# Patient Record
Sex: FEMALE | Race: WHITE | Employment: FULL TIME | URBAN - NONMETROPOLITAN AREA
[De-identification: names, ages, dates, MRNs, and addresses within clinical notes are randomized per-mention and may not be internally consistent; named-entity substitution may affect disease eponyms.]

---

## 2017-01-06 ENCOUNTER — TELEPHONE (OUTPATIENT)
Dept: PRIMARY CARE CLINIC | Age: 52
End: 2017-01-06

## 2017-02-01 ENCOUNTER — OFFICE VISIT (OUTPATIENT)
Dept: PRIMARY CARE CLINIC | Age: 52
End: 2017-02-01
Payer: COMMERCIAL

## 2017-02-01 VITALS
TEMPERATURE: 98.8 F | HEART RATE: 86 BPM | DIASTOLIC BLOOD PRESSURE: 60 MMHG | OXYGEN SATURATION: 98 % | HEIGHT: 63 IN | SYSTOLIC BLOOD PRESSURE: 110 MMHG

## 2017-02-01 DIAGNOSIS — N64.9 LESION OF BREAST: ICD-10-CM

## 2017-02-01 DIAGNOSIS — C88.4 MALTOMA (HCC): Primary | ICD-10-CM

## 2017-02-01 DIAGNOSIS — M25.50 POLYARTHRALGIA: ICD-10-CM

## 2017-02-01 DIAGNOSIS — E03.9 ACQUIRED HYPOTHYROIDISM: ICD-10-CM

## 2017-02-01 DIAGNOSIS — M72.2 PLANTAR FASCIITIS: ICD-10-CM

## 2017-02-01 PROBLEM — C88.40 MALTOMA: Status: ACTIVE | Noted: 2017-02-01

## 2017-02-01 LAB
C-REACTIVE PROTEIN: <0.3 MG/L (ref 0–5)
RHEUMATOID FACTOR: 10 IU/ML
SEDIMENTATION RATE, ERYTHROCYTE: 14 MM/HR (ref 0–25)

## 2017-02-01 PROCEDURE — 99214 OFFICE O/P EST MOD 30 MIN: CPT | Performed by: PEDIATRICS

## 2017-02-01 RX ORDER — LEVOTHYROXINE SODIUM 0.03 MG/1
25 TABLET ORAL DAILY
COMMUNITY
End: 2017-02-01 | Stop reason: SDUPTHER

## 2017-02-01 RX ORDER — LEVOTHYROXINE SODIUM 0.07 MG/1
75 TABLET ORAL DAILY
Qty: 30 TABLET | Refills: 0 | Status: SHIPPED | OUTPATIENT
Start: 2017-02-01 | End: 2017-03-02 | Stop reason: SDUPTHER

## 2017-02-01 ASSESSMENT — ENCOUNTER SYMPTOMS
ABDOMINAL PAIN: 0
DIARRHEA: 0
SINUS PRESSURE: 0
CONSTIPATION: 0
EYE PAIN: 0
BACK PAIN: 0
VOICE CHANGE: 0
NAUSEA: 0
VOMITING: 0
SHORTNESS OF BREATH: 0
EYE DISCHARGE: 0
SORE THROAT: 0
WHEEZING: 0
COUGH: 0

## 2017-02-02 ENCOUNTER — TELEPHONE (OUTPATIENT)
Dept: PRIMARY CARE CLINIC | Age: 52
End: 2017-02-02

## 2017-02-03 LAB — ANA IGG, ELISA: NORMAL

## 2017-02-06 ENCOUNTER — TELEPHONE (OUTPATIENT)
Dept: PRIMARY CARE CLINIC | Age: 52
End: 2017-02-06

## 2017-03-01 ENCOUNTER — OFFICE VISIT (OUTPATIENT)
Dept: ONCOLOGY | Facility: CLINIC | Age: 52
End: 2017-03-01

## 2017-03-01 ENCOUNTER — LAB (OUTPATIENT)
Dept: ONCOLOGY | Facility: CLINIC | Age: 52
End: 2017-03-01

## 2017-03-01 VITALS
TEMPERATURE: 98 F | OXYGEN SATURATION: 98 % | SYSTOLIC BLOOD PRESSURE: 112 MMHG | RESPIRATION RATE: 16 BRPM | HEART RATE: 76 BPM | DIASTOLIC BLOOD PRESSURE: 62 MMHG | WEIGHT: 171.1 LBS

## 2017-03-01 DIAGNOSIS — R53.1 WEAKNESS: ICD-10-CM

## 2017-03-01 DIAGNOSIS — C85.80 OTHER SPECIFIED TYPE OF NON-HODGKIN LYMPHOMA: Primary | ICD-10-CM

## 2017-03-01 DIAGNOSIS — E03.9 HYPOTHYROIDISM, UNSPECIFIED TYPE: ICD-10-CM

## 2017-03-01 DIAGNOSIS — C82.99 NODULAR LYMPHOMA, UNSPECIFIED SITE, EXTRANODAL AND SOLID ORGAN SITES: Primary | ICD-10-CM

## 2017-03-01 DIAGNOSIS — E55.9 VITAMIN D DEFICIENCY: ICD-10-CM

## 2017-03-01 DIAGNOSIS — R53.1 WEAKNESS: Primary | ICD-10-CM

## 2017-03-01 PROBLEM — C85.90 NON-HODGKIN LYMPHOMA (HCC): Status: ACTIVE | Noted: 2017-03-01

## 2017-03-01 LAB
ALBUMIN SERPL-MCNC: 4.1 G/DL (ref 3.5–5)
ALBUMIN/GLOB SERPL: 1.2 G/DL
ALP SERPL-CCNC: 70 U/L (ref 38–126)
ALT SERPL W P-5'-P-CCNC: 50 U/L (ref 9–52)
ANION GAP SERPL CALCULATED.3IONS-SCNC: 13 MMOL/L
AST SERPL-CCNC: 46 U/L (ref 5–40)
AUTO MIXED CELLS #: 0.3 10*3/UL (ref 0.1–1.5)
AUTO MIXED CELLS %: 6.8 % (ref 0.2–15.1)
BILIRUB SERPL-MCNC: 0.5 MG/DL (ref 0.2–1.3)
BUN BLD-MCNC: 9 MG/DL (ref 7–26)
BUN/CREAT SERPL: 12.9 (ref 7–25)
CALCIUM SPEC-SCNC: 9.2 MG/DL (ref 8.4–10.2)
CHLORIDE SERPL-SCNC: 103 MMOL/L (ref 98–107)
CO2 SERPL-SCNC: 29 MMOL/L (ref 22–30)
CREAT BLD-MCNC: 0.7 MG/DL (ref 0.7–1.4)
ERYTHROCYTE [DISTWIDTH] IN BLOOD BY AUTOMATED COUNT: 13.9 % (ref 11.5–14.5)
GFR SERPL CREATININE-BSD FRML MDRD: 88 ML/MIN/1.73
GLOBULIN UR ELPH-MCNC: 3.4 GM/DL
GLUCOSE BLD-MCNC: 87 MG/DL (ref 75–110)
HCT VFR BLD AUTO: 38.5 % (ref 37–47)
HGB BLD-MCNC: 12.6 G/DL (ref 12–16)
LYMPHOCYTES # BLD AUTO: 1.7 10*3/MM3 (ref 0.8–7)
LYMPHOCYTES NFR BLD AUTO: 44.8 % (ref 10–58.5)
MCH RBC QN AUTO: 30.4 PG (ref 27–31)
MCHC RBC AUTO-ENTMCNC: 32.7 G/DL (ref 33–37)
MCV RBC AUTO: 92.8 FL (ref 81–99)
NEUTROPHILS # BLD AUTO: 1.9 10*3/MM3 (ref 2–7.8)
NEUTROPHILS NFR BLD AUTO: 48.4 % (ref 37–92)
PLATELET # BLD AUTO: 225 10*3/MM3 (ref 130–400)
PMV BLD AUTO: 8.6 FL (ref 6–12)
POTASSIUM BLD-SCNC: 3.8 MMOL/L (ref 3.6–5)
PROT SERPL-MCNC: 7.5 G/DL (ref 6.3–8.2)
RBC # BLD AUTO: 4.15 10*6/MM3 (ref 4.2–5.4)
SODIUM BLD-SCNC: 145 MMOL/L (ref 137–145)
WBC NRBC COR # BLD: 3.9 10*3/MM3 (ref 4.8–10.8)

## 2017-03-01 PROCEDURE — 36415 COLL VENOUS BLD VENIPUNCTURE: CPT | Performed by: INTERNAL MEDICINE

## 2017-03-01 PROCEDURE — 80053 COMPREHEN METABOLIC PANEL: CPT | Performed by: INTERNAL MEDICINE

## 2017-03-01 PROCEDURE — 99214 OFFICE O/P EST MOD 30 MIN: CPT | Performed by: INTERNAL MEDICINE

## 2017-03-01 PROCEDURE — 83615 LACTATE (LD) (LDH) ENZYME: CPT | Performed by: INTERNAL MEDICINE

## 2017-03-01 PROCEDURE — 85025 COMPLETE CBC W/AUTO DIFF WBC: CPT | Performed by: INTERNAL MEDICINE

## 2017-03-01 RX ORDER — FLUTICASONE PROPIONATE 50 MCG
SPRAY, SUSPENSION (ML) NASAL
COMMUNITY
Start: 2016-10-26 | End: 2019-07-30

## 2017-03-01 RX ORDER — MULTIVIT WITH MINERALS/LUTEIN
TABLET ORAL
COMMUNITY
End: 2017-03-01

## 2017-03-01 RX ORDER — LEVOTHYROXINE SODIUM 0.07 MG/1
TABLET ORAL
COMMUNITY
Start: 2017-02-01 | End: 2019-08-05 | Stop reason: SDUPTHER

## 2017-03-01 RX ORDER — M-VIT,TX,IRON,MINS/CALC/FOLIC 27MG-0.4MG
TABLET ORAL
COMMUNITY
End: 2017-03-01

## 2017-03-01 NOTE — PROGRESS NOTES
[facility]  HEMATOLOGY & ONCOLOGY        Subjective     VISIT DIAGNOSIS: No diagnosis found.    REASON FOR VISIT:   No chief complaint on file.       HEMATOLOGY / ONCOLOGY HISTORY:   Oncology/Hematology History    anabella Alberto presented with signs and symptoms of intestinal obstruction to [facility] on 12/11/2013. Evaluation  revealed a partial small bowel obstruction and she was treated conservatively and improved. Prior to discharge, she underwent a small  bowel follow through which was normal. Patient returns to the hospital on 1/29/2014 with recurrence of symptoms. She was taken to the  operating room finding thickening of the bowel wall in a near circumferential fashion. The bowel was opened finding areas of ulceration and  thickening of the bowel originating from the border was a mesenteric. A small bowel resection was performed. 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  Patient is undergone a PET/CT scan which is unremarkable. Her CAT scans of her abdomen and pelvis have been unremarkable as well,  with the exception of the mentions small bowel obstruction.  Radiology Studies:  5/26/2015 CTAP: Uterine fibroid.  10/6/2015 RUQ US: Negative  10/6/2015 HIDA scan: Ejection fraction 14%.  10/7/2015 CTAP: Abnormal 3.5 Center length segment of small bowel left upper abdominal quadrant. Recommend EGD. ovarian cyst, right  increased size from previous exam. uterine leiomyomata  11/4/2015 SKULL MID THIGH PET/CT: Normal PET/CT scan.  11/6/2015 RUQ Intraoperative cholangiogram: Negative  INTERVAL HISTORY  Ms. 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 She is status post small bowel resection and is currently in remission. No additional treatment and monitoring with  CT scans, watch and wait it is our plan.         Non-Hodgkin lymphoma    3/1/2017 Initial Diagnosis    Non-Hodgkin lymphoma     [No treatment plan]  Cancer Staging Information:  No matching staging information was found for the patient.      INTERVAL HISTORY  Patient ID: Desirae Alberto is a 52 y.o. year old female         Review of Systems         Medications:    Current Outpatient Prescriptions   Medication Sig Dispense Refill   • Diclofenac Sodium (VOLTAREN PO) Take  by mouth.     • fluticasone (FLONASE) 50 MCG/ACT nasal spray 1 spray by Nasal route daily     • levothyroxine (SYNTHROID, LEVOTHROID) 75 MCG tablet Take 1 tablet by mouth Daily       No current facility-administered medications for this visit.        ALLERGIES:  No Known Allergies    Objective      @VITALS    Current Status 3/1/2017   ECOG score 0       General Appearance: Patient is awake, alert, oriented and in no acute distress. Patient is welldeveloped, wellnourished, and appears stated age.  HEENT: Normocephalic. Sclerae clear, conjunctiva pink, extraocular movements intact, pupils, round, reactive to light and  accommodation. Mouth and throat are clear with moist oral mucosa.  NECK: Supple, no jugular venous distention, thyroid not enlarged.  LYMPH: No cervical, supraclavicular, axillary, or inguinal lymphadenopathy.  CHEST: Equal bilateral expansion, AP  diameter normal, resonant percussion note  LUNGS: Good air movement, no rales, rhonchi, rubs or wheezes with auscultation  CARDIO: Regular sinus rhythm, no murmurs, gallops or rubs.  ABDOMEN: Nondistended, soft, No tenderness, no guarding, no rebound, No hepatosplenomegaly. No abdominal masses. Bowel sounds positive. No hernia  GENITALIA: Not examined.  BREASTS: Not examined.  MUSKEL: No joint swelling, decreased motion, or inflammation  EXTREMS: No edema, clubbing, cyanosis, No varicose veins.  NEURO: Grossly nonfocal, Gait is coordinated and smooth, Cognition is preserved.  SKIN: No rashes, no ecchymoses, no petechia.  PSYCH: Oriented to  time, place and person. Memory is preserved. Mood and affect appear normal      RECENT LABS:  Orders Only on 03/01/2017   Component Date Value Ref Range Status   • Glucose 03/01/2017 87  75 - 110 mg/dL Final   • BUN 03/01/2017 9  7 - 26 mg/dL Final   • Creatinine 03/01/2017 0.70  0.70 - 1.40 mg/dL Final   • Sodium 03/01/2017 145  137 - 145 mmol/L Final   • Potassium 03/01/2017 3.8  3.6 - 5.0 mmol/L Final   • Chloride 03/01/2017 103  98 - 107 mmol/L Final   • CO2 03/01/2017 29.0  22.0 - 30.0 mmol/L Final   • Calcium 03/01/2017 9.2  8.4 - 10.2 mg/dL Final   • Total Protein 03/01/2017 7.5  6.3 - 8.2 g/dL Final   • Albumin 03/01/2017 4.10  3.50 - 5.00 g/dL Final   • ALT (SGPT) 03/01/2017 50  9 - 52 U/L Final   • AST (SGOT) 03/01/2017 46* 5 - 40 U/L Final   • Alkaline Phosphatase 03/01/2017 70  38 - 126 U/L Final   • Total Bilirubin 03/01/2017 0.5  0.2 - 1.3 mg/dL Final   • eGFR Non African Amer 03/01/2017 88  >60 mL/min/1.73 Final   • Globulin 03/01/2017 3.4  gm/dL Final   • A/G Ratio 03/01/2017 1.2  g/dL Final   • BUN/Creatinine Ratio 03/01/2017 12.9  7.0 - 25.0 Final   • Anion Gap 03/01/2017 13.0  mmol/L Final   • WBC 03/01/2017 3.90* 4.80 - 10.80 10*3/mm3 Final   • RBC 03/01/2017 4.15* 4.20 - 5.40 10*6/mm3 Final   • Hemoglobin 03/01/2017 12.6  12.0 - 16.0 g/dL Final   • Hematocrit 03/01/2017 38.5  37.0 - 47.0 % Final   • MCV 03/01/2017 92.8  81.0 - 99.0 fL Final   • MCH 03/01/2017 30.4  27.0 - 31.0 pg Final   • MCHC 03/01/2017 32.7* 33.0 - 37.0 g/dL Final   • RDW 03/01/2017 13.9  11.5 - 14.5 % Final   • MPV 03/01/2017 8.6  6.0 - 12.0 fL Final   • Platelets 03/01/2017 225  130 - 400 10*3/mm3 Final   • Neutrophil % 03/01/2017 48.4  37.0 - 92.0 % Final   • Lymphocyte % 03/01/2017 44.8  10.0 - 58.5 % Final   • Auto Mixed Cells % 03/01/2017 6.8  0.2 - 15.1 % Final   • Neutrophils, Absolute 03/01/2017 1.90* 2.00 - 7.80 10*3/mm3 Final   • Lymphocytes, Absolute 03/01/2017 1.70  0.80 - 7.00 10*3/mm3 Final   • Auto Mixed Cells  # 03/01/2017 0.30  0.10 - 1.50 10*3/uL Final       RADIOLOGY:  No results found.         Assessment/Plan      MALTOMA small intestine status post resection in January 2014.  She has never had any chemotherapy.  CT scans done in 2015 shows some thickening 3.5 cm segment.  She does not have any nausea abdominal pain and a moving her bowels regularly without any blood in the stools or any black stools.  She denies any new lumps or bumps or any B symptoms.  I will repeat another CT scan of the abdomen to look at the 3.5 cm segment that showed some thickening in 2015.  I will also give her 3 stool occult blood cards to look for any blood in the stools.  I will also check beta-2 microglobulin and LDH is baseline.. Her white count is borderline low at 3.9 although the absolute neutrophil count is 1900 we will keep an eye on that.  Recently she was diagnosed with hypothyroidism and she wants me to recheck her TSH and free T4 that I will do.  She will return to clinic after the above imaging is obtained.  On my clinical examination I do not find any evidence of lymphadenopathy in the neck or under the armpit or in the groin.  Abdominal appears benign.                Efraín Coles MD    3/1/2017    3:54 PM

## 2017-03-02 DIAGNOSIS — E03.9 ACQUIRED HYPOTHYROIDISM: ICD-10-CM

## 2017-03-02 LAB — LDH SERPL-CCNC: 439 U/L (ref 313–618)

## 2017-03-02 RX ORDER — LEVOTHYROXINE SODIUM 0.07 MG/1
75 TABLET ORAL DAILY
Qty: 30 TABLET | Refills: 3 | Status: SHIPPED | OUTPATIENT
Start: 2017-03-02 | End: 2017-07-19 | Stop reason: SDUPTHER

## 2017-03-03 LAB
25(OH)D3+25(OH)D2 SERPL-MCNC: 33.9 NG/ML (ref 30–100)
B2 MICROGLOB SERPL-MCNC: 1.3 MG/L (ref 0.6–2.4)
T3FREE SERPL-MCNC: 3 PG/ML (ref 2–4.4)
T4 FREE SERPL-MCNC: 1.65 NG/DL (ref 0.78–2.19)
THYROPEROXIDASE AB SERPL-ACNC: <6 IU/ML (ref 0–34)
TSH SERPL DL<=0.005 MIU/L-ACNC: 0.88 MIU/ML (ref 0.47–4.68)

## 2017-03-10 DIAGNOSIS — E03.9 HYPOTHYROIDISM, UNSPECIFIED TYPE: Primary | ICD-10-CM

## 2017-03-10 LAB
T3 FREE: 3
T4 FREE: 1.65
TSH SERPL DL<=0.05 MIU/L-ACNC: 0.88 UIU/ML

## 2017-03-13 ENCOUNTER — TELEPHONE (OUTPATIENT)
Dept: PRIMARY CARE CLINIC | Age: 52
End: 2017-03-13

## 2017-03-14 DIAGNOSIS — C83.80: Primary | ICD-10-CM

## 2017-03-15 ENCOUNTER — LAB (OUTPATIENT)
Dept: ONCOLOGY | Facility: CLINIC | Age: 52
End: 2017-03-15

## 2017-03-15 ENCOUNTER — OFFICE VISIT (OUTPATIENT)
Dept: ONCOLOGY | Facility: CLINIC | Age: 52
End: 2017-03-15

## 2017-03-15 VITALS
TEMPERATURE: 97.4 F | HEIGHT: 63 IN | OXYGEN SATURATION: 98 % | RESPIRATION RATE: 16 BRPM | HEART RATE: 88 BPM | SYSTOLIC BLOOD PRESSURE: 130 MMHG | DIASTOLIC BLOOD PRESSURE: 82 MMHG | WEIGHT: 168.7 LBS | BODY MASS INDEX: 29.89 KG/M2

## 2017-03-15 DIAGNOSIS — C85.80 OTHER SPECIFIED TYPE OF NON-HODGKIN LYMPHOMA: Primary | ICD-10-CM

## 2017-03-15 DIAGNOSIS — C83.80: ICD-10-CM

## 2017-03-15 LAB
AUTO MIXED CELLS #: 0.2 10*3/UL (ref 0.1–1.5)
AUTO MIXED CELLS %: 4.6 % (ref 0.2–15.1)
ERYTHROCYTE [DISTWIDTH] IN BLOOD BY AUTOMATED COUNT: 13 % (ref 11.5–14.5)
HCT VFR BLD AUTO: 41.8 % (ref 37–47)
HGB BLD-MCNC: 13.2 G/DL (ref 12–16)
LYMPHOCYTES # BLD AUTO: 1.7 10*3/MM3 (ref 0.8–7)
LYMPHOCYTES NFR BLD AUTO: 40.7 % (ref 10–58.5)
MCH RBC QN AUTO: 29.7 PG (ref 27–31)
MCHC RBC AUTO-ENTMCNC: 31.6 G/DL (ref 33–37)
MCV RBC AUTO: 94.2 FL (ref 81–99)
NEUTROPHILS # BLD AUTO: 2.3 10*3/MM3 (ref 2–7.8)
NEUTROPHILS NFR BLD AUTO: 54.7 % (ref 37–92)
PLATELET # BLD AUTO: 232 10*3/MM3 (ref 130–400)
PMV BLD AUTO: 8.3 FL (ref 6–12)
RBC # BLD AUTO: 4.44 10*6/MM3 (ref 4.2–5.4)
WBC NRBC COR # BLD: 4.2 10*3/MM3 (ref 4.8–10.8)

## 2017-03-15 PROCEDURE — 36415 COLL VENOUS BLD VENIPUNCTURE: CPT | Performed by: INTERNAL MEDICINE

## 2017-03-15 PROCEDURE — 99214 OFFICE O/P EST MOD 30 MIN: CPT | Performed by: INTERNAL MEDICINE

## 2017-03-15 PROCEDURE — 85025 COMPLETE CBC W/AUTO DIFF WBC: CPT | Performed by: INTERNAL MEDICINE

## 2017-03-15 RX ORDER — LEVOTHYROXINE SODIUM 0.03 MG/1
TABLET ORAL
COMMUNITY
Start: 2017-01-19 | End: 2017-03-15

## 2017-03-15 NOTE — PROGRESS NOTES
[facility]  HEMATOLOGY & ONCOLOGY        Subjective     VISIT DIAGNOSIS: No diagnosis found.    REASON FOR VISIT:   No chief complaint on file.       HEMATOLOGY / ONCOLOGY HISTORY:   Oncology/Hematology History    anabella Alberto presented with signs and symptoms of intestinal obstruction to [facility] on 12/11/2013. Evaluation  revealed a partial small bowel obstruction and she was treated conservatively and improved. Prior to discharge, she underwent a small  bowel follow through which was normal. Patient returns to the hospital on 1/29/2014 with recurrence of symptoms. She was taken to the  operating room finding thickening of the bowel wall in a near circumferential fashion. The bowel was opened finding areas of ulceration and  thickening of the bowel originating from the border was a mesenteric. A small bowel resection was performed. 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  Patient is undergone a PET/CT scan which is unremarkable. Her CAT scans of her abdomen and pelvis have been unremarkable as well,  with the exception of the mentions small bowel obstruction.  Radiology Studies:  5/26/2015 CTAP: Uterine fibroid.  10/6/2015 RUQ US: Negative  10/6/2015 HIDA scan: Ejection fraction 14%.  10/7/2015 CTAP: Abnormal 3.5 Center length segment of small bowel left upper abdominal quadrant. Recommend EGD. ovarian cyst, right  increased size from previous exam. uterine leiomyomata  11/4/2015 SKULL MID THIGH PET/CT: Normal PET/CT scan.  11/6/2015 RUQ Intraoperative cholangiogram: Negative  INTERVAL HISTORY  Ms. 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 She is status post small bowel resection and is currently in remission. No additional treatment and monitoring with  CT scans, watch and wait it is our plan.         Non-Hodgkin lymphoma    3/1/2017 Initial Diagnosis    Non-Hodgkin lymphoma     [No treatment plan]  Cancer Staging Information:  No matching staging information was found for the patient.      INTERVAL HISTORY  Patient ID: Desirae Alberto is a 52 y.o. year old female         Review of Systems   Constitutional: Negative.    HENT: Negative.    Eyes: Negative.    Respiratory: Negative.    Cardiovascular: Negative.    Gastrointestinal: Negative.    Endocrine: Negative.    Genitourinary: Negative.    Musculoskeletal: Negative.    Skin: Negative.    Allergic/Immunologic: Negative.    Neurological: Negative.    Hematological: Negative.    Psychiatric/Behavioral: Negative.             Medications:    Current Outpatient Prescriptions   Medication Sig Dispense Refill   • Diclofenac Sodium (VOLTAREN PO) Take  by mouth.     • fluticasone (FLONASE) 50 MCG/ACT nasal spray 1 spray by Nasal route daily     • levothyroxine (SYNTHROID, LEVOTHROID) 25 MCG tablet      • levothyroxine (SYNTHROID, LEVOTHROID) 75 MCG tablet Take 1 tablet by mouth Daily       No current facility-administered medications for this visit.        ALLERGIES:  No Known Allergies    Objective      @VITALS    Current Status 3/1/2017   ECOG score 0       General Appearance: Patient is awake, alert, oriented and in no acute distress. Patient is welldeveloped, wellnourished, and appears stated age.  HEENT: Normocephalic. Sclerae clear, conjunctiva pink, extraocular movements intact, pupils, round, reactive to light and  accommodation. Mouth and throat are clear with moist oral mucosa.  NECK: Supple, no jugular venous distention, thyroid not enlarged.  LYMPH: No cervical, supraclavicular, axillary, or inguinal lymphadenopathy.  CHEST: Equal bilateral expansion, AP  diameter normal, resonant percussion note  LUNGS: Good air movement, no rales, rhonchi, rubs or wheezes with auscultation  CARDIO: Regular sinus rhythm, no murmurs, gallops or rubs.  ABDOMEN:  Nondistended, soft, No tenderness, no guarding, no rebound, No hepatosplenomegaly. No abdominal masses. Bowel sounds positive. No hernia  GENITALIA: Not examined.  BREASTS: Not examined.  MUSKEL: No joint swelling, decreased motion, or inflammation  EXTREMS: No edema, clubbing, cyanosis, No varicose veins.  NEURO: Grossly nonfocal, Gait is coordinated and smooth, Cognition is preserved.  SKIN: No rashes, no ecchymoses, no petechia.  PSYCH: Oriented to time, place and person. Memory is preserved. Mood and affect appear normal      RECENT LABS:  No visits with results within 7 Day(s) from this visit.  Latest known visit with results is:    Orders Only on 03/01/2017   Component Date Value Ref Range Status   • LDH 03/01/2017 439  313 - 618 U/L Final   • Free T4 03/01/2017 1.65  0.78 - 2.19 ng/dL Final   • TSH 03/01/2017 0.884  0.470 - 4.680 mIU/mL Final   • 25 Hydroxy, Vitamin D 03/01/2017 33.9  30.0 - 100.0 ng/mL Final   • Thyroid Peroxidase Antibody 03/01/2017 <6  0 - 34 IU/mL Final   • Beta-2 03/01/2017 1.3  0.6 - 2.4 mg/L Final   • T3, Free 03/01/2017 3.0  2.0 - 4.4 pg/mL Final       RADIOLOGY:  No results found.         Assessment/Plan      MALTOMA small intestine status post resection in January 2014.  She has never had any chemotherapy.  CT scans done in 2015 shows some thickening 3.5 cm segment.  She does not have any nausea abdominal pain and a moving her bowels regularly without any blood in the stools or any black stools.  She denies any new lumps or bumps or any B symptoms.  I will repeat another CT scan of the abdomen to look at the 3.5 cm segment that showed some thickening in 2015.  I will also give her 3 stool occult blood cards to look for any blood in the stools.  I will also check beta-2 microglobulin and LDH is baseline.. Her white count is borderline low at 3.9 although the absolute neutrophil count is 1900 we will keep an eye on that.  Recently she was diagnosed with hypothyroidism and she wants me  to recheck her TSH and free T4 that I will do.  She will return to clinic after the above imaging is obtained.  On my clinical examination I do not find any evidence of lymphadenopathy in the neck or under the armpit or in the groin.  Abdominal appears benign.        Beat imaging studies with a CT scan of the abdomen does not show any evidence of recurrent lymphoma.  LDH and beta-2 microglobulin normal.  Chest x-ray was also reportedly normal.  Clinical examination essentially benign.  Continue observation.    Efraín Coles MD    3/15/2017    1:19 PM

## 2017-04-10 DIAGNOSIS — Z12.11 ENCOUNTER FOR SCREENING COLONOSCOPY: Primary | ICD-10-CM

## 2017-07-19 ENCOUNTER — OFFICE VISIT (OUTPATIENT)
Dept: PRIMARY CARE CLINIC | Age: 52
End: 2017-07-19
Payer: COMMERCIAL

## 2017-07-19 VITALS
WEIGHT: 168.5 LBS | SYSTOLIC BLOOD PRESSURE: 120 MMHG | HEART RATE: 70 BPM | TEMPERATURE: 98.3 F | HEIGHT: 63 IN | BODY MASS INDEX: 29.86 KG/M2 | DIASTOLIC BLOOD PRESSURE: 76 MMHG | OXYGEN SATURATION: 96 %

## 2017-07-19 DIAGNOSIS — M13.0 POLYARTHRITIS: ICD-10-CM

## 2017-07-19 DIAGNOSIS — E03.9 ACQUIRED HYPOTHYROIDISM: ICD-10-CM

## 2017-07-19 DIAGNOSIS — R53.83 FATIGUE, UNSPECIFIED TYPE: Primary | ICD-10-CM

## 2017-07-19 PROCEDURE — 99214 OFFICE O/P EST MOD 30 MIN: CPT | Performed by: PEDIATRICS

## 2017-07-19 RX ORDER — LEVOTHYROXINE SODIUM 0.07 MG/1
75 TABLET ORAL DAILY
Qty: 30 TABLET | Refills: 11 | Status: SHIPPED | OUTPATIENT
Start: 2017-07-19 | End: 2017-08-25 | Stop reason: SDUPTHER

## 2017-07-19 ASSESSMENT — ENCOUNTER SYMPTOMS
BACK PAIN: 0
NAUSEA: 0
TROUBLE SWALLOWING: 0
CHOKING: 0
SHORTNESS OF BREATH: 0
VOICE CHANGE: 0
SINUS PRESSURE: 0
CONSTIPATION: 0
COUGH: 0
ABDOMINAL DISTENTION: 0
SORE THROAT: 0
CHEST TIGHTNESS: 0
ABDOMINAL PAIN: 0
DIARRHEA: 0
WHEEZING: 0
VOMITING: 0

## 2017-07-20 DIAGNOSIS — E03.9 ACQUIRED HYPOTHYROIDISM: ICD-10-CM

## 2017-07-20 DIAGNOSIS — R53.83 FATIGUE, UNSPECIFIED TYPE: ICD-10-CM

## 2017-07-20 DIAGNOSIS — M13.0 POLYARTHRITIS: ICD-10-CM

## 2017-07-20 LAB
C-REACTIVE PROTEIN: 0.3 MG/DL (ref 0–0.5)
CORTISOL - AM: 5.4 UG/DL (ref 6.2–19.4)
ESTRADIOL LEVEL: 160.5 PG/ML
GLUCOSE BLD-MCNC: 92 MG/DL (ref 74–109)
HBA1C MFR BLD: 5.9 %
INSULIN: 4.9 MU/L (ref 2.6–24.9)
PROGESTERONE LEVEL: <0.05 NG/ML
SEDIMENTATION RATE, ERYTHROCYTE: 9 MM/HR (ref 0–25)
T3 FREE: 3.2 PG/ML (ref 2–4.4)
T4 FREE: 1.1 NG/ML (ref 0.9–1.7)
TSH SERPL DL<=0.05 MIU/L-ACNC: 2.83 UIU/ML (ref 0.27–4.2)
VITAMIN B-12: 334 PG/ML (ref 211–946)
VITAMIN D 25-HYDROXY: 38.1 NG/ML

## 2017-07-22 LAB
ANA IGG, ELISA: NORMAL
DHEAS (DHEA SULFATE): 53 UG/DL (ref 26–200)

## 2017-07-23 LAB — T3 REVERSE: 15.2 NG/DL (ref 9–27)

## 2017-07-25 ENCOUNTER — TELEPHONE (OUTPATIENT)
Dept: PRIMARY CARE CLINIC | Age: 52
End: 2017-07-25

## 2017-07-25 LAB — ESTRONE: 78.2 PG/ML

## 2017-07-26 LAB — THYROGLOBULIN BY LC-MS/MS, SERUM/PLASMA: 11.1 NG/ML (ref 1.3–31.8)

## 2017-07-27 ENCOUNTER — TELEPHONE (OUTPATIENT)
Dept: PRIMARY CARE CLINIC | Age: 52
End: 2017-07-27

## 2017-08-25 DIAGNOSIS — E03.9 ACQUIRED HYPOTHYROIDISM: ICD-10-CM

## 2017-08-25 RX ORDER — LEVOTHYROXINE SODIUM 0.07 MG/1
75 TABLET ORAL DAILY
Qty: 90 TABLET | Refills: 3 | Status: SHIPPED | OUTPATIENT
Start: 2017-08-25

## 2017-08-28 DIAGNOSIS — C85.90 LEUCOSARCOMA (HCC): Primary | ICD-10-CM

## 2017-08-30 ENCOUNTER — OFFICE VISIT (OUTPATIENT)
Dept: ONCOLOGY | Facility: CLINIC | Age: 52
End: 2017-08-30

## 2017-08-30 ENCOUNTER — LAB (OUTPATIENT)
Dept: LAB | Facility: HOSPITAL | Age: 52
End: 2017-08-30

## 2017-08-30 VITALS
SYSTOLIC BLOOD PRESSURE: 130 MMHG | HEIGHT: 63 IN | DIASTOLIC BLOOD PRESSURE: 76 MMHG | WEIGHT: 167 LBS | RESPIRATION RATE: 16 BRPM | HEART RATE: 78 BPM | BODY MASS INDEX: 29.59 KG/M2 | OXYGEN SATURATION: 98 % | TEMPERATURE: 98.1 F

## 2017-08-30 DIAGNOSIS — Z00.00 HEALTH CARE MAINTENANCE: ICD-10-CM

## 2017-08-30 DIAGNOSIS — C85.80 OTHER SPECIFIED TYPE OF NON-HODGKIN LYMPHOMA: Primary | ICD-10-CM

## 2017-08-30 DIAGNOSIS — C88.4 EXTRANODAL MARGINAL ZONE B-CELL LYMPHOMA OF MUCOSA-ASSOCIATED LYMPHOID TISSUE (MALT) IN REMISSION (HCC): Primary | ICD-10-CM

## 2017-08-30 LAB
ALBUMIN SERPL-MCNC: 4.3 G/DL (ref 3.5–5)
ALBUMIN/GLOB SERPL: 1.4 G/DL (ref 1.1–2.5)
ALP SERPL-CCNC: 79 U/L (ref 24–120)
ALT SERPL W P-5'-P-CCNC: 36 U/L (ref 0–54)
ANION GAP SERPL CALCULATED.3IONS-SCNC: 8 MMOL/L (ref 4–13)
AST SERPL-CCNC: 31 U/L (ref 7–45)
AUTO MIXED CELLS #: 0.4 10*3/MM3 (ref 0.1–2.6)
AUTO MIXED CELLS %: 7.4 % (ref 0.1–24)
BILIRUB SERPL-MCNC: 0.6 MG/DL (ref 0.1–1)
BUN BLD-MCNC: 11 MG/DL (ref 5–21)
BUN/CREAT SERPL: 15.5
CALCIUM SPEC-SCNC: 9.4 MG/DL (ref 8.4–10.4)
CHLORIDE SERPL-SCNC: 101 MMOL/L (ref 98–110)
CO2 SERPL-SCNC: 28 MMOL/L (ref 24–31)
CREAT BLD-MCNC: 0.71 MG/DL (ref 0.5–1.4)
ERYTHROCYTE [DISTWIDTH] IN BLOOD BY AUTOMATED COUNT: 12.3 % (ref 12–15)
GFR SERPL CREATININE-BSD FRML MDRD: 87 ML/MIN/1.73
GLOBULIN UR ELPH-MCNC: 3.1 GM/DL
GLUCOSE BLD-MCNC: 92 MG/DL (ref 70–100)
HCT VFR BLD AUTO: 37.9 % (ref 37–47)
HGB BLD-MCNC: 13.4 G/DL (ref 12–16)
HOLD SPECIMEN: NORMAL
LYMPHOCYTES # BLD AUTO: 1.9 10*3/MM3 (ref 0.8–7)
LYMPHOCYTES NFR BLD AUTO: 35.9 % (ref 15–45)
MCH RBC QN AUTO: 30.2 PG (ref 28–32)
MCHC RBC AUTO-ENTMCNC: 35.4 G/DL (ref 33–36)
MCV RBC AUTO: 85.4 FL (ref 82–98)
NEUTROPHILS # BLD AUTO: 3 10*3/MM3 (ref 1.5–8.3)
NEUTROPHILS NFR BLD AUTO: 56.7 % (ref 39–78)
PLATELET # BLD AUTO: 198 10*3/MM3 (ref 130–400)
PMV BLD AUTO: 9 FL (ref 6–12)
POTASSIUM BLD-SCNC: 4 MMOL/L (ref 3.5–5.3)
PROT SERPL-MCNC: 7.4 G/DL (ref 6.3–8.7)
RBC # BLD AUTO: 4.44 10*6/MM3 (ref 4.2–5.4)
SODIUM BLD-SCNC: 137 MMOL/L (ref 135–145)
WBC NRBC COR # BLD: 5.3 10*3/MM3 (ref 4.8–10.8)

## 2017-08-30 PROCEDURE — 99214 OFFICE O/P EST MOD 30 MIN: CPT | Performed by: INTERNAL MEDICINE

## 2017-08-30 PROCEDURE — 36415 COLL VENOUS BLD VENIPUNCTURE: CPT

## 2017-08-30 PROCEDURE — 85025 COMPLETE CBC W/AUTO DIFF WBC: CPT | Performed by: INTERNAL MEDICINE

## 2017-08-30 PROCEDURE — 80053 COMPREHEN METABOLIC PANEL: CPT | Performed by: INTERNAL MEDICINE

## 2017-08-30 NOTE — PROGRESS NOTES
[facility]  HEMATOLOGY & ONCOLOGY        Subjective     VISIT DIAGNOSIS:   Encounter Diagnosis   Name Primary?   • Extranodal marginal zone B-cell lymphoma of mucosa-associated lymphoid tissue (MALT) in remission Yes       REASON FOR VISIT:     Chief Complaint   Patient presents with   • Extranodal marginal zone lymphoma of MALT     She is here for f/u visit        HEMATOLOGY / ONCOLOGY HISTORY:   Oncology/Hematology History    Dolly presented with signs and symptoms of intestinal obstruction to [facility] on 12/11/2013. Evaluation  revealed a partial small bowel obstruction and she was treated conservatively and improved. Prior to discharge, she underwent a small  bowel follow through which was normal. Patient returns to the hospital on 1/29/2014 with recurrence of symptoms. She was taken to the  operating room finding thickening of the bowel wall in a near circumferential fashion. The bowel was opened finding areas of ulceration and  thickening of the bowel originating from the border was a mesenteric. A small bowel resection was performed. 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  Patient is undergone a PET/CT scan which is unremarkable for distant metastases.   Radiology Studies:  5/26/2015 CTAP: Uterine fibroid.  10/6/2015 RUQ US: Negative  10/6/2015 HIDA scan: Ejection fraction 14%.  10/7/2015 CTAP: Abnormal 3.5 Center length segment of small bowel left upper abdominal quadrant. Recommend EGD. ovarian cyst, right  increased size from previous exam. uterine leiomyomata  11/4/2015 SKULL MID THIGH PET/CT: Normal PET/CT scan.  11/6/2015 RUQ Intraoperative cholangiogram: Negative  Currently on watch and wait.  Abdominal CT performed 03/13/2017 unremarkable for recurrence of disease.        Non-Hodgkin lymphoma    3/1/2017 Initial Diagnosis     Non-Hodgkin lymphoma          Cancer Staging Information:  No  matching staging information was found for the patient.      INTERVAL HISTORY  Patient ID: Desirae Alberto is a 52 y.o. year old female with history of external bertram marginal zone MALT of the intestine.  She was resected in 2013 and has been an JC.  She denies abdominal pain, nausea or vomiting, diarrhea or constipation.  For a brief period of time, she went on gluten-free diet and lost some weight, reintroduced carbohydrate and regained those weight.  Last CT 03/13/2017  unremarkable for recurrence of disease, but noted was uterine fibroid stable from last time.    History of fibrocystic breast mass last mammogram February 29 of 2016.  Showed left breast cyst 2 o'clock position.  She had excisional biopsy of a breast mass in 2016 that showed spindle cell mass with inflammation, no malignancy detected.  This was also reviewed by independent lab at [place] that agreed with local pathologist that this is a benign breast mass.  Patient reports sometimes feeling a lump upper quadrant of the left breast.  This is same area noted on breast mammogram.  No nipple discharge.  No night sweats.  No axillary, inguinal, or cervical lymphadenopathy.  No unintentional weight loss.      Past Medical History: No past medical history on file.  Past Surgical History: No past surgical history on file.  Social History:   Social History     Social History   • Marital status:      Spouse name: N/A   • Number of children: N/A   • Years of education: N/A     Occupational History   • Not on file.     Social History Main Topics   • Smoking status: Never Smoker   • Smokeless tobacco: Not on file   • Alcohol use No   • Drug use: No   • Sexual activity: Defer     Other Topics Concern   • Not on file     Social History Narrative     Family History: No family history on file.    Review of Systems   Constitutional: Negative.    HENT: Negative.    Eyes: Negative.    Respiratory: Negative.    Cardiovascular: Negative.    Gastrointestinal:  "Negative.    Endocrine: Negative.           Diagnosis of hypothyroidism.   Genitourinary: Negative.    Musculoskeletal: Negative.    Skin: Negative.    Neurological: Negative.    Hematological: Negative.    Psychiatric/Behavioral: Negative.         Performance Status:  Asymptomatic    Medications:    Current Outpatient Prescriptions   Medication Sig Dispense Refill   • Diclofenac Sodium (VOLTAREN PO) Take  by mouth.     • fluticasone (FLONASE) 50 MCG/ACT nasal spray 1 spray by Nasal route daily     • levothyroxine (SYNTHROID, LEVOTHROID) 75 MCG tablet Take 1 tablet by mouth Daily     • MULTIPLE VITAMIN PO Take  by mouth.       No current facility-administered medications for this visit.        ALLERGIES:  No Known Allergies    Objective      Vitals:    08/30/17 1456   BP: 130/76   Pulse: 78   Resp: 16   Temp: 98.1 °F (36.7 °C)   TempSrc: Tympanic   SpO2: 98%   Weight: 167 lb (75.8 kg)   Height: 63\" (160 cm)         Current Status 8/30/2017   ECOG score 0         Physical Exam  General Appearance: Patient is awake, alert, oriented and in no acute distress. Patient is welldeveloped, wellnourished, and appears stated age.  HEENT: Normocephalic. Sclerae clear, conjunctiva pink, extraocular movements intact, pupils, round, reactive to light and  accommodation. Mouth and throat are clear with moist oral mucosa.  NECK: Supple, no jugular venous distention, thyroid not enlarged.  LYMPH: No cervical, supraclavicular, axillary, or inguinal lymphadenopathy.  CHEST: Equal bilateral expansion, AP  diameter normal, resonant percussion note  LUNGS: Good air movement, no rales, rhonchi, rubs or wheezes with auscultation  CARDIO: Regular sinus rhythm, no murmurs, gallops or rubs.  ABDOMEN: Nondistended, soft, No tenderness, no guarding, no rebound, No hepatosplenomegaly. No abdominal masses. Bowel sounds positive. No hernia  GENITALIA: Not examined.  BREASTS: Not examined.  MUSKEL: No joint swelling, decreased motion, or " inflammation  EXTREMS: No edema, clubbing, cyanosis, No varicose veins.  No axillary, or cervical lymphadenopathy noted  NEURO: Grossly nonfocal, Gait is coordinated and smooth, Cognition is preserved.  SKIN: No rashes, no ecchymoses, no petechia.  PSYCH: Oriented to time, place and person. Memory is preserved. Mood and affect appear normal  RECENT LABS:  Orders Only on 08/28/2017   Component Date Value Ref Range Status   • WBC 08/30/2017 5.30  4.80 - 10.80 10*3/mm3 Final   • RBC 08/30/2017 4.44  4.20 - 5.40 10*6/mm3 Final   • Hemoglobin 08/30/2017 13.4  12.0 - 16.0 g/dL Final   • Hematocrit 08/30/2017 37.9  37.0 - 47.0 % Final   • MCV 08/30/2017 85.4  82.0 - 98.0 fL Final   • MCH 08/30/2017 30.2  28.0 - 32.0 pg Final   • MCHC 08/30/2017 35.4  33.0 - 36.0 g/dL Final   • RDW 08/30/2017 12.3  12.0 - 15.0 % Final   • MPV 08/30/2017 9.0  6.0 - 12.0 fL Final   • Platelets 08/30/2017 198  130 - 400 10*3/mm3 Final   • Neutrophil % 08/30/2017 56.7  39.0 - 78.0 % Final   • Lymphocyte % 08/30/2017 35.9  15.0 - 45.0 % Final   • Auto Mixed Cells % 08/30/2017 7.4  0.1 - 24.0 % Final   • Neutrophils, Absolute 08/30/2017 3.00  1.50 - 8.30 10*3/mm3 Final   • Lymphocytes, Absolute 08/30/2017 1.90  0.80 - 7.00 10*3/mm3 Final   • Auto Mixed Cells # 08/30/2017 0.40  0.10 - 2.60 10*3/mm3 Final       RADIOLOGY:  No results found.         Assessment/Plan 54-year-old female history of far extranodal marginal zone MALT of the intestine and is status post surgical resection [id number] currently JC.    Patient Active Problem List   Diagnosis   • Non-Hodgkin lymphoma          1.External bertram marginal zone MALT of the intestine: Four years since resection.  CT no evidence of recurrence continue CBC, CMP, LDH, beta-2 microglobulin, every 6 months.  No CT scan unless clinically indicated.    2.  Fibrocystic breast disease. Ordered mammogram she is due.    3.  Have maintenance: Advised healthy diet, exercise, at least 30 minutes of exercise that  will increase her heart rate 5 days a week.        Shweta Hwang MD    8/30/2017    3:04 PM

## 2018-09-02 ENCOUNTER — TELEPHONE (OUTPATIENT)
Dept: PRIMARY CARE CLINIC | Age: 53
End: 2018-09-02

## 2018-09-02 DIAGNOSIS — Z79.899 MEDICATION MANAGEMENT: ICD-10-CM

## 2018-09-02 DIAGNOSIS — Z00.00 ROUTINE GENERAL MEDICAL EXAMINATION AT A HEALTH CARE FACILITY: Primary | ICD-10-CM

## 2018-09-02 DIAGNOSIS — E03.9 ACQUIRED HYPOTHYROIDISM: ICD-10-CM

## 2018-09-04 NOTE — TELEPHONE ENCOUNTER
LM for pt to callback and schedule an appt. She also needs labs before her appt since last seen 7/19/17. (incase she found new MD will wait until hear back to refill med)    What labs would you like to do?  She had 10 different things done at last lab draw and it didn't include Lipid, CBC, CMP

## 2018-09-05 RX ORDER — LEVOTHYROXINE SODIUM 0.07 MG/1
75 TABLET ORAL DAILY
Qty: 30 TABLET | Refills: 0 | OUTPATIENT
Start: 2018-09-05

## 2018-09-05 NOTE — TELEPHONE ENCOUNTER
Pt is seeing Laddie Phoenix, APRN for her thyroid. Will deny this med. She will make an appt if needs us in the future. Received fax from pharmacy requesting refill on pts medication(s). Pt was last seen in office on Visit date not found  and has a follow up scheduled for Visit date not found. Will send request to  Pharmacy  for patient.      Requested Prescriptions     Refused Prescriptions Disp Refills    levothyroxine (SYNTHROID) 75 MCG tablet [Pharmacy Med Name: LEVOTHYROXINE 75 MCG TABLET] 30 tablet 0     Sig: Take 1 tablet by mouth Daily     Refused By: Analisa Teran     Reason for Refusal: Refill not appropriate

## 2018-09-06 DIAGNOSIS — E03.9 ACQUIRED HYPOTHYROIDISM: ICD-10-CM

## 2018-09-06 RX ORDER — LEVOTHYROXINE SODIUM 0.07 MG/1
TABLET ORAL
Qty: 30 TABLET | Refills: 2 | OUTPATIENT
Start: 2018-09-06

## 2019-07-30 ENCOUNTER — LAB (OUTPATIENT)
Dept: LAB | Facility: HOSPITAL | Age: 54
End: 2019-07-30

## 2019-07-30 ENCOUNTER — OFFICE VISIT (OUTPATIENT)
Dept: INTERNAL MEDICINE | Facility: CLINIC | Age: 54
End: 2019-07-30

## 2019-07-30 ENCOUNTER — HOSPITAL ENCOUNTER (OUTPATIENT)
Dept: MRI IMAGING | Facility: HOSPITAL | Age: 54
Discharge: HOME | End: 2019-07-30
Admitting: INTERNAL MEDICINE

## 2019-07-30 VITALS
HEIGHT: 63 IN | RESPIRATION RATE: 16 BRPM | HEART RATE: 84 BPM | OXYGEN SATURATION: 97 % | DIASTOLIC BLOOD PRESSURE: 90 MMHG | SYSTOLIC BLOOD PRESSURE: 130 MMHG | BODY MASS INDEX: 30.58 KG/M2 | WEIGHT: 172.6 LBS

## 2019-07-30 DIAGNOSIS — G45.9 TIA (TRANSIENT ISCHEMIC ATTACK): ICD-10-CM

## 2019-07-30 DIAGNOSIS — G45.9 TIA (TRANSIENT ISCHEMIC ATTACK): Primary | ICD-10-CM

## 2019-07-30 DIAGNOSIS — R20.2 PARESTHESIA: ICD-10-CM

## 2019-07-30 DIAGNOSIS — Z15.89 MTHFR MUTATION: ICD-10-CM

## 2019-07-30 DIAGNOSIS — C85.80 OTHER SPECIFIED TYPE OF NON-HODGKIN LYMPHOMA, UNSPECIFIED BODY REGION (HCC): ICD-10-CM

## 2019-07-30 DIAGNOSIS — E03.8 OTHER SPECIFIED HYPOTHYROIDISM: ICD-10-CM

## 2019-07-30 LAB
25(OH)D3 SERPL-MCNC: 53 NG/ML (ref 30–100)
ALBUMIN SERPL-MCNC: 4.8 G/DL (ref 3.5–5)
ALBUMIN/GLOB SERPL: 1.4 G/DL (ref 1.1–2.5)
ALP SERPL-CCNC: 83 U/L (ref 24–120)
ALT SERPL W P-5'-P-CCNC: 28 U/L (ref 0–54)
ANION GAP SERPL CALCULATED.3IONS-SCNC: 9 MMOL/L (ref 4–13)
ARTICHOKE IGE QN: 124 MG/DL (ref 0–99)
AST SERPL-CCNC: 29 U/L (ref 7–45)
BASOPHILS # BLD AUTO: 0.02 10*3/MM3 (ref 0–0.2)
BASOPHILS NFR BLD AUTO: 0.4 % (ref 0–2)
BILIRUB SERPL-MCNC: 0.6 MG/DL (ref 0.1–1)
BUN BLD-MCNC: 9 MG/DL (ref 5–21)
BUN/CREAT SERPL: 13.8 (ref 7–25)
CALCIUM SPEC-SCNC: 9.7 MG/DL (ref 8.4–10.4)
CHLORIDE SERPL-SCNC: 103 MMOL/L (ref 98–110)
CHOLEST SERPL-MCNC: 252 MG/DL (ref 130–200)
CO2 SERPL-SCNC: 28 MMOL/L (ref 24–31)
CREAT BLD-MCNC: 0.65 MG/DL (ref 0.5–1.4)
CREAT BLDA-MCNC: 0.7 MG/DL (ref 0.6–1.3)
DEPRECATED RDW RBC AUTO: 39.9 FL (ref 40–54)
EOSINOPHIL # BLD AUTO: 0.03 10*3/MM3 (ref 0–0.7)
EOSINOPHIL NFR BLD AUTO: 0.6 % (ref 0–4)
ERYTHROCYTE [DISTWIDTH] IN BLOOD BY AUTOMATED COUNT: 12.5 % (ref 12–15)
ERYTHROCYTE [SEDIMENTATION RATE] IN BLOOD: 5 MM/HR (ref 0–20)
GFR SERPL CREATININE-BSD FRML MDRD: 95 ML/MIN/1.73
GLOBULIN UR ELPH-MCNC: 3.4 GM/DL
GLUCOSE BLD-MCNC: 87 MG/DL (ref 70–100)
HCT VFR BLD AUTO: 40.5 % (ref 37–47)
HDLC SERPL-MCNC: 84 MG/DL
HGB BLD-MCNC: 13.6 G/DL (ref 12–16)
IMM GRANULOCYTES # BLD AUTO: 0.01 10*3/MM3 (ref 0–0.05)
IMM GRANULOCYTES NFR BLD AUTO: 0.2 % (ref 0–5)
LDH SERPL-CCNC: 430 U/L (ref 265–665)
LDLC/HDLC SERPL: 1.38 {RATIO}
LYMPHOCYTES # BLD AUTO: 1.8 10*3/MM3 (ref 0.72–4.86)
LYMPHOCYTES NFR BLD AUTO: 38 % (ref 15–45)
MAGNESIUM SERPL-MCNC: 2 MG/DL (ref 1.4–2.2)
MCH RBC QN AUTO: 29.4 PG (ref 28–32)
MCHC RBC AUTO-ENTMCNC: 33.6 G/DL (ref 33–36)
MCV RBC AUTO: 87.5 FL (ref 82–98)
MONOCYTES # BLD AUTO: 0.25 10*3/MM3 (ref 0.19–1.3)
MONOCYTES NFR BLD AUTO: 5.3 % (ref 4–12)
NEUTROPHILS # BLD AUTO: 2.63 10*3/MM3 (ref 1.87–8.4)
NEUTROPHILS NFR BLD AUTO: 55.5 % (ref 39–78)
NRBC BLD AUTO-RTO: 0 /100 WBC (ref 0–0.2)
PHOSPHATE SERPL-MCNC: 3.4 MG/DL (ref 2.5–4.5)
PLATELET # BLD AUTO: 250 10*3/MM3 (ref 130–400)
PMV BLD AUTO: 9.8 FL (ref 6–12)
POTASSIUM BLD-SCNC: 3.9 MMOL/L (ref 3.5–5.3)
PROT SERPL-MCNC: 8.2 G/DL (ref 6.3–8.7)
RBC # BLD AUTO: 4.63 10*6/MM3 (ref 4.2–5.4)
SODIUM BLD-SCNC: 140 MMOL/L (ref 135–145)
TRIGL SERPL-MCNC: 261 MG/DL (ref 0–149)
TSH SERPL DL<=0.05 MIU/L-ACNC: 0.94 MIU/ML (ref 0.47–4.68)
VIT B12 BLD-MCNC: 596 PG/ML (ref 239–931)
WBC NRBC COR # BLD: 4.74 10*3/MM3 (ref 4.8–10.8)

## 2019-07-30 PROCEDURE — 84443 ASSAY THYROID STIM HORMONE: CPT | Performed by: INTERNAL MEDICINE

## 2019-07-30 PROCEDURE — 80053 COMPREHEN METABOLIC PANEL: CPT | Performed by: INTERNAL MEDICINE

## 2019-07-30 PROCEDURE — A9577 INJ MULTIHANCE: HCPCS | Performed by: INTERNAL MEDICINE

## 2019-07-30 PROCEDURE — 93000 ELECTROCARDIOGRAM COMPLETE: CPT | Performed by: INTERNAL MEDICINE

## 2019-07-30 PROCEDURE — 82565 ASSAY OF CREATININE: CPT

## 2019-07-30 PROCEDURE — 82607 VITAMIN B-12: CPT | Performed by: INTERNAL MEDICINE

## 2019-07-30 PROCEDURE — 81291 MTHFR GENE: CPT | Performed by: INTERNAL MEDICINE

## 2019-07-30 PROCEDURE — 85025 COMPLETE CBC W/AUTO DIFF WBC: CPT | Performed by: INTERNAL MEDICINE

## 2019-07-30 PROCEDURE — 83615 LACTATE (LD) (LDH) ENZYME: CPT | Performed by: INTERNAL MEDICINE

## 2019-07-30 PROCEDURE — 36415 COLL VENOUS BLD VENIPUNCTURE: CPT

## 2019-07-30 PROCEDURE — 83735 ASSAY OF MAGNESIUM: CPT | Performed by: INTERNAL MEDICINE

## 2019-07-30 PROCEDURE — 99204 OFFICE O/P NEW MOD 45 MIN: CPT | Performed by: INTERNAL MEDICINE

## 2019-07-30 PROCEDURE — 82306 VITAMIN D 25 HYDROXY: CPT | Performed by: INTERNAL MEDICINE

## 2019-07-30 PROCEDURE — 80061 LIPID PANEL: CPT | Performed by: INTERNAL MEDICINE

## 2019-07-30 PROCEDURE — 84100 ASSAY OF PHOSPHORUS: CPT | Performed by: INTERNAL MEDICINE

## 2019-07-30 PROCEDURE — 0 GADOBENATE DIMEGLUMINE 529 MG/ML SOLUTION: Performed by: INTERNAL MEDICINE

## 2019-07-30 PROCEDURE — 70553 MRI BRAIN STEM W/O & W/DYE: CPT

## 2019-07-30 PROCEDURE — 82232 ASSAY OF BETA-2 PROTEIN: CPT | Performed by: INTERNAL MEDICINE

## 2019-07-30 PROCEDURE — 85651 RBC SED RATE NONAUTOMATED: CPT | Performed by: INTERNAL MEDICINE

## 2019-07-30 RX ORDER — ESTRADIOL 0.5 MG/1
TABLET ORAL
COMMUNITY
Start: 2019-07-01 | End: 2019-09-06 | Stop reason: ALTCHOICE

## 2019-07-30 RX ORDER — ASPIRIN 81 MG/1
81 TABLET ORAL DAILY
Qty: 30 TABLET | Refills: 5 | Status: SHIPPED | OUTPATIENT
Start: 2019-07-30 | End: 2019-10-15 | Stop reason: SDUPTHER

## 2019-07-30 RX ORDER — LEVOTHYROXINE SODIUM 0.07 MG/1
75 TABLET ORAL DAILY
COMMUNITY
Start: 2017-08-25 | End: 2019-07-30 | Stop reason: SDUPTHER

## 2019-07-30 RX ORDER — CONJUGATED ESTROGENS 0.3 MG/1
1 TABLET, FILM COATED ORAL DAILY
COMMUNITY
Start: 2019-07-09 | End: 2019-09-06 | Stop reason: ALTCHOICE

## 2019-07-30 RX ADMIN — GADOBENATE DIMEGLUMINE 16 ML: 529 INJECTION, SOLUTION INTRAVENOUS at 13:37

## 2019-07-30 NOTE — PROGRESS NOTES
"Chief Complaint   Patient presents with   • Establish Care     Pt c/o tingling in the right side of her face, along with numbnes in her neck         History:  Desirae Alberto is a 54 y.o. female who presents today for evaluation of the above problems.    She is here to establish new primary care physician.  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  She last saw hematology in 2017.  She is here for an acute visit as well.    A few weeks ago she was at work and she developed tingling in her right face.  Then it occurred in her arm and felt like Jell-O.  She could not pick it up and this episode of arm weakness lasted for about 1 minute.    Few weeks ago face tingling then arm felt like jello. Couldn't pick it up. Lasted about one minute.  However, the facial numbness and tingling lasted the rest of the day.  She thought it was related to her lower back issues and did not think much of it.  Last night, the facial numbness returned but this time it was also associated with right leg symptoms.  It is hard for her to describe the right leg symptoms but it feels \"odd\".  Also feels like there is \"not enough blood flow\" that symptom is now resolved but her face continues to be numb.  In the past she has had issues with right hand numbness which usually resolved with chiropractor.  Associated symptoms include decreased vision in the right eye since the symptoms started.  She is also had decreased memory for many months and has difficulty finding words including names.  She has also been having throbbing posterior headache which usually resolve with over-the-counter medications.  This is new for her.    She has not had previous work-up for this.    She was supposed to have every 6-month follow-up with oncology but was lost to follow-up.  They recommended checking general labs and LDH and beta-2 micro globin every 6 months but she has not had it done since " 2017.      HPI    ROS:  Review of Systems   Constitutional: Positive for activity change. Negative for appetite change, fatigue, fever and unexpected weight change.   HENT: Negative for nosebleeds, sore throat and trouble swallowing.    Eyes: Positive for visual disturbance. Negative for pain.   Respiratory: Negative for cough, chest tightness and shortness of breath.    Cardiovascular: Negative for chest pain, palpitations and leg swelling.   Gastrointestinal: Negative for abdominal pain, constipation, diarrhea, nausea and vomiting.   Endocrine: Positive for cold intolerance and heat intolerance.        Negative for Diabetes but positive for thyroid disease   Genitourinary: Negative for hematuria.        Urinary incontinence   Musculoskeletal: Negative.  Negative for back pain, neck pain and neck stiffness.   Skin: Negative for rash and wound.   Neurological: Positive for weakness, numbness and headaches. Negative for dizziness, syncope, facial asymmetry and light-headedness.        Expressive aphasia     Hematological: Negative for adenopathy. Does not bruise/bleed easily.   Psychiatric/Behavioral: Negative for agitation, behavioral problems and confusion.       No Known Allergies  History reviewed. No pertinent past medical history.  Past Surgical History:   Procedure Laterality Date   • CHOLECYSTECTOMY     • COLON RESECTION SMALL BOWEL  2014   • LAPAROSCOPIC TUBAL LIGATION     • TUBAL ABDOMINAL LIGATION     • VAGINAL MESH REVISION      2010/2016     Family History   Problem Relation Age of Onset   • Cancer Maternal Aunt      Desirae  reports that she has quit smoking. She has never used smokeless tobacco. She reports that she does not drink alcohol or use drugs.    I have reviewed and updated the above documentation (if necessary) including but not limited to chief complaint, ROS, PFSH, allergies and medications        Current Outpatient Medications:   •  estradiol (ESTRACE) 0.5 MG tablet, , Disp: , Rfl:   •   "levothyroxine (SYNTHROID, LEVOTHROID) 75 MCG tablet, Take 1 tablet by mouth Daily, Disp: , Rfl:   •  MULTIPLE VITAMIN PO, Take  by mouth., Disp: , Rfl:   •  PREMARIN 0.3 MG tablet, Take 1 tablet by mouth Daily., Disp: , Rfl:   •  progesterone (PROMETRIUM) 100 MG capsule, Take 1 capsule by mouth Daily., Disp: , Rfl:   •  aspirin 81 MG EC tablet, Take 1 tablet by mouth Daily., Disp: 30 tablet, Rfl: 5    PHQ-9 Depression Screening  Little interest or pleasure in doing things? 0   Feeling down, depressed, or hopeless? 0   Trouble falling or staying asleep, or sleeping too much?     Feeling tired or having little energy?     Poor appetite or overeating?     Feeling bad about yourself - or that you are a failure or have let yourself or your family down?     Trouble concentrating on things, such as reading the newspaper or watching television?     Moving or speaking so slowly that other people could have noticed? Or the opposite - being so fidgety or restless that you have been moving around a lot more than usual?     Thoughts that you would be better off dead, or of hurting yourself in some way?     PHQ-9 Total Score 0   If you checked off any problems, how difficult have these problems made it for you to do your work, take care of things at home, or get along with other people?       PHQ-9 Total Score: 0    OBJECTIVE:  Visit Vitals  /90 (BP Location: Left arm, Patient Position: Sitting, Cuff Size: Adult)   Pulse 84   Resp 16   Ht 160 cm (63\")   Wt 78.3 kg (172 lb 9.6 oz)   SpO2 97%   Breastfeeding? No   BMI 30.57 kg/m²        Physical Exam   Constitutional: She is oriented to person, place, and time. She appears well-developed and well-nourished.   HENT:   Head: Normocephalic and atraumatic.   Mouth/Throat: Oropharynx is clear and moist. No oropharyngeal exudate.   Eyes: Conjunctivae and EOM are normal. Pupils are equal, round, and reactive to light. No scleral icterus.   Neck: Normal range of motion. Neck supple. " No JVD present. No thyromegaly present.   Cardiovascular: Normal rate, regular rhythm and normal heart sounds. Exam reveals no gallop and no friction rub.   No murmur heard.  Pulmonary/Chest: Effort normal and breath sounds normal. She has no wheezes. She has no rales.   Abdominal: Soft. Bowel sounds are normal. She exhibits no distension. There is no tenderness. There is no rebound and no guarding.   Musculoskeletal: She exhibits no edema or tenderness.   Lymphadenopathy:     She has no cervical adenopathy.   Neurological: She is alert and oriented to person, place, and time. She has normal strength. She displays no tremor. A sensory deficit is present. No cranial nerve deficit. Coordination normal. GCS eye subscore is 4. GCS verbal subscore is 5. GCS motor subscore is 6.   Reflex Scores:       Patellar reflexes are 1+ on the right side and 2+ on the left side.  Skin: Skin is warm and dry.   Psychiatric: She has a normal mood and affect. Her behavior is normal.       ECG 12 Lead  Date/Time: 7/30/2019 11:20 AM  Performed by: Azam Howe MD  Authorized by: Azam Howe MD   Comparison: not compared with previous ECG   Previous ECG: no previous ECG available  Rhythm: sinus rhythm  Rate: normal  Conduction: conduction normal  QRS axis: normal    Clinical impression: normal ECG          MDM  Number of Diagnoses or Management Options  MTHFR mutation (CMS/HCC): new, needed workup  Other specified hypothyroidism: new, needed workup  Other specified type of non-Hodgkin lymphoma, unspecified body region (CMS/HCC): new, needed workup  Paresthesia: new, needed workup  TIA (transient ischemic attack): new, needed workup     Amount and/or Complexity of Data Reviewed  Clinical lab tests: ordered and reviewed  Tests in the radiology section of CPT®: ordered and reviewed  Review and summarize past medical records: yes  Independent visualization of images, tracings, or specimens: yes    Risk of Complications,  Morbidity, and/or Mortality  Presenting problems: moderate  Diagnostic procedures: moderate  Management options: moderate        Assessment/Plan    Desirae was seen today for establish care.    Diagnoses and all orders for this visit:    TIA (transient ischemic attack)  -     CBC w AUTO Differential; Future  -     Comprehensive metabolic panel; Future  -     Vitamin D 25 hydroxy; Future  -     Vitamin B12; Future  -     Lipid panel; Future  -     MRI Brain With & Without Contrast; Future  -     ECG 12 Lead  -     aspirin 81 MG EC tablet; Take 1 tablet by mouth Daily.  -     Sedimentation rate, automated; Future    Paresthesia  -     CBC w AUTO Differential; Future  -     Comprehensive metabolic panel; Future  -     Vitamin B12; Future  -     TSH; Future  -     Sedimentation rate, automated; Future  -     Magnesium; Future  -     Phosphorus; Future    Other specified type of non-Hodgkin lymphoma, unspecified body region (CMS/HCC)  -     Lactate Dehydrogenase; Future  -     Beta 2 Microglobulin, Serum; Future    Other specified hypothyroidism  -     TSH; Future    MTHFR mutation (CMS/HCC)  -     MTHFR Mutation; Future    Other orders  -     Cancel: CT Angiogram Head With Contrast; Future  -     Cancel: CT Angiogram Neck With & Without Contrast; Future      Etiology of her symptoms at this point are quite broad.  Will obtain work-up first with MRI.  This will help rule out a stroke or demyelinating disease such as MS.  Will consider obtaining either MRA or CTA or 2D echo depending on what the MRI shows.  Another possibility is recurrence of her lymphoma.  I do not appreciate any lymphadenopathy on my exam today.  Per oncology recommendations I will check beta-2 microglobulin and LDH today.  Will obtain general labs including CBC, CMP, magnesium, phosphorus, TSH as well as ESR given her recent headache.  She reports 1 of her children recently getting diagnosed with MTHFR mutation so we will check this as well as it could  increase her likelihood of thromboembolism.  The meantime of asked her to take baby aspirin and we will plan on seeing her back in 3 months or sooner if imaging/tests are abnormal.    Patient's Body mass index is 30.57 kg/m². BMI is above normal parameters. Recommendations include: educational material.      Education materials and an After Visit Summary were printed and given to the patient at discharge.  Return in about 3 months (around 10/30/2019).         GABRIELE Howe MD   10:21 AM  7/30/2019

## 2019-07-31 LAB — B2 MICROGLOB SERPL-MCNC: 1.3 MG/L (ref 0.6–2.4)

## 2019-08-02 LAB — MTHFR GENE MUT ANL BLD/T: NORMAL

## 2019-08-05 DIAGNOSIS — Z15.89 MTHFR MUTATION: Primary | ICD-10-CM

## 2019-08-05 RX ORDER — DIPHENHYDRAMINE HYDROCHLORIDE 25 MG/1
25 CAPSULE ORAL DAILY
Qty: 30 TABLET | Refills: 5 | Status: SHIPPED | OUTPATIENT
Start: 2019-08-05

## 2019-08-05 RX ORDER — CHOLECALCIFEROL (VITAMIN D3) 125 MCG
500 CAPSULE ORAL DAILY
Qty: 30 TABLET | Refills: 11 | Status: SHIPPED | OUTPATIENT
Start: 2019-08-05

## 2019-08-05 RX ORDER — LEVOTHYROXINE SODIUM 0.07 MG/1
75 TABLET ORAL DAILY
Qty: 30 TABLET | Refills: 5 | Status: SHIPPED | OUTPATIENT
Start: 2019-08-05 | End: 2019-09-06 | Stop reason: SDUPTHER

## 2019-08-05 RX ORDER — FOLIC ACID 1 MG/1
1 TABLET ORAL DAILY
Qty: 30 TABLET | Refills: 5 | Status: SHIPPED | OUTPATIENT
Start: 2019-08-05 | End: 2019-09-06 | Stop reason: SDUPTHER

## 2019-08-13 DIAGNOSIS — Z15.89 MTHFR MUTATION: ICD-10-CM

## 2019-08-16 ENCOUNTER — CLINICAL SUPPORT (OUTPATIENT)
Dept: INTERNAL MEDICINE | Facility: CLINIC | Age: 54
End: 2019-08-16

## 2019-08-16 DIAGNOSIS — G45.9 TIA (TRANSIENT ISCHEMIC ATTACK): Primary | ICD-10-CM

## 2019-08-16 DIAGNOSIS — Z15.89 MTHFR MUTATION: Primary | ICD-10-CM

## 2019-08-16 DIAGNOSIS — Z15.89 MTHFR MUTATION: ICD-10-CM

## 2019-08-17 LAB — HCYS SERPL-SCNC: 9.9 UMOL/L (ref 0–15)

## 2019-08-19 ENCOUNTER — TELEPHONE (OUTPATIENT)
Dept: INTERNAL MEDICINE | Facility: CLINIC | Age: 54
End: 2019-08-19

## 2019-08-19 NOTE — TELEPHONE ENCOUNTER
Pt called and stated that she would like to go to Dr. Grant Howe as her neurologist not Dr. Malagon.

## 2019-08-20 NOTE — TELEPHONE ENCOUNTER
Left message for return phone call, she has appointment with Dr. Grant Howe On Tuesday October 8 @ 9:00 arrive at 830 to fill out paperwork take photo ID insurance copay and all medications in original bottles. All info faxed to their office.

## 2019-09-06 ENCOUNTER — OFFICE VISIT (OUTPATIENT)
Dept: INTERNAL MEDICINE | Facility: CLINIC | Age: 54
End: 2019-09-06

## 2019-09-06 VITALS
WEIGHT: 173.5 LBS | SYSTOLIC BLOOD PRESSURE: 120 MMHG | HEART RATE: 78 BPM | DIASTOLIC BLOOD PRESSURE: 70 MMHG | HEIGHT: 63 IN | TEMPERATURE: 98.1 F | RESPIRATION RATE: 14 BRPM | BODY MASS INDEX: 30.74 KG/M2 | OXYGEN SATURATION: 95 %

## 2019-09-06 DIAGNOSIS — E03.8 OTHER SPECIFIED HYPOTHYROIDISM: ICD-10-CM

## 2019-09-06 DIAGNOSIS — R23.2 HOT FLASHES: ICD-10-CM

## 2019-09-06 DIAGNOSIS — E78.2 MIXED HYPERCHOLESTEROLEMIA AND HYPERTRIGLYCERIDEMIA: ICD-10-CM

## 2019-09-06 DIAGNOSIS — C85.80 OTHER SPECIFIED TYPE OF NON-HODGKIN LYMPHOMA, UNSPECIFIED BODY REGION (HCC): ICD-10-CM

## 2019-09-06 DIAGNOSIS — R61 UNEXPLAINED NIGHT SWEATS: ICD-10-CM

## 2019-09-06 DIAGNOSIS — G45.9 TIA (TRANSIENT ISCHEMIC ATTACK): Primary | ICD-10-CM

## 2019-09-06 DIAGNOSIS — Z15.89 MTHFR MUTATION: ICD-10-CM

## 2019-09-06 PROCEDURE — 99204 OFFICE O/P NEW MOD 45 MIN: CPT | Performed by: FAMILY MEDICINE

## 2019-09-06 RX ORDER — ONDANSETRON 8 MG/1
TABLET, ORALLY DISINTEGRATING ORAL
COMMUNITY
End: 2019-09-06 | Stop reason: ALTCHOICE

## 2019-09-06 RX ORDER — FOLIC ACID 1 MG/1
1 TABLET ORAL DAILY
Qty: 90 TABLET | Refills: 3 | Status: SHIPPED | OUTPATIENT
Start: 2019-09-06 | End: 2020-07-13

## 2019-09-06 RX ORDER — RANITIDINE 150 MG/1
TABLET ORAL
COMMUNITY
End: 2019-09-06 | Stop reason: ALTCHOICE

## 2019-09-06 RX ORDER — HYDROCODONE BITARTRATE AND ACETAMINOPHEN 5; 325 MG/1; MG/1
TABLET ORAL
COMMUNITY
End: 2020-02-18

## 2019-09-06 RX ORDER — LEVOTHYROXINE SODIUM 0.07 MG/1
75 TABLET ORAL DAILY
Qty: 90 TABLET | Refills: 3 | Status: SHIPPED | OUTPATIENT
Start: 2019-09-06 | End: 2020-02-07 | Stop reason: SDUPTHER

## 2019-09-06 RX ORDER — ATORVASTATIN CALCIUM 10 MG/1
10 TABLET, FILM COATED ORAL DAILY
Qty: 90 TABLET | Refills: 3 | Status: SHIPPED | OUTPATIENT
Start: 2019-09-06 | End: 2020-07-13

## 2019-09-06 NOTE — PATIENT INSTRUCTIONS
"Fat and Cholesterol Restricted Eating Plan  Getting too much fat and cholesterol in your diet may cause health problems. Choosing the right foods helps keep your fat and cholesterol at normal levels. This can keep you from getting certain diseases.  Your doctor may recommend an eating plan that includes:  · Total fat: ______% or less of total calories a day.  · Saturated fat: ______% or less of total calories a day.  · Cholesterol: less than _________mg a day.  · Fiber: ______g a day.  What are tips for following this plan?  General tips    · Work with your doctor to lose weight if you need to.  · Avoid:  ? Foods with added sugar.  ? Fried foods.  ? Foods with partially hydrogenated oils.  · Limit alcohol intake to no more than 1 drink a day for nonpregnant women and 2 drinks a day for men. One drink equals 12 oz of beer, 5 oz of wine, or 1½ oz of hard liquor.  Reading food labels  · Check food labels for:  ? Trans fats.  ? Partially hydrogenated oils.  ? Saturated fat (g) in each serving.  ? Cholesterol (mg) in each serving.  ? Fiber (g) in each serving.  · Choose foods with healthy fats, such as:  ? Monounsaturated fats.  ? Polyunsaturated fats.  ? Omega-3 fats.  · Choose grain products that have whole grains. Look for the word \"whole\" as the first word in the ingredient list.  Cooking  · Cook foods using low-fat methods. These include baking, boiling, grilling, and broiling.  · Eat more home-cooked foods. Eat at restaurants and buffets less often.  · Avoid cooking using saturated fats, such as butter, cream, palm oil, palm kernel oil, and coconut oil.  Meal planning    · At meals, divide your plate into four equal parts:  ? Fill one-half of your plate with vegetables and green salads.  ? Fill one-fourth of your plate with whole grains.  ? Fill one-fourth of your plate with low-fat (lean) protein foods.  · Eat fish that is high in omega-3 fats at least two times a week. This includes mackerel, tuna, sardines, and " salmon.  · Eat foods that are high in fiber, such as whole grains, beans, apples, broccoli, carrots, peas, and barley.  Recommended foods  Grains  · Whole grains, such as whole wheat or whole grain breads, crackers, cereals, and pasta. Unsweetened oatmeal, bulgur, barley, quinoa, or brown rice. Corn or whole wheat flour tortillas.  Vegetables  · Fresh or frozen vegetables (raw, steamed, roasted, or grilled). Green salads.  Fruits  · All fresh, canned (in natural juice), or frozen fruits.  Meats and other protein foods  · Ground beef (85% or leaner), grass-fed beef, or beef trimmed of fat. Skinless chicken or turkey. Ground chicken or turkey. Pork trimmed of fat. All fish and seafood. Egg whites. Dried beans, peas, or lentils. Unsalted nuts or seeds. Unsalted canned beans. Nut butters without added sugar or oil.  Dairy  · Low-fat or nonfat dairy products, such as skim or 1% milk, 2% or reduced-fat cheeses, low-fat and fat-free ricotta or cottage cheese, or plain low-fat and nonfat yogurt.  Fats and oils  · Tub margarine without trans fats. Light or reduced-fat mayonnaise and salad dressings. Avocado. Olive, canola, sesame, or safflower oils.  The items listed above may not be a complete list of recommended foods or beverages. Contact your dietitian for more options.  The items listed above may not be a complete list of foods and beverages [you/your child] can eat. Contact a dietitian for more information.  Foods to avoid  Grains  · White bread. White pasta. White rice. Cornbread. Bagels, pastries, and croissants. Crackers and snack foods that contain trans fat and hydrogenated oils.  Vegetables  · Vegetables cooked in cheese, cream, or butter sauce. Fried vegetables.  Fruits  · Canned fruit in heavy syrup. Fruit in cream or butter sauce. Fried fruit.  Meats and other protein foods  · Fatty cuts of meat. Ribs, chicken wings, dominguez, sausage, bologna, salami, chitterlings, fatback, hot dogs, bratwurst, and packaged  lunch meats. Liver and organ meats. Whole eggs and egg yolks. Chicken and turkey with skin. Fried meat.  Dairy  · Whole or 2% milk, cream, half-and-half, and cream cheese. Whole milk cheeses. Whole-fat or sweetened yogurt. Full-fat cheeses. Nondairy creamers and whipped toppings. Processed cheese, cheese spreads, and cheese curds.  Beverages  · Alcohol. Sugar-sweetened drinks such as sodas, lemonade, and fruit drinks.  Fats and oils  · Butter, stick margarine, lard, shortening, ghee, or dominguez fat. Coconut, palm kernel, and palm oils.  Sweets and desserts  · Corn syrup, sugars, honey, and molasses. Candy. Jam and jelly. Syrup. Sweetened cereals. Cookies, pies, cakes, donuts, muffins, and ice cream.  The items listed above may not be a complete list of foods and beverages to avoid. Contact your dietitian for more information.  The items listed above may not be a complete list of foods and beverages [you/your child] should avoid. Contact a dietitian for more information.  Summary  · Choosing the right foods helps keep your fat and cholesterol at normal levels. This can keep you from getting certain diseases.  · At meals, fill one-half of your plate with vegetables and green salads.  · Eat high-fiber foods, like whole grains, beans, apples, carrots, peas, and barley.  · Limit added sugar, saturated fats, alcohol, and fried foods.  This information is not intended to replace advice given to you by your health care provider. Make sure you discuss any questions you have with your health care provider.  Document Released: 06/18/2013 Document Revised: 09/04/2018 Document Reviewed: 09/04/2018  [employer] Interactive Patient Education © 2019 [employer]

## 2019-09-06 NOTE — PROGRESS NOTES
Subjective     Chief complaint  Hot flashes and night sweats.    History of Present Illness  Patient presents today with complaints of increasing hot flashes and night sweats.  The started a while back.  She had gone for a significant period of time without having any hot flashes or night sweats.      In July patient had symptoms of unilateral leg weakness arm weakness and facial tingling as well as having neck tingling.  She is evaluated at that time by Dr. Isael Lewis.  Laboratory data was obtained.  An MRI of the brain was obtained.  I see no evidence of any carotid ultrasound being obtained in the medical record.  Prior to these symptoms patient also had visual disturbance in her her eye.  Laboratory data was obtained as mentioned above.  It was normal with the exception of her cholesterol profile having elevated total cholesterol, triglycerides and  LDL.  She has not been started on any medication for this.   She does not routinely exercise nor does she follow a low-cholesterol diet.  Patient's PMR from outside medical facility reviewed and noted.    Review of Systems     Otherwise complete ROS reviewed and negative except as mentioned in the HPI.    Past Medical History:   Past Medical History:   Diagnosis Date   • Hypothyroidism      Past Surgical History:  Past Surgical History:   Procedure Laterality Date   • BREAST SURGERY Left 2017   • CHOLECYSTECTOMY     • COLON RESECTION SMALL BOWEL  2014   • LAPAROSCOPIC TUBAL LIGATION     • TUBAL ABDOMINAL LIGATION     • VAGINAL MESH REVISION      2010/2016     Social History:  reports that she has quit smoking. She has never used smokeless tobacco. She reports that she does not drink alcohol or use drugs.    Family History: family history includes Cancer in her maternal aunt.      Allergies:  No Known Allergies  Medications:  Prior to Admission medications    Medication Sig Start Date End Date Taking? Authorizing Provider   estrogens, conjugated, (PREMARIN)  "0.3 MG tablet Take 1 tablet by mouth once daily 7/9/19  Yes Cassandra Guadalupe MD   folic acid (FOLVITE) 1 MG tablet Take 1 tablet by mouth Daily. 8/5/19  Yes Azam Howe MD   levothyroxine (SYNTHROID, LEVOTHROID) 75 MCG tablet Take 1 tablet by mouth Daily. 8/5/19  Yes Cassandra Guadalupe MD   MULTIPLE VITAMIN PO Take  by mouth.   Yes ProviderRosalba MD   progesterone (PROMETRIUM) 100 MG capsule Take 1 capsule by mouth every night at bedtime 1/25/19  Yes Cassandra Guadalupe MD   vitamin B-12 (CYANOCOBALAMIN) 500 MCG tablet Take 1 tablet by mouth Daily. 8/5/19  Yes Azam Howe MD   aspirin 81 MG EC tablet Take 1 tablet by mouth Daily. 7/30/19   Azam Howe MD   HYDROcodone-acetaminophen (NORCO) 5-325 MG per tablet hydrocodone 5 mg-acetaminophen 325 mg tablet    Provider, MD Rosalba   vitamin B-6 (PYRIDOXINE) 25 MG tablet Take 1 tablet by mouth Daily. 8/5/19   Azam Howe MD       Objective     Vital Signs: /70 (BP Location: Right arm, Patient Position: Sitting, Cuff Size: Adult)   Pulse 78   Temp 98.1 °F (36.7 °C) (Oral)   Resp 14   Ht 160 cm (62.99\")   Wt 78.7 kg (173 lb 8 oz)   SpO2 95%   Breastfeeding? No   BMI 30.74 kg/m²   Physical Exam   Constitutional: She is oriented to person, place, and time. She appears well-developed and well-nourished. No distress.   HENT:   Head: Normocephalic and atraumatic.   Eyes: Conjunctivae and EOM are normal. Pupils are equal, round, and reactive to light. No scleral icterus.   Neck: Normal range of motion. Neck supple. No JVD present. No thyromegaly present.   No carotid bruit   Cardiovascular: Normal rate, regular rhythm, normal heart sounds and intact distal pulses. Exam reveals no gallop and no friction rub.   No murmur heard.  Pulmonary/Chest: Effort normal and breath sounds normal. No respiratory distress.   Abdominal: Soft. Bowel sounds are normal.   Musculoskeletal: Normal range of motion. She exhibits no edema. "   Lymphadenopathy:     She has no cervical adenopathy.   Neurological: She is alert and oriented to person, place, and time. No cranial nerve deficit.   Skin: Skin is warm and dry. Capillary refill takes less than 2 seconds. She is not diaphoretic.   Psychiatric: She has a normal mood and affect. Her behavior is normal.   Nursing note and vitals reviewed.      Patient's Body mass index is 30.74 kg/m². BMI is above normal parameters. Recommendations include: exercise counseling and nutrition counseling.      Results Reviewed:  Glucose   Date Value Ref Range Status   07/30/2019 87 70 - 100 mg/dL Final     BUN   Date Value Ref Range Status   07/30/2019 9 5 - 21 mg/dL Final     Creatinine   Date Value Ref Range Status   07/30/2019 0.70 0.60 - 1.30 mg/dL Final     Comment:     Serial Number: [id number]:  [id number]     Sodium   Date Value Ref Range Status   07/30/2019 140 135 - 145 mmol/L Final     Potassium   Date Value Ref Range Status   07/30/2019 3.9 3.5 - 5.3 mmol/L Final     Chloride   Date Value Ref Range Status   07/30/2019 103 98 - 110 mmol/L Final     CO2   Date Value Ref Range Status   07/30/2019 28.0 24.0 - 31.0 mmol/L Final     Calcium   Date Value Ref Range Status   07/30/2019 9.7 8.4 - 10.4 mg/dL Final     ALT (SGPT)   Date Value Ref Range Status   07/30/2019 28 0 - 54 U/L Final     AST (SGOT)   Date Value Ref Range Status   07/30/2019 29 7 - 45 U/L Final     WBC   Date Value Ref Range Status   07/30/2019 4.74 (L) 4.80 - 10.80 10*3/mm3 Final     Hematocrit   Date Value Ref Range Status   07/30/2019 40.5 37.0 - 47.0 % Final     Platelets   Date Value Ref Range Status   07/30/2019 250 130 - 400 10*3/mm3 Final     Total Cholesterol   Date Value Ref Range Status   07/30/2019 252 (H) 130 - 200 mg/dL Final     Triglycerides   Date Value Ref Range Status   07/30/2019 261 (H) 0 - 149 mg/dL Final     HDL Cholesterol   Date Value Ref Range Status   07/30/2019 84 >=50 mg/dL Final     LDL Cholesterol    Date Value Ref  Range Status   07/30/2019 124 (H) 0 - 99 mg/dL Final     LDL/HDL Ratio   Date Value Ref Range Status   07/30/2019 1.38  Final         Assessment / Plan     Assessment/Plan:  1. Unexplained night sweats    - US Abdomen Complete; Future    2. TIA (transient ischemic attack)    - US Carotid Bilateral    3. MTHFR mutation (CMS/HCC)    - folic acid (FOLVITE) 1 MG tablet; Take 1 tablet by mouth Daily.  Dispense: 90 tablet; Refill: 3    4. Mixed hypercholesterolemia and hypertriglyceridemia  Diet and exercise  lipitor 10 mg po daily  LFTs q4w x 2    CMP Lipid profile 3 months  5. Other specified hypothyroidism      6. Other specified type of non-Hodgkin lymphoma, unspecified body region (CMS/HCC)    - US Abdomen Complete; Future    7. Hot flashes          Return in about 4 weeks (around 10/4/2019). unless patient needs to be seen sooner or acute issues arise.        I have discussed the patient results/orders and and plan/recommendation with them at today's visit.      Jeannette Justin, DO   09/06/2019

## 2019-09-07 DIAGNOSIS — R93.5 ABNORMAL FINDINGS ON DIAGNOSTIC IMAGING OF ABDOMEN: Primary | ICD-10-CM

## 2019-09-17 ENCOUNTER — TELEPHONE (OUTPATIENT)
Dept: INTERNAL MEDICINE | Facility: CLINIC | Age: 54
End: 2019-09-17

## 2019-09-17 ENCOUNTER — HOSPITAL ENCOUNTER (OUTPATIENT)
Dept: CT IMAGING | Facility: HOSPITAL | Age: 54
Discharge: HOME | End: 2019-09-17
Admitting: FAMILY MEDICINE

## 2019-09-17 DIAGNOSIS — R93.5 ABNORMAL FINDINGS ON DIAGNOSTIC IMAGING OF ABDOMEN: ICD-10-CM

## 2019-09-17 PROCEDURE — 25010000002 IOPAMIDOL 61 % SOLUTION: Performed by: FAMILY MEDICINE

## 2019-09-17 PROCEDURE — 82565 ASSAY OF CREATININE: CPT

## 2019-09-17 PROCEDURE — 74177 CT ABD & PELVIS W/CONTRAST: CPT

## 2019-09-17 RX ADMIN — IOPAMIDOL 100 ML: 612 INJECTION, SOLUTION INTRAVENOUS at 07:55

## 2019-09-18 LAB — CREAT BLDA-MCNC: 0.7 MG/DL (ref 0.6–1.3)

## 2019-10-01 DIAGNOSIS — M54.50 LOW BACK PAIN, UNSPECIFIED BACK PAIN LATERALITY, UNSPECIFIED CHRONICITY, UNSPECIFIED WHETHER SCIATICA PRESENT: Primary | ICD-10-CM

## 2019-10-01 PROCEDURE — 96372 THER/PROPH/DIAG INJ SC/IM: CPT | Performed by: INTERNAL MEDICINE

## 2019-10-01 RX ORDER — DEXAMETHASONE SODIUM PHOSPHATE 10 MG/ML
10 INJECTION INTRAMUSCULAR; INTRAVENOUS ONCE
Status: COMPLETED | OUTPATIENT
Start: 2019-10-01 | End: 2019-10-01

## 2019-10-01 RX ORDER — KETOROLAC TROMETHAMINE 30 MG/ML
60 INJECTION, SOLUTION INTRAMUSCULAR; INTRAVENOUS ONCE
Status: COMPLETED | OUTPATIENT
Start: 2019-10-01 | End: 2019-10-01

## 2019-10-01 RX ADMIN — KETOROLAC TROMETHAMINE 60 MG: 30 INJECTION, SOLUTION INTRAMUSCULAR; INTRAVENOUS at 17:20

## 2019-10-01 RX ADMIN — DEXAMETHASONE SODIUM PHOSPHATE 10 MG: 10 INJECTION INTRAMUSCULAR; INTRAVENOUS at 17:19

## 2019-10-15 DIAGNOSIS — G45.9 TIA (TRANSIENT ISCHEMIC ATTACK): ICD-10-CM

## 2019-10-16 RX ORDER — ASPIRIN 81 MG/1
81 TABLET ORAL DAILY
Qty: 30 TABLET | Refills: 5 | Status: SHIPPED | OUTPATIENT
Start: 2019-10-16 | End: 2020-07-13

## 2019-10-24 ENCOUNTER — TELEPHONE (OUTPATIENT)
Dept: INTERNAL MEDICINE | Facility: CLINIC | Age: 54
End: 2019-10-24

## 2019-10-24 NOTE — TELEPHONE ENCOUNTER
You had sent in a rx for Desirae and it's not in the chart,   Is there anyway she can have a refill?  She cant remember the name of the med.

## 2019-10-26 RX ORDER — LEVOFLOXACIN 750 MG/1
750 TABLET ORAL DAILY
Qty: 5 TABLET | Refills: 0 | Status: SHIPPED | OUTPATIENT
Start: 2019-10-26 | End: 2020-01-10

## 2019-10-26 RX ORDER — FLUTICASONE PROPIONATE 50 MCG
2 SPRAY, SUSPENSION (ML) NASAL DAILY
Qty: 1 BOTTLE | Refills: 0 | Status: SHIPPED | OUTPATIENT
Start: 2019-10-26 | End: 2020-07-13

## 2019-10-26 RX ORDER — BENZONATATE 100 MG/1
200 CAPSULE ORAL 3 TIMES DAILY PRN
Qty: 30 CAPSULE | Refills: 0 | Status: SHIPPED | OUTPATIENT
Start: 2019-10-26 | End: 2020-01-10 | Stop reason: SDUPTHER

## 2019-10-28 DIAGNOSIS — M54.50 ACUTE LOW BACK PAIN WITHOUT SCIATICA, UNSPECIFIED BACK PAIN LATERALITY: Primary | ICD-10-CM

## 2019-10-28 RX ORDER — CYCLOBENZAPRINE HCL 5 MG
5 TABLET ORAL 3 TIMES DAILY PRN
Qty: 30 TABLET | Refills: 1 | Status: SHIPPED | OUTPATIENT
Start: 2019-10-28 | End: 2020-02-03 | Stop reason: SDUPTHER

## 2019-12-04 ENCOUNTER — CLINICAL SUPPORT (OUTPATIENT)
Dept: INTERNAL MEDICINE | Facility: CLINIC | Age: 54
End: 2019-12-04

## 2019-12-04 DIAGNOSIS — N28.9 KIDNEY FUNCTION ABNORMAL: ICD-10-CM

## 2019-12-04 DIAGNOSIS — E55.9 VITAMIN D DEFICIENCY: Primary | ICD-10-CM

## 2019-12-04 DIAGNOSIS — E53.8 FOLIC ACID DEFICIENCY: ICD-10-CM

## 2019-12-04 DIAGNOSIS — E53.8 VITAMIN B12 DEFICIENCY: ICD-10-CM

## 2019-12-04 DIAGNOSIS — R31.9 HEMATURIA, UNSPECIFIED TYPE: ICD-10-CM

## 2019-12-04 DIAGNOSIS — E03.9 HYPOTHYROIDISM, UNSPECIFIED TYPE: ICD-10-CM

## 2019-12-04 PROCEDURE — 36415 COLL VENOUS BLD VENIPUNCTURE: CPT | Performed by: FAMILY MEDICINE

## 2019-12-04 NOTE — PROGRESS NOTES
Venipuncture Blood Specimen Collection  Venipuncture performed in clinic by Linda Boyd MA with good hemostasis. Patient tolerated the procedure well without complications.   12/04/19   Linda Boyd MA

## 2019-12-05 LAB
25(OH)D3+25(OH)D2 SERPL-MCNC: 30.7 NG/ML (ref 30–100)
ALBUMIN SERPL-MCNC: 4.5 G/DL (ref 3.5–5.5)
ALBUMIN/GLOB SERPL: 1.9 {RATIO} (ref 1.2–2.2)
ALP SERPL-CCNC: 90 IU/L (ref 39–117)
ALT SERPL-CCNC: 28 IU/L (ref 0–32)
APPEARANCE UR: CLEAR
AST SERPL-CCNC: 22 IU/L (ref 0–40)
BACTERIA #/AREA URNS HPF: NORMAL /[HPF]
BILIRUB SERPL-MCNC: 0.3 MG/DL (ref 0–1.2)
BILIRUB UR QL STRIP: NEGATIVE
BUN SERPL-MCNC: 16 MG/DL (ref 6–24)
BUN/CREAT SERPL: 21 (ref 9–23)
CALCIUM SERPL-MCNC: 9.4 MG/DL (ref 8.7–10.2)
CHLORIDE SERPL-SCNC: 103 MMOL/L (ref 96–106)
CO2 SERPL-SCNC: 22 MMOL/L (ref 20–29)
COLOR UR: YELLOW
CREAT SERPL-MCNC: 0.78 MG/DL (ref 0.57–1)
EPI CELLS #/AREA URNS HPF: NORMAL /HPF
FOLATE SERPL-MCNC: 13.2 NG/ML
GLOBULIN SER CALC-MCNC: 2.4 G/DL (ref 1.5–4.5)
GLUCOSE SERPL-MCNC: 105 MG/DL (ref 65–99)
GLUCOSE UR QL: NEGATIVE
HGB UR QL STRIP: NEGATIVE
KETONES UR QL STRIP: NEGATIVE
LEUKOCYTE ESTERASE UR QL STRIP: NEGATIVE
MICRO URNS: (no result)
MICRO URNS: (no result)
MUCOUS THREADS URNS QL MICRO: PRESENT
NITRITE UR QL STRIP: NEGATIVE
PH UR STRIP: 6.5 [PH] (ref 5–7.5)
POTASSIUM SERPL-SCNC: 4.4 MMOL/L (ref 3.5–5.2)
PROT SERPL-MCNC: 6.9 G/DL (ref 6–8.5)
PROT UR QL STRIP: NEGATIVE
RBC #/AREA URNS HPF: NORMAL /HPF
SODIUM SERPL-SCNC: 141 MMOL/L (ref 134–144)
SP GR UR: 1.01 (ref 1–1.03)
T3FREE SERPL-MCNC: 3.1 PG/ML (ref 2–4.4)
T4 FREE SERPL-MCNC: 1.52 NG/DL (ref 0.82–1.77)
TSH SERPL DL<=0.005 MIU/L-ACNC: 0.86 UIU/ML (ref 0.45–4.5)
UROBILINOGEN UR STRIP-MCNC: 0.2 MG/DL (ref 0.2–1)
VIT B12 SERPL-MCNC: 691 PG/ML (ref 232–1245)
WBC #/AREA URNS HPF: NORMAL /HPF

## 2019-12-06 ENCOUNTER — TELEPHONE (OUTPATIENT)
Dept: INTERNAL MEDICINE | Facility: CLINIC | Age: 54
End: 2019-12-06

## 2019-12-06 RX ORDER — CHOLECALCIFEROL (VITAMIN D3) 125 MCG
1 TABLET ORAL DAILY
Qty: 90 TABLET | Refills: 3 | Status: SHIPPED | OUTPATIENT
Start: 2019-12-06 | End: 2019-12-07 | Stop reason: SDUPTHER

## 2019-12-06 NOTE — TELEPHONE ENCOUNTER
Called Pt let let her know of her lab results. Also to ask if she takes a Vit D replacement. Pt states that she does not take a Vit D replacement.

## 2019-12-06 NOTE — TELEPHONE ENCOUNTER
----- Message from Jeannette Justin DO sent at 12/6/2019  7:27 AM CST -----  The lab work all looks good.  The Vitamin D level is low normal. Is she taking any Vit D replacement?

## 2019-12-10 RX ORDER — CHOLECALCIFEROL (VITAMIN D3) 125 MCG
1 TABLET ORAL DAILY
Qty: 90 TABLET | Refills: 3 | Status: SHIPPED | OUTPATIENT
Start: 2019-12-10 | End: 2019-12-13 | Stop reason: SDUPTHER

## 2019-12-12 ENCOUNTER — TELEPHONE (OUTPATIENT)
Dept: INTERNAL MEDICINE | Facility: CLINIC | Age: 54
End: 2019-12-12

## 2019-12-13 RX ORDER — CHOLECALCIFEROL (VITAMIN D3) 125 MCG
1 TABLET ORAL DAILY
Qty: 90 TABLET | Refills: 3 | Status: SHIPPED | OUTPATIENT
Start: 2019-12-13 | End: 2020-07-13

## 2019-12-13 NOTE — TELEPHONE ENCOUNTER
I called [facility] they never contacted me of my script being ready.  They never received it since their system was down Mon & Tues.      Can you re call my script Ergocalciferol (VITAMIN D2) 50 MCG (2000 UT) tablet [[id number]] (Order [id number])     To [facility] [place].    Thank you!

## 2020-01-06 ENCOUNTER — OFFICE VISIT (OUTPATIENT)
Dept: OBSTETRICS AND GYNECOLOGY | Facility: CLINIC | Age: 55
End: 2020-01-06

## 2020-01-06 VITALS
BODY MASS INDEX: 32.39 KG/M2 | WEIGHT: 176 LBS | SYSTOLIC BLOOD PRESSURE: 154 MMHG | DIASTOLIC BLOOD PRESSURE: 76 MMHG | HEIGHT: 62 IN

## 2020-01-06 DIAGNOSIS — N95.1 MENOPAUSAL SYMPTOMS: ICD-10-CM

## 2020-01-06 DIAGNOSIS — E01.0 THYROMEGALY: ICD-10-CM

## 2020-01-06 DIAGNOSIS — E78.2 MIXED HYPERCHOLESTEROLEMIA AND HYPERTRIGLYCERIDEMIA: ICD-10-CM

## 2020-01-06 DIAGNOSIS — Z12.31 ENCOUNTER FOR SCREENING MAMMOGRAM FOR BREAST CANCER: ICD-10-CM

## 2020-01-06 DIAGNOSIS — Z01.419 WELL WOMAN EXAM WITH ROUTINE GYNECOLOGICAL EXAM: Primary | ICD-10-CM

## 2020-01-06 DIAGNOSIS — G45.9 TIA (TRANSIENT ISCHEMIC ATTACK): ICD-10-CM

## 2020-01-06 DIAGNOSIS — C85.80 OTHER SPECIFIED TYPE OF NON-HODGKIN LYMPHOMA, UNSPECIFIED BODY REGION (HCC): ICD-10-CM

## 2020-01-06 DIAGNOSIS — Z13.820 ENCOUNTER FOR SCREENING FOR OSTEOPOROSIS: ICD-10-CM

## 2020-01-06 DIAGNOSIS — R10.2 PELVIC PAIN: ICD-10-CM

## 2020-01-06 PROCEDURE — 87624 HPV HI-RISK TYP POOLED RSLT: CPT | Performed by: NURSE PRACTITIONER

## 2020-01-06 PROCEDURE — G0123 SCREEN CERV/VAG THIN LAYER: HCPCS | Performed by: NURSE PRACTITIONER

## 2020-01-06 PROCEDURE — 99386 PREV VISIT NEW AGE 40-64: CPT | Performed by: NURSE PRACTITIONER

## 2020-01-06 RX ORDER — VENLAFAXINE HYDROCHLORIDE 37.5 MG/1
37.5 CAPSULE, EXTENDED RELEASE ORAL DAILY
Qty: 30 CAPSULE | Refills: 12 | Status: SHIPPED | OUTPATIENT
Start: 2020-01-06 | End: 2020-09-11

## 2020-01-06 RX ORDER — OMEGA-3-ACID ETHYL ESTERS 1 G/1
1 CAPSULE, LIQUID FILLED ORAL 2 TIMES DAILY
Qty: 60 CAPSULE | Refills: 3 | Status: SHIPPED | OUTPATIENT
Start: 2020-01-06 | End: 2020-07-13

## 2020-01-06 NOTE — PROGRESS NOTES
"Subjective     Desirae Alberto is a 55 y.o. female    Patient comes in today as a new patient for yearly checkup.  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  She is also had a TIA and is currently on hormones.    Gynecologic Exam   The patient's primary symptoms include pelvic pain. The patient's pertinent negatives include no vaginal bleeding or vaginal discharge. This is a new problem. The current episode started more than 1 month ago. The problem occurs intermittently. The problem has been waxing and waning. The pain is mild. The problem affects both sides. She is not pregnant. Associated symptoms include painful intercourse. Pertinent negatives include no abdominal pain, anorexia, back pain, chills, constipation, diarrhea, discolored urine, dysuria, fever, flank pain, frequency, headaches, hematuria, joint pain, joint swelling, nausea, rash, sore throat, urgency or vomiting. She is sexually active. She is postmenopausal.         /76   Ht 157.5 cm (62\")   Wt 79.8 kg (176 lb)   LMP 01/06/2017 (Approximate)   Breastfeeding No   BMI 32.19 kg/m²     Outpatient Encounter Medications as of 1/6/2020   Medication Sig Dispense Refill   • cyclobenzaprine (FLEXERIL) 5 MG tablet Take 1 tablet by mouth 3 (Three) Times a Day As Needed for Muscle Spasms. 30 tablet 1   • Ergocalciferol (VITAMIN D2) 50 MCG (2000 UT) tablet Take 1 tablet by mouth Daily. 90 tablet 3   • estrogens, conjugated, (PREMARIN) 0.3 MG tablet Take 1 tablet by mouth once daily 90 tablet 3   • levothyroxine (SYNTHROID, LEVOTHROID) 75 MCG tablet Take 1 tablet by mouth Daily. 90 tablet 3   • MULTIPLE VITAMIN PO Take  by mouth.     • progesterone (PROMETRIUM) 100 MG capsule Take 1 capsule by mouth Every Night. 90 capsule 3   • vitamin B-12 (CYANOCOBALAMIN) 500 MCG tablet Take 1 tablet by mouth Daily. 30 tablet 11   • vitamin B-6 (PYRIDOXINE) 25 MG tablet Take 1 tablet by mouth Daily. 30 " tablet 5   • aspirin 81 MG EC tablet Take 1 tablet by mouth Daily. 30 tablet 5   • atorvastatin (LIPITOR) 10 MG tablet Take 1 tablet by mouth Daily. 90 tablet 3   • benzonatate (TESSALON PERLES) 100 MG capsule Take 2 capsules by mouth 3 (Three) Times a Day As Needed for Cough. 30 capsule 0   • fluticasone (FLONASE) 50 MCG/ACT nasal spray 2 sprays into the nostril(s) as directed by provider Daily. 1 bottle 0   • folic acid (FOLVITE) 1 MG tablet Take 1 tablet by mouth Daily. 90 tablet 3   • HYDROcodone-acetaminophen (NORCO) 5-325 MG per tablet hydrocodone 5 mg-acetaminophen 325 mg tablet     • levoFLOXacin (LEVAQUIN) 750 MG tablet Take 1 tablet by mouth Daily. 5 tablet 0   • omega-3 acid ethyl esters (LOVAZA) 1 g capsule Take 1 capsule by mouth 2 (Two) Times a Day. 60 capsule 3   • venlafaxine XR (EFFEXOR XR) 37.5 MG 24 hr capsule Take 1 capsule by mouth Daily. 30 capsule 12     No facility-administered encounter medications on file as of 1/6/2020.        Surgical History  Past Surgical History:   Procedure Laterality Date   • BREAST BIOPSY     • BREAST SURGERY Left 2017   • CHOLECYSTECTOMY     • COLON RESECTION SMALL BOWEL  2014   • LAPAROSCOPIC TUBAL LIGATION     • TUBAL ABDOMINAL LIGATION     • VAGINAL MESH REVISION      2010/2016   • WISDOM TOOTH EXTRACTION         Family History  Family History   Problem Relation Age of Onset   • Cancer Maternal Aunt    • No Known Problems Father    • Heart defect Mother    • No Known Problems Sister    • Colon cancer Paternal Aunt    • Colon cancer Paternal Aunt    • Liver cancer Paternal Aunt    • Breast cancer Neg Hx    • Ovarian cancer Neg Hx    • Uterine cancer Neg Hx    • Melanoma Neg Hx    • Prostate cancer Neg Hx        The following portions of the patient's history were reviewed and updated as appropriate: allergies, current medications, past family history, past medical history, past social history, past surgical history and problem list.    Review of Systems    Constitutional: Positive for fatigue. Negative for activity change, appetite change, chills, diaphoresis, fever, unexpected weight gain and unexpected weight loss.   HENT: Negative for congestion, dental problem, drooling, ear discharge, ear pain, facial swelling, hearing loss, mouth sores, nosebleeds, postnasal drip, rhinorrhea, sinus pressure, sneezing, sore throat, swollen glands, tinnitus, trouble swallowing and voice change.    Eyes: Negative for blurred vision, double vision, photophobia, pain, discharge, redness, itching and visual disturbance.   Respiratory: Negative for apnea, cough, choking, chest tightness, shortness of breath, wheezing and stridor.    Cardiovascular: Negative for chest pain, palpitations and leg swelling.   Gastrointestinal: Negative for abdominal distention, abdominal pain, anal bleeding, anorexia, blood in stool, constipation, diarrhea, nausea, rectal pain, vomiting, GERD and indigestion.   Endocrine: Positive for heat intolerance. Negative for cold intolerance, polydipsia, polyphagia and polyuria.   Genitourinary: Positive for pelvic pain. Negative for amenorrhea, breast discharge, breast lump, breast pain, decreased libido, decreased urine volume, difficulty urinating, dyspareunia, dysuria, flank pain, frequency, genital sores, hematuria, menstrual problem, pelvic pressure, urgency, urinary incontinence, vaginal bleeding, vaginal discharge and vaginal pain.   Musculoskeletal: Negative for arthralgias, back pain, gait problem, joint pain, joint swelling, myalgias, neck pain, neck stiffness and bursitis.   Skin: Negative for color change, dry skin and rash.   Allergic/Immunologic: Negative for environmental allergies, food allergies and immunocompromised state.   Neurological: Negative for dizziness, tremors, seizures, syncope, facial asymmetry, speech difficulty, weakness, light-headedness, numbness, headache, memory problem and confusion.   Hematological: Negative for adenopathy.  Does not bruise/bleed easily.   Psychiatric/Behavioral: Negative for agitation, behavioral problems, decreased concentration, dysphoric mood, hallucinations, self-injury, sleep disturbance, suicidal ideas, negative for hyperactivity, depressed mood and stress. The patient is not nervous/anxious.        Objective   Physical Exam   Constitutional: She is oriented to person, place, and time. She appears well-developed and well-nourished. No distress.   HENT:   Head: Normocephalic.   Right Ear: External ear normal.   Left Ear: External ear normal.   Nose: Nose normal.   Mouth/Throat: Oropharynx is clear and moist.   Eyes: Conjunctivae are normal. Right eye exhibits no discharge. Left eye exhibits no discharge. No scleral icterus.   Neck: Normal range of motion. Neck supple. Carotid bruit is not present. No tracheal deviation present. No thyromegaly present.   Cardiovascular: Normal rate, regular rhythm, normal heart sounds and intact distal pulses.   No murmur heard.  Pulmonary/Chest: Effort normal and breath sounds normal. No respiratory distress. She has no wheezes. Right breast exhibits no inverted nipple, no mass, no nipple discharge, no skin change and no tenderness. Left breast exhibits no inverted nipple, no mass, no nipple discharge, no skin change and no tenderness. No breast swelling, tenderness, discharge or bleeding. Breasts are symmetrical.   Abdominal: Soft. She exhibits no distension and no mass. There is no tenderness. There is no guarding. No hernia. Hernia confirmed negative in the right inguinal area and confirmed negative in the left inguinal area.   Genitourinary: Rectum normal and vagina normal. Rectal exam shows no mass. No breast swelling, tenderness, discharge or bleeding. Pelvic exam was performed with patient supine. There is no rash, tenderness, lesion or injury on the right labia. There is no rash, tenderness, lesion or injury on the left labia. Uterus is tender. Uterus is not enlarged and  not fixed. Cervix exhibits no motion tenderness, no discharge and no friability. Right adnexum displays no mass, no tenderness and no fullness. Left adnexum displays no mass, no tenderness and no fullness. No erythema, tenderness or bleeding in the vagina. No foreign body in the vagina. No signs of injury around the vagina. No vaginal discharge found.   Genitourinary Comments:   BSU normal  Urethral meatus  Normal  Perineum  Normal   Musculoskeletal: Normal range of motion. She exhibits no edema or tenderness.   Lymphadenopathy:        Head (right side): No submental, no submandibular, no tonsillar, no preauricular, no posterior auricular and no occipital adenopathy present.        Head (left side): No submental, no submandibular, no tonsillar, no preauricular, no posterior auricular and no occipital adenopathy present.     She has no cervical adenopathy.        Right cervical: No superficial cervical, no deep cervical and no posterior cervical adenopathy present.       Left cervical: No superficial cervical, no deep cervical and no posterior cervical adenopathy present.     She has no axillary adenopathy.        Right: No inguinal adenopathy present.        Left: No inguinal adenopathy present.   Neurological: She is alert and oriented to person, place, and time. Coordination normal.   Skin: Skin is warm and dry. No bruising and no rash noted. She is not diaphoretic. No erythema.   Psychiatric: She has a normal mood and affect. Her behavior is normal. Judgment and thought content normal.   Nursing note and vitals reviewed.      Assessment/Plan   Desirae was seen today for gynecologic exam.    Diagnoses and all orders for this visit:    Well woman exam with routine gynecological exam  Normal GYN exam. Will have lab work at PCP. Encouraged SBE, pt is aware how to do self breast exam and the importance of same. Discussed weight management and importance of maintaining a healthy weight. Discussed Vitamin D intake and the  importance of adequate vitamin D for both Bone Health and a healthy immune system.  Discussed Daily exercise and the importance of same, in regards to a healthy heart as well as helping to maintain her weight and improving her mental health.  BMI 32.2.  Colonoscopy is up to date.  Mammogram bone density will be scheduled at [facility].  Pap smear is done per [facility]P guidelines.  -     Liquid-based Pap Smear, Screening    Encounter for screening mammogram for breast cancer  Comments:  Patient will have mammogram at [facility].  Orders:  -     Mammo Screening Digital Tomosynthesis Bilateral With CAD; Future    Encounter for screening for osteoporosis  Comments:  Patient will have bone density at [facility].  Orders:  -     DEXA Bone Density Axial; Future    Thyromegaly  Comments:  She has moderate thyromegaly.  She will have a thyroid ultrasound at [facility].  Orders:  -     US Thyroid; Future    Menopausal symptoms  Comments:  We will try Effexor for her menopausal symptoms.  She will return in 6 weeks for follow-up.  Orders:  -     venlafaxine XR (EFFEXOR XR) 37.5 MG 24 hr capsule; Take 1 capsule by mouth Daily.    TIA (transient ischemic attack)  Comments:  Patient has history of a TIA.  She is currently on progesterone and estrogen we discussed in great detail why she should not be on this medication with history of a TIA.  Patient will stop it and we will try Effexor for her hot flashes.    Mixed hypercholesterolemia and hypertriglyceridemia  Comments:  Patient was placed on cholesterol medication due to elevated triglycerides and cholesterol.  She was unable to tolerate it.  She will try Lovaza and repeat labs  Orders:  -     omega-3 acid ethyl esters (LOVAZA) 1 g capsule; Take 1 capsule by mouth 2 (Two) Times a Day.    Other specified type of non-Hodgkin lymphoma, unspecified body region (CMS/HCC)  Comments:  Patient recently had a CT scan with her primary care  provider.  She is not followed by a oncologist at this point.    Pelvic pain  Comments:  Patient is tender on exam.  She will return for pelvic ultrasound.         Patient's Body mass index is 32.19 kg/m². BMI is above normal parameters. Recommendations include: educational material, exercise counseling and nutrition counseling.      Apple Ivan, APRN  1/6/2020

## 2020-01-06 NOTE — PATIENT INSTRUCTIONS

## 2020-01-06 NOTE — PROGRESS NOTES
Attempted to obtain health maintenance information, patient unable to provide answers for the following items Mammogram, Pneumococcal Vaccine, Medicare Annual Wellness Exam, Diabetic Eye Exam, Diabetic Foot Exam, HPV Vaccine, Chlamydia Screening  and Zoster Vaccine.

## 2020-01-08 ENCOUNTER — TELEPHONE (OUTPATIENT)
Dept: INTERNAL MEDICINE | Facility: CLINIC | Age: 55
End: 2020-01-08

## 2020-01-08 LAB
GEN CATEG CVX/VAG CYTO-IMP: NORMAL
HPV I/H RISK 4 DNA CVX QL PROBE+SIG AMP: NOT DETECTED
LAB AP CASE REPORT: NORMAL
LAB AP GYN ADDITIONAL INFORMATION: NORMAL
LAB AP GYN OTHER FINDINGS: NORMAL
PATH INTERP SPEC-IMP: NORMAL
STAT OF ADQ CVX/VAG CYTO-IMP: NORMAL

## 2020-01-10 RX ORDER — CEFDINIR 300 MG/1
300 CAPSULE ORAL 2 TIMES DAILY
Qty: 14 CAPSULE | Refills: 0 | Status: SHIPPED | OUTPATIENT
Start: 2020-01-10 | End: 2020-07-13

## 2020-01-10 RX ORDER — BENZONATATE 100 MG/1
200 CAPSULE ORAL 3 TIMES DAILY PRN
Qty: 30 CAPSULE | Refills: 0 | Status: SHIPPED | OUTPATIENT
Start: 2020-01-10 | End: 2020-07-13

## 2020-02-03 ENCOUNTER — APPOINTMENT (OUTPATIENT)
Dept: BONE DENSITY | Facility: HOSPITAL | Age: 55
End: 2020-02-03

## 2020-02-03 ENCOUNTER — APPOINTMENT (OUTPATIENT)
Dept: ULTRASOUND IMAGING | Facility: HOSPITAL | Age: 55
End: 2020-02-03

## 2020-02-03 ENCOUNTER — HOSPITAL ENCOUNTER (OUTPATIENT)
Dept: MAMMOGRAPHY | Facility: HOSPITAL | Age: 55
Discharge: HOME | End: 2020-02-03
Admitting: NURSE PRACTITIONER

## 2020-02-03 DIAGNOSIS — M54.50 ACUTE LOW BACK PAIN WITHOUT SCIATICA, UNSPECIFIED BACK PAIN LATERALITY: ICD-10-CM

## 2020-02-03 DIAGNOSIS — Z12.31 ENCOUNTER FOR SCREENING MAMMOGRAM FOR BREAST CANCER: ICD-10-CM

## 2020-02-03 PROCEDURE — 77067 SCR MAMMO BI INCL CAD: CPT

## 2020-02-03 PROCEDURE — 77063 BREAST TOMOSYNTHESIS BI: CPT

## 2020-02-03 RX ORDER — CYCLOBENZAPRINE HCL 5 MG
5 TABLET ORAL 3 TIMES DAILY PRN
Qty: 30 TABLET | Refills: 1 | Status: SHIPPED | OUTPATIENT
Start: 2020-02-03 | End: 2020-07-06 | Stop reason: SDUPTHER

## 2020-02-04 RX ORDER — CYCLOBENZAPRINE HCL 5 MG
5 TABLET ORAL 3 TIMES DAILY PRN
Qty: 30 TABLET | Refills: 1 | OUTPATIENT
Start: 2020-02-04

## 2020-02-07 RX ORDER — LEVOTHYROXINE SODIUM 0.07 MG/1
75 TABLET ORAL DAILY
Qty: 90 TABLET | Refills: 3 | Status: SHIPPED | OUTPATIENT
Start: 2020-02-07 | End: 2020-06-23 | Stop reason: SDUPTHER

## 2020-02-18 ENCOUNTER — OFFICE VISIT (OUTPATIENT)
Dept: OBSTETRICS AND GYNECOLOGY | Facility: CLINIC | Age: 55
End: 2020-02-18

## 2020-02-18 VITALS
SYSTOLIC BLOOD PRESSURE: 136 MMHG | HEIGHT: 62 IN | WEIGHT: 174 LBS | DIASTOLIC BLOOD PRESSURE: 94 MMHG | BODY MASS INDEX: 32.02 KG/M2

## 2020-02-18 DIAGNOSIS — N95.1 MENOPAUSAL SYMPTOMS: ICD-10-CM

## 2020-02-18 DIAGNOSIS — E03.9 HYPOTHYROIDISM, UNSPECIFIED TYPE: ICD-10-CM

## 2020-02-18 DIAGNOSIS — R10.2 PELVIC PAIN: Primary | ICD-10-CM

## 2020-02-18 DIAGNOSIS — K64.4 EXTERNAL HEMORRHOIDS: ICD-10-CM

## 2020-02-18 DIAGNOSIS — R35.0 URINARY FREQUENCY: ICD-10-CM

## 2020-02-18 DIAGNOSIS — N83.201 CYST OF RIGHT OVARY: ICD-10-CM

## 2020-02-18 PROCEDURE — 99213 OFFICE O/P EST LOW 20 MIN: CPT | Performed by: NURSE PRACTITIONER

## 2020-02-18 NOTE — PROGRESS NOTES
"Subjective     Desirae Alberto is a 55 y.o. female    Patient is here today to follow-up of pelvic pain.  Patient had pelvic ultrasound done today.    Pelvic Pain   The patient's primary symptoms include pelvic pain. The patient's pertinent negatives include no vaginal bleeding or vaginal discharge. This is a recurrent problem. The current episode started more than 1 month ago. The problem occurs intermittently. The problem has been waxing and waning. The pain is mild. The problem affects both sides. She is not pregnant. Associated symptoms include abdominal pain, constipation, frequency and urgency. Pertinent negatives include no anorexia, back pain, chills, diarrhea, discolored urine, dysuria, fever, flank pain, headaches, hematuria, joint pain, joint swelling, nausea, painful intercourse, rash, sore throat or vomiting. Nothing aggravates the symptoms. She has tried nothing for the symptoms. She is sexually active. She is postmenopausal.         /94   Ht 157.5 cm (62\")   Wt 78.9 kg (174 lb)   LMP 01/06/2017 (Approximate)   Breastfeeding No   BMI 31.83 kg/m²     Outpatient Encounter Medications as of 2/18/2020   Medication Sig Dispense Refill   • aspirin 81 MG EC tablet Take 1 tablet by mouth Daily. 30 tablet 5   • cyclobenzaprine (FLEXERIL) 5 MG tablet Take 1 tablet by mouth 3 (Three) Times a Day As Needed for Muscle Spasms. 30 tablet 1   • Ergocalciferol (VITAMIN D2) 50 MCG (2000 UT) tablet Take 1 tablet by mouth Daily. 90 tablet 3   • folic acid (FOLVITE) 1 MG tablet Take 1 tablet by mouth Daily. 90 tablet 3   • levothyroxine (SYNTHROID) 75 MCG tablet Take 1 tablet by mouth Daily. 90 tablet 3   • MULTIPLE VITAMIN PO Take  by mouth.     • omega-3 acid ethyl esters (LOVAZA) 1 g capsule Take 1 capsule by mouth 2 (Two) Times a Day. 60 capsule 3   • venlafaxine XR (EFFEXOR XR) 37.5 MG 24 hr capsule Take 1 capsule by mouth Daily. 30 capsule 12   • vitamin B-12 (CYANOCOBALAMIN) 500 MCG tablet Take 1 tablet " by mouth Daily. 30 tablet 11   • vitamin B-6 (PYRIDOXINE) 25 MG tablet Take 1 tablet by mouth Daily. 30 tablet 5   • atorvastatin (LIPITOR) 10 MG tablet Take 1 tablet by mouth Daily. 90 tablet 3   • benzonatate (TESSALON PERLES) 100 MG capsule Take 2 capsules by mouth 3 (Three) Times a Day As Needed for Cough. 30 capsule 0   • cefdinir (OMNICEF) 300 MG capsule Take 1 capsule by mouth 2 (Two) Times a Day. 14 capsule 0   • estrogens, conjugated, (PREMARIN) 0.3 MG tablet Take 1 tablet by mouth once daily 90 tablet 3   • fluticasone (FLONASE) 50 MCG/ACT nasal spray 2 sprays into the nostril(s) as directed by provider Daily. 1 bottle 0   • hydrocortisone (ANUSOL-HC) 2.5 % rectal cream Apply rectally 2 times daily 30 g 1   • progesterone (PROMETRIUM) 100 MG capsule Take 1 capsule by mouth Every Night. 90 capsule 3   • [DISCONTINUED] HYDROcodone-acetaminophen (NORCO) 5-325 MG per tablet hydrocodone 5 mg-acetaminophen 325 mg tablet       No facility-administered encounter medications on file as of 2/18/2020.        Surgical History  Past Surgical History:   Procedure Laterality Date   • BREAST EXCISIONAL BIOPSY Left 01/2016    benign   • BREAST SURGERY Left 2017   • CHOLECYSTECTOMY     • COLON RESECTION SMALL BOWEL  2014   • COLONOSCOPY  2019   • LAPAROSCOPIC TUBAL LIGATION     • TUBAL ABDOMINAL LIGATION     • VAGINAL MESH REVISION      2010/2016   • WISDOM TOOTH EXTRACTION         Family History  Family History   Problem Relation Age of Onset   • Cancer Maternal Aunt    • No Known Problems Father    • Heart defect Mother    • No Known Problems Sister    • Colon cancer Paternal Aunt 68   • Colon cancer Paternal Aunt 70   • Liver cancer Paternal Aunt    • Breast cancer Neg Hx    • Ovarian cancer Neg Hx    • Uterine cancer Neg Hx    • Melanoma Neg Hx    • Prostate cancer Neg Hx        The following portions of the patient's history were reviewed and updated as appropriate: allergies, current medications, past family history,  past medical history, past social history, past surgical history and problem list.    Review of Systems   Constitutional: Negative for activity change, appetite change, chills, diaphoresis, fatigue, fever, unexpected weight gain and unexpected weight loss.   HENT: Negative for congestion, dental problem, drooling, ear discharge, ear pain, facial swelling, hearing loss, mouth sores, nosebleeds, postnasal drip, rhinorrhea, sinus pressure, sneezing, sore throat, swollen glands, tinnitus, trouble swallowing and voice change.    Eyes: Negative for blurred vision, double vision, photophobia, pain, discharge, redness, itching and visual disturbance.   Respiratory: Negative for apnea, cough, choking, chest tightness, shortness of breath, wheezing and stridor.    Cardiovascular: Negative for chest pain, palpitations and leg swelling.   Gastrointestinal: Positive for abdominal pain and constipation. Negative for abdominal distention, anal bleeding, anorexia, blood in stool, diarrhea, nausea, rectal pain, vomiting, GERD and indigestion.   Endocrine: Negative for cold intolerance, heat intolerance, polydipsia, polyphagia and polyuria.   Genitourinary: Positive for frequency, pelvic pain and urgency. Negative for amenorrhea, breast discharge, breast lump, breast pain, decreased libido, decreased urine volume, difficulty urinating, dyspareunia, dysuria, flank pain, genital sores, hematuria, menstrual problem, pelvic pressure, urinary incontinence, vaginal bleeding, vaginal discharge and vaginal pain.   Musculoskeletal: Negative for arthralgias, back pain, gait problem, joint pain, joint swelling, myalgias, neck pain, neck stiffness and bursitis.   Skin: Negative for color change, dry skin and rash.   Allergic/Immunologic: Negative for environmental allergies, food allergies and immunocompromised state.   Neurological: Negative for dizziness, tremors, seizures, syncope, facial asymmetry, speech difficulty, weakness,  light-headedness, numbness, headache, memory problem and confusion.   Hematological: Negative for adenopathy. Does not bruise/bleed easily.   Psychiatric/Behavioral: Negative for agitation, behavioral problems, decreased concentration, dysphoric mood, hallucinations, self-injury, sleep disturbance, suicidal ideas, negative for hyperactivity, depressed mood and stress. The patient is not nervous/anxious.        Objective   Physical Exam   Constitutional: She is oriented to person, place, and time. She appears well-developed and well-nourished.   HENT:   Head: Normocephalic and atraumatic.   Eyes: Conjunctivae are normal. Right eye exhibits no discharge. Left eye exhibits no discharge.   Neck: Normal range of motion. Neck supple. No thyromegaly present.   Cardiovascular: Normal rate, regular rhythm and normal heart sounds.   Pulmonary/Chest: Effort normal and breath sounds normal.   Neurological: She is alert and oriented to person, place, and time.   Skin: Skin is warm and dry.   Psychiatric: She has a normal mood and affect. Her behavior is normal. Judgment and thought content normal.   Nursing note and vitals reviewed.      Assessment/Plan   Desirae was seen today for pelvic pain.    Diagnoses and all orders for this visit:    Pelvic pain  Comments:  Patient had pelvic ultrasound today for pelvic pain.  Complex cysts were found on the right ovary.    Menopausal symptoms  Comments:  Patient is on Effexor for hot flashes and has done well with it.  However there is a caution advised if you have glaucoma and she states today that she does have glaucoma.  She will discuss with her eye doctor regarding staying on the Effexor, if she cannot then she will try something different.    Cyst of right ovary  Comments:  Patient has a 4 cm complex cyst on the right.  Tumor markers are drawn today.  She will return for repeat ultrasound in 6 weeks if the tumor markers are normal.  Orders:  -     AFP Tumor Marker  -       -      Cancer Antigen 19-9  -     CEA  -     HCG, B-subunit, Quantitative  -     Lactate Dehydrogenase  -     Ovarian Malignancy Risk-CRISTHIAN    Hypothyroidism, unspecified type  Comments:  Patient would like to have thyroid antibodies drawn.  Orders:  -     Thyroid Antibodies    Urinary frequency  Comments:  Patient is having urinary frequency.  Urine will be done today.  Orders:  -     UA / M With / Rflx Culture([facility] ONLY) - Urine, Clean Catch    External hemorrhoids  Comments:  Patient is given Anusol cream to use for her hemorrhoids.  Orders:  -     hydrocortisone (ANUSOL-HC) 2.5 % rectal cream; Apply rectally 2 times daily         Patient's Body mass index is 31.83 kg/m². BMI is above normal parameters. Recommendations include: educational material, exercise counseling and nutrition counseling.      Apple Ivan, APRN  2/18/2020  Answers for HPI/ROS submitted by the patient on 2/18/2020   Abdominal pain  What is the primary reason for your visit?: Abdominal Pain

## 2020-02-18 NOTE — PATIENT INSTRUCTIONS

## 2020-02-19 LAB
AFP-TM SERPL-MCNC: 5.6 NG/ML (ref 0–8.3)
APPEARANCE UR: CLEAR
BACTERIA #/AREA URNS HPF: NORMAL /HPF
BILIRUB UR QL STRIP: NEGATIVE
CANCER AG125 SERPL-ACNC: 7.5 U/ML (ref 0–38.1)
CANCER AG19-9 SERPL-ACNC: 6 U/ML (ref 0–35)
CEA SERPL-MCNC: 1.51 NG/ML
COLOR UR: YELLOW
EPI CELLS #/AREA URNS HPF: NORMAL /HPF (ref 0–10)
GLUCOSE UR QL: NEGATIVE
HCG INTACT+B SERPL-ACNC: 1.24 MIU/ML
HE4 SERPL-SCNC: 43.9 PMOL/L (ref 0–105.2)
HGB UR QL STRIP: NEGATIVE
KETONES UR QL STRIP: NEGATIVE
LABORATORY COMMENT REPORT: NORMAL
LDH SERPL-CCNC: 171 U/L (ref 135–214)
LEUKOCYTE ESTERASE UR QL STRIP: NEGATIVE
MICRO URNS: NORMAL
MICRO URNS: NORMAL
NITRITE UR QL STRIP: NEGATIVE
PH UR STRIP: 6 [PH] (ref 5–7.5)
POSTMENOPAUSAL INTERP: LOW: NORMAL
PREMENOPAUSAL INTERP: LOW: NORMAL
PROT UR QL STRIP: NEGATIVE
RBC #/AREA URNS HPF: NORMAL /HPF (ref 0–2)
ROMA SCORE POSTMEN SERPL: 0.64
ROMA SCORE PREMEN SERPL: 0.54
SP GR UR: 1.01 (ref 1–1.03)
THYROGLOB AB SERPL-ACNC: <1 IU/ML (ref 0–0.9)
THYROPEROXIDASE AB SERPL-ACNC: 10 IU/ML (ref 0–34)
URINALYSIS REFLEX: NORMAL
UROBILINOGEN UR STRIP-MCNC: 0.2 MG/DL (ref 0.2–1)
WBC #/AREA URNS HPF: NORMAL /HPF (ref 0–5)

## 2020-03-18 ENCOUNTER — OFFICE VISIT (OUTPATIENT)
Dept: INTERNAL MEDICINE | Facility: CLINIC | Age: 55
End: 2020-03-18

## 2020-03-18 DIAGNOSIS — M19.071 OSTEOARTHRITIS OF BOTH ANKLES, UNSPECIFIED OSTEOARTHRITIS TYPE: Primary | ICD-10-CM

## 2020-03-18 DIAGNOSIS — M19.072 OSTEOARTHRITIS OF BOTH ANKLES, UNSPECIFIED OSTEOARTHRITIS TYPE: Primary | ICD-10-CM

## 2020-03-18 PROCEDURE — 99213 OFFICE O/P EST LOW 20 MIN: CPT | Performed by: FAMILY MEDICINE

## 2020-03-18 RX ORDER — METHYLPREDNISOLONE 4 MG/1
TABLET ORAL
Qty: 21 TABLET | Refills: 0 | Status: SHIPPED | OUTPATIENT
Start: 2020-03-18 | End: 2020-07-13

## 2020-03-18 NOTE — PROGRESS NOTES
Subjective     CC:  Right fifth finger pain      History of Present Illness  The patient has had persistent right 5th finger dip joint pain.  Feels like it crunches.  Previous xray no fracture.  No trauma.  Has a presthesia along the side of above finger.   Patient's PMR from outside medical facility reviewed and noted.    Review of Systems     Otherwise complete ROS reviewed and negative except as mentioned in the HPI.    Past Medical History:   Past Medical History:   Diagnosis Date   • Cancer (CMS/HCC)     lymphoma of small bowel   • Glaucoma    • Hypothyroidism    • TIA (transient ischemic attack)      Past Surgical History:  Past Surgical History:   Procedure Laterality Date   • BREAST EXCISIONAL BIOPSY Left 01/2016    benign   • BREAST SURGERY Left 2017   • CHOLECYSTECTOMY     • COLON RESECTION SMALL BOWEL  2014   • COLONOSCOPY  2019   • LAPAROSCOPIC TUBAL LIGATION     • TUBAL ABDOMINAL LIGATION     • VAGINAL MESH REVISION      2010/2016   • WISDOM TOOTH EXTRACTION       Social History:  reports that she has quit smoking. She has never used smokeless tobacco. She reports that she does not drink alcohol or use drugs.    Family History: family history includes Cancer in her maternal aunt; Colon cancer (age of onset: 68) in her paternal aunt; Colon cancer (age of onset: 70) in her paternal aunt; Heart defect in her mother; Liver cancer in her paternal aunt; No Known Problems in her father and sister.       Allergies:  No Known Allergies  Medications:  Prior to Admission medications    Medication Sig Start Date End Date Taking? Authorizing Provider   aspirin 81 MG EC tablet Take 1 tablet by mouth Daily. 10/16/19   CATIE Howe MD   atorvastatin (LIPITOR) 10 MG tablet Take 1 tablet by mouth Daily. 9/6/19   Jeannette Justin DO   benzonatate (TESSALON PERLES) 100 MG capsule Take 2 capsules by mouth 3 (Three) Times a Day As Needed for Cough. 1/10/20   Jeannette Justin DO   cefdinir (OMNICEF) 300  MG capsule Take 1 capsule by mouth 2 (Two) Times a Day. 1/10/20   Jeannette Justin DO   cyclobenzaprine (FLEXERIL) 5 MG tablet Take 1 tablet by mouth 3 (Three) Times a Day As Needed for Muscle Spasms. 2/3/20   Manjit Whiting,    Ergocalciferol (VITAMIN D2) 50 MCG (2000 UT) tablet Take 1 tablet by mouth Daily. 12/13/19   Jeannette Justin DO   estrogens, conjugated, (PREMARIN) 0.3 MG tablet Take 1 tablet by mouth once daily 9/6/19   Jeannette Justin DO   fluticasone (FLONASE) 50 MCG/ACT nasal spray 2 sprays into the nostril(s) as directed by provider Daily. 10/26/19   Jeannette Justin DO   folic acid (FOLVITE) 1 MG tablet Take 1 tablet by mouth Daily. 9/6/19   Jeannette Justin DO   hydrocortisone (ANUSOL-HC) 2.5 % rectal cream Apply rectally 2 times daily 2/18/20   Apple Ivan APRN   levothyroxine (SYNTHROID) 75 MCG tablet Take 1 tablet by mouth Daily. 2/7/20   Jeannette Justin DO   MULTIPLE VITAMIN PO Take  by mouth.    Provider, MD Rosalba   omega-3 acid ethyl esters (LOVAZA) 1 g capsule Take 1 capsule by mouth 2 (Two) Times a Day. 1/6/20   Apple Ivan APRN   progesterone (PROMETRIUM) 100 MG capsule Take 1 capsule by mouth Every Night. 9/6/19   Jeannette Justin DO   venlafaxine XR (EFFEXOR XR) 37.5 MG 24 hr capsule Take 1 capsule by mouth Daily. 1/6/20   Apple Ivan APRN   vitamin B-12 (CYANOCOBALAMIN) 500 MCG tablet Take 1 tablet by mouth Daily. 8/5/19   CATIE Howe MD   vitamin B-6 (PYRIDOXINE) 25 MG tablet Take 1 tablet by mouth Daily. 8/5/19   CATIE Howe MD       Objective     Vital Signs: LMP 01/06/2017 (Approximate)   Physical Exam  Patient with tenderness over right 5th PIP joint.   Mild erythema  No heat   Mild crepitus.    Full flexion and extnsion Results Reviewed:  Glucose   Date Value Ref Range Status   07/30/2019 87 70 - 100 mg/dL Final     BUN   Date Value Ref Range Status   12/04/2019 16 6 - 24 mg/dL Final   07/30/2019 9 5 - 21 mg/dL Final      Creatinine   Date Value Ref Range Status   12/04/2019 0.78 0.57 - 1.00 mg/dL Final   09/17/2019 0.70 0.60 - 1.30 mg/dL Final     Comment:     Serial Number: [id number]:  [id number]     Sodium   Date Value Ref Range Status   12/04/2019 141 134 - 144 mmol/L Final   07/30/2019 140 135 - 145 mmol/L Final     Potassium   Date Value Ref Range Status   12/04/2019 4.4 3.5 - 5.2 mmol/L Final   07/30/2019 3.9 3.5 - 5.3 mmol/L Final     Chloride   Date Value Ref Range Status   12/04/2019 103 96 - 106 mmol/L Final   07/30/2019 103 98 - 110 mmol/L Final     CO2   Date Value Ref Range Status   07/30/2019 28.0 24.0 - 31.0 mmol/L Final     Total CO2   Date Value Ref Range Status   12/04/2019 22 20 - 29 mmol/L Final     Calcium   Date Value Ref Range Status   12/04/2019 9.4 8.7 - 10.2 mg/dL Final   07/30/2019 9.7 8.4 - 10.4 mg/dL Final     ALT (SGPT)   Date Value Ref Range Status   12/04/2019 28 0 - 32 IU/L Final   07/30/2019 28 0 - 54 U/L Final     AST (SGOT)   Date Value Ref Range Status   12/04/2019 22 0 - 40 IU/L Final   07/30/2019 29 7 - 45 U/L Final     WBC   Date Value Ref Range Status   07/30/2019 4.74 (L) 4.80 - 10.80 10*3/mm3 Final     Hematocrit   Date Value Ref Range Status   07/30/2019 40.5 37.0 - 47.0 % Final     Platelets   Date Value Ref Range Status   07/30/2019 250 130 - 400 10*3/mm3 Final     Total Cholesterol   Date Value Ref Range Status   07/30/2019 252 (H) 130 - 200 mg/dL Final     Triglycerides   Date Value Ref Range Status   07/30/2019 261 (H) 0 - 149 mg/dL Final     HDL Cholesterol   Date Value Ref Range Status   07/30/2019 84 >=50 mg/dL Final     LDL Cholesterol    Date Value Ref Range Status   07/30/2019 124 (H) 0 - 99 mg/dL Final     LDL/HDL Ratio   Date Value Ref Range Status   07/30/2019 1.38  Final         Assessment / Plan     Assessment/Plan:  osteoarthritis right thmum pip.  Medrol dose pack    Return in about 3 months (around 6/18/2020). unless patient needs to be seen sooner or acute issues  arise.        I have discussed the patient results/orders and and plan/recommendation with them at today's visit.      Jeannette Justin, DO   03/18/2020

## 2020-04-10 ENCOUNTER — OFFICE VISIT (OUTPATIENT)
Dept: INTERNAL MEDICINE | Facility: CLINIC | Age: 55
End: 2020-04-10

## 2020-04-10 VITALS
DIASTOLIC BLOOD PRESSURE: 77 MMHG | BODY MASS INDEX: 34.25 KG/M2 | TEMPERATURE: 97.8 F | OXYGEN SATURATION: 98 % | HEART RATE: 99 BPM | SYSTOLIC BLOOD PRESSURE: 123 MMHG | WEIGHT: 186.13 LBS | HEIGHT: 62 IN
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
## 2020-04-10 DIAGNOSIS — L03.316 CELLULITIS OF UMBILICUS: ICD-10-CM

## 2020-04-10 PROCEDURE — 99213 OFFICE O/P EST LOW 20 MIN: CPT | Performed by: FAMILY MEDICINE

## 2020-04-10 RX ORDER — DOXYCYCLINE 100 MG/1
100 TABLET ORAL 2 TIMES DAILY
Qty: 20 TABLET | Refills: 0 | Status: SHIPPED | OUTPATIENT
Start: 2020-04-10 | End: 2020-04-10 | Stop reason: SDUPTHER

## 2020-04-10 RX ORDER — DOXYCYCLINE 100 MG/1
100 TABLET ORAL 2 TIMES DAILY
Qty: 20 TABLET | Refills: 0 | Status: SHIPPED | OUTPATIENT
Start: 2020-04-10 | End: 2020-07-13

## 2020-04-10 NOTE — PROGRESS NOTES
Subjective     CC:  Tick bite last Sunday     Red abdomen     History of Present Illness    Pain and swelling with redness around belly button.  Bit by tick on Sunday    Was out doing yard work.     Patient's PMR from outside medical facility reviewed and noted.    Review of Systems     Otherwise complete ROS reviewed and negative except as mentioned in the HPI.    Past Medical History:   Past Medical History:   Diagnosis Date   • Cancer (CMS/HCC)     lymphoma of small bowel   • Glaucoma    • Hypothyroidism    • TIA (transient ischemic attack)      Past Surgical History:  Past Surgical History:   Procedure Laterality Date   • BREAST EXCISIONAL BIOPSY Left 01/2016    benign   • BREAST SURGERY Left 2017   • CHOLECYSTECTOMY     • COLON RESECTION SMALL BOWEL  2014   • COLONOSCOPY  2019   • LAPAROSCOPIC TUBAL LIGATION     • TUBAL ABDOMINAL LIGATION     • VAGINAL MESH REVISION      2010/2016   • WISDOM TOOTH EXTRACTION       Social History:  reports that she has quit smoking. She has never used smokeless tobacco. She reports that she does not drink alcohol or use drugs.    Family History: family history includes Cancer in her maternal aunt; Colon cancer (age of onset: 68) in her paternal aunt; Colon cancer (age of onset: 70) in her paternal aunt; Heart defect in her mother; Liver cancer in her paternal aunt; No Known Problems in her father and sister.       Allergies:  No Known Allergies  Medications:  Prior to Admission medications    Medication Sig Start Date End Date Taking? Authorizing Provider   aspirin 81 MG EC tablet Take 1 tablet by mouth Daily. 10/16/19   CATIE Howe MD   atorvastatin (LIPITOR) 10 MG tablet Take 1 tablet by mouth Daily. 9/6/19   Jeannette Justin DO   benzonatate (TESSALON PERLES) 100 MG capsule Take 2 capsules by mouth 3 (Three) Times a Day As Needed for Cough. 1/10/20   Jeannette Justin DO   cefdinir (OMNICEF) 300 MG capsule Take 1 capsule by mouth 2 (Two) Times a Day.  "1/10/20   Jeannette Justin DO   cyclobenzaprine (FLEXERIL) 5 MG tablet Take 1 tablet by mouth 3 (Three) Times a Day As Needed for Muscle Spasms. 2/3/20   Manjit Whiting,    Ergocalciferol (VITAMIN D2) 50 MCG (2000 UT) tablet Take 1 tablet by mouth Daily. 12/13/19   Jeannette Justin DO   estrogens, conjugated, (PREMARIN) 0.3 MG tablet Take 1 tablet by mouth once daily 9/6/19   Jeannette Justin DO   fluticasone (FLONASE) 50 MCG/ACT nasal spray 2 sprays into the nostril(s) as directed by provider Daily. 10/26/19   Jeannette Justin DO   folic acid (FOLVITE) 1 MG tablet Take 1 tablet by mouth Daily. 9/6/19   Jeannette Justin DO   hydrocortisone (ANUSOL-HC) 2.5 % rectal cream Apply rectally 2 times daily 2/18/20   Apple Ivan APRN   levothyroxine (Synthroid) 75 MCG tablet Take 1 tablet by mouth Daily. 2/7/20   Jeannette Justin DO   methylPREDNISolone (MEDROL, QUIQUE,) 4 MG tablet Take as directed on package instructions. 3/18/20   Jeannette Justin DO   MULTIPLE VITAMIN PO Take  by mouth.    Provider, MD Rosalba   omega-3 acid ethyl esters (LOVAZA) 1 g capsule Take 1 capsule by mouth 2 (Two) Times a Day. 1/6/20   Apple Ivan APRN   progesterone (PROMETRIUM) 100 MG capsule Take 1 capsule by mouth Every Night. 9/6/19   Jeannette Justin DO   venlafaxine XR (Effexor XR) 37.5 MG 24 hr capsule Take 1 capsule by mouth Daily. 1/6/20   Apple Ivan APRN   vitamin B-12 (CYANOCOBALAMIN) 500 MCG tablet Take 1 tablet by mouth Daily. 8/5/19   CATIE Howe MD   vitamin B-6 (PYRIDOXINE) 25 MG tablet Take 1 tablet by mouth Daily. 8/5/19   CATIE Howe MD       Objective     Vital Signs: /77 (BP Location: Left arm, Patient Position: Sitting, Cuff Size: Adult)   Pulse 99   Temp 97.8 °F (36.6 °C) (Oral)   Ht 157.5 cm (62\")   Wt 84.4 kg (186 lb 2 oz)   LMP 01/06/2017 (Approximate)   SpO2 98%   Breastfeeding No   BMI 34.04 kg/m²   Physical Exam   Constitutional: She is " oriented to person, place, and time. She appears well-developed and well-nourished.   HENT:   Head: Normocephalic and atraumatic.   Eyes: Pupils are equal, round, and reactive to light. EOM are normal.   Neck: Normal range of motion.   Abdominal: Soft.   Musculoskeletal: Normal range of motion.   Neurological: She is alert and oriented to person, place, and time.   Skin: Skin is warm and dry.   Erythema periumbilical.   Mild ttp.  Mild heat.      Psychiatric: She has a normal mood and affect. Her behavior is normal.           Results Reviewed:  Glucose   Date Value Ref Range Status   07/30/2019 87 70 - 100 mg/dL Final     BUN   Date Value Ref Range Status   12/04/2019 16 6 - 24 mg/dL Final   07/30/2019 9 5 - 21 mg/dL Final     Creatinine   Date Value Ref Range Status   12/04/2019 0.78 0.57 - 1.00 mg/dL Final   09/17/2019 0.70 0.60 - 1.30 mg/dL Final     Comment:     Serial Number: [id number]:  [id number]     Sodium   Date Value Ref Range Status   12/04/2019 141 134 - 144 mmol/L Final   07/30/2019 140 135 - 145 mmol/L Final     Potassium   Date Value Ref Range Status   12/04/2019 4.4 3.5 - 5.2 mmol/L Final   07/30/2019 3.9 3.5 - 5.3 mmol/L Final     Chloride   Date Value Ref Range Status   12/04/2019 103 96 - 106 mmol/L Final   07/30/2019 103 98 - 110 mmol/L Final     CO2   Date Value Ref Range Status   07/30/2019 28.0 24.0 - 31.0 mmol/L Final     Total CO2   Date Value Ref Range Status   12/04/2019 22 20 - 29 mmol/L Final     Calcium   Date Value Ref Range Status   12/04/2019 9.4 8.7 - 10.2 mg/dL Final   07/30/2019 9.7 8.4 - 10.4 mg/dL Final     ALT (SGPT)   Date Value Ref Range Status   12/04/2019 28 0 - 32 IU/L Final   07/30/2019 28 0 - 54 U/L Final     AST (SGOT)   Date Value Ref Range Status   12/04/2019 22 0 - 40 IU/L Final   07/30/2019 29 7 - 45 U/L Final     WBC   Date Value Ref Range Status   07/30/2019 4.74 (L) 4.80 - 10.80 10*3/mm3 Final     Hematocrit   Date Value Ref Range Status   07/30/2019 40.5 37.0 -  47.0 % Final     Platelets   Date Value Ref Range Status   07/30/2019 250 130 - 400 10*3/mm3 Final     Total Cholesterol   Date Value Ref Range Status   07/30/2019 252 (H) 130 - 200 mg/dL Final     Triglycerides   Date Value Ref Range Status   07/30/2019 261 (H) 0 - 149 mg/dL Final     HDL Cholesterol   Date Value Ref Range Status   07/30/2019 84 >=50 mg/dL Final     LDL Cholesterol    Date Value Ref Range Status   07/30/2019 124 (H) 0 - 99 mg/dL Final     LDL/HDL Ratio   Date Value Ref Range Status   07/30/2019 1.38  Final         Assessment / Plan     Assessment/Plan:  1. Tick bite, initial encounter  Monitor for fever, headache, rash.    2. Cellulitis of umbilicus      Doxycycline 100 mg po bid 10 days        Return if symptoms worsen or fail to improve. unless patient needs to be seen sooner or acute issues arise.        I have discussed the patient results/orders and and plan/recommendation with them at today's visit.      Jeannette Justin,    04/10/2020

## 2020-06-08 ENCOUNTER — OFFICE VISIT (OUTPATIENT)
Dept: INTERNAL MEDICINE | Facility: CLINIC | Age: 55
End: 2020-06-08

## 2020-06-08 VITALS
TEMPERATURE: 98.2 F | BODY MASS INDEX: 32.85 KG/M2 | HEART RATE: 85 BPM | HEIGHT: 62 IN | SYSTOLIC BLOOD PRESSURE: 122 MMHG | DIASTOLIC BLOOD PRESSURE: 72 MMHG | WEIGHT: 178.5 LBS | OXYGEN SATURATION: 99 %

## 2020-06-08 DIAGNOSIS — M25.561 ACUTE PAIN OF RIGHT KNEE: Primary | ICD-10-CM

## 2020-06-08 PROCEDURE — 96372 THER/PROPH/DIAG INJ SC/IM: CPT | Performed by: NURSE PRACTITIONER

## 2020-06-08 PROCEDURE — 99214 OFFICE O/P EST MOD 30 MIN: CPT | Performed by: NURSE PRACTITIONER

## 2020-06-08 RX ORDER — TRIAMCINOLONE ACETONIDE 40 MG/ML
40 INJECTION, SUSPENSION INTRA-ARTICULAR; INTRAMUSCULAR ONCE
Status: COMPLETED | OUTPATIENT
Start: 2020-06-08 | End: 2020-06-08

## 2020-06-08 RX ORDER — M-VIT,TX,IRON,MINS/CALC/FOLIC 27MG-0.4MG
1 TABLET ORAL DAILY
COMMUNITY
End: 2022-02-28

## 2020-06-08 RX ADMIN — TRIAMCINOLONE ACETONIDE 40 MG: 40 INJECTION, SUSPENSION INTRA-ARTICULAR; INTRAMUSCULAR at 12:49

## 2020-06-08 NOTE — PROGRESS NOTES
Subjective     Chief Complaint   Patient presents with   • Knee Pain     Right Knee       History of Present Illness  Pt comes in today with complaints of worsening right knee pain. States she was running after her grand-kids and then she was doing something and felt a pop and it dropped her to her knees. She does have a history of back pain. She has a history of arthritis in her knees several years ago and states she was told at some point she would need surgery. She admits to pain in her hip with radiation to her foot as well. She has been seeing chriopractor.       Patient's PMR from outside medical facility reviewed and noted.    Review of Systems   Otherwise complete ROS reviewed and negative except as mentioned in the HPI.    Past Medical History:   Past Medical History:   Diagnosis Date   • Cancer (CMS/HCC)     lymphoma of small bowel   • Glaucoma    • Hypothyroidism    • TIA (transient ischemic attack)      Past Surgical History:  Past Surgical History:   Procedure Laterality Date   • BREAST EXCISIONAL BIOPSY Left 01/2016    benign   • BREAST SURGERY Left 2017   • CHOLECYSTECTOMY     • COLON RESECTION SMALL BOWEL  2014   • COLONOSCOPY  2019   • LAPAROSCOPIC TUBAL LIGATION     • TUBAL ABDOMINAL LIGATION     • VAGINAL MESH REVISION      2010/2016   • WISDOM TOOTH EXTRACTION       Social History:  reports that she has quit smoking. She has never used smokeless tobacco. She reports that she does not drink alcohol or use drugs.    Family History: family history includes Cancer in her maternal aunt; Colon cancer (age of onset: 68) in her paternal aunt; Colon cancer (age of onset: 70) in her paternal aunt; Heart defect in her mother; Liver cancer in her paternal aunt; No Known Problems in her father and sister.      Allergies:  No Known Allergies  Medications:  Prior to Admission medications    Medication Sig Start Date End Date Taking? Authorizing Provider   aspirin 81 MG EC tablet Take 1 tablet by mouth  Daily. 10/16/19   CATIE Howe MD   atorvastatin (LIPITOR) 10 MG tablet Take 1 tablet by mouth Daily. 9/6/19   Jeannette Justin DO   benzonatate (TESSALON PERLES) 100 MG capsule Take 2 capsules by mouth 3 (Three) Times a Day As Needed for Cough. 1/10/20   Jeannette Justin DO   cefdinir (OMNICEF) 300 MG capsule Take 1 capsule by mouth 2 (Two) Times a Day. 1/10/20   Jeannette Justin DO   cyclobenzaprine (FLEXERIL) 5 MG tablet Take 1 tablet by mouth 3 (Three) Times a Day As Needed for Muscle Spasms. 2/3/20   Manjit Whiting,    doxycycline (ADOXA) 100 MG tablet Take 1 tablet by mouth 2 (Two) Times a Day. 4/10/20   Jeannette Justin DO   Ergocalciferol (VITAMIN D2) 50 MCG (2000 UT) tablet Take 1 tablet by mouth Daily. 12/13/19   Jeannette Justin DO   estrogens, conjugated, (PREMARIN) 0.3 MG tablet Take 1 tablet by mouth once daily 9/6/19   Jeannette Justin DO   fluticasone (FLONASE) 50 MCG/ACT nasal spray 2 sprays into the nostril(s) as directed by provider Daily. 10/26/19   Jeannette Justin DO   folic acid (FOLVITE) 1 MG tablet Take 1 tablet by mouth Daily. 9/6/19   Jeannette Justin DO   hydrocortisone (ANUSOL-HC) 2.5 % rectal cream Apply rectally 2 times daily 2/18/20   Apple Ivan APRN   levothyroxine (Synthroid) 75 MCG tablet Take 1 tablet by mouth Daily. 2/7/20   Jeannette Justin DO   methylPREDNISolone (MEDROL, QUIQUE,) 4 MG tablet Take as directed on package instructions. 3/18/20   Jeannette Justin DO   MULTIPLE VITAMIN PO Take  by mouth.    Provider, MD Rosalba   omega-3 acid ethyl esters (LOVAZA) 1 g capsule Take 1 capsule by mouth 2 (Two) Times a Day. 1/6/20   Apple Ivan APRN   progesterone (PROMETRIUM) 100 MG capsule Take 1 capsule by mouth Every Night. 9/6/19   Jeannette Justin,    therapeutic multivitamin-minerals (THERAGRAN-M) tablet Take 1 tablet by mouth.    Provider, MD Rosalba   venlafaxine XR (Effexor XR) 37.5 MG 24 hr capsule Take 1  "capsule by mouth Daily. 1/6/20   Apple Ivan, APRN   vitamin B-12 (CYANOCOBALAMIN) 500 MCG tablet Take 1 tablet by mouth Daily. 8/5/19   CATIE Howe MD   vitamin B-6 (PYRIDOXINE) 25 MG tablet Take 1 tablet by mouth Daily. 8/5/19   CATIE Howe MD       Objective     Vital Signs: /72 (BP Location: Left arm, Patient Position: Sitting, Cuff Size: Adult)   Pulse 85   Temp 98.2 °F (36.8 °C) (Skin)   Ht 157.5 cm (62\")   Wt 81 kg (178 lb 8 oz)   LMP 01/06/2017 (Approximate)   SpO2 99%   Breastfeeding No   BMI 32.65 kg/m²   Physical Exam   Constitutional: She is oriented to person, place, and time. She appears well-developed and well-nourished.   HENT:   Head: Normocephalic and atraumatic.   Eyes: Pupils are equal, round, and reactive to light. EOM are normal.   Neck: Normal range of motion. Neck supple. No JVD present.   Cardiovascular: Normal rate and regular rhythm.   Pulmonary/Chest: Effort normal and breath sounds normal.   Abdominal: Soft. Bowel sounds are normal.   Musculoskeletal: She exhibits edema (right knee with mild effusion.) and tenderness ( anterior and right lateral tenderness. ). She exhibits no deformity.   Lymphadenopathy:     She has no cervical adenopathy.   Neurological: She is alert and oriented to person, place, and time.   Skin: Skin is warm and dry.   Psychiatric: She has a normal mood and affect. Her behavior is normal. Judgment and thought content normal.   Vitals reviewed.    Results Reviewed:  Glucose   Date Value Ref Range Status   07/30/2019 87 70 - 100 mg/dL Final     BUN   Date Value Ref Range Status   12/04/2019 16 6 - 24 mg/dL Final   07/30/2019 9 5 - 21 mg/dL Final     Creatinine   Date Value Ref Range Status   12/04/2019 0.78 0.57 - 1.00 mg/dL Final   09/17/2019 0.70 0.60 - 1.30 mg/dL Final     Comment:     Serial Number: [id number]:  [id number]     Sodium   Date Value Ref Range Status   12/04/2019 141 134 - 144 mmol/L Final   07/30/2019 140 135 - 145 " mmol/L Final     Potassium   Date Value Ref Range Status   12/04/2019 4.4 3.5 - 5.2 mmol/L Final   07/30/2019 3.9 3.5 - 5.3 mmol/L Final     Chloride   Date Value Ref Range Status   12/04/2019 103 96 - 106 mmol/L Final   07/30/2019 103 98 - 110 mmol/L Final     CO2   Date Value Ref Range Status   07/30/2019 28.0 24.0 - 31.0 mmol/L Final     Total CO2   Date Value Ref Range Status   12/04/2019 22 20 - 29 mmol/L Final     Calcium   Date Value Ref Range Status   12/04/2019 9.4 8.7 - 10.2 mg/dL Final   07/30/2019 9.7 8.4 - 10.4 mg/dL Final     ALT (SGPT)   Date Value Ref Range Status   12/04/2019 28 0 - 32 IU/L Final   07/30/2019 28 0 - 54 U/L Final     AST (SGOT)   Date Value Ref Range Status   12/04/2019 22 0 - 40 IU/L Final   07/30/2019 29 7 - 45 U/L Final     WBC   Date Value Ref Range Status   07/30/2019 4.74 (L) 4.80 - 10.80 10*3/mm3 Final     Hematocrit   Date Value Ref Range Status   07/30/2019 40.5 37.0 - 47.0 % Final     Platelets   Date Value Ref Range Status   07/30/2019 250 130 - 400 10*3/mm3 Final     Total Cholesterol   Date Value Ref Range Status   07/30/2019 252 (H) 130 - 200 mg/dL Final     Triglycerides   Date Value Ref Range Status   07/30/2019 261 (H) 0 - 149 mg/dL Final     HDL Cholesterol   Date Value Ref Range Status   07/30/2019 84 >=50 mg/dL Final     LDL Cholesterol    Date Value Ref Range Status   07/30/2019 124 (H) 0 - 99 mg/dL Final     LDL/HDL Ratio   Date Value Ref Range Status   07/30/2019 1.38  Final         Assessment / Plan     Assessment/Plan:  Desirae was seen today for knee pain.    Diagnoses and all orders for this visit:    Acute pain of right knee  -     XR Knee 1 or 2 View Right (In Office)  -     triamcinolone acetonide (KENALOG-40) injection 40 mg    Other orders  -     diclofenac (VOLTAREN) 50 MG EC tablet; Take 1 tablet by mouth 2 (Two) Times a Day.    Will send her to Dr. Grewal's office.     No follow-ups on file. unless patient needs to be seen sooner or acute issues  arise.    Code Status: Full.     I have discussed the patient results/orders and and plan/recommendation with them at today's visit.      Luz Mota, NAVEED   06/08/2020

## 2020-06-23 ENCOUNTER — TRANSCRIBE ORDERS (OUTPATIENT)
Dept: ADMINISTRATIVE | Facility: HOSPITAL | Age: 55
End: 2020-06-23

## 2020-06-23 DIAGNOSIS — M17.11 ARTHRITIS OF RIGHT KNEE: Primary | ICD-10-CM

## 2020-06-24 RX ORDER — LEVOTHYROXINE SODIUM 0.07 MG/1
75 TABLET ORAL DAILY
Qty: 90 TABLET | Refills: 3 | Status: SHIPPED | OUTPATIENT
Start: 2020-06-24 | End: 2021-06-15

## 2020-06-30 ENCOUNTER — HOSPITAL ENCOUNTER (OUTPATIENT)
Dept: MRI IMAGING | Facility: HOSPITAL | Age: 55
Discharge: HOME | End: 2020-06-30
Admitting: PHYSICIAN ASSISTANT

## 2020-06-30 PROCEDURE — 73721 MRI JNT OF LWR EXTRE W/O DYE: CPT

## 2020-07-06 DIAGNOSIS — M54.50 ACUTE LOW BACK PAIN WITHOUT SCIATICA, UNSPECIFIED BACK PAIN LATERALITY: ICD-10-CM

## 2020-07-06 RX ORDER — CYCLOBENZAPRINE HCL 5 MG
5 TABLET ORAL 3 TIMES DAILY PRN
Qty: 30 TABLET | Refills: 1 | Status: SHIPPED | OUTPATIENT
Start: 2020-07-06 | End: 2020-09-16 | Stop reason: SDUPTHER

## 2020-07-06 NOTE — TELEPHONE ENCOUNTER
Pt would like her cyclobenzaprine called in because she only has 2 left and it is really helping her knee.

## 2020-07-09 ENCOUNTER — TRANSCRIBE ORDERS (OUTPATIENT)
Dept: ADMINISTRATIVE | Facility: HOSPITAL | Age: 55
End: 2020-07-09

## 2020-07-09 DIAGNOSIS — Z01.818 PRE-OP TESTING: Primary | ICD-10-CM

## 2020-07-11 ENCOUNTER — LAB (OUTPATIENT)
Dept: LAB | Facility: HOSPITAL | Age: 55
End: 2020-07-11

## 2020-07-11 PROCEDURE — U0003 INFECTIOUS AGENT DETECTION BY NUCLEIC ACID (DNA OR RNA); SEVERE ACUTE RESPIRATORY SYNDROME CORONAVIRUS 2 (SARS-COV-2) (CORONAVIRUS DISEASE [COVID-19]), AMPLIFIED PROBE TECHNIQUE, MAKING USE OF HIGH THROUGHPUT TECHNOLOGIES AS DESCRIBED BY CMS-2020-01-R: HCPCS | Performed by: ORTHOPAEDIC SURGERY

## 2020-07-11 PROCEDURE — C9803 HOPD COVID-19 SPEC COLLECT: HCPCS | Performed by: ORTHOPAEDIC SURGERY

## 2020-07-12 LAB
COVID LABCORP PRIORITY: NORMAL
SARS-COV-2 RNA RESP QL NAA+PROBE: NOT DETECTED

## 2020-07-13 ENCOUNTER — APPOINTMENT (OUTPATIENT)
Dept: PREADMISSION TESTING | Facility: HOSPITAL | Age: 55
End: 2020-07-13

## 2020-07-13 VITALS
HEIGHT: 63 IN | HEART RATE: 86 BPM | OXYGEN SATURATION: 97 % | BODY MASS INDEX: 30.74 KG/M2 | RESPIRATION RATE: 18 BRPM | SYSTOLIC BLOOD PRESSURE: 124 MMHG | WEIGHT: 173.5 LBS | DIASTOLIC BLOOD PRESSURE: 80 MMHG

## 2020-07-13 PROBLEM — M23.203 OLD COMPLEX TEAR OF MEDIAL MENISCUS OF RIGHT KNEE: Status: ACTIVE | Noted: 2020-07-13

## 2020-07-13 LAB
ANION GAP SERPL CALCULATED.3IONS-SCNC: 11 MMOL/L (ref 5–15)
BUN SERPL-MCNC: 14 MG/DL (ref 6–20)
BUN/CREAT SERPL: 20.3 (ref 7–25)
CALCIUM SPEC-SCNC: 9.6 MG/DL (ref 8.6–10.5)
CHLORIDE SERPL-SCNC: 101 MMOL/L (ref 98–107)
CO2 SERPL-SCNC: 27 MMOL/L (ref 22–29)
CREAT SERPL-MCNC: 0.69 MG/DL (ref 0.57–1)
DEPRECATED RDW RBC AUTO: 39.1 FL (ref 37–54)
ERYTHROCYTE [DISTWIDTH] IN BLOOD BY AUTOMATED COUNT: 12.5 % (ref 12.3–15.4)
GFR SERPL CREATININE-BSD FRML MDRD: 89 ML/MIN/1.73
GLUCOSE SERPL-MCNC: 107 MG/DL (ref 65–99)
HCT VFR BLD AUTO: 40.4 % (ref 34–46.6)
HGB BLD-MCNC: 13.7 G/DL (ref 12–15.9)
MCH RBC QN AUTO: 29.2 PG (ref 26.6–33)
MCHC RBC AUTO-ENTMCNC: 33.9 G/DL (ref 31.5–35.7)
MCV RBC AUTO: 86.1 FL (ref 79–97)
PLATELET # BLD AUTO: 217 10*3/MM3 (ref 140–450)
PMV BLD AUTO: 10 FL (ref 6–12)
POTASSIUM SERPL-SCNC: 4 MMOL/L (ref 3.5–5.2)
RBC # BLD AUTO: 4.69 10*6/MM3 (ref 3.77–5.28)
SODIUM SERPL-SCNC: 139 MMOL/L (ref 136–145)
WBC # BLD AUTO: 4.24 10*3/MM3 (ref 3.4–10.8)

## 2020-07-13 PROCEDURE — 80048 BASIC METABOLIC PNL TOTAL CA: CPT | Performed by: ORTHOPAEDIC SURGERY

## 2020-07-13 PROCEDURE — 85027 COMPLETE CBC AUTOMATED: CPT | Performed by: ORTHOPAEDIC SURGERY

## 2020-07-13 PROCEDURE — 93005 ELECTROCARDIOGRAM TRACING: CPT

## 2020-07-13 PROCEDURE — 36415 COLL VENOUS BLD VENIPUNCTURE: CPT

## 2020-07-13 PROCEDURE — 93010 ELECTROCARDIOGRAM REPORT: CPT | Performed by: INTERNAL MEDICINE

## 2020-07-13 RX ORDER — SACCHAROMYCES BOULARDII 250 MG
250 CAPSULE ORAL 2 TIMES DAILY
COMMUNITY

## 2020-07-13 NOTE — DISCHARGE INSTRUCTIONS
DAY OF SURGERY INSTRUCTIONS        YOUR SURGEON: Dr. Grewal    PROCEDURE: Right knee partial medial meniscectomy    DATE OF SURGERY: July 14, 2020    ARRIVAL TIME: AS DIRECTED BY OFFICE    YOU MAY TAKE THE FOLLOWING MEDICATION(S) THE MORNING OF SURGERY WITH A SIP OF WATER: NONE      ALL OTHER HOME MEDICATION CHECK WITH YOUR PHYSICIAN      DO NOT TAKE ANY ERECTILE DYSFUNCTION MEDICATIONS (EX: CIALIS, VIAGRA) 24 HOURS PRIOR TO SURGERY                      MANAGING PAIN AFTER SURGERY    We know you are probably wondering what your pain will be like after surgery.  Following surgery it is unrealistic to expect you will not have pain.   Pain is how our bodies let us know that something is wrong or cautions us to be careful.  That said, our goal is to make your pain tolerable.    Methods we may use to treat your pain include (oral or IV medications, PCAs, epidurals, nerve blocks, etc.)   While some procedures require IV pain medications for a short time after surgery, transitioning to pain medications by mouth allows for better management of pain.   Your nurse will encourage you to take oral pain medications whenever possible.  IV medications work almost immediately, but only last a short while.  Taking medications by mouth allows for a more constant level of medication in your blood stream for a longer period of time.      Once your pain is out of control it is harder to get back under control.  It is important you are aware when your next dose of pain medication is due.  If you are admitted, your nurse may write the time of your next dose on the white board in your room to help you remember.      We are interested in your pain and encourage you to inform us about aggravating factors during your visit.   Many times a simple repositioning every few hours can make a big difference.    If your physician says it is okay, do not let your pain prevent you from getting out of bed. Be sure to call your nurse for assistance  prior to getting up so you do not fall.      Before surgery, please decide your tolerable pain goal.  These faces help describe the pain ratings we use on a 0-10 scale.   Be prepared to tell us your goal and whether or not you take pain or anxiety medications at home.          BEFORE YOU COME TO THE HOSPITAL  (Pre-op instructions)  • Do not eat, drink, smoke or chew gum after midnight the night before surgery.  This also includes no mints.  • Morning of surgery take only the medicines you have been instructed with a sip of water unless otherwise instructed  by your physician.  • Do not shave, wear makeup or dark nail polish.  • Remove all jewelry including rings.  • Leave anything you consider valuable at home.  • Leave your suitcase in the car until after your surgery.  • Bring the following with you if applicable:  o Picture ID and insurance, Medicare or Medicaid cards  o Co-pay/deductible required by insurance (cash, check, credit card)  o Copy of advance directive, living will or power-of- documents if not brought to PAT  o CPAP or BIPAP mask and tubing  o Relaxation aids ( book, magazine), etc.  o Hearing aids                        ON THE DAY OF SURGERY  · On the day of surgery check in at registration located at the main entrance of the hospital.   ? You will be registered and given a beeper with instructions where to wait in the main lobby.  ? When your beeper lights up and vibrates a member of the Outpatient Surgery staff will meet you at the double doors under the stair steps and escort you to your preoperative room.   · You may have cloth compression devices placed on your legs. These help to prevent blood clots and reduce swelling in your legs.  · An IV may be inserted into one of your veins.  · In the operating room, you may be given one or more of the following:  ? A medicine to help you relax (sedative).  ? A medicine to numb the area (local anesthetic).  ? A medicine to make you fall asleep  "(general anesthetic).  ? A medicine that is injected into an area of your body to numb everything below the injection site (regional anesthetic).  · Your surgical site will be marked or identified.  · You may be given an antibiotic through your IV to help prevent infection.  Contact a health care provider if you:  · Develop a fever of more than 100.4°F (38°C) or other feelings of illness during the 48 hours before your surgery.  · Have symptoms that get worse.  Have questions or concerns about your surgery    General Anesthesia/Surgery, Adult  General anesthesia is the use of medicines to make a person \"go to sleep\" (unconscious) for a medical procedure. General anesthesia must be used for certain procedures, and is often recommended for procedures that:  · Last a long time.  · Require you to be still or in an unusual position.  · Are major and can cause blood loss.  The medicines used for general anesthesia are called general anesthetics. As well as making you unconscious for a certain amount of time, these medicines:  · Prevent pain.  · Control your blood pressure.  · Relax your muscles.  Tell a health care provider about:  · Any allergies you have.  · All medicines you are taking, including vitamins, herbs, eye drops, creams, and over-the-counter medicines.  · Any problems you or family members have had with anesthetic medicines.  · Types of anesthetics you have had in the past.  · Any blood disorders you have.  · Any surgeries you have had.  · Any medical conditions you have.  · Any recent upper respiratory, chest, or ear infections.  · Any history of:  ? Heart or lung conditions, such as heart failure, sleep apnea, asthma, or chronic obstructive pulmonary disease (COPD).  ?  service.  ? Depression or anxiety.  · Any tobacco or drug use, including marijuana or alcohol use.  · Whether you are pregnant or may be pregnant.  What are the risks?  Generally, this is a safe procedure. However, problems may " occur, including:  · Allergic reaction.  · Lung and heart problems.  · Inhaling food or liquid from the stomach into the lungs (aspiration).  · Nerve injury.  · Air in the bloodstream, which can lead to stroke.  · Extreme agitation or confusion (delirium) when you wake up from the anesthetic.  · Waking up during your procedure and being unable to move. This is rare.  These problems are more likely to develop if you are having a major surgery or if you have an advanced or serious medical condition. You can prevent some of these complications by answering all of your health care provider's questions thoroughly and by following all instructions before your procedure.  General anesthesia can cause side effects, including:  · Nausea or vomiting.  · A sore throat from the breathing tube.  · Hoarseness.  · Wheezing or coughing.  · Shaking chills.  · Tiredness.  · Body aches.  · Anxiety.  · Sleepiness or drowsiness.  · Confusion or agitation.  RISKS AND COMPLICATIONS OF SURGERY  Your health care provider will discuss possible risks and complications with you before surgery. Common risks and complications include:    · Problems due to the use of anesthetics.  · Blood loss and replacement (does not apply to minor surgical procedures).  · Temporary increase in pain due to surgery.  · Uncorrected pain or problems that the surgery was meant to correct.  · Infection.  · New damage.    What happens before the procedure?    Medicines  Ask your health care provider about:  · Changing or stopping your regular medicines. This is especially important if you are taking diabetes medicines or blood thinners.  · Taking medicines such as aspirin and ibuprofen. These medicines can thin your blood. Do not take these medicines unless your health care provider tells you to take them.  · Taking over-the-counter medicines, vitamins, herbs, and supplements. Do not take these during the week before your procedure unless your health care provider  approves them.  General instructions  · Starting 3-6 weeks before the procedure, do not use any products that contain nicotine or tobacco, such as cigarettes and e-cigarettes. If you need help quitting, ask your health care provider.  · If you brush your teeth on the morning of the procedure, make sure to spit out all of the toothpaste.  · Tell your health care provider if you become ill or develop a cold, cough, or fever.  · If instructed by your health care provider, bring your sleep apnea device with you on the day of your surgery (if applicable).  · Ask your health care provider if you will be going home the same day, the following day, or after a longer hospital stay.  ? Plan to have someone take you home from the hospital or clinic.  ? Plan to have a responsible adult care for you for at least 24 hours after you leave the hospital or clinic. This is important.  What happens during the procedure?  · You will be given anesthetics through both of the following:  ? A mask placed over your nose and mouth.  ? An IV in one of your veins.  · You may receive a medicine to help you relax (sedative).  · After you are unconscious, a breathing tube may be inserted down your throat to help you breathe. This will be removed before you wake up.  · An anesthesia specialist will stay with you throughout your procedure. He or she will:  ? Keep you comfortable and safe by continuing to give you medicines and adjusting the amount of medicine that you get.  ? Monitor your blood pressure, pulse, and oxygen levels to make sure that the anesthetics do not cause any problems.  The procedure may vary among health care providers and hospitals.  What happens after the procedure?  · Your blood pressure, temperature, heart rate, breathing rate, and blood oxygen level will be monitored until the medicines you were given have worn off.  · You will wake up in a recovery area. You may wake up slowly.  · If you feel anxious or agitated, you may  be given medicine to help you calm down.  · If you will be going home the same day, your health care provider may check to make sure you can walk, drink, and urinate.  · Your health care provider will treat any pain or side effects you have before you go home.  · Do not drive for 24 hours if you were given a sedative.  Summary  · General anesthesia is used to keep you still and prevent pain during a procedure.  · It is important to tell your healthcare provider about your medical history and any surgeries you have had, and previous experience with anesthesia.  · Follow your healthcare provider’s instructions about when to stop eating, drinking, or taking certain medicines before your procedure.  · Plan to have someone take you home from the hospital or clinic.  This information is not intended to replace advice given to you by your health care provider. Make sure you discuss any questions you have with your health care provider.  Document Released: 03/26/2009 Document Revised: 08/03/2018 Document Reviewed: 08/03/2018  [employer] Interactive Patient Education © 2019 [employer]       Fall Prevention in Hospitals, Adult  As a hospital patient, your condition and the treatments you receive can increase your risk for falls. Some additional risk factors for falls in a hospital include:  · Being in an unfamiliar environment.  · Being on bed rest.  · Your surgery.  · Taking certain medicines.  · Your tubing requirements, such as intravenous (IV) therapy or catheters.  It is important that you learn how to decrease fall risks while at the hospital. Below are important tips that can help prevent falls.  SAFETY TIPS FOR PREVENTING FALLS  Talk about your risk of falling.  · Ask your health care provider why you are at risk for falling. Is it your medicine, illness, tubing placement, or something else?  · Make a plan with your health care provider to keep you safe from falls.  · Ask your health care provider or pharmacist about  side effects of your medicines. Some medicines can make you dizzy or affect your coordination.  Ask for help.  · Ask for help before getting out of bed. You may need to press your call button.  · Ask for assistance in getting safely to the toilet.  · Ask for a walker or cane to be put at your bedside. Ask that most of the side rails on your bed be placed up before your health care provider leaves the room.  · Ask family or friends to sit with you.  · Ask for things that are out of your reach, such as your glasses, hearing aids, telephone, bedside table, or call button.  Follow these tips to avoid falling:  · Stay lying or seated, rather than standing, while waiting for help.  · Wear rubber-soled slippers or shoes whenever you walk in the hospital.  · Avoid quick, sudden movements.  ¨ Change positions slowly.  ¨ Sit on the side of your bed before standing.  ¨ Stand up slowly and wait before you start to walk.  · Let your health care provider know if there is a spill on the floor.  · Pay careful attention to the medical equipment, electrical cords, and tubes around you.  · When you need help, use your call button by your bed or in the bathroom. Wait for one of your health care providers to help you.  · If you feel dizzy or unsure of your footing, return to bed and wait for assistance.  · Avoid being distracted by the TV, telephone, or another person in your room.  · Do not lean or support yourself on rolling objects, such as IV poles or bedside tables.     This information is not intended to replace advice given to you by your health care provider. Make sure you discuss any questions you have with your health care provider.     Document Released: 12/15/2001 Document Revised: 01/08/2016 Document Reviewed: 08/25/2013  [employer] Interactive Patient Education ©2016 [employer]       [facility]  CHG 4% Patient Instruction Sheet    Chlorhexidine Before Surgery  Chlorhexidine gluconate (CHG) is a germ-killing  (antiseptic) solution that is used to clean the skin. It gets rid of the bacteria that normally live on the skin. Cleaning your skin with CHG before surgery helps lower the risk for infection after surgery.    How to use CHG solution  · You will take 2 showers, one shower the night before surgery, the second shower the morning of surgery before coming to the hospital.  · Use CHG only as told by your health care provider, and follow the instructions on the label.  · Use CHG solution while taking a shower. Follow these steps when using CHG solution (unless your health care provider gives you different instructions):  1. Start the shower.  2. Use your normal soap and shampoo to wash your face and hair.  3. Turn off the shower or move out of the shower stream.  4. Pour the CHG onto a clean washcloth. Do not use any type of brush or rough-edged sponge.  5. Starting at your neck, lather your body down to your toes. Make sure you:  6. Pay special attention to the part of your body where you will be having surgery. Scrub this area for at least 1 minute.  7. Use the full amount of CHG as directed. Usually, this is one half bottle for each shower.  8. Do not use CHG on your head or face. If the solution gets into your ears or eyes, rinse them well with water.  9. Avoid your genital area.  10. Avoid any areas of skin that have broken skin, cuts, or scrapes.  11. Scrub your back and under your arms. Make sure to wash skin folds.  12. Let the lather sit on your skin for 1-2 minutes or as long as told by your health care  provider.  13. Thoroughly rinse your entire body in the shower. Make sure that all body creases and crevices are rinsed well.  14. Dry off with a clean towel. Do not put any substances on your body afterward, such as powder, lotion, or perfume.  15. Put on clean clothes or pajamas.  16. If it is the night before your surgery, sleep in clean sheets.    What are the risks?  Risks of using CHG include:  · A skin  reaction.  · Hearing loss, if CHG gets in your ears.  · Eye injury, if CHG gets in your eyes and is not rinsed out.  · The CHG product catching fire.  Make sure that you avoid smoking and flames after applying CHG to your skin.  Do not use CHG:  · If you have a chlorhexidine allergy or have previously reacted to chlorhexidine.  · On babies younger than 2 months of age.      On the day of surgery, when you are taken to your room in Outpatient Surgery you will be given a CHG prepackaged cloth to wipe the site for your surgery.  How to use CHG prepackaged cloths  · Follow the instructions on the label.  · Use the CHG cloth on clean, dry skin. Follow these steps when using a CHG cloth (unless your health care provider gives you different instructions):  1. Using the CHG cloth, vigorously scrub the part of your body where you will be having surgery. Scrub using a back-and-forth motion for 3 minutes. The area on your body should be completely wet with CHG when you are finished scrubbing.  2. Do not rinse. Discard the cloth and let the area air-dry for 1 minute. Do not put any substances on your body afterward, such as powder, lotion, or perfume.  Contact a health care provider if:  · Your skin gets irritated after scrubbing.  · You have questions about using your solution or cloth.  Get help right away if:  · Your eyes become very red or swollen.  · Your eyes itch badly.  · Your skin itches badly and is red or swollen.  · Your hearing changes.  · You have trouble seeing.  · You have swelling or tingling in your mouth or throat.  · You have trouble breathing.  · You swallow any chlorhexidine.  Summary  · Chlorhexidine gluconate (CHG) is a germ-killing (antiseptic) solution that is used to clean the skin. Cleaning your skin with CHG before surgery helps lower the risk for infection after surgery.  · You may be given CHG to use at home. It may be in a bottle or in a prepackaged cloth to use on your skin. Carefully follow  your health care provider's instructions and the instructions on the product label.  · Do not use CHG if you have a chlorhexidine allergy.  · Contact your health care provider if your skin gets irritated after scrubbing.  This information is not intended to replace advice given to you by your health care provider. Make sure you discuss any questions you have with your health care provider.  Document Released: 09/11/2013 Document Revised: 11/15/2018 Document Reviewed: 11/15/2018  [employer] Interactive Patient Education © 2019 [employer]          PATIENT/FAMILY/RESPONSIBLE PARTY VERBALIZES UNDERSTANDING OF ABOVE EDUCATION.  COPY OF PAIN SCALE GIVEN AND REVIEWED WITH VERBALIZED UNDERSTANDING.

## 2020-07-13 NOTE — DISCHARGE INSTRUCTIONS
YOUR NEXT PAIN MEDICATION IS DUE AT______________         General Anesthesia, Adult, Care After  Refer to this sheet in the next few weeks. These instructions provide you with information on caring for yourself after your procedure. Your health care provider may also give you more specific instructions. Your treatment has been planned according to current medical practices, but problems sometimes occur. Call your health care provider if you have any problems or questions after your procedure.  WHAT TO EXPECT AFTER THE PROCEDURE  After the procedure, it is typical to experience:  · Sleepiness.  · Nausea and vomiting.  HOME CARE INSTRUCTIONS  · For the first 24 hours after general anesthesia:  ¨ Have a responsible person with you.  ¨ Do not drive a car. If you are alone, do not take public transportation.  ¨ Do not drink alcohol.  ¨ Do not take medicine that has not been prescribed by your health care provider.  ¨ Do not sign important papers or make important decisions.  ¨ You may resume a normal diet and activities as directed by your health care provider.  · Change bandages (dressings) as directed.  · If you have questions or problems that seem related to general anesthesia, call the hospital and ask for the anesthetist or anesthesiologist on call.  SEEK MEDICAL CARE IF:  · You have nausea and vomiting that continue the day after anesthesia.  · You develop a rash.  SEEK IMMEDIATE MEDICAL CARE IF:    · You have difficulty breathing.  · You have chest pain.  · You have any allergic problems.     This information is not intended to replace advice given to you by your health care provider. Make sure you discuss any questions you have with your health care provider.     Document Released: 03/26/2002 Document Revised: 01/08/2016 Document Reviewed: 07/03/2014  [employer] Interactive Patient Education ©2016 [employer]    CALL YOUR PHYSICIAN IF YOU EXPERIENCE  INCREASED PAIN NOT HELPED BY YOUR PAIN MEDICATION.    .                                               Fall Prevention in the Home      Falls can cause injuries. They can happen to people of all ages. There are many things you can do to make your home safe and to help prevent falls.    WHAT CAN I DO ON THE OUTSIDE OF MY HOME?  · Regularly fix the edges of walkways and driveways and fix any cracks.  · Remove anything that might make you trip as you walk through a door, such as a raised step or threshold.  · Trim any bushes or trees on the path to your home.  · Use bright outdoor lighting.  · Clear any walking paths of anything that might make someone trip, such as rocks or tools.  · Regularly check to see if handrails are loose or broken. Make sure that both sides of any steps have handrails.  · Any raised decks and porches should have guardrails on the edges.  · Have any leaves, snow, or ice cleared regularly.  · Use sand or salt on walking paths during winter.  · Clean up any spills in your garage right away. This includes oil or grease spills.  WHAT CAN I DO IN THE BATHROOM?    · Use night lights.  · Install grab bars by the toilet and in the tub and shower. Do not use towel bars as grab bars.  · Use non-skid mats or decals in the tub or shower.  · If you need to sit down in the shower, use a plastic, non-slip stool.  · Keep the floor dry. Clean up any water that spills on the floor as soon as it happens.  · Remove soap buildup in the tub or shower regularly.  · Attach bath mats securely with double-sided non-slip rug tape.  · Do not have throw rugs and other things on the floor that can make you trip.  WHAT CAN I DO IN THE BEDROOM?  · Use night lights.  · Make sure that you have a light by your bed that is easy to reach.  · Do not use any sheets or blankets that are too big for your bed. They should not hang down onto the floor.  · Have a firm chair that has side arms. You can use this for support while you get dressed.  · Do not have throw rugs and other things on the floor  that can make you trip.  WHAT CAN I DO IN THE KITCHEN?  · Clean up any spills right away.  · Avoid walking on wet floors.  · Keep items that you use a lot in easy-to-reach places.  · If you need to reach something above you, use a strong step stool that has a grab bar.  · Keep electrical cords out of the way.  · Do not use floor polish or wax that makes floors slippery. If you must use wax, use non-skid floor wax.  · Do not have throw rugs and other things on the floor that can make you trip.  WHAT CAN I DO WITH MY STAIRS?  · Do not leave any items on the stairs.  · Make sure that there are handrails on both sides of the stairs and use them. Fix handrails that are broken or loose. Make sure that handrails are as long as the stairways.  · Check any carpeting to make sure that it is firmly attached to the stairs. Fix any carpet that is loose or worn.  · Avoid having throw rugs at the top or bottom of the stairs. If you do have throw rugs, attach them to the floor with carpet tape.  · Make sure that you have a light switch at the top of the stairs and the bottom of the stairs. If you do not have them, ask someone to add them for you.  WHAT ELSE CAN I DO TO HELP PREVENT FALLS?  · Wear shoes that:  ¨ Do not have high heels.  ¨ Have rubber bottoms.  ¨ Are comfortable and fit you well.  ¨ Are closed at the toe. Do not wear sandals.  · If you use a stepladder:  ¨ Make sure that it is fully opened. Do not climb a closed stepladder.  ¨ Make sure that both sides of the stepladder are locked into place.  ¨ Ask someone to hold it for you, if possible.  · Clearly douglas and make sure that you can see:  ¨ Any grab bars or handrails.  ¨ First and last steps.  ¨ Where the edge of each step is.  · Use tools that help you move around (mobility aids) if they are needed. These include:  ¨ Canes.  ¨ Walkers.  ¨ Scooters.  ¨ Crutches.  · Turn on the lights when you go into a dark area. Replace any light bulbs as soon as they burn  out.  · Set up your furniture so you have a clear path. Avoid moving your furniture around.  · If any of your floors are uneven, fix them.  · If there are any pets around you, be aware of where they are.  · Review your medicines with your doctor. Some medicines can make you feel dizzy. This can increase your chance of falling.  Ask your doctor what other things that you can do to help prevent falls.     This information is not intended to replace advice given to you by your health care provider. Make sure you discuss any questions you have with your health care provider.     Document Released: 10/14/2010 Document Revised: 05/03/2016 Document Reviewed: 01/22/2016  [employer] Interactive Patient Education ©2016 [employer]     PATIENT/FAMILY/RESPONSIBLE PARTY VERBALIZES UNDERSTANDING OF ABOVE EDUCATION.  COPY OF PAIN SCALE GIVEN AND REVIEWED WITH VERBALIZED UNDERSTANDING.Lower Extremity Post-op Instructions  Dr. WATKINS        POST-OP CARE: Please follow these instructions closely!    IMPORTANT PHONE NUMBERS:  • For emergencies, please call [phone]  • You may reach Dr. Watkins or his medical assistants at [phone], M-F 8:0am-5:00pm  • After 5pm or on the weekends, please call the answering service which can be reached from the number above   • Call immediately if you have any of the following symptoms:  - Elevated temperature above 101.5 degrees for more than 48 hours after surgery  - Persistent drainage  from wound  - Severe pain around surgical site  - Calf pain    Weight Bearing:   __x___ Weight Bearing as tolerated (with or without crutches)   _____ Touch-Toe Weight-bearing    _____ Partial Weight Bearing  ___ % for ___ weeks (must use crutches)   _____ Non Weight Bearing for ____ weeks (must use crutches, wheelchair or                          knee walker)    Bathing:  If stated below, it is ok to remove your postop dressing and shower.  DO NOT SOAK the incision in water.  Pat the incision site dry after  surgery  _x__ You may remove your dressing and shower on the 3rd day following surgery; if you shower before this, please cover your incision thoroughly  ___Keep your splint/dressing clean, dry, intact.  Do not place foreign objects inside the splint/dressing.    Dressings: Do NOT remove dressing/splint unless unless told to do so. SOME DRAINAGE IS NORMAL!  • If you have a splint or cast, do NOT get wet!!    • DO NOT touch, remove, or apply ointment to the incision and/or steri strips  • Steri strips may fall off on their own  • Signs of infection that warrant a phone call to our clinical line:  o Excessive drainage or redness  o Red streaking coming away from the incision  o Increased pain  o Increased temperature above 101 degrees    Sutures:  If your physician uses sutures in your knee or ankle, they will dissolve on their own and will not need to be removed.  Black sutures occasionally used will need to be removed 10-14 days after surgery.    Elevate: Place 2 pillows under your ankle to get the incision area above the level of the heart to help in swelling (a recliner is not elevated!!!)    Ice: Ice your surgery site 5-6 times per day for 20 minutes at a time with dressing in place. You should wait at least 30 minutes before icing again to avoid ice irritation. It may be difficult at first to ice the surgery area due to the amount of dressing, but continue to be diligent with icing.  Your dressings will be taken down at your first post-op appointment.     Range of motion:   - For the knee- It is important to gain gain full extension (knee straight) as soon as possible following surgery.         Ice to decrease swelling --> Knee fully straight --> Walk without a limp!!!  - NO PILLOWS UNDER THE KNEE, ONLY under the ankle  - For the foot/ankle- range of motion restrictions will be given to you at your first post-op appointment. Until that time, avoid any unnecessary range o f motion.  - Physical therapy- Your  physical therapy status will be discussed with you at your first post-op appointment.   **Achieving range of motion goals and decreasing swelling/inflammation are the primary focus for the first two (2) weeks following surgery. **    Medications: You will be discharged with the appropriate medications following your surgery. Fill these at the pharmacy and take them as directed on the label.   Possible medications that will be prescribed are below.  You may or may not receive all of these. Occasionally, additional medications may be given with specific instructions.  Percocet/Lortab (oxycodone/hydrocodone with tylenol) - Pain Medication.  o Take one tablet every 4-6 hours. DO NOT EXCEED 4,000mg of Tylenol in 24 hours.        **Itching is not an allergy - take benadryl or an over the counter allergy medication (claritin, Zyrtec) if needed  **DO NOT MIX WITH ALCOHOL, DRIVE WHILE TAKING, OR TAKE EXTRA TYLENOL*   Zofran - Anti-nausea medication to help prevent nausea and vomiting after                             surgery.     Aspirin 81 mg - all patients with lower extremity surgery should take one aspirin                            81 mg for 17 days after surgery    DO NOT TAKE NSAID'S (ex. IBUPROFEN, MOTRIN, ALEVE, ETC) AFTER A BROKEN BONE HAS BEEN REPAIRED OR AFTER ACL SURGERY - THESE MEDICATIONS WILL SLOW THE HEALING PROCESS!!!    **If you are running low on pain medications, please notify us if you need a refill 24-48 hours prior to when you run out, so we can make arrangements to refill the prescription for you if we determine it is necessary**

## 2020-07-13 NOTE — OP NOTE
Patient Name: Char  : 1965  MRN: [id number]    DATE of SURGERY: 2020    SURGEON: Vijay Grewal MD    ASSISTANT: Chris Loredo PA-C, was used as an assistant during this procedure and was utilized during patient positioning, arthroscopic exposure, wound closure, and postoperative dressing application.    PREOPERATIVE DIAGNOSIS: Old complex tear of the posterior horn of the medial meniscus, Right knee    POSTOPERATIVE DIAGNOSIS:   1) Chondromalacia medial femoral condyle, right knee  2) Primary Osteoarthritis, Right knee    PROCEDURE PERFORMED: Right knee arthroscopic medial compartment chondroplasty    IMPLANTS: none    ANESTHESIA USED: LMA    OPERATIVE INDICATIONS: This patient is a 55 y.o. female who presented to my clinic with complaints of progressive pain and mechanical symptoms without a traumatic injury to the knee. An MRI was obtained which showed the above named diagnoses. Pain and mechanical symptoms were not relieved with conservative treatment consisting of anti-inflammatories, corticosteroid injections, physical therapy.  Due to progressive pain and loss of function, surgical evaluation was discussed and the patient wished to proceed understanding risks, benefits, and alternatives. Surgical indications were to relieve pain and mechanical symptoms related to an unstable meniscus tear.  Risks included, but were not limited to, that of anesthesia, bleeding, infection, pain, damage to local structures, need for further surgery, failure to improve, stiffness, failure of implants, and loss of function.      ESTIMATED BLOOD LOSS: minimal    DRAINS: none     COMPLICATIONS: none    SPECIMENS: none    OPERATIVE FINDINGS:  1) Exam Under Anesthesia:  ROM 0-130, stable ligamentously without laxity, no effusion  2) Patellofemoral Joint:grade IV change central trochlea  3) Central Compartment:  Intact ACL/PCL  4) Lateral Compartment:  Grade II change LFC, intact lateral meniscus  5) Medial  Compartment: grade II/III change MFC, large loose osteochondral loose body in the medial gutter associated with an osteophyte, intact medial meniscus    PROCEDURE IN DETAIL:  The patient was seen in the preoperative holding room, the informed consent was reviewed and signed, and the correct operative extremity marked with the patient’s agreement.  The patient was transported to the operating room, where a timeout was performed identifying the correct patient and operative site.  Perioperative antibiotics were administered prior to incision.  A sterile prep and drape was performed. The operative leg was placed into an arthroscopic leg lópez device, the contralateral leg well protected and a tourniquet was placed about the thigh.  Total tourniquet time was <30 minutes.    A standard anterolateral arthroscopy portal was established and an outside-in technique was used to establish and anteromedial portal.  The arthroscopic probe was inserted and a thorough diagnostic arthroscopy of the knee was performed as outline above.    Attention was then turned to the medial compartment where a probe was used to demonstrate unstable chondral flaps of the medial condyle.  There was a loose body associated with a far medial osteophyte.  A shaver and cautery device were used to debride the unstable portions of the cartilage. A grasper was used to remove the loose body. The probe was reinserted verifying the remaining cartilage to be stable without further injury.    My assistant was used to manipulate the knee to allow entrance into the medial compartment.  He also performed the closure of portals and placed the postoperative dressing.    Free fluid was suctioned from the knee, portals were closed with monocryl suture and steri-strips.  A sterile dressing was placed.  The patient was awakened from anesthesia, transported to the PACU in stable condition.    POSTOPERATIVE PLAN:  1) Discharge home with family  2) Return to clinic 1  week for a clinical check    Electronically signed by Vijay Grewal MD on 7/14/2020 at 11:46

## 2020-07-14 ENCOUNTER — ANESTHESIA (OUTPATIENT)
Dept: PERIOP | Facility: HOSPITAL | Age: 55
End: 2020-07-14

## 2020-07-14 ENCOUNTER — HOSPITAL ENCOUNTER (OUTPATIENT)
Facility: HOSPITAL | Age: 55
Setting detail: HOSPITAL OUTPATIENT SURGERY
Discharge: HOME | End: 2020-07-14
Attending: ORTHOPAEDIC SURGERY | Admitting: ORTHOPAEDIC SURGERY

## 2020-07-14 ENCOUNTER — ANESTHESIA EVENT (OUTPATIENT)
Dept: PERIOP | Facility: HOSPITAL | Age: 55
End: 2020-07-14

## 2020-07-14 VITALS
HEART RATE: 66 BPM | SYSTOLIC BLOOD PRESSURE: 124 MMHG | TEMPERATURE: 97.8 F | DIASTOLIC BLOOD PRESSURE: 80 MMHG | OXYGEN SATURATION: 93 % | RESPIRATION RATE: 16 BRPM

## 2020-07-14 DIAGNOSIS — M23.203 OLD COMPLEX TEAR OF MEDIAL MENISCUS OF RIGHT KNEE: Primary | ICD-10-CM

## 2020-07-14 PROCEDURE — 25010000002 MIDAZOLAM PER 1 MG: Performed by: ANESTHESIOLOGY

## 2020-07-14 PROCEDURE — 25010000002 ONDANSETRON PER 1 MG: Performed by: NURSE ANESTHETIST, CERTIFIED REGISTERED

## 2020-07-14 PROCEDURE — 25010000002 PROPOFOL 10 MG/ML EMULSION: Performed by: NURSE ANESTHETIST, CERTIFIED REGISTERED

## 2020-07-14 PROCEDURE — 25010000002 DEXAMETHASONE PER 1 MG: Performed by: ANESTHESIOLOGY

## 2020-07-14 PROCEDURE — 25010000002 DEXAMETHASONE PER 1 MG: Performed by: NURSE ANESTHETIST, CERTIFIED REGISTERED

## 2020-07-14 PROCEDURE — 25010000002 KETOROLAC TROMETHAMINE PER 15 MG: Performed by: ORTHOPAEDIC SURGERY

## 2020-07-14 PROCEDURE — 25010000003 LIDOCAINE 1 % SOLUTION: Performed by: ORTHOPAEDIC SURGERY

## 2020-07-14 PROCEDURE — 25010000002 FENTANYL CITRATE (PF) 100 MCG/2ML SOLUTION: Performed by: NURSE ANESTHETIST, CERTIFIED REGISTERED

## 2020-07-14 PROCEDURE — 25010000002 ONDANSETRON PER 1 MG: Performed by: ANESTHESIOLOGY

## 2020-07-14 RX ORDER — MIDAZOLAM HYDROCHLORIDE 1 MG/ML
2 INJECTION INTRAMUSCULAR; INTRAVENOUS
Status: DISCONTINUED | OUTPATIENT
Start: 2020-07-14 | End: 2020-07-14 | Stop reason: HOSPADM

## 2020-07-14 RX ORDER — LIDOCAINE HYDROCHLORIDE 10 MG/ML
INJECTION, SOLUTION INFILTRATION; PERINEURAL AS NEEDED
Status: DISCONTINUED | OUTPATIENT
Start: 2020-07-14 | End: 2020-07-14 | Stop reason: HOSPADM

## 2020-07-14 RX ORDER — SODIUM CHLORIDE 0.9 % (FLUSH) 0.9 %
10 SYRINGE (ML) INJECTION AS NEEDED
Status: DISCONTINUED | OUTPATIENT
Start: 2020-07-14 | End: 2020-07-14 | Stop reason: HOSPADM

## 2020-07-14 RX ORDER — OXYCODONE AND ACETAMINOPHEN 7.5; 325 MG/1; MG/1
2 TABLET ORAL EVERY 4 HOURS PRN
Status: DISCONTINUED | OUTPATIENT
Start: 2020-07-14 | End: 2020-07-14 | Stop reason: HOSPADM

## 2020-07-14 RX ORDER — SODIUM CHLORIDE 0.9 % (FLUSH) 0.9 %
3 SYRINGE (ML) INJECTION EVERY 12 HOURS SCHEDULED
Status: DISCONTINUED | OUTPATIENT
Start: 2020-07-14 | End: 2020-07-14 | Stop reason: HOSPADM

## 2020-07-14 RX ORDER — DEXAMETHASONE SODIUM PHOSPHATE 4 MG/ML
4 INJECTION, SOLUTION INTRA-ARTICULAR; INTRALESIONAL; INTRAMUSCULAR; INTRAVENOUS; SOFT TISSUE ONCE AS NEEDED
Status: COMPLETED | OUTPATIENT
Start: 2020-07-14 | End: 2020-07-14

## 2020-07-14 RX ORDER — SODIUM CHLORIDE 0.9 % (FLUSH) 0.9 %
3-10 SYRINGE (ML) INJECTION AS NEEDED
Status: DISCONTINUED | OUTPATIENT
Start: 2020-07-14 | End: 2020-07-14 | Stop reason: HOSPADM

## 2020-07-14 RX ORDER — MAGNESIUM HYDROXIDE 1200 MG/15ML
LIQUID ORAL AS NEEDED
Status: DISCONTINUED | OUTPATIENT
Start: 2020-07-14 | End: 2020-07-14 | Stop reason: HOSPADM

## 2020-07-14 RX ORDER — LIDOCAINE HYDROCHLORIDE 10 MG/ML
0.5 INJECTION, SOLUTION EPIDURAL; INFILTRATION; INTRACAUDAL; PERINEURAL ONCE AS NEEDED
Status: DISCONTINUED | OUTPATIENT
Start: 2020-07-14 | End: 2020-07-14 | Stop reason: HOSPADM

## 2020-07-14 RX ORDER — IBUPROFEN 600 MG/1
600 TABLET ORAL ONCE AS NEEDED
Status: DISCONTINUED | OUTPATIENT
Start: 2020-07-14 | End: 2020-07-14 | Stop reason: HOSPADM

## 2020-07-14 RX ORDER — DEXAMETHASONE SODIUM PHOSPHATE 4 MG/ML
INJECTION, SOLUTION INTRA-ARTICULAR; INTRALESIONAL; INTRAMUSCULAR; INTRAVENOUS; SOFT TISSUE AS NEEDED
Status: DISCONTINUED | OUTPATIENT
Start: 2020-07-14 | End: 2020-07-14 | Stop reason: SURG

## 2020-07-14 RX ORDER — FLUMAZENIL 0.1 MG/ML
0.2 INJECTION INTRAVENOUS AS NEEDED
Status: DISCONTINUED | OUTPATIENT
Start: 2020-07-14 | End: 2020-07-14 | Stop reason: HOSPADM

## 2020-07-14 RX ORDER — ONDANSETRON 2 MG/ML
4 INJECTION INTRAMUSCULAR; INTRAVENOUS ONCE AS NEEDED
Status: COMPLETED | OUTPATIENT
Start: 2020-07-14 | End: 2020-07-14

## 2020-07-14 RX ORDER — ONDANSETRON 4 MG/1
4 TABLET, FILM COATED ORAL EVERY 8 HOURS PRN
Qty: 10 TABLET | Refills: 0 | Status: SHIPPED | OUTPATIENT
Start: 2020-07-14 | End: 2020-09-11

## 2020-07-14 RX ORDER — FENTANYL CITRATE 50 UG/ML
INJECTION, SOLUTION INTRAMUSCULAR; INTRAVENOUS AS NEEDED
Status: DISCONTINUED | OUTPATIENT
Start: 2020-07-14 | End: 2020-07-14 | Stop reason: SURG

## 2020-07-14 RX ORDER — PROPOFOL 10 MG/ML
VIAL (ML) INTRAVENOUS AS NEEDED
Status: DISCONTINUED | OUTPATIENT
Start: 2020-07-14 | End: 2020-07-14 | Stop reason: SURG

## 2020-07-14 RX ORDER — NALOXONE HCL 0.4 MG/ML
0.4 VIAL (ML) INJECTION AS NEEDED
Status: DISCONTINUED | OUTPATIENT
Start: 2020-07-14 | End: 2020-07-14 | Stop reason: HOSPADM

## 2020-07-14 RX ORDER — SODIUM CHLORIDE 0.9 % (FLUSH) 0.9 %
10 SYRINGE (ML) INJECTION EVERY 12 HOURS SCHEDULED
Status: DISCONTINUED | OUTPATIENT
Start: 2020-07-14 | End: 2020-07-14 | Stop reason: HOSPADM

## 2020-07-14 RX ORDER — SODIUM CHLORIDE, SODIUM LACTATE, POTASSIUM CHLORIDE, CALCIUM CHLORIDE 600; 310; 30; 20 MG/100ML; MG/100ML; MG/100ML; MG/100ML
100 INJECTION, SOLUTION INTRAVENOUS CONTINUOUS
Status: DISCONTINUED | OUTPATIENT
Start: 2020-07-14 | End: 2020-07-14 | Stop reason: HOSPADM

## 2020-07-14 RX ORDER — SODIUM CHLORIDE, SODIUM LACTATE, POTASSIUM CHLORIDE, CALCIUM CHLORIDE 600; 310; 30; 20 MG/100ML; MG/100ML; MG/100ML; MG/100ML
100 INJECTION, SOLUTION INTRAVENOUS CONTINUOUS PRN
Status: DISCONTINUED | OUTPATIENT
Start: 2020-07-14 | End: 2020-07-14 | Stop reason: HOSPADM

## 2020-07-14 RX ORDER — ONDANSETRON 2 MG/ML
INJECTION INTRAMUSCULAR; INTRAVENOUS AS NEEDED
Status: DISCONTINUED | OUTPATIENT
Start: 2020-07-14 | End: 2020-07-14 | Stop reason: SURG

## 2020-07-14 RX ORDER — OXYCODONE AND ACETAMINOPHEN 10; 325 MG/1; MG/1
1 TABLET ORAL ONCE AS NEEDED
Status: DISCONTINUED | OUTPATIENT
Start: 2020-07-14 | End: 2020-07-14 | Stop reason: HOSPADM

## 2020-07-14 RX ORDER — LABETALOL HYDROCHLORIDE 5 MG/ML
5 INJECTION, SOLUTION INTRAVENOUS
Status: DISCONTINUED | OUTPATIENT
Start: 2020-07-14 | End: 2020-07-14 | Stop reason: HOSPADM

## 2020-07-14 RX ORDER — ONDANSETRON 2 MG/ML
4 INJECTION INTRAMUSCULAR; INTRAVENOUS ONCE AS NEEDED
Status: DISCONTINUED | OUTPATIENT
Start: 2020-07-14 | End: 2020-07-14 | Stop reason: HOSPADM

## 2020-07-14 RX ORDER — BUPIVACAINE HYDROCHLORIDE 2.5 MG/ML
INJECTION, SOLUTION INFILTRATION; PERINEURAL AS NEEDED
Status: DISCONTINUED | OUTPATIENT
Start: 2020-07-14 | End: 2020-07-14 | Stop reason: HOSPADM

## 2020-07-14 RX ORDER — KETOROLAC TROMETHAMINE 30 MG/ML
INJECTION, SOLUTION INTRAMUSCULAR; INTRAVENOUS AS NEEDED
Status: DISCONTINUED | OUTPATIENT
Start: 2020-07-14 | End: 2020-07-14 | Stop reason: HOSPADM

## 2020-07-14 RX ORDER — ACETAMINOPHEN 500 MG
1000 TABLET ORAL ONCE
Status: COMPLETED | OUTPATIENT
Start: 2020-07-14 | End: 2020-07-14

## 2020-07-14 RX ORDER — HYDROCODONE BITARTRATE AND ACETAMINOPHEN 5; 325 MG/1; MG/1
TABLET ORAL
Qty: 20 TABLET | Refills: 0 | Status: SHIPPED | OUTPATIENT
Start: 2020-07-14 | End: 2021-06-22

## 2020-07-14 RX ORDER — FENTANYL CITRATE 50 UG/ML
25 INJECTION, SOLUTION INTRAMUSCULAR; INTRAVENOUS
Status: DISCONTINUED | OUTPATIENT
Start: 2020-07-14 | End: 2020-07-14 | Stop reason: HOSPADM

## 2020-07-14 RX ADMIN — LIDOCAINE HYDROCHLORIDE 100 MG: 20 INJECTION, SOLUTION INTRAVENOUS at 11:06

## 2020-07-14 RX ADMIN — ONDANSETRON HYDROCHLORIDE 8 MG: 2 SOLUTION INTRAMUSCULAR; INTRAVENOUS at 11:16

## 2020-07-14 RX ADMIN — ACETAMINOPHEN 1000 MG: 500 TABLET, FILM COATED ORAL at 10:48

## 2020-07-14 RX ADMIN — MIDAZOLAM HYDROCHLORIDE 2 MG: 1 INJECTION, SOLUTION INTRAMUSCULAR; INTRAVENOUS at 10:48

## 2020-07-14 RX ADMIN — PROPOFOL 200 MG: 10 INJECTION, EMULSION INTRAVENOUS at 11:06

## 2020-07-14 RX ADMIN — CEFAZOLIN 1 G: 1 INJECTION, POWDER, FOR SOLUTION INTRAMUSCULAR; INTRAVENOUS; PARENTERAL at 11:03

## 2020-07-14 RX ADMIN — DEXAMETHASONE SODIUM PHOSPHATE 4 MG: 4 INJECTION, SOLUTION INTRAMUSCULAR; INTRAVENOUS at 10:48

## 2020-07-14 RX ADMIN — FENTANYL CITRATE 200 MCG: 50 INJECTION, SOLUTION INTRAMUSCULAR; INTRAVENOUS at 11:06

## 2020-07-14 RX ADMIN — SODIUM CHLORIDE, POTASSIUM CHLORIDE, SODIUM LACTATE AND CALCIUM CHLORIDE 100 ML/HR: 600; 310; 30; 20 INJECTION, SOLUTION INTRAVENOUS at 10:19

## 2020-07-14 RX ADMIN — SODIUM CHLORIDE, POTASSIUM CHLORIDE, SODIUM LACTATE AND CALCIUM CHLORIDE: 600; 310; 30; 20 INJECTION, SOLUTION INTRAVENOUS at 11:03

## 2020-07-14 RX ADMIN — ONDANSETRON HYDROCHLORIDE 4 MG: 2 SOLUTION INTRAMUSCULAR; INTRAVENOUS at 13:25

## 2020-07-14 RX ADMIN — DEXAMETHASONE SODIUM PHOSPHATE 8 MG: 4 INJECTION, SOLUTION INTRAMUSCULAR; INTRAVENOUS at 11:16

## 2020-07-14 NOTE — ANESTHESIA POSTPROCEDURE EVALUATION
Patient: Desirae Alberto    Procedure Summary     Date:  07/14/20 Room / Location:   [facility]  /  [facility]    Anesthesia Start:  1103 Anesthesia Stop:  1151    Procedure:  RIGHT KNEE PARTIAL MEDIAL MENISCECTOMY (Right Knee) Diagnosis:       Chondromalacia of medial condyle of right femur      (S83.241D)    Surgeon:  Vijay Grewal MD Provider:  Gumaro Elmore CRNA    Anesthesia Type:  general ASA Status:  2          Anesthesia Type: general    Vitals  Vitals Value Taken Time   /77 7/14/2020 11:50 AM   Temp     Pulse 63 7/14/2020 11:50 AM   Resp     SpO2 95 % 7/14/2020 11:50 AM   Vitals shown include unvalidated device data.        Post Anesthesia Care and Evaluation    Patient location during evaluation: PACU  Patient participation: complete - patient participated  Level of consciousness: awake and alert  Pain management: adequate  Airway patency: patent  Anesthetic complications: No anesthetic complications    Cardiovascular status: acceptable  Respiratory status: acceptable  Hydration status: acceptable    Comments: Blood pressure 125/84, pulse 70, temperature 97.4 °F (36.3 °C), temperature source Temporal, resp. rate 18, last menstrual period 06/30/2019, SpO2 96 %, not currently breastfeeding.    Pt discharged from PACU based on jennifer score >8

## 2020-07-14 NOTE — H&P
Pt Name: Desirae Alberto  MRN: [id number]  YOB: 1965  Date of evaluation: 7/14/2020    H&P including current review of systems was updated in the paper chart and/or the document previously scanned into the record.  There have been no significant changes or new problems since the original evaluation.  The patient's problems continue and indications for contemplated procedure have not changed.    Electronically signed by Vijay Grewal MD on 7/14/2020 at 09:48

## 2020-07-14 NOTE — BRIEF OP NOTE
KNEE ARTHROSCOPY  Progress Note    Desirae Alberto  7/14/2020    Pre-op Diagnosis:   S83.241D       Post-Op Diagnosis Codes:     * Chondromalacia of medial condyle of right femur [M94.261]    Procedure/CPT® Codes:  OH KNEE SCOPE,SHAVE ARTICULAR CART [17992]    Procedure(s):  RIGHT KNEE PARTIAL MEDIAL MENISCECTOMY    Surgeon(s):  Vijay Grewal MD    Anesthesia: General    Staff:   Circulator: Whitley Garcia RN; Lavell Hamilton RN  Scrub Person: Xiang Denton Victoria  Assistant: Chris Loredo PA-C    Estimated Blood Loss: minimal    Urine Voided: * No values recorded between 7/14/2020 11:03 AM and 7/14/2020 11:43 AM *    Specimens:                None          Drains: * No LDAs found *    Findings: see op note     Complications: none      Vijay Grewal MD     Date: 7/14/2020  Time: 11:43

## 2020-07-14 NOTE — ANESTHESIA PREPROCEDURE EVALUATION
Anesthesia Evaluation     Patient summary reviewed   no history of anesthetic complications:  NPO Solid Status: > 8 hours  NPO Liquid Status: > 8 hours           Airway   Mallampati: III  TM distance: >3 FB  Neck ROM: full  Dental - normal exam     Pulmonary - normal exam    breath sounds clear to auscultation  (-) asthma, recent URI, sleep apnea, not a smoker  Cardiovascular - normal exam  Exercise tolerance: good (4-7 METS)    ECG reviewed  Rhythm: regular  Rate: normal    (+) hypertension, hyperlipidemia,   (-) pacemaker, past MI, angina, cardiac stents, CABG      Neuro/Psych  (+) TIA (5/2019),     (-) seizures, CVA  GI/Hepatic/Renal/Endo    (+)  GERD well controlled,  thyroid problem   (-) liver disease, no renal disease, diabetes    Musculoskeletal     Abdominal    Substance History      OB/GYN          Other                        Anesthesia Plan    ASA 2     general     intravenous induction     Anesthetic plan, all risks, benefits, and alternatives have been provided, discussed and informed consent has been obtained with: patient.

## 2020-07-14 NOTE — ANESTHESIA PROCEDURE NOTES
Airway  Urgency: elective    Date/Time: 7/14/2020 11:09 AM  Airway not difficult    General Information and Staff    Patient location during procedure: OR  CRNA: Gumaro Elmore CRNA    Indications and Patient Condition  Indications for airway management: airway protection    Preoxygenated: yes  MILS maintained throughout  Mask difficulty assessment: 1 - vent by mask    Final Airway Details  Final airway type: supraglottic airway      Successful airway: unique  Size 4    Number of attempts at approach: 1  Assessment: lips, teeth, and gum same as pre-op and atraumatic intubation

## 2020-09-02 ENCOUNTER — CLINICAL SUPPORT (OUTPATIENT)
Dept: INTERNAL MEDICINE | Facility: CLINIC | Age: 55
End: 2020-09-02

## 2020-09-02 DIAGNOSIS — R23.2 HOT FLASHES: ICD-10-CM

## 2020-09-02 DIAGNOSIS — E03.9 HYPOTHYROIDISM, UNSPECIFIED TYPE: Primary | ICD-10-CM

## 2020-09-02 PROCEDURE — 36415 COLL VENOUS BLD VENIPUNCTURE: CPT | Performed by: FAMILY MEDICINE

## 2020-09-04 LAB
ALBUMIN SERPL-MCNC: 5.1 G/DL (ref 3.8–4.9)
ALBUMIN/GLOB SERPL: 2 {RATIO} (ref 1.2–2.2)
ALP SERPL-CCNC: 92 IU/L (ref 39–117)
ALT SERPL-CCNC: 42 IU/L (ref 0–32)
AST SERPL-CCNC: 42 IU/L (ref 0–40)
BASOPHILS # BLD AUTO: 0 X10E3/UL (ref 0–0.2)
BASOPHILS NFR BLD AUTO: 1 %
BILIRUB SERPL-MCNC: 0.2 MG/DL (ref 0–1.2)
BUN SERPL-MCNC: 16 MG/DL (ref 6–24)
BUN/CREAT SERPL: 20 (ref 9–23)
CALCIUM SERPL-MCNC: 10.3 MG/DL (ref 8.7–10.2)
CANCER AG125 SERPL-ACNC: 6.4 U/ML (ref 0–38.1)
CHLORIDE SERPL-SCNC: 97 MMOL/L (ref 96–106)
CO2 SERPL-SCNC: 28 MMOL/L (ref 20–29)
CREAT SERPL-MCNC: 0.81 MG/DL (ref 0.57–1)
EOSINOPHIL # BLD AUTO: 0.2 X10E3/UL (ref 0–0.4)
EOSINOPHIL NFR BLD AUTO: 3 %
ERYTHROCYTE [DISTWIDTH] IN BLOOD BY AUTOMATED COUNT: 13.8 % (ref 11.7–15.4)
FSH SERPL-ACNC: 73 MIU/ML
GLOBULIN SER CALC-MCNC: 2.6 G/DL (ref 1.5–4.5)
GLUCOSE SERPL-MCNC: 75 MG/DL (ref 65–99)
HCT VFR BLD AUTO: 43.4 % (ref 34–46.6)
HGB BLD-MCNC: 13.8 G/DL (ref 11.1–15.9)
IMM GRANULOCYTES # BLD AUTO: 0 X10E3/UL (ref 0–0.1)
IMM GRANULOCYTES NFR BLD AUTO: 0 %
LH SERPL-ACNC: 50.7 MIU/ML
LYMPHOCYTES # BLD AUTO: 2.1 X10E3/UL (ref 0.7–3.1)
LYMPHOCYTES NFR BLD AUTO: 43 %
MCH RBC QN AUTO: 29.6 PG (ref 26.6–33)
MCHC RBC AUTO-ENTMCNC: 31.8 G/DL (ref 31.5–35.7)
MCV RBC AUTO: 93 FL (ref 79–97)
MONOCYTES # BLD AUTO: 0.3 X10E3/UL (ref 0.1–0.9)
MONOCYTES NFR BLD AUTO: 6 %
NEUTROPHILS # BLD AUTO: 2.3 X10E3/UL (ref 1.4–7)
NEUTROPHILS NFR BLD AUTO: 47 %
PLATELET # BLD AUTO: 253 X10E3/UL (ref 150–450)
POTASSIUM SERPL-SCNC: 4.9 MMOL/L (ref 3.5–5.2)
PROT SERPL-MCNC: 7.7 G/DL (ref 6–8.5)
RBC # BLD AUTO: 4.66 X10E6/UL (ref 3.77–5.28)
SODIUM SERPL-SCNC: 141 MMOL/L (ref 134–144)
T3FREE SERPL-MCNC: 2.9 PG/ML (ref 2–4.4)
T4 SERPL-MCNC: 9.4 UG/DL (ref 4.5–12)
TESTOST SERPL-MCNC: <3 NG/DL (ref 3–41)
THYROGLOB AB SERPL-ACNC: <1 IU/ML (ref 0–0.9)
THYROPEROXIDASE AB SERPL-ACNC: <9 IU/ML (ref 0–34)
TSH SERPL DL<=0.005 MIU/L-ACNC: 1.17 UIU/ML (ref 0.45–4.5)
WBC # BLD AUTO: 4.8 X10E3/UL (ref 3.4–10.8)

## 2020-09-05 ENCOUNTER — TELEPHONE (OUTPATIENT)
Dept: INTERNAL MEDICINE | Facility: CLINIC | Age: 55
End: 2020-09-05

## 2020-09-05 NOTE — TELEPHONE ENCOUNTER
----- Message from Jeannette Justin, DO sent at 9/5/2020  8:36 AM CDT -----  Normal except testosterone low  Calcium and albumin high   Repeat in one week. cmp and add pth.

## 2020-09-05 NOTE — TELEPHONE ENCOUNTER
Talked with patient about her lab results and recommendations per Dr. Justin. Patient states she will make an appointment for a follow up in a week.

## 2020-09-05 NOTE — PROGRESS NOTES
Venipuncture Blood Specimen Collection  Venipuncture performed in left ac by Maggie Neil MA with good hemostasis. Patient tolerated the procedure well without complications.   09/05/20   Maggie Neil MA

## 2020-09-11 ENCOUNTER — OFFICE VISIT (OUTPATIENT)
Dept: INTERNAL MEDICINE | Facility: CLINIC | Age: 55
End: 2020-09-11

## 2020-09-11 VITALS
BODY MASS INDEX: 32.57 KG/M2 | SYSTOLIC BLOOD PRESSURE: 130 MMHG | DIASTOLIC BLOOD PRESSURE: 79 MMHG | OXYGEN SATURATION: 98 % | HEART RATE: 89 BPM | TEMPERATURE: 97.9 F | WEIGHT: 177 LBS | RESPIRATION RATE: 17 BRPM | HEIGHT: 62 IN

## 2020-09-11 DIAGNOSIS — E83.52 HYPERCALCEMIA: ICD-10-CM

## 2020-09-11 DIAGNOSIS — E03.8 OTHER SPECIFIED HYPOTHYROIDISM: ICD-10-CM

## 2020-09-11 DIAGNOSIS — C85.80 OTHER SPECIFIED TYPE OF NON-HODGKIN LYMPHOMA, UNSPECIFIED BODY REGION (HCC): ICD-10-CM

## 2020-09-11 DIAGNOSIS — R74.8 ELEVATED LIVER ENZYMES: Primary | ICD-10-CM

## 2020-09-11 DIAGNOSIS — R23.2 HOT FLASHES: ICD-10-CM

## 2020-09-11 PROCEDURE — 99214 OFFICE O/P EST MOD 30 MIN: CPT | Performed by: FAMILY MEDICINE

## 2020-09-11 RX ORDER — VENLAFAXINE HYDROCHLORIDE 75 MG/1
75 CAPSULE, EXTENDED RELEASE ORAL DAILY
Qty: 30 CAPSULE | Refills: 3 | Status: SHIPPED | OUTPATIENT
Start: 2020-09-11 | End: 2021-01-11 | Stop reason: SDUPTHER

## 2020-09-11 NOTE — PROGRESS NOTES
Subjective     Chief Complaint   Patient presents with   • Lab Results Discussion       History of Present Illness  Concenred about testosterone and repeat labs.  Patient has recently had labs drawn and her testosterone level is less than 3.  Her liver enzymes are slightly elevated as was her calcium.  She has been staying active.  She feels she is doing relatively well.  She is having problems with the hot flashes.  When she started the venlafaxine she feels it did help her a little with than the hot flashes.  Hormonal therapy for hot flashes is not indicated as she has had a TIA.  She is tolerating the remainder of her medications well.  She is currently not taking the B6 as she has not found one that does not have a soy replacement.  Patient's PMR from outside medical facility reviewed and noted.    Review of Systems     Otherwise complete ROS reviewed and negative except as mentioned in the HPI.    Past Medical History:   Past Medical History:   Diagnosis Date   • Cancer (CMS/HCC)     lymphoma of small bowel   • GERD (gastroesophageal reflux disease)    • Glaucoma    • Hyperlipidemia    • Hypertension    • Hypothyroidism    • TIA (transient ischemic attack)      Past Surgical History:  Past Surgical History:   Procedure Laterality Date   • BREAST EXCISIONAL BIOPSY Left 01/2016    benign   • BREAST SURGERY Left 2017   • CHOLECYSTECTOMY     • COLON RESECTION SMALL BOWEL  2014   • COLONOSCOPY  2019   • KNEE ARTHROSCOPY Right 7/14/2020    Procedure: RIGHT KNEE PARTIAL MEDIAL MENISCECTOMY;  Surgeon: Vijay Grewal MD;  Location: [facility];  Service: Orthopedics;  Laterality: Right;   • LAPAROSCOPIC TUBAL LIGATION     • TUBAL ABDOMINAL LIGATION     • VAGINAL MESH REVISION      2010/2016   • WISDOM TOOTH EXTRACTION       Social History:  reports that she has quit smoking. She has never used smokeless tobacco. She reports that she does not drink alcohol or use drugs.    Family History: family history  includes Cancer in her maternal aunt; Colon cancer (age of onset: 68) in her paternal aunt; Colon cancer (age of onset: 70) in her paternal aunt; Heart defect in her mother; Liver cancer in her paternal aunt; No Known Problems in her father and sister.       Allergies:  No Known Allergies  Medications:  Prior to Admission medications    Medication Sig Start Date End Date Taking? Authorizing Provider   Calcium Carb-Cholecalciferol (CALCIUM 1000 + D PO) Take 1 tablet by mouth Daily.   Yes Rosalba Zarate MD   cyclobenzaprine (FLEXERIL) 5 MG tablet Take 1 tablet by mouth 3 (Three) Times a Day As Needed for Muscle Spasms. 7/6/20  Yes Manjit Whiting DO   diclofenac (VOLTAREN) 50 MG EC tablet Take 1 tablet by mouth 2 (Two) Times a Day. 6/8/20  Yes Luz Mota APRN   levothyroxine (Synthroid) 75 MCG tablet Take 1 tablet by mouth Daily. 6/24/20  Yes Jeannette Justin DO   saccharomyces boulardii (FLORASTOR) 250 MG capsule Take 250 mg by mouth 2 (Two) Times a Day.   Yes Rosalba Zarate MD   therapeutic multivitamin-minerals (THERAGRAN-M) tablet Take 1 tablet by mouth Daily.   Yes Rosalba Zarate MD   THYROID PO Take 1 capsule by mouth Daily.   Yes Rosalba Zarate MD   venlafaxine XR (Effexor XR) 37.5 MG 24 hr capsule Take 1 capsule by mouth Daily. 1/6/20  Yes Apple Ivan APRN   vitamin B-12 (CYANOCOBALAMIN) 500 MCG tablet Take 1 tablet by mouth Daily. 8/5/19  Yes CATIE Howe MD   vitamin B-6 (PYRIDOXINE) 25 MG tablet Take 1 tablet by mouth Daily. 8/5/19  Yes CATIE Howe MD   HYDROcodone-acetaminophen (NORCO) 5-325 MG per tablet Take one tablet by mouth every 6 hours as needed for pain 7/14/20   Vijay Grewal MD   ondansetron (Zofran) 4 MG tablet Take 1 tablet by mouth Every 8 (Eight) Hours As Needed for Nausea or Vomiting. 7/14/20 9/11/20  Vijay Grewal MD       Objective     Vital Signs: /79 (BP Location: Right arm, Patient Position:  "Sitting, Cuff Size: Adult)   Pulse 89   Temp 97.9 °F (36.6 °C) (Skin)   Resp 17   Ht 157.5 cm (62\")   Wt 80.3 kg (177 lb)   LMP 06/30/2019 Comment: has been a year since pt went through menopause and had a period  SpO2 98%   Breastfeeding No   BMI 32.37 kg/m²   Physical Exam   Constitutional: She is oriented to person, place, and time. She appears well-developed and well-nourished.   HENT:   Head: Normocephalic and atraumatic.   Right Ear: External ear normal.   Left Ear: External ear normal.   Nose: Nose normal.   Mouth/Throat: Oropharynx is clear and moist. No oropharyngeal exudate.   Eyes: Pupils are equal, round, and reactive to light. Conjunctivae and EOM are normal.   Neck: Normal range of motion. Neck supple.   Cardiovascular: Normal rate, regular rhythm, normal heart sounds and intact distal pulses. Exam reveals no gallop and no friction rub.   No murmur heard.  Pulmonary/Chest: Effort normal and breath sounds normal.   Abdominal: Soft. Bowel sounds are normal.   Musculoskeletal: Normal range of motion. She exhibits no edema.   Neurological: She is alert and oriented to person, place, and time. No cranial nerve deficit.   Skin: Skin is warm and dry. Capillary refill takes less than 2 seconds.   Psychiatric: She has a normal mood and affect. Her behavior is normal. Judgment and thought content normal.   Nursing note and vitals reviewed.          Results Reviewed:  Glucose   Date Value Ref Range Status   07/13/2020 107 (H) 65 - 99 mg/dL Final     BUN   Date Value Ref Range Status   09/02/2020 16 6 - 24 mg/dL Final   07/13/2020 14 6 - 20 mg/dL Final     Creatinine   Date Value Ref Range Status   09/02/2020 0.81 0.57 - 1.00 mg/dL Final   07/13/2020 0.69 0.57 - 1.00 mg/dL Final   09/17/2019 0.70 0.60 - 1.30 mg/dL Final     Comment:     Serial Number: [id number]:  [id number]     Sodium   Date Value Ref Range Status   09/02/2020 141 134 - 144 mmol/L Final   07/13/2020 139 136 - 145 mmol/L Final "     Potassium   Date Value Ref Range Status   09/02/2020 4.9 3.5 - 5.2 mmol/L Final   07/13/2020 4.0 3.5 - 5.2 mmol/L Final     Chloride   Date Value Ref Range Status   09/02/2020 97 96 - 106 mmol/L Final   07/13/2020 101 98 - 107 mmol/L Final     CO2   Date Value Ref Range Status   07/13/2020 27.0 22.0 - 29.0 mmol/L Final     Total CO2   Date Value Ref Range Status   09/02/2020 28 20 - 29 mmol/L Final     Calcium   Date Value Ref Range Status   09/02/2020 10.3 (H) 8.7 - 10.2 mg/dL Final   07/13/2020 9.6 8.6 - 10.5 mg/dL Final     ALT (SGPT)   Date Value Ref Range Status   09/02/2020 42 (H) 0 - 32 IU/L Final   07/30/2019 28 0 - 54 U/L Final     AST (SGOT)   Date Value Ref Range Status   09/02/2020 42 (H) 0 - 40 IU/L Final   07/30/2019 29 7 - 45 U/L Final     WBC   Date Value Ref Range Status   09/02/2020 4.8 3.4 - 10.8 x10E3/uL Final     Hematocrit   Date Value Ref Range Status   09/02/2020 43.4 34.0 - 46.6 % Final   07/13/2020 40.4 34.0 - 46.6 % Final     Platelets   Date Value Ref Range Status   09/02/2020 253 150 - 450 x10E3/uL Final   07/13/2020 217 140 - 450 10*3/mm3 Final     Total Cholesterol   Date Value Ref Range Status   07/30/2019 252 (H) 130 - 200 mg/dL Final     Triglycerides   Date Value Ref Range Status   07/30/2019 261 (H) 0 - 149 mg/dL Final     HDL Cholesterol   Date Value Ref Range Status   07/30/2019 84 >=50 mg/dL Final     LDL Cholesterol    Date Value Ref Range Status   07/30/2019 124 (H) 0 - 99 mg/dL Final     LDL/HDL Ratio   Date Value Ref Range Status   07/30/2019 1.38  Final         Assessment / Plan     Assessment/Plan:  1. Elevated liver enzymes    - Comprehensive metabolic panel    2. Hypercalcemia  Repeat calcium    3. Hot flashes  Increase venlafaxine 75 milligrams daily    4. Other specified hypothyroidism  Continue thyroid replacement    5. Other specified type of non-Hodgkin lymphoma, unspecified body region (CMS/HCC)  Patient is currently considered cured        Return in about 3  months (around 12/11/2020). unless patient needs to be seen sooner or acute issues arise.        I have discussed the patient results/orders and and plan/recommendation with them at today's visit.      Jeannette Justin,    09/11/2020

## 2020-09-12 LAB
ALBUMIN SERPL-MCNC: 4.5 G/DL (ref 3.8–4.9)
ALBUMIN/GLOB SERPL: 1.7 {RATIO} (ref 1.2–2.2)
ALP SERPL-CCNC: 111 IU/L (ref 39–117)
ALT SERPL-CCNC: 46 IU/L (ref 0–32)
AST SERPL-CCNC: 34 IU/L (ref 0–40)
BILIRUB SERPL-MCNC: 0.2 MG/DL (ref 0–1.2)
BUN SERPL-MCNC: 13 MG/DL (ref 6–24)
BUN/CREAT SERPL: 14 (ref 9–23)
CALCIUM SERPL-MCNC: 9.3 MG/DL (ref 8.7–10.2)
CHLORIDE SERPL-SCNC: 101 MMOL/L (ref 96–106)
CO2 SERPL-SCNC: 26 MMOL/L (ref 20–29)
CREAT SERPL-MCNC: 0.9 MG/DL (ref 0.57–1)
GLOBULIN SER CALC-MCNC: 2.6 G/DL (ref 1.5–4.5)
GLUCOSE SERPL-MCNC: 118 MG/DL (ref 65–99)
POTASSIUM SERPL-SCNC: 4.6 MMOL/L (ref 3.5–5.2)
PROT SERPL-MCNC: 7.1 G/DL (ref 6–8.5)
SODIUM SERPL-SCNC: 143 MMOL/L (ref 134–144)

## 2020-09-16 ENCOUNTER — TELEPHONE (OUTPATIENT)
Dept: INTERNAL MEDICINE | Facility: CLINIC | Age: 55
End: 2020-09-16

## 2020-09-16 DIAGNOSIS — M54.50 ACUTE LOW BACK PAIN WITHOUT SCIATICA, UNSPECIFIED BACK PAIN LATERALITY: ICD-10-CM

## 2020-09-16 RX ORDER — CYCLOBENZAPRINE HCL 5 MG
5 TABLET ORAL 3 TIMES DAILY PRN
Qty: 30 TABLET | Refills: 2 | Status: SHIPPED | OUTPATIENT
Start: 2020-09-16 | End: 2021-01-19

## 2020-09-16 NOTE — TELEPHONE ENCOUNTER
Pt is requesting refills on   diclofenac (VOLTAREN) 50 MG EC tablet [[id number]] (Order [id number])  cyclobenzaprine (FLEXERIL) 5 MG tablet [[id number]] (Order [id number])  Send to  Pharmacy

## 2020-11-13 ENCOUNTER — CLINICAL SUPPORT (OUTPATIENT)
Dept: INTERNAL MEDICINE | Facility: CLINIC | Age: 55
End: 2020-11-13

## 2020-11-13 DIAGNOSIS — R00.0 HEART RATE FAST: Primary | ICD-10-CM

## 2020-11-13 DIAGNOSIS — R00.2 PALPITATIONS: ICD-10-CM

## 2020-11-13 PROCEDURE — 93000 ELECTROCARDIOGRAM COMPLETE: CPT | Performed by: FAMILY MEDICINE

## 2020-12-15 ENCOUNTER — OFFICE VISIT (OUTPATIENT)
Dept: INTERNAL MEDICINE | Facility: CLINIC | Age: 55
End: 2020-12-15

## 2020-12-15 VITALS
HEART RATE: 86 BPM | SYSTOLIC BLOOD PRESSURE: 143 MMHG | OXYGEN SATURATION: 98 % | BODY MASS INDEX: 33.79 KG/M2 | RESPIRATION RATE: 18 BRPM | WEIGHT: 183.6 LBS | HEIGHT: 62 IN | DIASTOLIC BLOOD PRESSURE: 81 MMHG | TEMPERATURE: 97.7 F

## 2020-12-15 DIAGNOSIS — M25.562 PAIN IN JOINT OF LEFT KNEE: Primary | ICD-10-CM

## 2020-12-15 PROCEDURE — 99213 OFFICE O/P EST LOW 20 MIN: CPT | Performed by: FAMILY MEDICINE

## 2020-12-30 ENCOUNTER — TELEPHONE (OUTPATIENT)
Dept: OBSTETRICS AND GYNECOLOGY | Facility: CLINIC | Age: 55
End: 2020-12-30

## 2020-12-30 DIAGNOSIS — Z12.31 ENCOUNTER FOR SCREENING MAMMOGRAM FOR BREAST CANCER: Primary | ICD-10-CM

## 2020-12-30 DIAGNOSIS — Z13.820 ENCOUNTER FOR SCREENING FOR OSTEOPOROSIS: ICD-10-CM

## 2020-12-30 NOTE — TELEPHONE ENCOUNTER
Patient yearly has been scheduled, patient is requesting an order for her mammogram and bone density

## 2021-01-12 RX ORDER — VENLAFAXINE HYDROCHLORIDE 75 MG/1
75 CAPSULE, EXTENDED RELEASE ORAL DAILY
Qty: 30 CAPSULE | Refills: 3 | Status: SHIPPED | OUTPATIENT
Start: 2021-01-12 | End: 2021-05-19 | Stop reason: SDUPTHER

## 2021-01-19 DIAGNOSIS — M54.50 ACUTE LOW BACK PAIN WITHOUT SCIATICA, UNSPECIFIED BACK PAIN LATERALITY: ICD-10-CM

## 2021-01-19 RX ORDER — CYCLOBENZAPRINE HCL 5 MG
5 TABLET ORAL 3 TIMES DAILY PRN
Qty: 30 TABLET | Refills: 2 | Status: SHIPPED | OUTPATIENT
Start: 2021-01-19 | End: 2021-09-02

## 2021-02-11 ENCOUNTER — APPOINTMENT (OUTPATIENT)
Dept: MAMMOGRAPHY | Facility: HOSPITAL | Age: 56
End: 2021-02-11

## 2021-02-11 ENCOUNTER — HOSPITAL ENCOUNTER (OUTPATIENT)
Dept: BONE DENSITY | Facility: HOSPITAL | Age: 56
End: 2021-02-11

## 2021-03-05 ENCOUNTER — OFFICE VISIT (OUTPATIENT)
Dept: INTERNAL MEDICINE | Facility: CLINIC | Age: 56
End: 2021-03-05

## 2021-03-05 VITALS
BODY MASS INDEX: 34.23 KG/M2 | RESPIRATION RATE: 18 BRPM | WEIGHT: 186 LBS | TEMPERATURE: 98.4 F | DIASTOLIC BLOOD PRESSURE: 79 MMHG | HEART RATE: 82 BPM | HEIGHT: 62 IN | OXYGEN SATURATION: 98 % | SYSTOLIC BLOOD PRESSURE: 122 MMHG

## 2021-03-05 DIAGNOSIS — M79.604 BILATERAL LOWER EXTREMITY PAIN: Primary | ICD-10-CM

## 2021-03-05 DIAGNOSIS — M79.605 BILATERAL LOWER EXTREMITY PAIN: Primary | ICD-10-CM

## 2021-03-05 PROCEDURE — 99213 OFFICE O/P EST LOW 20 MIN: CPT | Performed by: FAMILY MEDICINE

## 2021-03-06 NOTE — PROGRESS NOTES
Subjective     Chief complaint: Bilateral lower extremity pain and weakness.      History of Present Illness  Patient's been experiencing pain and weakness in her lower extremities.  The pain is intense and it radiates down her legs.  It comes and goes.  It is worse with activity.  On occasion will be more 1 side than the other.  It is gotten progressively worse over the last month or so.  Patient's PMR from outside medical facility reviewed and noted.    Review of Systems     Otherwise complete ROS reviewed and negative except as mentioned in the HPI.    Past Medical History:   Past Medical History:   Diagnosis Date   • Cancer (CMS/HCC)     lymphoma of small bowel   • GERD (gastroesophageal reflux disease)    • Glaucoma    • Hyperlipidemia    • Hypertension    • Hypothyroidism    • TIA (transient ischemic attack)      Past Surgical History:  Past Surgical History:   Procedure Laterality Date   • BREAST EXCISIONAL BIOPSY Left 01/2016    benign   • BREAST SURGERY Left 2017   • CHOLECYSTECTOMY     • COLON RESECTION SMALL BOWEL  2014   • COLONOSCOPY  2019   • KNEE ARTHROSCOPY Right 7/14/2020    Procedure: RIGHT KNEE PARTIAL MEDIAL MENISCECTOMY;  Surgeon: Vijay Grewal MD;  Location: [facility] OR;  Service: Orthopedics;  Laterality: Right;   • LAPAROSCOPIC TUBAL LIGATION     • TUBAL ABDOMINAL LIGATION     • VAGINAL MESH REVISION      2010/2016   • WISDOM TOOTH EXTRACTION       Social History:  reports that she quit smoking about 20 years ago. Her smoking use included cigarettes. She has a 10.00 pack-year smoking history. She has never used smokeless tobacco. She reports that she does not drink alcohol or use drugs.    Family History: family history includes Cancer in her maternal aunt; Colon cancer (age of onset: 68) in her paternal aunt; Colon cancer (age of onset: 70) in her paternal aunt; Heart defect in her mother; Liver cancer in her paternal aunt; No Known Problems in her father and sister.        Allergies:  No Known Allergies  Medications:  Prior to Admission medications    Medication Sig Start Date End Date Taking? Authorizing Provider   Calcium Carb-Cholecalciferol (CALCIUM 1000 + D PO) Take 1 tablet by mouth Daily.    Rosalba Zarate MD   cyclobenzaprine (FLEXERIL) 5 MG tablet Take 1 tablet by mouth 3 (Three) Times a Day As Needed for Muscle Spasms. 1/19/21   Manjit Whiting DO   diclofenac (VOLTAREN) 50 MG EC tablet Take 1 tablet by mouth 2 (Two) Times a Day. 12/18/20   Luz Mota APRN   HYDROcodone-acetaminophen (NORCO) 5-325 MG per tablet Take one tablet by mouth every 6 hours as needed for pain 7/14/20   Vijay Grewal MD   levothyroxine (Synthroid) 75 MCG tablet Take 1 tablet by mouth Daily. 6/24/20   Jeannette Justin DO   saccharomyces boulardii (FLORASTOR) 250 MG capsule Take 250 mg by mouth 2 (Two) Times a Day.    Rosalba Zarate MD   therapeutic multivitamin-minerals (THERAGRAN-M) tablet Take 1 tablet by mouth Daily.    Rosalba Zarate MD   THYROID PO Take 1 capsule by mouth Daily.    Rosalba Zarate MD   venlafaxine XR (EFFEXOR-XR) 75 MG 24 hr capsule Take 1 capsule by mouth Daily. 1/12/21   Manjit Whiting DO   vitamin B-12 (CYANOCOBALAMIN) 500 MCG tablet Take 1 tablet by mouth Daily. 8/5/19   CATIE Howe MD   vitamin B-6 (PYRIDOXINE) 25 MG tablet Take 1 tablet by mouth Daily. 8/5/19   CATIE Howe MD       Objective            LMP 06/30/2019 Comment: has been a year since pt went through menopause and had a period  Physical Exam  Vitals signs and nursing note reviewed.   Constitutional:       Appearance: Normal appearance.   HENT:      Head: Normocephalic and atraumatic.      Right Ear: External ear normal.      Left Ear: External ear normal.   Eyes:      Extraocular Movements: Extraocular movements intact.      Conjunctiva/sclera: Conjunctivae normal.      Pupils: Pupils are equal, round, and reactive to light.    Neck:      Musculoskeletal: Normal range of motion and neck supple.   Musculoskeletal:      Comments: Patient is able to move lower extremities through active range of motion.  She currently does not have any pain with palpation.  She does have crepitus of bilateral knees.   Skin:     General: Skin is warm and dry.      Capillary Refill: Capillary refill takes less than 2 seconds.   Neurological:      General: No focal deficit present.      Mental Status: She is alert and oriented to person, place, and time.   Psychiatric:         Mood and Affect: Mood normal.         Behavior: Behavior normal.         Thought Content: Thought content normal.         Judgment: Judgment normal.         Patient's There is no height or weight on file to calculate BMI.     Results Reviewed:  Glucose   Date Value Ref Range Status   07/13/2020 107 (H) 65 - 99 mg/dL Final     BUN   Date Value Ref Range Status   09/11/2020 13 6 - 24 mg/dL Final   07/13/2020 14 6 - 20 mg/dL Final     Creatinine   Date Value Ref Range Status   09/11/2020 0.90 0.57 - 1.00 mg/dL Final   07/13/2020 0.69 0.57 - 1.00 mg/dL Final   09/17/2019 0.70 0.60 - 1.30 mg/dL Final     Comment:     Serial Number: [id number]:  [id number]     Sodium   Date Value Ref Range Status   09/11/2020 143 134 - 144 mmol/L Final   07/13/2020 139 136 - 145 mmol/L Final     Potassium   Date Value Ref Range Status   09/11/2020 4.6 3.5 - 5.2 mmol/L Final   07/13/2020 4.0 3.5 - 5.2 mmol/L Final     Chloride   Date Value Ref Range Status   09/11/2020 101 96 - 106 mmol/L Final   07/13/2020 101 98 - 107 mmol/L Final     CO2   Date Value Ref Range Status   07/13/2020 27.0 22.0 - 29.0 mmol/L Final     Total CO2   Date Value Ref Range Status   09/11/2020 26 20 - 29 mmol/L Final     Calcium   Date Value Ref Range Status   09/11/2020 9.3 8.7 - 10.2 mg/dL Final   07/13/2020 9.6 8.6 - 10.5 mg/dL Final     ALT (SGPT)   Date Value Ref Range Status   09/11/2020 46 (H) 0 - 32 IU/L Final   07/30/2019 28 0  - 54 U/L Final     AST (SGOT)   Date Value Ref Range Status   09/11/2020 34 0 - 40 IU/L Final   07/30/2019 29 7 - 45 U/L Final     WBC   Date Value Ref Range Status   09/02/2020 4.8 3.4 - 10.8 x10E3/uL Final     Hematocrit   Date Value Ref Range Status   09/02/2020 43.4 34.0 - 46.6 % Final   07/13/2020 40.4 34.0 - 46.6 % Final     Platelets   Date Value Ref Range Status   09/02/2020 253 150 - 450 x10E3/uL Final   07/13/2020 217 140 - 450 10*3/mm3 Final     Total Cholesterol   Date Value Ref Range Status   07/30/2019 252 (H) 130 - 200 mg/dL Final     Triglycerides   Date Value Ref Range Status   07/30/2019 261 (H) 0 - 149 mg/dL Final     HDL Cholesterol   Date Value Ref Range Status   07/30/2019 84 >=50 mg/dL Final     LDL Cholesterol    Date Value Ref Range Status   07/30/2019 124 (H) 0 - 99 mg/dL Final     LDL/HDL Ratio   Date Value Ref Range Status   07/30/2019 1.38  Final         Assessment / Plan     Assessment/Plan:  1. Bilateral lower extremity pain  - EMG & Nerve Conduction Test; Future        Return Follow-up after testing. unless patient needs to be seen sooner or acute issues arise.        I have discussed the patient results/orders and and plan/recommendation with them at today's visit.      Jeannette Justin,    03/05/2021

## 2021-03-16 ENCOUNTER — APPOINTMENT (OUTPATIENT)
Dept: BONE DENSITY | Facility: HOSPITAL | Age: 56
End: 2021-03-16

## 2021-03-16 ENCOUNTER — HOSPITAL ENCOUNTER (OUTPATIENT)
Dept: BONE DENSITY | Facility: HOSPITAL | Age: 56
Discharge: HOME | End: 2021-03-16

## 2021-03-16 ENCOUNTER — TELEPHONE (OUTPATIENT)
Dept: INTERNAL MEDICINE | Facility: CLINIC | Age: 56
End: 2021-03-16

## 2021-03-16 ENCOUNTER — APPOINTMENT (OUTPATIENT)
Dept: MAMMOGRAPHY | Facility: HOSPITAL | Age: 56
End: 2021-03-16

## 2021-03-16 ENCOUNTER — HOSPITAL ENCOUNTER (OUTPATIENT)
Dept: MAMMOGRAPHY | Facility: HOSPITAL | Age: 56
Discharge: HOME | End: 2021-03-16

## 2021-03-16 ENCOUNTER — HOSPITAL ENCOUNTER (OUTPATIENT)
Dept: NEUROLOGY | Facility: HOSPITAL | Age: 56
Discharge: HOME | End: 2021-03-16

## 2021-03-16 DIAGNOSIS — Z12.31 ENCOUNTER FOR SCREENING MAMMOGRAM FOR BREAST CANCER: ICD-10-CM

## 2021-03-16 DIAGNOSIS — Z13.820 ENCOUNTER FOR SCREENING FOR OSTEOPOROSIS: ICD-10-CM

## 2021-03-16 DIAGNOSIS — M79.604 BILATERAL LOWER EXTREMITY PAIN: ICD-10-CM

## 2021-03-16 DIAGNOSIS — M79.605 BILATERAL LOWER EXTREMITY PAIN: ICD-10-CM

## 2021-03-16 PROCEDURE — 95909 NRV CNDJ TST 5-6 STUDIES: CPT | Performed by: PSYCHIATRY & NEUROLOGY

## 2021-03-16 PROCEDURE — 77063 BREAST TOMOSYNTHESIS BI: CPT

## 2021-03-16 PROCEDURE — 77067 SCR MAMMO BI INCL CAD: CPT

## 2021-03-16 PROCEDURE — 95886 MUSC TEST DONE W/N TEST COMP: CPT

## 2021-03-16 PROCEDURE — 77080 DXA BONE DENSITY AXIAL: CPT

## 2021-03-16 PROCEDURE — 95909 NRV CNDJ TST 5-6 STUDIES: CPT

## 2021-03-16 PROCEDURE — 95886 MUSC TEST DONE W/N TEST COMP: CPT | Performed by: PSYCHIATRY & NEUROLOGY

## 2021-03-16 NOTE — TELEPHONE ENCOUNTER
----- Message from Jeannette Justin DO sent at 3/16/2021  6:42 PM CDT -----  Needs MRI of the LS spine    Study suggests diffuse neuropathy/neuromyopathy but in the setting I cannot rule out peripheral nerve entrapments or proximal nerve/nerve root or plexus abnormalities or spinal stenosis as contributory

## 2021-03-16 NOTE — TELEPHONE ENCOUNTER
Attempted to call patient. No answer. Left VM advising patient to return call to discuss test results and recommendations per Dr. Justin.

## 2021-03-17 DIAGNOSIS — M79.604 LUMBAR PAIN WITH RADIATION DOWN BOTH LEGS: Primary | ICD-10-CM

## 2021-03-17 DIAGNOSIS — M79.605 LUMBAR PAIN WITH RADIATION DOWN BOTH LEGS: Primary | ICD-10-CM

## 2021-03-17 DIAGNOSIS — M54.50 LUMBAR PAIN WITH RADIATION DOWN BOTH LEGS: Primary | ICD-10-CM

## 2021-03-17 DIAGNOSIS — R94.131 ABNORMAL EMG: ICD-10-CM

## 2021-03-18 DIAGNOSIS — M79.605 LUMBAR PAIN WITH RADIATION DOWN BOTH LEGS: Primary | ICD-10-CM

## 2021-03-18 DIAGNOSIS — M54.50 LUMBAR PAIN WITH RADIATION DOWN BOTH LEGS: Primary | ICD-10-CM

## 2021-03-18 DIAGNOSIS — M79.604 LUMBAR PAIN WITH RADIATION DOWN BOTH LEGS: Primary | ICD-10-CM

## 2021-03-18 RX ORDER — OXYCODONE HYDROCHLORIDE AND ACETAMINOPHEN 5; 325 MG/1; MG/1
1 TABLET ORAL EVERY 4 HOURS PRN
Qty: 18 TABLET | Refills: 0 | Status: SHIPPED | OUTPATIENT
Start: 2021-03-18 | End: 2021-06-08 | Stop reason: SDUPTHER

## 2021-04-02 ENCOUNTER — APPOINTMENT (OUTPATIENT)
Dept: MRI IMAGING | Facility: HOSPITAL | Age: 56
End: 2021-04-02

## 2021-04-09 ENCOUNTER — HOSPITAL ENCOUNTER (OUTPATIENT)
Dept: MRI IMAGING | Facility: HOSPITAL | Age: 56
Discharge: HOME | End: 2021-04-09
Admitting: FAMILY MEDICINE

## 2021-04-09 DIAGNOSIS — M54.50 LUMBAR PAIN WITH RADIATION DOWN BOTH LEGS: ICD-10-CM

## 2021-04-09 DIAGNOSIS — R20.2 LEG PARESTHESIA: Primary | ICD-10-CM

## 2021-04-09 DIAGNOSIS — M79.605 LUMBAR PAIN WITH RADIATION DOWN BOTH LEGS: ICD-10-CM

## 2021-04-09 DIAGNOSIS — R94.131 ABNORMAL EMG: ICD-10-CM

## 2021-04-09 DIAGNOSIS — M79.604 LUMBAR PAIN WITH RADIATION DOWN BOTH LEGS: ICD-10-CM

## 2021-04-09 PROCEDURE — 72148 MRI LUMBAR SPINE W/O DYE: CPT

## 2021-04-12 ENCOUNTER — TELEPHONE (OUTPATIENT)
Dept: NEUROSURGERY | Facility: CLINIC | Age: 56
End: 2021-04-12

## 2021-04-12 NOTE — TELEPHONE ENCOUNTER
We have received a referral to call patient and schedule for an appointment, tried her number, no answer I have left message for a call back.

## 2021-04-14 ENCOUNTER — OFFICE VISIT (OUTPATIENT)
Dept: NEUROSURGERY | Facility: CLINIC | Age: 56
End: 2021-04-14

## 2021-04-14 VITALS
BODY MASS INDEX: 33.84 KG/M2 | WEIGHT: 191 LBS | SYSTOLIC BLOOD PRESSURE: 128 MMHG | HEIGHT: 63 IN | DIASTOLIC BLOOD PRESSURE: 82 MMHG

## 2021-04-14 DIAGNOSIS — G60.9 HEREDITARY PERIPHERAL NEUROPATHY: ICD-10-CM

## 2021-04-14 DIAGNOSIS — M48.061 SPINAL STENOSIS OF LUMBAR REGION, UNSPECIFIED WHETHER NEUROGENIC CLAUDICATION PRESENT: Primary | ICD-10-CM

## 2021-04-14 DIAGNOSIS — Z87.891 FORMER SMOKER: ICD-10-CM

## 2021-04-14 DIAGNOSIS — E66.09 CLASS 1 OBESITY DUE TO EXCESS CALORIES WITHOUT SERIOUS COMORBIDITY WITH BODY MASS INDEX (BMI) OF 33.0 TO 33.9 IN ADULT: ICD-10-CM

## 2021-04-14 DIAGNOSIS — M51.37 DEGENERATION OF LUMBAR OR LUMBOSACRAL INTERVERTEBRAL DISC: ICD-10-CM

## 2021-04-14 PROBLEM — M51.379 DEGENERATION OF LUMBAR OR LUMBOSACRAL INTERVERTEBRAL DISC: Status: ACTIVE | Noted: 2021-04-14

## 2021-04-14 PROBLEM — G62.9 PERIPHERAL NEUROPATHY: Status: ACTIVE | Noted: 2021-04-14

## 2021-04-14 PROBLEM — E66.811 CLASS 1 OBESITY DUE TO EXCESS CALORIES WITHOUT SERIOUS COMORBIDITY WITH BODY MASS INDEX (BMI) OF 33.0 TO 33.9 IN ADULT: Status: ACTIVE | Noted: 2021-04-14

## 2021-04-14 PROCEDURE — 99214 OFFICE O/P EST MOD 30 MIN: CPT | Performed by: NURSE PRACTITIONER

## 2021-04-14 RX ORDER — GABAPENTIN 100 MG/1
100 CAPSULE ORAL 3 TIMES DAILY
Qty: 90 CAPSULE | Refills: 2 | Status: SHIPPED | OUTPATIENT
Start: 2021-04-14 | End: 2021-08-27 | Stop reason: SDUPTHER

## 2021-04-14 RX ORDER — DICLOFENAC SODIUM 75 MG/1
75 TABLET, DELAYED RELEASE ORAL 2 TIMES DAILY
Qty: 60 TABLET | Refills: 2 | Status: SHIPPED | OUTPATIENT
Start: 2021-04-14 | End: 2021-07-08 | Stop reason: SDUPTHER

## 2021-04-14 NOTE — PROGRESS NOTES
Chief complaint:   Chief Complaint   Patient presents with   • Back Pain     Desirae has been referred here from Dr.Frances Justin for follow up for some nerve pain issue from her waist down, she was attributing to her knee pain but has had an MRI of lumbar and has had an EMG/NCV with [facility].        Subjective     HPI: This is a 56-year-old female patient who was referred to us by Dr. Aravind Justin for back and leg pain.  She is here to be evaluated today.  The patient states that she did have back pain for several years.  She is not had any significant change in her back symptoms.  The pain in her back is constant but is worse with bending and lifting and better with chiropractic care.  She says in July 2020 she had a right knee surgery for meniscal tear.  She says within the last 6 months that she did have a fall and started having some leg pain.  She says the right leg seems to be worse than the left but the pain in her legs is very sporadic.  She does not give a particular pattern of the pain in her legs.  She says it can be hurting up in her thigh and then the next day can be hurting in her calf area or on the top of her foot.  He does very in intensity.  She says it can go from one leg to the other.  She says it does seem to be worse with lifting but nothing really makes it better.  Denies any bowel or bladder incontinence.  She has not done any recent physical therapy.  She has done chiropractic care without any significant improvement.  She has not done any pain management injections.  She is right-hand dominant.  She works at the [employer] in [place].  She denies any tobacco, alcohol, or illicit drug use.  Rates her pain on a scale 0-10 at a 10.  She says it can interfere with actives of daily living    Review of Systems   Constitutional: Positive for activity change and unexpected weight change.   HENT: Positive for trouble swallowing and voice change.    Eyes: Positive for itching.    Endocrine: Positive for cold intolerance and heat intolerance.   Genitourinary: Positive for flank pain, pelvic pain and urgency.   Musculoskeletal: Positive for arthralgias, back pain, gait problem, joint swelling, neck pain and neck stiffness.   Allergic/Immunologic: Positive for environmental allergies.   Neurological: Positive for numbness and headaches.   Hematological: Bruises/bleeds easily.   Psychiatric/Behavioral: Positive for agitation, decreased concentration and sleep disturbance.   All other systems reviewed and are negative.       Past Medical History:   Diagnosis Date   • Cancer (CMS/HCC)     lymphoma of small bowel   • GERD (gastroesophageal reflux disease)    • Glaucoma    • Hyperlipidemia    • Hypertension    • Hypothyroidism    • TIA (transient ischemic attack)      Past Surgical History:   Procedure Laterality Date   • BREAST EXCISIONAL BIOPSY Left 2016    benign   • BREAST SURGERY Left 2017   • CHOLECYSTECTOMY     • COLON RESECTION SMALL BOWEL     • COLONOSCOPY     • KNEE ARTHROSCOPY Right 2020    Procedure: RIGHT KNEE PARTIAL MEDIAL MENISCECTOMY;  Surgeon: Vijay Grewal MD;  Location: [facility];  Service: Orthopedics;  Laterality: Right;   • LAPAROSCOPIC TUBAL LIGATION     • TUBAL ABDOMINAL LIGATION     • VAGINAL MESH REVISION         • WISDOM TOOTH EXTRACTION       Family History   Problem Relation Age of Onset   • Cancer Maternal Aunt    • No Known Problems Father    • Heart defect Mother    • No Known Problems Sister    • Colon cancer Paternal Aunt 68   • Colon cancer Paternal Aunt 70   • Liver cancer Paternal Aunt    • Breast cancer Neg Hx    • Ovarian cancer Neg Hx    • Uterine cancer Neg Hx    • Melanoma Neg Hx    • Prostate cancer Neg Hx      Social History     Tobacco Use   • Smoking status: Former Smoker     Packs/day: 0.50     Years: 20.00     Pack years: 10.00     Types: Cigarettes     Quit date:      Years since quittin.2   • Smokeless  "tobacco: Never Used   • Tobacco comment: quit 1995   Substance Use Topics   • Alcohol use: No   • Drug use: No     (Not in a hospital admission)    Allergies:  Patient has no known allergies.    Objective      Vital Signs  /82   Ht 160 cm (63\")   Wt 86.6 kg (191 lb)   LMP 06/30/2019 Comment: has been a year since pt went through menopause and had a period  BMI 33.83 kg/m²     Physical Exam  Constitutional:       Appearance: Normal appearance. She is well-developed.   HENT:      Head: Normocephalic.   Eyes:      General: Lids are normal.      Conjunctiva/sclera: Conjunctivae normal.      Pupils: Pupils are equal, round, and reactive to light.   Cardiovascular:      Rate and Rhythm: Normal rate and regular rhythm.      Pulses:           Dorsalis pedis pulses are 2+ on the right side and 2+ on the left side.        Posterior tibial pulses are 2+ on the right side and 2+ on the left side.   Pulmonary:      Effort: Pulmonary effort is normal.      Breath sounds: Normal breath sounds.   Musculoskeletal:         General: Normal range of motion.      Cervical back: Normal range of motion.      Comments: Back pain   Skin:     General: Skin is warm.   Neurological:      Mental Status: She is alert and oriented to person, place, and time.      GCS: GCS eye subscore is 4. GCS verbal subscore is 5. GCS motor subscore is 6.      Cranial Nerves: No cranial nerve deficit.      Sensory: No sensory deficit.      Deep Tendon Reflexes: Reflexes are normal and symmetric. Reflexes normal.   Psychiatric:         Speech: Speech normal.         Behavior: Behavior normal.         Thought Content: Thought content normal.         Neurologic Exam     Mental Status   Oriented to person, place, and time.   Speech: speech is normal     Cranial Nerves     CN III, IV, VI   Pupils are equal, round, and reactive to light.      Results Review: MRI of the lumbar spine that was done on April 9, 2021 does have lumbar disc degeneration present at " L4-5 and L5-S1 with Modic endplate changes.  At L5-S1 there is bilateral foraminal narrowing.  At L4-5 there is mild bilateral foraminal narrowing.  No fracture visualized.  There is a slight malalignment due to the disc degeneration as it wears off to the left at L4-5 more so than on the right.    Patient also had a nerve conduction study done by Dr. Magdi Chavez which was suggestive of motor and sensory neuropathy    Assessment/Plan: I am going to send the patient for set of x-rays of the lumbar spine to include standing flexion and extension.  I will also increase her diclofenac to 75 mg twice a day and also start her on gabapentin 100 mg and will escalate her dose to 3 times a day  For first line conservative care of lumbar pain, I would like to send Ms. Alberto for a dedicated course of physician directed physical therapy consisting of 2-3 times a week for 4-6 weeks.    Return for reassessment with Dr. Betancur after physical therapy.     I advised the patient to call and return sooner for new or worsening complaints of weakness, paresthesias, gait disturbances, or any additional concerns.  Treatment options discussed in detail with Desirae and the patient voiced understanding.  Ms. Alberto agrees with this plan of care.     Patient is a nonsmoker  The patient's Body mass index is 33.83 kg/m².. BMI is above normal parameters. Recommendations include: educational material and nutrition counseling    Diagnoses and all orders for this visit:    1. Spinal stenosis of lumbar region, unspecified whether neurogenic claudication present (Primary)  -     Ambulatory Referral to Physical Therapy Evaluate and treat (2-3 days a week for 4-6 weeks )  -     XR Spine Lumbar Complete With Flex & Ext; Future    2. Degeneration of lumbar or lumbosacral intervertebral disc  -     Ambulatory Referral to Physical Therapy Evaluate and treat (2-3 days a week for 4-6 weeks )  -     XR Spine Lumbar Complete With Flex & Ext; Future    3.  Hereditary peripheral neuropathy  -     Ambulatory Referral to Physical Therapy Evaluate and treat (2-3 days a week for 4-6 weeks )  -     XR Spine Lumbar Complete With Flex & Ext; Future  -     gabapentin (Neurontin) 100 MG capsule; Take 1 capsule by mouth 3 (Three) Times a Day.  Dispense: 90 capsule; Refill: 2    4. Former smoker    5. Class 1 obesity due to excess calories without serious comorbidity with body mass index (BMI) of 33.0 to 33.9 in adult    Other orders  -     diclofenac (VOLTAREN) 75 MG EC tablet; Take 1 tablet by mouth 2 (Two) Times a Day.  Dispense: 60 tablet; Refill: 2          I discussed the patients findings and my recommendations with patient    Rafy Gamez, APRN  04/14/21  15:56 CDT

## 2021-04-14 NOTE — PATIENT INSTRUCTIONS
"BMI for Adults  What is BMI?  Body mass index (BMI) is a number that is calculated from a person's weight and height. BMI can help estimate how much of a person's weight is composed of fat. BMI does not measure body fat directly. Rather, it is an alternative to procedures that directly measure body fat, which can be difficult and expensive.  BMI can help identify people who may be at higher risk for certain medical problems.  What are BMI measurements used for?  BMI is used as a screening tool to identify possible weight problems. It helps determine whether a person is obese, overweight, a healthy weight, or underweight.  BMI is useful for:  · Identifying a weight problem that may be related to a medical condition or may increase the risk for medical problems.  · Promoting changes, such as changes in diet and exercise, to help reach a healthy weight. BMI screening can be repeated to see if these changes are working.  How is BMI calculated?  BMI involves measuring your weight in relation to your height. Both height and weight are measured, and the BMI is calculated from those numbers. This can be done either in English (U[place] or metric measurements. Note that charts and online BMI calculators are available to help you find your BMI quickly and easily without having to do these calculations yourself.  To calculate your BMI in English ([place] measurements:    1. Measure your weight in pounds (lb).  2. Multiply the number of pounds by 703.  ? For example, for a person who weighs 180 lb, multiply that number by 703, which equals 126,540.  3. Measure your height in inches. Then multiply that number by itself to get a measurement called \"inches squared.\"  ? For example, for a person who is 70 inches tall, the \"inches squared\" measurement is 70 inches x 70 inches, which equals 4,900 inches squared.  4. Divide the total from step 2 (number of lb x 703) by the total from step 3 (inches squared): 126,540 ÷ 4,900 = 25.8. This is " "your BMI.  To calculate your BMI in metric measurements:  1. Measure your weight in kilograms (kg).  2. Measure your height in meters (m). Then multiply that number by itself to get a measurement called \"meters squared.\"  ? For example, for a person who is 1.75 m tall, the \"meters squared\" measurement is 1.75 m x 1.75 m, which is equal to 3.1 meters squared.  3. Divide the number of kilograms (your weight) by the meters squared number. In this example: 70 ÷ 3.1 = 22.6. This is your BMI.  What do the results mean?  BMI charts are used to identify whether you are underweight, normal weight, overweight, or obese. The following guidelines will be used:  · Underweight: BMI less than 18.5.  · Normal weight: BMI between 18.5 and 24.9.  · Overweight: BMI between 25 and 29.9.  · Obese: BMI of 30 or above.  Keep these notes in mind:  · Weight includes both fat and muscle, so someone with a muscular build, such as an athlete, may have a BMI that is higher than 24.9. In cases like these, BMI is not an accurate measure of body fat.  · To determine if excess body fat is the cause of a BMI of 25 or higher, further assessments may need to be done by a health care provider.  · BMI is usually interpreted in the same way for men and women.  Where to find more information  For more information about BMI, including tools to quickly calculate your BMI, go to these websites:  · Centers for Disease Control and Prevention: www.cdc.gov  · American Heart Association: www.heart.org  · National Heart, Lung, and Blood [place]: www.nhlbi.nih.gov  Summary  · Body mass index (BMI) is a number that is calculated from a person's weight and height.  · BMI may help estimate how much of a person's weight is composed of fat. BMI can help identify those who may be at higher risk for certain medical problems.  · BMI can be measured using English measurements or metric measurements.  · BMI charts are used to identify whether you are underweight, normal " "weight, overweight, or obese.  This information is not intended to replace advice given to you by your health care provider. Make sure you discuss any questions you have with your health care provider.  Document Revised: 09/09/2020 Document Reviewed: 07/17/2020  [employer] Patient Education © 2021 [employer]      https://www.nhlbi.nih.gov/files/docs/public/heart/dash_brief.pdf\">   DASH Eating Plan  DASH stands for Dietary Approaches to Stop Hypertension. The DASH eating plan is a healthy eating plan that has been shown to:  · Reduce high blood pressure (hypertension).  · Reduce your risk for type 2 diabetes, heart disease, and stroke.  · Help with weight loss.  What are tips for following this plan?  Reading food labels  · Check food labels for the amount of salt (sodium) per serving. Choose foods with less than 5 percent of the Daily Value of sodium. Generally, foods with less than 300 milligrams (mg) of sodium per serving fit into this eating plan.  · To find whole grains, look for the word \"whole\" as the first word in the ingredient list.  Shopping  · Buy products labeled as \"low-sodium\" or \"no salt added.\"  · Buy fresh foods. Avoid canned foods and pre-made or frozen meals.  Cooking  · Avoid adding salt when cooking. Use salt-free seasonings or herbs instead of table salt or sea salt. Check with your health care provider or pharmacist before using salt substitutes.  · Do not crook foods. Cook foods using healthy methods such as baking, boiling, grilling, roasting, and broiling instead.  · Cook with heart-healthy oils, such as olive, canola, avocado, soybean, or sunflower oil.  Meal planning    · Eat a balanced diet that includes:  ? 4 or more servings of fruits and 4 or more servings of vegetables each day. Try to fill one-half of your plate with fruits and vegetables.  ? 6-8 servings of whole grains each day.  ? Less than 6 oz (170 g) of lean meat, poultry, or fish each day. A 3-oz (85-g) serving of meat is " about the same size as a deck of cards. One egg equals 1 oz (28 g).  ? 2-3 servings of low-fat dairy each day. One serving is 1 cup (237 mL).  ? 1 serving of nuts, seeds, or beans 5 times each week.  ? 2-3 servings of heart-healthy fats. Healthy fats called omega-3 fatty acids are found in foods such as walnuts, flaxseeds, fortified milks, and eggs. These fats are also found in cold-water fish, such as sardines, salmon, and mackerel.  · Limit how much you eat of:  ? Canned or prepackaged foods.  ? Food that is high in trans fat, such as some fried foods.  ? Food that is high in saturated fat, such as fatty meat.  ? Desserts and other sweets, sugary drinks, and other foods with added sugar.  ? Full-fat dairy products.  · Do not salt foods before eating.  · Do not eat more than 4 egg yolks a week.  · Try to eat at least 2 vegetarian meals a week.  · Eat more home-cooked food and less restaurant, buffet, and fast food.  Lifestyle  · When eating at a restaurant, ask that your food be prepared with less salt or no salt, if possible.  · If you drink alcohol:  ? Limit how much you use to:  § 0-1 drink a day for women who are not pregnant.  § 0-2 drinks a day for men.  ? Be aware of how much alcohol is in your drink. In the [place] one drink equals one 12 oz bottle of beer (355 mL), one 5 oz glass of wine (148 mL), or one 1½ oz glass of hard liquor (44 mL).  General information  · Avoid eating more than 2,300 mg of salt a day. If you have hypertension, you may need to reduce your sodium intake to 1,500 mg a day.  · Work with your health care provider to maintain a healthy body weight or to lose weight. Ask what an ideal weight is for you.  · Get at least 30 minutes of exercise that causes your heart to beat faster (aerobic exercise) most days of the week. Activities may include walking, swimming, or biking.  · Work with your health care provider or dietitian to adjust your eating plan to your individual calorie needs.  What  foods should I eat?  Fruits  All fresh, dried, or frozen fruit. Canned fruit in natural juice (without added sugar).  Vegetables  Fresh or frozen vegetables (raw, steamed, roasted, or grilled). Low-sodium or reduced-sodium tomato and vegetable juice. Low-sodium or reduced-sodium tomato sauce and tomato paste. Low-sodium or reduced-sodium canned vegetables.  Grains  Whole-grain or whole-wheat bread. Whole-grain or whole-wheat pasta. Brown rice. Oatmeal. Quinoa. Bulgur. Whole-grain and low-sodium cereals. Yuki bread. Low-fat, low-sodium crackers. Whole-wheat flour tortillas.  Meats and other proteins  Skinless chicken or turkey. Ground chicken or turkey. Pork with fat trimmed off. Fish and seafood. Egg whites. Dried beans, peas, or lentils. Unsalted nuts, nut butters, and seeds. Unsalted canned beans. Lean cuts of beef with fat trimmed off. Low-sodium, lean precooked or cured meat, such as sausages or meat loaves.  Dairy  Low-fat (1%) or fat-free (skim) milk. Reduced-fat, low-fat, or fat-free cheeses. Nonfat, low-sodium ricotta or cottage cheese. Low-fat or nonfat yogurt. Low-fat, low-sodium cheese.  Fats and oils  Soft margarine without trans fats. Vegetable oil. Reduced-fat, low-fat, or light mayonnaise and salad dressings (reduced-sodium). Canola, safflower, olive, avocado, soybean, and sunflower oils. Avocado.  Seasonings and condiments  Herbs. Spices. Seasoning mixes without salt.  Other foods  Unsalted popcorn and pretzels. Fat-free sweets.  The items listed above may not be a complete list of foods and beverages you can eat. Contact a dietitian for more information.  What foods should I avoid?  Fruits  Canned fruit in a light or heavy syrup. Fried fruit. Fruit in cream or butter sauce.  Vegetables  Creamed or fried vegetables. Vegetables in a cheese sauce. Regular canned vegetables (not low-sodium or reduced-sodium). Regular canned tomato sauce and paste (not low-sodium or reduced-sodium). Regular tomato and  vegetable juice (not low-sodium or reduced-sodium). Pickles. Olives.  Grains  Baked goods made with fat, such as croissants, muffins, or some breads. Dry pasta or rice meal packs.  Meats and other proteins  Fatty cuts of meat. Ribs. Fried meat. Koehler. Bologna, salami, and other precooked or cured meats, such as sausages or meat loaves. Fat from the back of a pig (fatback). Bratwurst. Salted nuts and seeds. Canned beans with added salt. Canned or smoked fish. Whole eggs or egg yolks. Chicken or turkey with skin.  Dairy  Whole or 2% milk, cream, and half-and-half. Whole or full-fat cream cheese. Whole-fat or sweetened yogurt. Full-fat cheese. Nondairy creamers. Whipped toppings. Processed cheese and cheese spreads.  Fats and oils  Butter. Stick margarine. Lard. Shortening. Ghee. Koehler fat. Tropical oils, such as coconut, palm kernel, or palm oil.  Seasonings and condiments  Onion salt, garlic salt, seasoned salt, table salt, and sea salt. Worcestershire sauce. Tartar sauce. Barbecue sauce. Teriyaki sauce. Soy sauce, including reduced-sodium. Steak sauce. Canned and packaged gravies. Fish sauce. Oyster sauce. Cocktail sauce. Store-bought horseradish. Ketchup. Mustard. Meat flavorings and tenderizers. Bouillon cubes. Hot sauces. Pre-made or packaged marinades. Pre-made or packaged taco seasonings. Relishes. Regular salad dressings.  Other foods  Salted popcorn and pretzels.  The items listed above may not be a complete list of foods and beverages you should avoid. Contact a dietitian for more information.  Where to find more information  · National Heart, Lung, and Blood [place]: www.nhlbi.nih.gov  · American Heart Association: www.heart.org  · Academy of Nutrition and Dietetics: www.eatright.org  · National Kidney Foundation: www.kidney.org  Summary  · The DASH eating plan is a healthy eating plan that has been shown to reduce high blood pressure (hypertension). It may also reduce your risk for type 2 diabetes, heart  disease, and stroke.  · When on the DASH eating plan, aim to eat more fresh fruits and vegetables, whole grains, lean proteins, low-fat dairy, and heart-healthy fats.  · With the DASH eating plan, you should limit salt (sodium) intake to 2,300 mg a day. If you have hypertension, you may need to reduce your sodium intake to 1,500 mg a day.  · Work with your health care provider or dietitian to adjust your eating plan to your individual calorie needs.  This information is not intended to replace advice given to you by your health care provider. Make sure you discuss any questions you have with your health care provider.  Document Revised: 11/20/2020 Document Reviewed: 11/20/2020  [employer] Patient Education © 2021 [employer]

## 2021-05-19 RX ORDER — VENLAFAXINE HYDROCHLORIDE 75 MG/1
75 CAPSULE, EXTENDED RELEASE ORAL DAILY
Qty: 30 CAPSULE | Refills: 3 | Status: SHIPPED | OUTPATIENT
Start: 2021-05-19 | End: 2022-04-08 | Stop reason: SDUPTHER

## 2021-06-01 ENCOUNTER — OFFICE VISIT (OUTPATIENT)
Dept: INTERNAL MEDICINE | Facility: CLINIC | Age: 56
End: 2021-06-01
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
## 2021-06-01 DIAGNOSIS — S20.469A NONVENOMOUS INSECT BITE OF BACK WITHOUT INFECTION, UNSPECIFIED LATERALITY, INITIAL ENCOUNTER: Primary | ICD-10-CM
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
## 2021-06-01 PROCEDURE — 99213 OFFICE O/P EST LOW 20 MIN: CPT | Performed by: FAMILY MEDICINE

## 2021-06-01 NOTE — PROGRESS NOTES
Subjective     CC:  Tick bite      History of Present Illness  Bit by tick over the weekend.   Was still there this AM  Removed by Maren Onofre MA this AM  Patient's PMR from outside medical facility reviewed and noted.    Review of Systems     Otherwise complete ROS reviewed and negative except as mentioned in the HPI.    Past Medical History:   Past Medical History:   Diagnosis Date   • Cancer (CMS/HCC)     lymphoma of small bowel   • GERD (gastroesophageal reflux disease)    • Glaucoma    • Hyperlipidemia    • Hypertension    • Hypothyroidism    • TIA (transient ischemic attack)      Past Surgical History:  Past Surgical History:   Procedure Laterality Date   • BREAST EXCISIONAL BIOPSY Left 01/2016    benign   • BREAST SURGERY Left 2017   • CHOLECYSTECTOMY     • COLON RESECTION SMALL BOWEL  2014   • COLONOSCOPY  2019   • KNEE ARTHROSCOPY Right 7/14/2020    Procedure: RIGHT KNEE PARTIAL MEDIAL MENISCECTOMY;  Surgeon: Vijay Grewal MD;  Location: [facility] OR;  Service: Orthopedics;  Laterality: Right;   • LAPAROSCOPIC TUBAL LIGATION     • TUBAL ABDOMINAL LIGATION     • VAGINAL MESH REVISION      2010/2016   • WISDOM TOOTH EXTRACTION       Social History:  reports that she quit smoking about 20 years ago. Her smoking use included cigarettes. She has a 10.00 pack-year smoking history. She has never used smokeless tobacco. She reports that she does not drink alcohol and does not use drugs.    Family History: family history includes Cancer in her maternal aunt; Colon cancer (age of onset: 68) in her paternal aunt; Colon cancer (age of onset: 70) in her paternal aunt; Heart defect in her mother; Liver cancer in her paternal aunt; No Known Problems in her father and sister.       Allergies:  No Known Allergies  Medications:  Prior to Admission medications    Medication Sig Start Date End Date Taking? Authorizing Provider   Calcium Carb-Cholecalciferol (CALCIUM 1000 + D PO) Take 1 tablet by mouth Daily.     Rosalba Zarate MD   cyclobenzaprine (FLEXERIL) 5 MG tablet Take 1 tablet by mouth 3 (Three) Times a Day As Needed for Muscle Spasms. 1/19/21   Manjit Whiting DO   diclofenac (VOLTAREN) 75 MG EC tablet Take 1 tablet by mouth 2 (Two) Times a Day. 4/14/21   Rafy Gamez APRN   gabapentin (Neurontin) 100 MG capsule Take 1 capsule by mouth 3 (Three) Times a Day. 4/14/21   Rafy Gamez APRN   HYDROcodone-acetaminophen (NORCO) 5-325 MG per tablet Take one tablet by mouth every 6 hours as needed for pain 7/14/20   Vijay Grewal MD   levothyroxine (Synthroid) 75 MCG tablet Take 1 tablet by mouth Daily. 6/24/20   Jeannette Justin DO   Mirabegron ER (MYRBETRIQ) 25 MG tablet sustained-release 24 hour 24 hr tablet Take 1 tablet by mouth Daily 4/30/21 6/10/21  Jeannette Justin DO   oxyCODONE-acetaminophen (Percocet) 5-325 MG per tablet Take 1 tablet by mouth Every 4 (Four) Hours As Needed for Severe Pain . 3/18/21   Luz Mota APRN   saccharomyces boulardii (FLORASTOR) 250 MG capsule Take 250 mg by mouth 2 (Two) Times a Day.    Rosalba Zarate MD   therapeutic multivitamin-minerals (THERAGRAN-M) tablet Take 1 tablet by mouth Daily.    Rosalba Zarate MD   THYROID PO Take 1 capsule by mouth Daily.    Rosalba Zarate MD   venlafaxine XR (EFFEXOR-XR) 75 MG 24 hr capsule Take 1 capsule by mouth Daily. 5/19/21   Jeannette Justin DO   vitamin B-12 (CYANOCOBALAMIN) 500 MCG tablet Take 1 tablet by mouth Daily. 8/5/19   CATIE Howe MD   vitamin B-6 (PYRIDOXINE) 25 MG tablet Take 1 tablet by mouth Daily. 8/5/19   CATIE Howe MD       Objective     Vital Signs: LMP 06/30/2019 Comment: has been a year since pt went through menopause and had a period  Physical Exam  Constitutional:       Appearance: Normal appearance.   HENT:      Head: Normocephalic and atraumatic.   Musculoskeletal:         General: Normal range of motion.   Skin:     General: Skin  is warm and dry.      Capillary Refill: Capillary refill takes less than 2 seconds.      Comments: 1cm [place] of erythema surrounding area of tic bite just above bra strap midline.   Neurological:      General: No focal deficit present.      Mental Status: She is alert and oriented to person, place, and time.   Psychiatric:         Mood and Affect: Mood normal.         Behavior: Behavior normal.         Results Reviewed:  Glucose   Date Value Ref Range Status   07/13/2020 107 (H) 65 - 99 mg/dL Final     BUN   Date Value Ref Range Status   09/11/2020 13 6 - 24 mg/dL Final   07/13/2020 14 6 - 20 mg/dL Final     Creatinine   Date Value Ref Range Status   09/11/2020 0.90 0.57 - 1.00 mg/dL Final   07/13/2020 0.69 0.57 - 1.00 mg/dL Final   09/17/2019 0.70 0.60 - 1.30 mg/dL Final     Comment:     Serial Number: [id number]:  [id number]     Sodium   Date Value Ref Range Status   09/11/2020 143 134 - 144 mmol/L Final   07/13/2020 139 136 - 145 mmol/L Final     Potassium   Date Value Ref Range Status   09/11/2020 4.6 3.5 - 5.2 mmol/L Final   07/13/2020 4.0 3.5 - 5.2 mmol/L Final     Chloride   Date Value Ref Range Status   09/11/2020 101 96 - 106 mmol/L Final   07/13/2020 101 98 - 107 mmol/L Final     CO2   Date Value Ref Range Status   07/13/2020 27.0 22.0 - 29.0 mmol/L Final     Total CO2   Date Value Ref Range Status   09/11/2020 26 20 - 29 mmol/L Final     Calcium   Date Value Ref Range Status   09/11/2020 9.3 8.7 - 10.2 mg/dL Final   07/13/2020 9.6 8.6 - 10.5 mg/dL Final     ALT (SGPT)   Date Value Ref Range Status   09/11/2020 46 (H) 0 - 32 IU/L Final   07/30/2019 28 0 - 54 U/L Final     AST (SGOT)   Date Value Ref Range Status   09/11/2020 34 0 - 40 IU/L Final   07/30/2019 29 7 - 45 U/L Final     WBC   Date Value Ref Range Status   09/02/2020 4.8 3.4 - 10.8 x10E3/uL Final     Hematocrit   Date Value Ref Range Status   09/02/2020 43.4 34.0 - 46.6 % Final   07/13/2020 40.4 34.0 - 46.6 % Final     Platelets   Date Value  Ref Range Status   09/02/2020 253 150 - 450 x10E3/uL Final   07/13/2020 217 140 - 450 10*3/mm3 Final     Total Cholesterol   Date Value Ref Range Status   07/30/2019 252 (H) 130 - 200 mg/dL Final     Triglycerides   Date Value Ref Range Status   07/30/2019 261 (H) 0 - 149 mg/dL Final     HDL Cholesterol   Date Value Ref Range Status   07/30/2019 84 >=50 mg/dL Final     LDL Cholesterol    Date Value Ref Range Status   07/30/2019 124 (H) 0 - 99 mg/dL Final     LDL/HDL Ratio   Date Value Ref Range Status   07/30/2019 1.38  Final         Assessment / Plan     Assessment/Plan:  1. Tick bite, initial encounter    Doxycycline 100 mg bid 10 days        Return if symptoms worsen or fail to improve. unless patient needs to be seen sooner or acute issues arise.        I have discussed the patient results/orders and and plan/recommendation with them at today's visit.      Jeannette Justin,    06/01/2021

## 2021-06-08 DIAGNOSIS — M54.50 LUMBAR PAIN WITH RADIATION DOWN BOTH LEGS: ICD-10-CM

## 2021-06-08 DIAGNOSIS — M79.605 LUMBAR PAIN WITH RADIATION DOWN BOTH LEGS: ICD-10-CM

## 2021-06-08 DIAGNOSIS — M79.604 LUMBAR PAIN WITH RADIATION DOWN BOTH LEGS: ICD-10-CM

## 2021-06-10 RX ORDER — OXYCODONE HYDROCHLORIDE AND ACETAMINOPHEN 5; 325 MG/1; MG/1
1 TABLET ORAL EVERY 4 HOURS PRN
Qty: 30 TABLET | Refills: 0 | Status: SHIPPED | OUTPATIENT
Start: 2021-06-10 | End: 2021-07-10

## 2021-06-15 RX ORDER — LEVOTHYROXINE SODIUM 75 MCG
TABLET ORAL
Qty: 90 TABLET | Refills: 3 | Status: SHIPPED | OUTPATIENT
Start: 2021-06-15 | End: 2022-06-08

## 2021-06-22 ENCOUNTER — OFFICE VISIT (OUTPATIENT)
Dept: OBSTETRICS AND GYNECOLOGY | Facility: CLINIC | Age: 56
End: 2021-06-22

## 2021-06-22 VITALS
WEIGHT: 190 LBS | SYSTOLIC BLOOD PRESSURE: 142 MMHG | HEIGHT: 62 IN | BODY MASS INDEX: 34.96 KG/M2 | DIASTOLIC BLOOD PRESSURE: 90 MMHG

## 2021-06-22 DIAGNOSIS — G89.29 CHRONIC PAIN OF RIGHT KNEE: ICD-10-CM

## 2021-06-22 DIAGNOSIS — M25.561 CHRONIC PAIN OF RIGHT KNEE: ICD-10-CM

## 2021-06-22 DIAGNOSIS — N95.1 MENOPAUSAL SYMPTOMS: ICD-10-CM

## 2021-06-22 DIAGNOSIS — N39.3 SUI (STRESS URINARY INCONTINENCE, FEMALE): ICD-10-CM

## 2021-06-22 DIAGNOSIS — Z12.31 ENCOUNTER FOR SCREENING MAMMOGRAM FOR BREAST CANCER: ICD-10-CM

## 2021-06-22 DIAGNOSIS — N83.202 CYST OF LEFT OVARY: ICD-10-CM

## 2021-06-22 DIAGNOSIS — Z13.820 ENCOUNTER FOR SCREENING FOR OSTEOPOROSIS: ICD-10-CM

## 2021-06-22 DIAGNOSIS — Z01.419 WELL WOMAN EXAM WITH ROUTINE GYNECOLOGICAL EXAM: Primary | ICD-10-CM

## 2021-06-22 PROCEDURE — 99396 PREV VISIT EST AGE 40-64: CPT | Performed by: NURSE PRACTITIONER

## 2021-06-22 PROCEDURE — 87624 HPV HI-RISK TYP POOLED RSLT: CPT | Performed by: NURSE PRACTITIONER

## 2021-06-22 PROCEDURE — G0123 SCREEN CERV/VAG THIN LAYER: HCPCS | Performed by: NURSE PRACTITIONER

## 2021-06-22 RX ORDER — VENLAFAXINE HYDROCHLORIDE 75 MG/1
75 CAPSULE, EXTENDED RELEASE ORAL DAILY
Qty: 90 CAPSULE | Refills: 3 | Status: SHIPPED | OUTPATIENT
Start: 2021-06-22 | End: 2022-02-28

## 2021-06-22 NOTE — PROGRESS NOTES
"Subjective     Desirae Alberto is a 56 y.o. female    Patient is here today for yearly checkup.  She has no GYN complaints.  Her hot flashes have improved with Effexor and she is also started a vitamin over-the-counter that has really helped.  The name of the vitamin is pure encapsulation's.    Gynecologic Exam  The patient's pertinent negatives include no pelvic pain, vaginal bleeding or vaginal discharge. The patient is experiencing no pain. Pertinent negatives include no abdominal pain, anorexia, back pain, chills, constipation, diarrhea, discolored urine, dysuria, fever, flank pain, frequency, headaches, hematuria, joint pain, joint swelling, nausea, painful intercourse, rash, sore throat, urgency or vomiting. She is sexually active. She is postmenopausal.         /90   Ht 157.5 cm (62.01\")   Wt 86.2 kg (190 lb)   LMP 06/30/2019   Breastfeeding No   BMI 34.74 kg/m²     Outpatient Encounter Medications as of 6/22/2021   Medication Sig Dispense Refill   • cyclobenzaprine (FLEXERIL) 5 MG tablet Take 1 tablet by mouth 3 (Three) Times a Day As Needed for Muscle Spasms. 30 tablet 2   • diclofenac (VOLTAREN) 75 MG EC tablet Take 1 tablet by mouth 2 (Two) Times a Day. 60 tablet 2   • gabapentin (Neurontin) 100 MG capsule Take 1 capsule by mouth 3 (Three) Times a Day. 90 capsule 2   • Mirabegron ER (MYRBETRIQ) 25 MG tablet sustained-release 24 hour 24 hr tablet Take 1 tablet by mouth Daily 30 tablet 6   • NON FORMULARY Pure encapsulation vitamins     • oxyCODONE-acetaminophen (Percocet) 5-325 MG per tablet Take 1 tablet by mouth Every 4 (Four) Hours As Needed for Severe Pain  for up to 30 days. 30 tablet 0   • saccharomyces boulardii (FLORASTOR) 250 MG capsule Take 250 mg by mouth 2 (Two) Times a Day.     • Synthroid 75 MCG tablet TAKE 1 TABLET DAILY 90 tablet 3   • therapeutic multivitamin-minerals (THERAGRAN-M) tablet Take 1 tablet by mouth Daily.     • THYROID PO Take 1 capsule by mouth Daily.     • " venlafaxine XR (EFFEXOR-XR) 75 MG 24 hr capsule Take 1 capsule by mouth Daily. 90 capsule 3   • vitamin B-12 (CYANOCOBALAMIN) 500 MCG tablet Take 1 tablet by mouth Daily. 30 tablet 11   • vitamin B-6 (PYRIDOXINE) 25 MG tablet Take 1 tablet by mouth Daily. 30 tablet 5   • vitamin D3 125 MCG (5000 UT) capsule capsule Take 5,000 Units by mouth Daily.     • [DISCONTINUED] venlafaxine XR (EFFEXOR-XR) 75 MG 24 hr capsule Take 1 capsule by mouth Daily. 30 capsule 3   • [DISCONTINUED] Calcium Carb-Cholecalciferol (CALCIUM 1000 + D PO) Take 1 tablet by mouth Daily.     • [DISCONTINUED] HYDROcodone-acetaminophen (NORCO) 5-325 MG per tablet Take one tablet by mouth every 6 hours as needed for pain 20 tablet 0     No facility-administered encounter medications on file as of 6/22/2021.       Surgical History  Past Surgical History:   Procedure Laterality Date   • BREAST EXCISIONAL BIOPSY Left 01/2016    benign   • BREAST SURGERY Left 2017   • CHOLECYSTECTOMY     • COLON RESECTION SMALL BOWEL  2014   • COLONOSCOPY  2019   • KNEE ARTHROSCOPY Right 7/14/2020    Procedure: RIGHT KNEE PARTIAL MEDIAL MENISCECTOMY;  Surgeon: Vijay Grewal MD;  [facility];  Service: Orthopedics;  Laterality: Right;   • LAPAROSCOPIC TUBAL LIGATION     • TUBAL ABDOMINAL LIGATION     • VAGINAL MESH REVISION      2010/2016   • WISDOM TOOTH EXTRACTION         Family History  Family History   Problem Relation Age of Onset   • Cancer Maternal Aunt    • No Known Problems Father    • Heart defect Mother    • No Known Problems Sister    • Colon cancer Paternal Aunt 68   • Colon cancer Paternal Aunt 70   • Liver cancer Paternal Aunt    • Colon cancer Paternal Aunt 80   • Breast cancer Neg Hx    • Ovarian cancer Neg Hx    • Uterine cancer Neg Hx    • Melanoma Neg Hx    • Prostate cancer Neg Hx        The following portions of the patient's history were reviewed and updated as appropriate: allergies, current medications, past family history, past  medical history, past social history, past surgical history and problem list.    Review of Systems   Constitutional: Negative for activity change, appetite change, chills, diaphoresis, fatigue, fever, unexpected weight gain and unexpected weight loss.   HENT: Negative for congestion, dental problem, drooling, ear discharge, ear pain, facial swelling, hearing loss, mouth sores, nosebleeds, postnasal drip, rhinorrhea, sinus pressure, sneezing, sore throat, swollen glands, tinnitus, trouble swallowing and voice change.    Eyes: Negative for blurred vision, double vision, photophobia, pain, discharge, redness, itching and visual disturbance.   Respiratory: Negative for apnea, cough, choking, chest tightness, shortness of breath, wheezing and stridor.    Cardiovascular: Negative for chest pain, palpitations and leg swelling.   Gastrointestinal: Negative for abdominal distention, abdominal pain, anal bleeding, anorexia, blood in stool, constipation, diarrhea, nausea, rectal pain, vomiting, GERD and indigestion.   Endocrine: Negative for cold intolerance, heat intolerance, polydipsia, polyphagia and polyuria.   Genitourinary: Positive for urinary incontinence. Negative for amenorrhea, breast discharge, breast lump, breast pain, decreased libido, decreased urine volume, difficulty urinating, dyspareunia, dysuria, flank pain, frequency, genital sores, hematuria, menstrual problem, pelvic pain, pelvic pressure, urgency, vaginal bleeding, vaginal discharge and vaginal pain.   Musculoskeletal: Negative for arthralgias, back pain, gait problem, joint pain, joint swelling, myalgias, neck pain, neck stiffness and bursitis.   Skin: Negative for color change, dry skin and rash.   Allergic/Immunologic: Negative for environmental allergies, food allergies and immunocompromised state.   Neurological: Negative for dizziness, tremors, seizures, syncope, facial asymmetry, speech difficulty, weakness, light-headedness, numbness, headache,  memory problem and confusion.   Hematological: Negative for adenopathy. Does not bruise/bleed easily.   Psychiatric/Behavioral: Negative for agitation, behavioral problems, decreased concentration, dysphoric mood, hallucinations, self-injury, sleep disturbance, suicidal ideas, negative for hyperactivity, depressed mood and stress. The patient is not nervous/anxious.        Objective   Physical Exam  Vitals and nursing note reviewed. Exam conducted with a chaperone present.   Constitutional:       General: She is not in acute distress.     Appearance: She is well-developed. She is not diaphoretic.   HENT:      Head: Normocephalic.      Right Ear: External ear normal.      Left Ear: External ear normal.      Nose: Nose normal.   Eyes:      General: No scleral icterus.        Right eye: No discharge.         Left eye: No discharge.      Conjunctiva/sclera: Conjunctivae normal.      Pupils: Pupils are equal, round, and reactive to light.   Neck:      Thyroid: No thyromegaly.      Vascular: No carotid bruit.      Trachea: No tracheal deviation.   Cardiovascular:      Rate and Rhythm: Normal rate and regular rhythm.      Heart sounds: Normal heart sounds. No murmur heard.     Pulmonary:      Effort: Pulmonary effort is normal. No respiratory distress.      Breath sounds: Normal breath sounds. No wheezing.   Chest:      Breasts: Breasts are symmetrical.         Right: Normal. No swelling, bleeding, inverted nipple, mass, nipple discharge, skin change or tenderness.         Left: Normal. No swelling, bleeding, inverted nipple, mass, nipple discharge, skin change or tenderness.   Abdominal:      General: There is no distension.      Palpations: Abdomen is soft. There is no mass.      Tenderness: There is no abdominal tenderness. There is no right CVA tenderness, left CVA tenderness or guarding.      Hernia: No hernia is present. There is no hernia in the left inguinal area or right inguinal area.   Genitourinary:      General: Normal vulva.      Exam position: Lithotomy position.      Labia:         Right: No rash, tenderness, lesion or injury.         Left: No rash, tenderness, lesion or injury.       Vagina: Normal. No signs of injury and foreign body. No vaginal discharge, erythema, tenderness or bleeding.      Cervix: Normal.      Uterus: Normal. Not enlarged, not fixed and not tender.       Adnexa: Right adnexa normal and left adnexa normal.        Right: No mass, tenderness or fullness.          Left: No mass, tenderness or fullness.        Rectum: Normal. No mass.      Comments:   BSU normal  Urethral meatus  Normal  Perineum  Normal  Musculoskeletal:         General: No tenderness. Normal range of motion.      Cervical back: Normal range of motion and neck supple.   Lymphadenopathy:      Head:      Right side of head: No submental, submandibular, tonsillar, preauricular, posterior auricular or occipital adenopathy.      Left side of head: No submental, submandibular, tonsillar, preauricular, posterior auricular or occipital adenopathy.      Cervical: No cervical adenopathy.      Right cervical: No superficial, deep or posterior cervical adenopathy.     Left cervical: No superficial, deep or posterior cervical adenopathy.      Upper Body:      Right upper body: No supraclavicular, axillary or pectoral adenopathy.      Left upper body: No supraclavicular, axillary or pectoral adenopathy.      Lower Body: No right inguinal adenopathy. No left inguinal adenopathy.   Skin:     General: Skin is warm and dry.      Findings: No bruising, erythema or rash.   Neurological:      Mental Status: She is alert and oriented to person, place, and time.      Coordination: Coordination normal.   Psychiatric:         Mood and Affect: Mood normal.         Behavior: Behavior normal.         Thought Content: Thought content normal.         Judgment: Judgment normal.         Assessment/Plan   Diagnoses and all orders for this visit:    1. Well  woman exam with routine gynecological exam (Primary)  Normal GYN exam. Will have lab work at PCP. Encouraged SBE, pt is aware how to do self breast exam and the importance of same. Discussed weight management and importance of maintaining a healthy weight. Discussed Vitamin D intake and the importance of adequate vitamin D for both Bone Health and a healthy immune system.  Discussed Daily exercise and the importance of same, in regards to a healthy heart as well as helping to maintain her weight and improving her mental health.  BMI 34.7.  Colonoscopy is up to date.  Mammogram and bone density were already done and were normal. Pap smear is done per ASCCP guidelines.  -     Liquid-based Pap Smear, Screening    2. Encounter for screening mammogram for breast cancer  Comments:  Patient has recently had a negative mammogram.    3. Encounter for screening for osteoporosis  Comments:  Patient has recently had a normal bone density.    4. Menopausal symptoms  Comments:  Patient states that her Effexor was helping with her hot flashes but they were much improved after she started a new vitamin that she bought online.  The vitamin name is pure encapsulation's.  Orders:  -     venlafaxine XR (EFFEXOR-XR) 75 MG 24 hr capsule; Take 1 capsule by mouth Daily.  Dispense: 90 capsule; Refill: 3    5. BERNICE (stress urinary incontinence, female)  Comments:  Patient was having BERNICE and was started on Myrbetriq by her PCP.  She is much improved.  We will continue same.    6. Chronic pain of right knee  Comments:  Patient is scheduled for knee replacement in the near future.  States her knee has bothered her for several years.    7.  Cyst of left ovary  Patient has had a previous and complex ovarian cyst.  She will have her ultrasound repeated.  Prefers to do that at her place of employment.    Patient's Body mass index is 34.74 kg/m². indicating that she is obese (BMI >30). Obesity-related health conditions include the following: none.  Obesity is unchanged. BMI is is above average; BMI management plan is completed. We discussed portion control and increasing exercise..      Apple Ivan, APRN  6/22/2021

## 2021-06-22 NOTE — PATIENT INSTRUCTIONS
"BMI for Adults  What is BMI?  Body mass index (BMI) is a number that is calculated from a person's weight and height. BMI can help estimate how much of a person's weight is composed of fat. BMI does not measure body fat directly. Rather, it is an alternative to procedures that directly measure body fat, which can be difficult and expensive.  BMI can help identify people who may be at higher risk for certain medical problems.  What are BMI measurements used for?  BMI is used as a screening tool to identify possible weight problems. It helps determine whether a person is obese, overweight, a healthy weight, or underweight.  BMI is useful for:  · Identifying a weight problem that may be related to a medical condition or may increase the risk for medical problems.  · Promoting changes, such as changes in diet and exercise, to help reach a healthy weight. BMI screening can be repeated to see if these changes are working.  How is BMI calculated?  BMI involves measuring your weight in relation to your height. Both height and weight are measured, and the BMI is calculated from those numbers. This can be done either in English ([place] or metric measurements. Note that charts and online BMI calculators are available to help you find your BMI quickly and easily without having to do these calculations yourself.  To calculate your BMI in English ([place].S[place] measurements:    1. Measure your weight in pounds (lb).  2. Multiply the number of pounds by 703.  ? For example, for a person who weighs 180 lb, multiply that number by 703, which equals 126,540.  3. Measure your height in inches. Then multiply that number by itself to get a measurement called \"inches squared.\"  ? For example, for a person who is 70 inches tall, the \"inches squared\" measurement is 70 inches x 70 inches, which equals 4,900 inches squared.  4. Divide the total from step 2 (number of lb x 703) by the total from step 3 (inches squared): 126,540 ÷ 4,900 = 25.8. This is " "your BMI.  To calculate your BMI in metric measurements:  1. Measure your weight in kilograms (kg).  2. Measure your height in meters (m). Then multiply that number by itself to get a measurement called \"meters squared.\"  ? For example, for a person who is 1.75 m tall, the \"meters squared\" measurement is 1.75 m x 1.75 m, which is equal to 3.1 meters squared.  3. Divide the number of kilograms (your weight) by the meters squared number. In this example: 70 ÷ 3.1 = 22.6. This is your BMI.  What do the results mean?  BMI charts are used to identify whether you are underweight, normal weight, overweight, or obese. The following guidelines will be used:  · Underweight: BMI less than 18.5.  · Normal weight: BMI between 18.5 and 24.9.  · Overweight: BMI between 25 and 29.9.  · Obese: BMI of 30 or above.  Keep these notes in mind:  · Weight includes both fat and muscle, so someone with a muscular build, such as an athlete, may have a BMI that is higher than 24.9. In cases like these, BMI is not an accurate measure of body fat.  · To determine if excess body fat is the cause of a BMI of 25 or higher, further assessments may need to be done by a health care provider.  · BMI is usually interpreted in the same way for men and women.  Where to find more information  For more information about BMI, including tools to quickly calculate your BMI, go to these websites:  · Centers for Disease Control and Prevention: www.cdc.gov  · American Heart Association: www.heart.org  · National Heart, Lung, and Blood [place]: www.nhlbi.nih.gov  Summary  · Body mass index (BMI) is a number that is calculated from a person's weight and height.  · BMI may help estimate how much of a person's weight is composed of fat. BMI can help identify those who may be at higher risk for certain medical problems.  · BMI can be measured using English measurements or metric measurements.  · BMI charts are used to identify whether you are underweight, normal " weight, overweight, or obese.  This information is not intended to replace advice given to you by your health care provider. Make sure you discuss any questions you have with your health care provider.  Document Revised: 09/09/2020 Document Reviewed: 07/17/2020  Elsevier Patient Education © 2021 [employer]

## 2021-06-30 DIAGNOSIS — R10.2 PELVIC PAIN: Primary | ICD-10-CM
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
## 2021-07-02 PROBLEM — S20.469A: Status: ACTIVE | Noted: 2021-07-02

## 2021-07-08 DIAGNOSIS — M51.37 DEGENERATION OF LUMBAR OR LUMBOSACRAL INTERVERTEBRAL DISC: Primary | ICD-10-CM

## 2021-07-09 ENCOUNTER — HOSPITAL ENCOUNTER (OUTPATIENT)
Dept: GENERAL RADIOLOGY | Facility: HOSPITAL | Age: 56
Discharge: HOME | End: 2021-07-09

## 2021-07-09 ENCOUNTER — PRE-ADMISSION TESTING (OUTPATIENT)
Dept: PREADMISSION TESTING | Facility: HOSPITAL | Age: 56
End: 2021-07-09

## 2021-07-09 VITALS
SYSTOLIC BLOOD PRESSURE: 152 MMHG | RESPIRATION RATE: 18 BRPM | OXYGEN SATURATION: 97 % | WEIGHT: 190.04 LBS | HEART RATE: 98 BPM | HEIGHT: 62 IN | DIASTOLIC BLOOD PRESSURE: 85 MMHG | BODY MASS INDEX: 34.97 KG/M2

## 2021-07-09 LAB
ALBUMIN SERPL-MCNC: 4.6 G/DL (ref 3.5–5.2)
ALBUMIN/GLOB SERPL: 2.1 G/DL
ALP SERPL-CCNC: 93 U/L (ref 39–117)
ALT SERPL W P-5'-P-CCNC: 37 U/L (ref 1–33)
ANION GAP SERPL CALCULATED.3IONS-SCNC: 7 MMOL/L (ref 5–15)
APTT PPP: 27 SECONDS (ref 24.1–35)
AST SERPL-CCNC: 28 U/L (ref 1–32)
BACTERIA UR QL AUTO: ABNORMAL /HPF
BASOPHILS # BLD AUTO: 0.02 10*3/MM3 (ref 0–0.2)
BASOPHILS NFR BLD AUTO: 0.5 % (ref 0–1.5)
BILIRUB SERPL-MCNC: 0.2 MG/DL (ref 0–1.2)
BILIRUB UR QL STRIP: NEGATIVE
BUN SERPL-MCNC: 13 MG/DL (ref 6–20)
BUN/CREAT SERPL: 14.8 (ref 7–25)
CALCIUM SPEC-SCNC: 9.8 MG/DL (ref 8.6–10.5)
CHLORIDE SERPL-SCNC: 105 MMOL/L (ref 98–107)
CLARITY UR: CLEAR
CO2 SERPL-SCNC: 28 MMOL/L (ref 22–29)
COLOR UR: ABNORMAL
CREAT SERPL-MCNC: 0.88 MG/DL (ref 0.57–1)
DEPRECATED RDW RBC AUTO: 38.9 FL (ref 37–54)
EOSINOPHIL # BLD AUTO: 0.06 10*3/MM3 (ref 0–0.4)
EOSINOPHIL NFR BLD AUTO: 1.6 % (ref 0.3–6.2)
ERYTHROCYTE [DISTWIDTH] IN BLOOD BY AUTOMATED COUNT: 12.2 % (ref 12.3–15.4)
GFR SERPL CREATININE-BSD FRML MDRD: 67 ML/MIN/1.73
GLOBULIN UR ELPH-MCNC: 2.2 GM/DL
GLUCOSE SERPL-MCNC: 108 MG/DL (ref 65–99)
GLUCOSE UR STRIP-MCNC: NEGATIVE MG/DL
HCT VFR BLD AUTO: 37.6 % (ref 34–46.6)
HGB BLD-MCNC: 12.8 G/DL (ref 12–15.9)
HGB UR QL STRIP.AUTO: NEGATIVE
HYALINE CASTS UR QL AUTO: ABNORMAL /LPF
IMM GRANULOCYTES # BLD AUTO: 0.01 10*3/MM3 (ref 0–0.05)
IMM GRANULOCYTES NFR BLD AUTO: 0.3 % (ref 0–0.5)
INR PPP: 0.97 (ref 0.91–1.09)
KETONES UR QL STRIP: NEGATIVE
LEUKOCYTE ESTERASE UR QL STRIP.AUTO: ABNORMAL
LYMPHOCYTES # BLD AUTO: 1.36 10*3/MM3 (ref 0.7–3.1)
LYMPHOCYTES NFR BLD AUTO: 35.8 % (ref 19.6–45.3)
MCH RBC QN AUTO: 29.5 PG (ref 26.6–33)
MCHC RBC AUTO-ENTMCNC: 34 G/DL (ref 31.5–35.7)
MCV RBC AUTO: 86.6 FL (ref 79–97)
MONOCYTES # BLD AUTO: 0.32 10*3/MM3 (ref 0.1–0.9)
MONOCYTES NFR BLD AUTO: 8.4 % (ref 5–12)
NEUTROPHILS NFR BLD AUTO: 2.03 10*3/MM3 (ref 1.7–7)
NEUTROPHILS NFR BLD AUTO: 53.4 % (ref 42.7–76)
NITRITE UR QL STRIP: NEGATIVE
NRBC BLD AUTO-RTO: 0 /100 WBC (ref 0–0.2)
PH UR STRIP.AUTO: 8 [PH] (ref 5–8)
PLATELET # BLD AUTO: 211 10*3/MM3 (ref 140–450)
PMV BLD AUTO: 9.7 FL (ref 6–12)
POTASSIUM SERPL-SCNC: 4.1 MMOL/L (ref 3.5–5.2)
PROT SERPL-MCNC: 6.8 G/DL (ref 6–8.5)
PROT UR QL STRIP: NEGATIVE
PROTHROMBIN TIME: 12.1 SECONDS (ref 11.5–13.4)
RBC # BLD AUTO: 4.34 10*6/MM3 (ref 3.77–5.28)
RBC # UR: ABNORMAL /HPF
REF LAB TEST METHOD: ABNORMAL
SODIUM SERPL-SCNC: 140 MMOL/L (ref 136–145)
SP GR UR STRIP: 1.02 (ref 1–1.03)
SQUAMOUS #/AREA URNS HPF: ABNORMAL /HPF
UROBILINOGEN UR QL STRIP: ABNORMAL
WBC # BLD AUTO: 3.8 10*3/MM3 (ref 3.4–10.8)
WBC UR QL AUTO: ABNORMAL /HPF

## 2021-07-09 PROCEDURE — 85025 COMPLETE CBC W/AUTO DIFF WBC: CPT

## 2021-07-09 PROCEDURE — 36415 COLL VENOUS BLD VENIPUNCTURE: CPT

## 2021-07-09 PROCEDURE — 85730 THROMBOPLASTIN TIME PARTIAL: CPT

## 2021-07-09 PROCEDURE — 71046 X-RAY EXAM CHEST 2 VIEWS: CPT

## 2021-07-09 PROCEDURE — 85610 PROTHROMBIN TIME: CPT

## 2021-07-09 PROCEDURE — 87086 URINE CULTURE/COLONY COUNT: CPT

## 2021-07-09 PROCEDURE — 81001 URINALYSIS AUTO W/SCOPE: CPT

## 2021-07-09 PROCEDURE — 93005 ELECTROCARDIOGRAM TRACING: CPT

## 2021-07-09 PROCEDURE — 93010 ELECTROCARDIOGRAM REPORT: CPT | Performed by: INTERNAL MEDICINE

## 2021-07-09 PROCEDURE — 87081 CULTURE SCREEN ONLY: CPT

## 2021-07-09 PROCEDURE — 80053 COMPREHEN METABOLIC PANEL: CPT

## 2021-07-09 RX ORDER — DICLOFENAC SODIUM 75 MG/1
75 TABLET, DELAYED RELEASE ORAL 2 TIMES DAILY
Qty: 60 TABLET | Refills: 2 | Status: SHIPPED | OUTPATIENT
Start: 2021-07-09 | End: 2021-07-29 | Stop reason: HOSPADM

## 2021-07-09 NOTE — DISCHARGE INSTRUCTIONS
PATIENT/FAMILY/RESPONSIBLE PARTY VERBALIZES UNDERSTANDING OF ABOVE EDUCATION.  COPY OF PAIN SCALE GIVEN AND REVIEWED WITH VERBALIZED UNDERSTANDING.  DAY OF SURGERY INSTRUCTIONS        YOUR SURGEON: Dr. Foley     PROCEDURE: Right Total Knee Replacement     DATE OF SURGERY: 07/23/21    ARRIVAL TIME: AS DIRECTED BY OFFICE    YOU MAY TAKE THE FOLLOWING MEDICATION(S) THE MORNING OF SURGERY WITH A SIP OF WATER: Percocet and Gabapentin ok to take morning of; Otherwise nothing to eat or drink past midnight       ALL OTHER HOME MEDICATION CHECK WITH YOUR PHYSICIAN      DO NOT TAKE ANY ERECTILE DYSFUNCTION MEDICATIONS (EX: CIALIS, VIAGRA) 24 HOURS PRIOR TO SURGERY                      MANAGING PAIN AFTER SURGERY    We know you are probably wondering what your pain will be like after surgery.  Following surgery it is unrealistic to expect you will not have pain.   Pain is how our bodies let us know that something is wrong or cautions us to be careful.  That said, our goal is to make your pain tolerable.    Methods we may use to treat your pain include (oral or IV medications, PCAs, epidurals, nerve blocks, etc.)   While some procedures require IV pain medications for a short time after surgery, transitioning to pain medications by mouth allows for better management of pain.   Your nurse will encourage you to take oral pain medications whenever possible.  IV medications work almost immediately, but only last a short while.  Taking medications by mouth allows for a more constant level of medication in your blood stream for a longer period of time.      Once your pain is out of control it is harder to get back under control.  It is important you are aware when your next dose of pain medication is due.  If you are admitted, your nurse may write the time of your next dose on the white board in your room to help you remember.      We are interested in your pain and encourage you to inform us about aggravating factors during your  visit.   Many times a simple repositioning every few hours can make a big difference.    If your physician says it is okay, do not let your pain prevent you from getting out of bed. Be sure to call your nurse for assistance prior to getting up so you do not fall.      Before surgery, please decide your tolerable pain goal.  These faces help describe the pain ratings we use on a 0-10 scale.   Be prepared to tell us your goal and whether or not you take pain or anxiety medications at home.          BEFORE YOU COME TO THE HOSPITAL  (Pre-op instructions)  • Do not eat, drink, smoke or chew gum after midnight the night before surgery.  This also includes no mints.  • Morning of surgery take only the medicines you have been instructed with a sip of water unless otherwise instructed  by your physician.  • Do not shave, wear makeup or dark nail polish.  • Remove all jewelry including rings.  • Leave anything you consider valuable at home.  • Leave your suitcase in the car until after your surgery.  • Bring the following with you if applicable:  o Picture ID and insurance, Medicare or Medicaid cards  o Co-pay/deductible required by insurance (cash, check, credit card)  o Copy of advance directive, living will or power-of- documents if not brought to PAT  o CPAP or BIPAP mask and tubing  o Relaxation aids ( book, magazine), etc.  o Hearing aids                        ON THE DAY OF SURGERY  · On the day of surgery check in at registration located at the main entrance of the hospital.   ? You will be registered and given a beeper with instructions where to wait in the main lobby.  ? When your beeper lights up and vibrates a member of the Outpatient Surgery staff will meet you at the double doors under the stair steps and escort you to your preoperative room.   · You may have cloth compression devices placed on your legs. These help to prevent blood clots and reduce swelling in your legs.  · An IV may be inserted into  "one of your veins.  · In the operating room, you may be given one or more of the following:  ? A medicine to help you relax (sedative).  ? A medicine to numb the area (local anesthetic).  ? A medicine to make you fall asleep (general anesthetic).  ? A medicine that is injected into an area of your body to numb everything below the injection site (regional anesthetic).  · Your surgical site will be marked or identified.  · You may be given an antibiotic through your IV to help prevent infection.  Contact a health care provider if you:  · Develop a fever of more than 100.4°F (38°C) or other feelings of illness during the 48 hours before your surgery.  · Have symptoms that get worse.  Have questions or concerns about your surgery    General Anesthesia/Surgery, Adult  General anesthesia is the use of medicines to make a person \"go to sleep\" (unconscious) for a medical procedure. General anesthesia must be used for certain procedures, and is often recommended for procedures that:  · Last a long time.  · Require you to be still or in an unusual position.  · Are major and can cause blood loss.  The medicines used for general anesthesia are called general anesthetics. As well as making you unconscious for a certain amount of time, these medicines:  · Prevent pain.  · Control your blood pressure.  · Relax your muscles.  Tell a health care provider about:  · Any allergies you have.  · All medicines you are taking, including vitamins, herbs, eye drops, creams, and over-the-counter medicines.  · Any problems you or family members have had with anesthetic medicines.  · Types of anesthetics you have had in the past.  · Any blood disorders you have.  · Any surgeries you have had.  · Any medical conditions you have.  · Any recent upper respiratory, chest, or ear infections.  · Any history of:  ? Heart or lung conditions, such as heart failure, sleep apnea, asthma, or chronic obstructive pulmonary disease (COPD).  ?  " service.  ? Depression or anxiety.  · Any tobacco or drug use, including marijuana or alcohol use.  · Whether you are pregnant or may be pregnant.  What are the risks?  Generally, this is a safe procedure. However, problems may occur, including:  · Allergic reaction.  · Lung and heart problems.  · Inhaling food or liquid from the stomach into the lungs (aspiration).  · Nerve injury.  · Air in the bloodstream, which can lead to stroke.  · Extreme agitation or confusion (delirium) when you wake up from the anesthetic.  · Waking up during your procedure and being unable to move. This is rare.  These problems are more likely to develop if you are having a major surgery or if you have an advanced or serious medical condition. You can prevent some of these complications by answering all of your health care provider's questions thoroughly and by following all instructions before your procedure.  General anesthesia can cause side effects, including:  · Nausea or vomiting.  · A sore throat from the breathing tube.  · Hoarseness.  · Wheezing or coughing.  · Shaking chills.  · Tiredness.  · Body aches.  · Anxiety.  · Sleepiness or drowsiness.  · Confusion or agitation.  RISKS AND COMPLICATIONS OF SURGERY  Your health care provider will discuss possible risks and complications with you before surgery. Common risks and complications include:    · Problems due to the use of anesthetics.  · Blood loss and replacement (does not apply to minor surgical procedures).  · Temporary increase in pain due to surgery.  · Uncorrected pain or problems that the surgery was meant to correct.  · Infection.  · New damage.    What happens before the procedure?    Medicines  Ask your health care provider about:  · Changing or stopping your regular medicines. This is especially important if you are taking diabetes medicines or blood thinners.  · Taking medicines such as aspirin and ibuprofen. These medicines can thin your blood. Do not take these  medicines unless your health care provider tells you to take them.  · Taking over-the-counter medicines, vitamins, herbs, and supplements. Do not take these during the week before your procedure unless your health care provider approves them.  General instructions  · Starting 3-6 weeks before the procedure, do not use any products that contain nicotine or tobacco, such as cigarettes and e-cigarettes. If you need help quitting, ask your health care provider.  · If you brush your teeth on the morning of the procedure, make sure to spit out all of the toothpaste.  · Tell your health care provider if you become ill or develop a cold, cough, or fever.  · If instructed by your health care provider, bring your sleep apnea device with you on the day of your surgery (if applicable).  · Ask your health care provider if you will be going home the same day, the following day, or after a longer hospital stay.  ? Plan to have someone take you home from the hospital or clinic.  ? Plan to have a responsible adult care for you for at least 24 hours after you leave the hospital or clinic. This is important.  What happens during the procedure?  · You will be given anesthetics through both of the following:  ? A mask placed over your nose and mouth.  ? An IV in one of your veins.  · You may receive a medicine to help you relax (sedative).  · After you are unconscious, a breathing tube may be inserted down your throat to help you breathe. This will be removed before you wake up.  · An anesthesia specialist will stay with you throughout your procedure. He or she will:  ? Keep you comfortable and safe by continuing to give you medicines and adjusting the amount of medicine that you get.  ? Monitor your blood pressure, pulse, and oxygen levels to make sure that the anesthetics do not cause any problems.  The procedure may vary among health care providers and hospitals.  What happens after the procedure?  · Your blood pressure, temperature,  heart rate, breathing rate, and blood oxygen level will be monitored until the medicines you were given have worn off.  · You will wake up in a recovery area. You may wake up slowly.  · If you feel anxious or agitated, you may be given medicine to help you calm down.  · If you will be going home the same day, your health care provider may check to make sure you can walk, drink, and urinate.  · Your health care provider will treat any pain or side effects you have before you go home.  · Do not drive for 24 hours if you were given a sedative.  Summary  · General anesthesia is used to keep you still and prevent pain during a procedure.  · It is important to tell your healthcare provider about your medical history and any surgeries you have had, and previous experience with anesthesia.  · Follow your healthcare provider’s instructions about when to stop eating, drinking, or taking certain medicines before your procedure.  · Plan to have someone take you home from the hospital or clinic.  This information is not intended to replace advice given to you by your health care provider. Make sure you discuss any questions you have with your health care provider.  Document Released: 03/26/2009 Document Revised: 08/03/2018 Document Reviewed: 08/03/2018  [employer] Interactive Patient Education © 2019 [employer]       Fall Prevention in Hospitals, Adult  As a hospital patient, your condition and the treatments you receive can increase your risk for falls. Some additional risk factors for falls in a hospital include:  · Being in an unfamiliar environment.  · Being on bed rest.  · Your surgery.  · Taking certain medicines.  · Your tubing requirements, such as intravenous (IV) therapy or catheters.  It is important that you learn how to decrease fall risks while at the hospital. Below are important tips that can help prevent falls.  SAFETY TIPS FOR PREVENTING FALLS  Talk about your risk of falling.  · Ask your health care provider  why you are at risk for falling. Is it your medicine, illness, tubing placement, or something else?  · Make a plan with your health care provider to keep you safe from falls.  · Ask your health care provider or pharmacist about side effects of your medicines. Some medicines can make you dizzy or affect your coordination.  Ask for help.  · Ask for help before getting out of bed. You may need to press your call button.  · Ask for assistance in getting safely to the toilet.  · Ask for a walker or cane to be put at your bedside. Ask that most of the side rails on your bed be placed up before your health care provider leaves the room.  · Ask family or friends to sit with you.  · Ask for things that are out of your reach, such as your glasses, hearing aids, telephone, bedside table, or call button.  Follow these tips to avoid falling:  · Stay lying or seated, rather than standing, while waiting for help.  · Wear rubber-soled slippers or shoes whenever you walk in the hospital.  · Avoid quick, sudden movements.  ¨ Change positions slowly.  ¨ Sit on the side of your bed before standing.  ¨ Stand up slowly and wait before you start to walk.  · Let your health care provider know if there is a spill on the floor.  · Pay careful attention to the medical equipment, electrical cords, and tubes around you.  · When you need help, use your call button by your bed or in the bathroom. Wait for one of your health care providers to help you.  · If you feel dizzy or unsure of your footing, return to bed and wait for assistance.  · Avoid being distracted by the TV, telephone, or another person in your room.  · Do not lean or support yourself on rolling objects, such as IV poles or bedside tables.     This information is not intended to replace advice given to you by your health care provider. Make sure you discuss any questions you have with your health care provider.     Document Released: 12/15/2001 Document Revised: 01/08/2016 Document  Reviewed: 08/25/2013  [employer] Interactive Patient Education ©2016 [employer]       [facility]  CHG 4% Patient Instruction Sheet    Chlorhexidine Before Surgery  Chlorhexidine gluconate (CHG) is a germ-killing (antiseptic) solution that is used to clean the skin. It gets rid of the bacteria that normally live on the skin. Cleaning your skin with CHG before surgery helps lower the risk for infection after surgery.    How to use CHG solution  · You will take 2 showers, one shower the night before surgery, the second shower the morning of surgery before coming to the hospital.  · Use CHG only as told by your health care provider, and follow the instructions on the label.  · Use CHG solution while taking a shower. Follow these steps when using CHG solution (unless your health care provider gives you different instructions):  1. Start the shower.  2. Use your normal soap and shampoo to wash your face and hair.  3. Turn off the shower or move out of the shower stream.  4. Pour the CHG onto a clean washcloth. Do not use any type of brush or rough-edged sponge.  5. Starting at your neck, lather your body down to your toes. Make sure you:  6. Pay special attention to the part of your body where you will be having surgery. Scrub this area for at least 1 minute.  7. Use the full amount of CHG as directed. Usually, this is one half bottle for each shower.  8. Do not use CHG on your head or face. If the solution gets into your ears or eyes, rinse them well with water.  9. Avoid your genital area.  10. Avoid any areas of skin that have broken skin, cuts, or scrapes.  11. Scrub your back and under your arms. Make sure to wash skin folds.  12. Let the lather sit on your skin for 1-2 minutes or as long as told by your health care  provider.  13. Thoroughly rinse your entire body in the shower. Make sure that all body creases and crevices are rinsed well.  14. Dry off with a clean towel. Do not put any substances on  your body afterward, such as powder, lotion, or perfume.  15. Put on clean clothes or pajamas.  16. If it is the night before your surgery, sleep in clean sheets.    What are the risks?  Risks of using CHG include:  · A skin reaction.  · Hearing loss, if CHG gets in your ears.  · Eye injury, if CHG gets in your eyes and is not rinsed out.  · The CHG product catching fire.  Make sure that you avoid smoking and flames after applying CHG to your skin.  Do not use CHG:  · If you have a chlorhexidine allergy or have previously reacted to chlorhexidine.  · On babies younger than 2 months of age.      On the day of surgery, when you are taken to your room in Outpatient Surgery you will be given a CHG prepackaged cloth to wipe the site for your surgery.  How to use CHG prepackaged cloths  · Follow the instructions on the label.  · Use the CHG cloth on clean, dry skin. Follow these steps when using a CHG cloth (unless your health care provider gives you different instructions):  1. Using the CHG cloth, vigorously scrub the part of your body where you will be having surgery. Scrub using a back-and-forth motion for 3 minutes. The area on your body should be completely wet with CHG when you are finished scrubbing.  2. Do not rinse. Discard the cloth and let the area air-dry for 1 minute. Do not put any substances on your body afterward, such as powder, lotion, or perfume.  Contact a health care provider if:  · Your skin gets irritated after scrubbing.  · You have questions about using your solution or cloth.  Get help right away if:  · Your eyes become very red or swollen.  · Your eyes itch badly.  · Your skin itches badly and is red or swollen.  · Your hearing changes.  · You have trouble seeing.  · You have swelling or tingling in your mouth or throat.  · You have trouble breathing.  · You swallow any chlorhexidine.  Summary  · Chlorhexidine gluconate (CHG) is a germ-killing (antiseptic) solution that is used to clean the  skin. Cleaning your skin with CHG before surgery helps lower the risk for infection after surgery.  · You may be given CHG to use at home. It may be in a bottle or in a prepackaged cloth to use on your skin. Carefully follow your health care provider's instructions and the instructions on the product label.  · Do not use CHG if you have a chlorhexidine allergy.  · Contact your health care provider if your skin gets irritated after scrubbing.  This information is not intended to replace advice given to you by your health care provider. Make sure you discuss any questions you have with your health care provider.  Document Released: 09/11/2013 Document Revised: 11/15/2018 Document Reviewed: 11/15/2018  [employer] Interactive Patient Education © 2019 [employer]

## 2021-07-11 LAB
BACTERIA SPEC AEROBE CULT: NORMAL
MRSA SPEC QL CULT: NORMAL
QT INTERVAL: 376 MS
QTC INTERVAL: 441 MS

## 2021-07-21 ENCOUNTER — TRANSCRIBE ORDERS (OUTPATIENT)
Dept: ADMINISTRATIVE | Facility: HOSPITAL | Age: 56
End: 2021-07-21

## 2021-07-26 ENCOUNTER — CLINICAL SUPPORT (OUTPATIENT)
Dept: INTERNAL MEDICINE | Facility: CLINIC | Age: 56
End: 2021-07-26

## 2021-07-26 DIAGNOSIS — Z20.822 ENCOUNTER FOR PREOPERATIVE SCREENING LABORATORY TESTING FOR COVID-19 VIRUS: Primary | ICD-10-CM

## 2021-07-26 DIAGNOSIS — Z01.812 ENCOUNTER FOR PREOPERATIVE SCREENING LABORATORY TESTING FOR COVID-19 VIRUS: Primary | ICD-10-CM

## 2021-07-26 LAB — SARS-COV-2 RNA PNL SPEC NAA+PROBE: NOT DETECTED

## 2021-07-26 PROCEDURE — 87635 SARS-COV-2 COVID-19 AMP PRB: CPT | Performed by: ORTHOPAEDIC SURGERY

## 2021-07-26 NOTE — PROGRESS NOTES
Desirae ARIZMENDI Dolly  1965  [id number]    Verified patient's NAME and  before test was performed.     COVID-19 Test Performed:   Nasopharyngeal Swab     Location:  PATIENT VEHICLE - [facility]      How did the patient tolerate the test?   Well tolerated by patient..    Staff Member Performing Test:  Maren Onofre     PPE Worn:    mask, gloves, eye protection, face shield and gown          Patient presented for COVID-19 testing as ordered by the provider. Appropriate PPE donned. Patient tolerated well. Patient was given COVID-19 Fact Sheet, How to Quarantine at Home Instructions, and Infection Prevention in the Home handout. Patient advised that results are expected within 2-4 days depending on the time of test.     While waiting for results of test, patient was asked to please call their primary care physician's office or the [place] hotline at [phone] for support of non-emergent symptoms expected with this virus such as increased shortness of breath, fever, cough, or other questions.    He was advised to seek care in the Emergency Department should extreme symptoms arise such as new onset confusion, extreme lethargy, hypoxia, or new hypotension.     Questions were engaged and answered to the best of my ability, patient expressed verbal understanding.

## 2021-07-28 ENCOUNTER — ANESTHESIA EVENT (OUTPATIENT)
Dept: PERIOP | Facility: HOSPITAL | Age: 56
End: 2021-07-28

## 2021-07-28 ENCOUNTER — HOSPITAL ENCOUNTER (OUTPATIENT)
Facility: HOSPITAL | Age: 56
Discharge: HOME | End: 2021-07-29
Attending: ORTHOPAEDIC SURGERY | Admitting: ORTHOPAEDIC SURGERY

## 2021-07-28 ENCOUNTER — APPOINTMENT (OUTPATIENT)
Dept: GENERAL RADIOLOGY | Facility: HOSPITAL | Age: 56
End: 2021-07-28

## 2021-07-28 ENCOUNTER — ANESTHESIA (OUTPATIENT)
Dept: PERIOP | Facility: HOSPITAL | Age: 56
End: 2021-07-28

## 2021-07-28 DIAGNOSIS — Z74.09 IMPAIRED MOBILITY: ICD-10-CM

## 2021-07-28 DIAGNOSIS — Z78.9 DECREASED ACTIVITIES OF DAILY LIVING (ADL): ICD-10-CM

## 2021-07-28 PROBLEM — M17.11 RIGHT KNEE DJD: Status: ACTIVE | Noted: 2021-07-28

## 2021-07-28 LAB
ABO GROUP BLD: NORMAL
ANION GAP SERPL CALCULATED.3IONS-SCNC: 8 MMOL/L (ref 5–15)
BLD GP AB SCN SERPL QL: NEGATIVE
BUN SERPL-MCNC: 14 MG/DL (ref 6–20)
BUN/CREAT SERPL: 21.9 (ref 7–25)
CALCIUM SPEC-SCNC: 9.3 MG/DL (ref 8.6–10.5)
CHLORIDE SERPL-SCNC: 104 MMOL/L (ref 98–107)
CO2 SERPL-SCNC: 27 MMOL/L (ref 22–29)
CREAT SERPL-MCNC: 0.64 MG/DL (ref 0.57–1)
GFR SERPL CREATININE-BSD FRML MDRD: 96 ML/MIN/1.73
GLUCOSE SERPL-MCNC: 125 MG/DL (ref 65–99)
HCT VFR BLD AUTO: 36.9 % (ref 34–46.6)
HGB BLD-MCNC: 12.3 G/DL (ref 12–15.9)
POTASSIUM SERPL-SCNC: 4.7 MMOL/L (ref 3.5–5.2)
RH BLD: POSITIVE
SODIUM SERPL-SCNC: 139 MMOL/L (ref 136–145)
T&S EXPIRATION DATE: NORMAL

## 2021-07-28 PROCEDURE — 76000 FLUOROSCOPY <1 HR PHYS/QHP: CPT

## 2021-07-28 PROCEDURE — 73560 X-RAY EXAM OF KNEE 1 OR 2: CPT

## 2021-07-28 PROCEDURE — 25010000003 HYDROMORPHONE 1 MG/ML SOLUTION: Performed by: ORTHOPAEDIC SURGERY

## 2021-07-28 PROCEDURE — 94799 UNLISTED PULMONARY SVC/PX: CPT

## 2021-07-28 PROCEDURE — C1713 ANCHOR/SCREW BN/BN,TIS/BN: HCPCS | Performed by: ORTHOPAEDIC SURGERY

## 2021-07-28 PROCEDURE — 86901 BLOOD TYPING SEROLOGIC RH(D): CPT | Performed by: ORTHOPAEDIC SURGERY

## 2021-07-28 PROCEDURE — 97161 PT EVAL LOW COMPLEX 20 MIN: CPT | Performed by: PHYSICAL THERAPIST

## 2021-07-28 PROCEDURE — 85014 HEMATOCRIT: CPT | Performed by: ORTHOPAEDIC SURGERY

## 2021-07-28 PROCEDURE — 80048 BASIC METABOLIC PNL TOTAL CA: CPT | Performed by: ORTHOPAEDIC SURGERY

## 2021-07-28 PROCEDURE — 97165 OT EVAL LOW COMPLEX 30 MIN: CPT

## 2021-07-28 PROCEDURE — 85018 HEMOGLOBIN: CPT | Performed by: ORTHOPAEDIC SURGERY

## 2021-07-28 PROCEDURE — 25010000002 DEXAMETHASONE PER 1 MG: Performed by: NURSE ANESTHETIST, CERTIFIED REGISTERED

## 2021-07-28 PROCEDURE — 25010000002 PROPOFOL 10 MG/ML EMULSION: Performed by: NURSE ANESTHETIST, CERTIFIED REGISTERED

## 2021-07-28 PROCEDURE — 25010000003 CEFAZOLIN PER 500 MG: Performed by: NURSE ANESTHETIST, CERTIFIED REGISTERED

## 2021-07-28 PROCEDURE — 25010000002 ONDANSETRON PER 1 MG: Performed by: ORTHOPAEDIC SURGERY

## 2021-07-28 PROCEDURE — 25010000003 CEFAZOLIN PER 500 MG: Performed by: ORTHOPAEDIC SURGERY

## 2021-07-28 PROCEDURE — 25010000002 FENTANYL CITRATE (PF) 100 MCG/2ML SOLUTION: Performed by: NURSE ANESTHETIST, CERTIFIED REGISTERED

## 2021-07-28 PROCEDURE — C9290 INJ, BUPIVACAINE LIPOSOME: HCPCS | Performed by: ORTHOPAEDIC SURGERY

## 2021-07-28 PROCEDURE — 86850 RBC ANTIBODY SCREEN: CPT | Performed by: ORTHOPAEDIC SURGERY

## 2021-07-28 PROCEDURE — 86900 BLOOD TYPING SEROLOGIC ABO: CPT | Performed by: ORTHOPAEDIC SURGERY

## 2021-07-28 PROCEDURE — 25010000002 PHENYLEPHRINE HCL 0.8 MG/10ML SOLUTION PREFILLED SYRINGE: Performed by: NURSE ANESTHETIST, CERTIFIED REGISTERED

## 2021-07-28 PROCEDURE — 25010000003 BUPIVACAINE LIPOSOME 1.3 % SUSPENSION 20 ML VIAL: Performed by: ORTHOPAEDIC SURGERY

## 2021-07-28 PROCEDURE — 25010000002 CEFAZOLIN PER 500 MG: Performed by: ORTHOPAEDIC SURGERY

## 2021-07-28 PROCEDURE — C1776 JOINT DEVICE (IMPLANTABLE): HCPCS | Performed by: ORTHOPAEDIC SURGERY

## 2021-07-28 DEVICE — IMPLANTABLE DEVICE: Type: IMPLANTABLE DEVICE | Site: KNEE | Status: FUNCTIONAL

## 2021-07-28 DEVICE — CAP TOTL KN CMT PRIMARY: Type: IMPLANTABLE DEVICE | Site: KNEE | Status: FUNCTIONAL

## 2021-07-28 DEVICE — STEM TIB/KN PERSONA TRABECULAR 2PEG SZE RT: Type: IMPLANTABLE DEVICE | Site: KNEE | Status: FUNCTIONAL

## 2021-07-28 DEVICE — ART/SRF KN PERSONA PS EF MC 6TO7 10MM RT: Type: IMPLANTABLE DEVICE | Site: KNEE | Status: FUNCTIONAL

## 2021-07-28 RX ORDER — FAMOTIDINE 20 MG/1
20 TABLET, FILM COATED ORAL DAILY PRN
Status: DISCONTINUED | OUTPATIENT
Start: 2021-07-28 | End: 2021-07-29 | Stop reason: HOSPADM

## 2021-07-28 RX ORDER — SODIUM CHLORIDE 0.9 % (FLUSH) 0.9 %
3-10 SYRINGE (ML) INJECTION AS NEEDED
Status: DISCONTINUED | OUTPATIENT
Start: 2021-07-28 | End: 2021-07-28 | Stop reason: HOSPADM

## 2021-07-28 RX ORDER — MIDAZOLAM HYDROCHLORIDE 1 MG/ML
2 INJECTION INTRAMUSCULAR; INTRAVENOUS ONCE
Status: DISCONTINUED | OUTPATIENT
Start: 2021-07-28 | End: 2021-07-28 | Stop reason: HOSPADM

## 2021-07-28 RX ORDER — SODIUM CHLORIDE 0.9 % (FLUSH) 0.9 %
3 SYRINGE (ML) INJECTION EVERY 12 HOURS SCHEDULED
Status: DISCONTINUED | OUTPATIENT
Start: 2021-07-28 | End: 2021-07-29 | Stop reason: HOSPADM

## 2021-07-28 RX ORDER — BUPIVACAINE HCL/0.9 % NACL/PF 0.1 %
2 PLASTIC BAG, INJECTION (ML) EPIDURAL EVERY 8 HOURS
Status: COMPLETED | OUTPATIENT
Start: 2021-07-28 | End: 2021-07-29

## 2021-07-28 RX ORDER — SODIUM CHLORIDE 0.9 % (FLUSH) 0.9 %
3 SYRINGE (ML) INJECTION EVERY 12 HOURS SCHEDULED
Status: DISCONTINUED | OUTPATIENT
Start: 2021-07-28 | End: 2021-07-28 | Stop reason: HOSPADM

## 2021-07-28 RX ORDER — NALOXONE HCL 0.4 MG/ML
0.1 VIAL (ML) INJECTION
Status: DISCONTINUED | OUTPATIENT
Start: 2021-07-28 | End: 2021-07-29 | Stop reason: HOSPADM

## 2021-07-28 RX ORDER — MIDAZOLAM HYDROCHLORIDE 1 MG/ML
1 INJECTION INTRAMUSCULAR; INTRAVENOUS
Status: DISCONTINUED | OUTPATIENT
Start: 2021-07-28 | End: 2021-07-28 | Stop reason: HOSPADM

## 2021-07-28 RX ORDER — NALOXONE HCL 0.4 MG/ML
0.04 VIAL (ML) INJECTION AS NEEDED
Status: DISCONTINUED | OUTPATIENT
Start: 2021-07-28 | End: 2021-07-28 | Stop reason: HOSPADM

## 2021-07-28 RX ORDER — LABETALOL HYDROCHLORIDE 5 MG/ML
5 INJECTION, SOLUTION INTRAVENOUS
Status: DISCONTINUED | OUTPATIENT
Start: 2021-07-28 | End: 2021-07-28 | Stop reason: HOSPADM

## 2021-07-28 RX ORDER — DEXAMETHASONE SODIUM PHOSPHATE 4 MG/ML
INJECTION, SOLUTION INTRA-ARTICULAR; INTRALESIONAL; INTRAMUSCULAR; INTRAVENOUS; SOFT TISSUE AS NEEDED
Status: DISCONTINUED | OUTPATIENT
Start: 2021-07-28 | End: 2021-07-28 | Stop reason: SURG

## 2021-07-28 RX ORDER — ONDANSETRON 2 MG/ML
4 INJECTION INTRAMUSCULAR; INTRAVENOUS EVERY 6 HOURS PRN
Status: DISCONTINUED | OUTPATIENT
Start: 2021-07-28 | End: 2021-07-29 | Stop reason: HOSPADM

## 2021-07-28 RX ORDER — BUPIVACAINE HYDROCHLORIDE 7.5 MG/ML
INJECTION, SOLUTION EPIDURAL; RETROBULBAR AS NEEDED
Status: DISCONTINUED | OUTPATIENT
Start: 2021-07-28 | End: 2021-07-28 | Stop reason: SURG

## 2021-07-28 RX ORDER — VENLAFAXINE HYDROCHLORIDE 75 MG/1
75 CAPSULE, EXTENDED RELEASE ORAL DAILY
Status: DISCONTINUED | OUTPATIENT
Start: 2021-07-28 | End: 2021-07-28 | Stop reason: SDUPTHER

## 2021-07-28 RX ORDER — POLYETHYLENE GLYCOL 3350 17 G/17G
17 POWDER, FOR SOLUTION ORAL DAILY
Status: DISCONTINUED | OUTPATIENT
Start: 2021-07-28 | End: 2021-07-29 | Stop reason: HOSPADM

## 2021-07-28 RX ORDER — OXYBUTYNIN CHLORIDE 5 MG/1
5 TABLET, EXTENDED RELEASE ORAL DAILY
Status: DISCONTINUED | OUTPATIENT
Start: 2021-07-28 | End: 2021-07-29 | Stop reason: HOSPADM

## 2021-07-28 RX ORDER — SODIUM CHLORIDE 0.9 % (FLUSH) 0.9 %
10 SYRINGE (ML) INJECTION AS NEEDED
Status: DISCONTINUED | OUTPATIENT
Start: 2021-07-28 | End: 2021-07-29 | Stop reason: HOSPADM

## 2021-07-28 RX ORDER — SODIUM CHLORIDE 0.9 % (FLUSH) 0.9 %
3 SYRINGE (ML) INJECTION AS NEEDED
Status: DISCONTINUED | OUTPATIENT
Start: 2021-07-28 | End: 2021-07-28 | Stop reason: HOSPADM

## 2021-07-28 RX ORDER — DIAZEPAM 5 MG/1
5 TABLET ORAL EVERY 6 HOURS PRN
Status: DISCONTINUED | OUTPATIENT
Start: 2021-07-28 | End: 2021-07-29 | Stop reason: HOSPADM

## 2021-07-28 RX ORDER — HYDROMORPHONE HYDROCHLORIDE 1 MG/ML
0.5 INJECTION, SOLUTION INTRAMUSCULAR; INTRAVENOUS; SUBCUTANEOUS
Status: DISCONTINUED | OUTPATIENT
Start: 2021-07-28 | End: 2021-07-28 | Stop reason: HOSPADM

## 2021-07-28 RX ORDER — TRANEXAMIC ACID 100 MG/ML
INJECTION, SOLUTION INTRAVENOUS AS NEEDED
Status: DISCONTINUED | OUTPATIENT
Start: 2021-07-28 | End: 2021-07-28 | Stop reason: SURG

## 2021-07-28 RX ORDER — CEFAZOLIN SODIUM 1 G/3ML
INJECTION, POWDER, FOR SOLUTION INTRAMUSCULAR; INTRAVENOUS AS NEEDED
Status: DISCONTINUED | OUTPATIENT
Start: 2021-07-28 | End: 2021-07-28 | Stop reason: SURG

## 2021-07-28 RX ORDER — LIDOCAINE HYDROCHLORIDE 10 MG/ML
0.5 INJECTION, SOLUTION EPIDURAL; INFILTRATION; INTRACAUDAL; PERINEURAL ONCE AS NEEDED
Status: DISCONTINUED | OUTPATIENT
Start: 2021-07-28 | End: 2021-07-28 | Stop reason: HOSPADM

## 2021-07-28 RX ORDER — CYCLOBENZAPRINE HCL 10 MG
5 TABLET ORAL 3 TIMES DAILY PRN
Status: DISCONTINUED | OUTPATIENT
Start: 2021-07-28 | End: 2021-07-29 | Stop reason: HOSPADM

## 2021-07-28 RX ORDER — LANOLIN ALCOHOL/MO/W.PET/CERES
25 CREAM (GRAM) TOPICAL DAILY
Status: DISCONTINUED | OUTPATIENT
Start: 2021-07-28 | End: 2021-07-29 | Stop reason: HOSPADM

## 2021-07-28 RX ORDER — FENTANYL CITRATE 50 UG/ML
25 INJECTION, SOLUTION INTRAMUSCULAR; INTRAVENOUS
Status: DISCONTINUED | OUTPATIENT
Start: 2021-07-28 | End: 2021-07-28 | Stop reason: HOSPADM

## 2021-07-28 RX ORDER — GABAPENTIN 100 MG/1
100 CAPSULE ORAL 3 TIMES DAILY
Status: DISCONTINUED | OUTPATIENT
Start: 2021-07-28 | End: 2021-07-29 | Stop reason: HOSPADM

## 2021-07-28 RX ORDER — OXYCODONE HCL 10 MG/1
10 TABLET, FILM COATED, EXTENDED RELEASE ORAL EVERY 12 HOURS SCHEDULED
Status: DISCONTINUED | OUTPATIENT
Start: 2021-07-28 | End: 2021-07-29 | Stop reason: HOSPADM

## 2021-07-28 RX ORDER — SODIUM CHLORIDE, SODIUM LACTATE, POTASSIUM CHLORIDE, CALCIUM CHLORIDE 600; 310; 30; 20 MG/100ML; MG/100ML; MG/100ML; MG/100ML
1000 INJECTION, SOLUTION INTRAVENOUS CONTINUOUS
Status: DISCONTINUED | OUTPATIENT
Start: 2021-07-28 | End: 2021-07-28 | Stop reason: HOSPADM

## 2021-07-28 RX ORDER — SODIUM CHLORIDE 9 MG/ML
175 INJECTION, SOLUTION INTRAVENOUS CONTINUOUS
Status: DISCONTINUED | OUTPATIENT
Start: 2021-07-28 | End: 2021-07-29 | Stop reason: HOSPADM

## 2021-07-28 RX ORDER — VENLAFAXINE HYDROCHLORIDE 75 MG/1
75 CAPSULE, EXTENDED RELEASE ORAL NIGHTLY
Status: DISCONTINUED | OUTPATIENT
Start: 2021-07-28 | End: 2021-07-29 | Stop reason: HOSPADM

## 2021-07-28 RX ORDER — ONDANSETRON 4 MG/1
4 TABLET, FILM COATED ORAL EVERY 6 HOURS PRN
Status: DISCONTINUED | OUTPATIENT
Start: 2021-07-28 | End: 2021-07-29 | Stop reason: HOSPADM

## 2021-07-28 RX ORDER — SODIUM CHLORIDE, SODIUM LACTATE, POTASSIUM CHLORIDE, CALCIUM CHLORIDE 600; 310; 30; 20 MG/100ML; MG/100ML; MG/100ML; MG/100ML
100 INJECTION, SOLUTION INTRAVENOUS CONTINUOUS
Status: DISCONTINUED | OUTPATIENT
Start: 2021-07-28 | End: 2021-07-28 | Stop reason: HOSPADM

## 2021-07-28 RX ORDER — FLUMAZENIL 0.1 MG/ML
0.2 INJECTION INTRAVENOUS AS NEEDED
Status: DISCONTINUED | OUTPATIENT
Start: 2021-07-28 | End: 2021-07-28 | Stop reason: HOSPADM

## 2021-07-28 RX ORDER — OXYCODONE AND ACETAMINOPHEN 10; 325 MG/1; MG/1
1 TABLET ORAL ONCE AS NEEDED
Status: DISCONTINUED | OUTPATIENT
Start: 2021-07-28 | End: 2021-07-28 | Stop reason: HOSPADM

## 2021-07-28 RX ORDER — CLINDAMYCIN PHOSPHATE 900 MG/50ML
900 INJECTION INTRAVENOUS EVERY 8 HOURS
Status: DISCONTINUED | OUTPATIENT
Start: 2021-07-28 | End: 2021-07-28 | Stop reason: SDUPTHER

## 2021-07-28 RX ORDER — LEVOTHYROXINE SODIUM 0.07 MG/1
75 TABLET ORAL DAILY
Status: DISCONTINUED | OUTPATIENT
Start: 2021-07-28 | End: 2021-07-29 | Stop reason: HOSPADM

## 2021-07-28 RX ORDER — ONDANSETRON 2 MG/ML
4 INJECTION INTRAMUSCULAR; INTRAVENOUS AS NEEDED
Status: DISCONTINUED | OUTPATIENT
Start: 2021-07-28 | End: 2021-07-28 | Stop reason: HOSPADM

## 2021-07-28 RX ORDER — MULTIPLE VITAMINS W/ MINERALS TAB 9MG-400MCG
1 TAB ORAL DAILY
Status: DISCONTINUED | OUTPATIENT
Start: 2021-07-28 | End: 2021-07-29 | Stop reason: HOSPADM

## 2021-07-28 RX ORDER — FENTANYL CITRATE 50 UG/ML
50 INJECTION, SOLUTION INTRAMUSCULAR; INTRAVENOUS ONCE
Status: DISCONTINUED | OUTPATIENT
Start: 2021-07-28 | End: 2021-07-28 | Stop reason: HOSPADM

## 2021-07-28 RX ORDER — PHENYLEPHRINE HCL IN 0.9% NACL 0.8MG/10ML
SYRINGE (ML) INTRAVENOUS AS NEEDED
Status: DISCONTINUED | OUTPATIENT
Start: 2021-07-28 | End: 2021-07-28 | Stop reason: SURG

## 2021-07-28 RX ORDER — DIPHENHYDRAMINE HCL 25 MG
25 CAPSULE ORAL EVERY 6 HOURS PRN
Status: DISCONTINUED | OUTPATIENT
Start: 2021-07-28 | End: 2021-07-29 | Stop reason: HOSPADM

## 2021-07-28 RX ORDER — MAGNESIUM HYDROXIDE 1200 MG/15ML
LIQUID ORAL AS NEEDED
Status: DISCONTINUED | OUTPATIENT
Start: 2021-07-28 | End: 2021-07-28 | Stop reason: HOSPADM

## 2021-07-28 RX ORDER — DIPHENHYDRAMINE HYDROCHLORIDE 50 MG/ML
25 INJECTION INTRAMUSCULAR; INTRAVENOUS EVERY 6 HOURS PRN
Status: DISCONTINUED | OUTPATIENT
Start: 2021-07-28 | End: 2021-07-29 | Stop reason: HOSPADM

## 2021-07-28 RX ORDER — CHOLECALCIFEROL (VITAMIN D3) 125 MCG
500 CAPSULE ORAL DAILY
Status: DISCONTINUED | OUTPATIENT
Start: 2021-07-28 | End: 2021-07-29 | Stop reason: HOSPADM

## 2021-07-28 RX ORDER — ACETAMINOPHEN 500 MG
1000 TABLET ORAL ONCE
Status: COMPLETED | OUTPATIENT
Start: 2021-07-28 | End: 2021-07-28

## 2021-07-28 RX ORDER — FENTANYL CITRATE 50 UG/ML
INJECTION, SOLUTION INTRAMUSCULAR; INTRAVENOUS AS NEEDED
Status: DISCONTINUED | OUTPATIENT
Start: 2021-07-28 | End: 2021-07-28 | Stop reason: SURG

## 2021-07-28 RX ORDER — OXYCODONE HYDROCHLORIDE 5 MG/1
10 TABLET ORAL EVERY 4 HOURS PRN
Status: DISCONTINUED | OUTPATIENT
Start: 2021-07-28 | End: 2021-07-29 | Stop reason: HOSPADM

## 2021-07-28 RX ADMIN — GABAPENTIN 100 MG: 100 CAPSULE ORAL at 20:37

## 2021-07-28 RX ADMIN — GABAPENTIN 100 MG: 100 CAPSULE ORAL at 15:39

## 2021-07-28 RX ADMIN — OXYBUTYNIN CHLORIDE 5 MG: 5 TABLET, EXTENDED RELEASE ORAL at 12:15

## 2021-07-28 RX ADMIN — ONDANSETRON 4 MG: 2 INJECTION INTRAMUSCULAR; INTRAVENOUS at 17:51

## 2021-07-28 RX ADMIN — ACETAMINOPHEN 1000 MG: 500 TABLET, FILM COATED ORAL at 06:50

## 2021-07-28 RX ADMIN — Medication 80 MCG: at 08:40

## 2021-07-28 RX ADMIN — TRANEXAMIC ACID 1000 MG: 100 INJECTION, SOLUTION INTRAVENOUS at 07:30

## 2021-07-28 RX ADMIN — VENLAFAXINE HYDROCHLORIDE 75 MG: 75 CAPSULE, EXTENDED RELEASE ORAL at 20:37

## 2021-07-28 RX ADMIN — BUPIVACAINE HYDROCHLORIDE 1.4 ML: 7.5 INJECTION, SOLUTION EPIDURAL; RETROBULBAR at 07:21

## 2021-07-28 RX ADMIN — OXYCODONE HYDROCHLORIDE 10 MG: 10 TABLET, FILM COATED, EXTENDED RELEASE ORAL at 12:15

## 2021-07-28 RX ADMIN — FENTANYL CITRATE 50 MCG: 50 INJECTION, SOLUTION INTRAMUSCULAR; INTRAVENOUS at 07:13

## 2021-07-28 RX ADMIN — SODIUM CHLORIDE 2 G: 9 INJECTION, SOLUTION INTRAVENOUS at 15:38

## 2021-07-28 RX ADMIN — HYDROMORPHONE HYDROCHLORIDE 1 MG: 1 INJECTION, SOLUTION INTRAMUSCULAR; INTRAVENOUS; SUBCUTANEOUS at 18:58

## 2021-07-28 RX ADMIN — SODIUM CHLORIDE, POTASSIUM CHLORIDE, SODIUM LACTATE AND CALCIUM CHLORIDE: 600; 310; 30; 20 INJECTION, SOLUTION INTRAVENOUS at 07:55

## 2021-07-28 RX ADMIN — CYCLOBENZAPRINE 5 MG: 10 TABLET, FILM COATED ORAL at 20:37

## 2021-07-28 RX ADMIN — DEXAMETHASONE SODIUM PHOSPHATE 10 MG: 4 INJECTION, SOLUTION INTRA-ARTICULAR; INTRALESIONAL; INTRAMUSCULAR; INTRAVENOUS; SOFT TISSUE at 07:30

## 2021-07-28 RX ADMIN — OXYCODONE HYDROCHLORIDE 10 MG: 10 TABLET, FILM COATED, EXTENDED RELEASE ORAL at 20:38

## 2021-07-28 RX ADMIN — PROPOFOL 100 MCG/KG/MIN: 10 INJECTION, EMULSION INTRAVENOUS at 07:28

## 2021-07-28 RX ADMIN — FENTANYL CITRATE 50 MCG: 50 INJECTION, SOLUTION INTRAMUSCULAR; INTRAVENOUS at 07:11

## 2021-07-28 RX ADMIN — GABAPENTIN 100 MG: 100 CAPSULE ORAL at 12:15

## 2021-07-28 RX ADMIN — SODIUM CHLORIDE, POTASSIUM CHLORIDE, SODIUM LACTATE AND CALCIUM CHLORIDE: 600; 310; 30; 20 INJECTION, SOLUTION INTRAVENOUS at 07:07

## 2021-07-28 RX ADMIN — TRANEXAMIC ACID 1000 MG: 100 INJECTION, SOLUTION INTRAVENOUS at 09:02

## 2021-07-28 RX ADMIN — HYDROMORPHONE HYDROCHLORIDE 1 MG: 1 INJECTION, SOLUTION INTRAMUSCULAR; INTRAVENOUS; SUBCUTANEOUS at 15:56

## 2021-07-28 RX ADMIN — SODIUM CHLORIDE, PRESERVATIVE FREE 3 ML: 5 INJECTION INTRAVENOUS at 20:38

## 2021-07-28 RX ADMIN — Medication 80 MCG: at 08:11

## 2021-07-28 RX ADMIN — CEFAZOLIN 2 G: 330 INJECTION, POWDER, FOR SOLUTION INTRAMUSCULAR; INTRAVENOUS at 07:30

## 2021-07-28 RX ADMIN — HYDROMORPHONE HYDROCHLORIDE 1 MG: 1 INJECTION, SOLUTION INTRAMUSCULAR; INTRAVENOUS; SUBCUTANEOUS at 10:59

## 2021-07-28 NOTE — ANESTHESIA PREPROCEDURE EVALUATION
Anesthesia Evaluation     Patient summary reviewed   no history of anesthetic complications:  NPO Solid Status: > 8 hours  NPO Liquid Status: > 8 hours           Airway   Mallampati: III  TM distance: >3 FB  Neck ROM: full  Dental - normal exam     Pulmonary    (-) asthma, recent URI, sleep apnea, not a smoker  Cardiovascular   Exercise tolerance: good (4-7 METS)    ECG reviewed    (+) hypertension, hyperlipidemia,   (-) pacemaker, past MI, angina, cardiac stents      Neuro/Psych  (+) TIA (5/2019--never went to hospital or received workkup; week ),     (-) seizures, CVA  GI/Hepatic/Renal/Endo    (+) obesity,  GERD well controlled,  thyroid problem hypothyroidism  (-) liver disease, no renal disease, diabetes    Musculoskeletal     Abdominal    Substance History      OB/GYN          Other   arthritis,    history of cancer                    Anesthesia Plan    ASA 2     MAC and spinal     intravenous induction     Anesthetic plan, all risks, benefits, and alternatives have been provided, discussed and informed consent has been obtained with: patient.

## 2021-07-28 NOTE — ANESTHESIA POSTPROCEDURE EVALUATION
"Patient: Desirae Alberto    Procedure Summary     Date: 07/28/21 Room / Location:  [facility] OR 06 /  [facility] OR    Anesthesia Start: 0707 Anesthesia Stop: 0942    Procedure: RIGHT TOTAL KNEE REPLACEMENT (Right Knee) Diagnosis: (M17.11)    Surgeons: Eliecer Foley MD Provider: Shelby Cesar CRNA    Anesthesia Type: general with block ASA Status: 2          Anesthesia Type: general with block    Vitals  Vitals Value Taken Time   /74 07/28/21 1025   Temp 99 °F (37.2 °C) 07/28/21 1015   Pulse 68 07/28/21 1028   Resp 14 07/28/21 1025   SpO2 93 % 07/28/21 1026   Vitals shown include unvalidated device data.        Post Anesthesia Care and Evaluation    Patient location during evaluation: PACU  Patient participation: complete - patient participated  Level of consciousness: awake and alert  Pain management: adequate  Airway patency: patent  Anesthetic complications: No anesthetic complications    Cardiovascular status: acceptable  Respiratory status: acceptable  Hydration status: acceptable    Comments: Blood pressure 125/74, pulse 65, temperature 99 °F (37.2 °C), temperature source Temporal, resp. rate 18, height 158 cm (62.21\"), weight 86.2 kg (190 lb 0.6 oz), last menstrual period 06/30/2019, SpO2 95 %, not currently breastfeeding.    Pt discharged from PACU based on jennifer score >8      "

## 2021-07-28 NOTE — ANESTHESIA PROCEDURE NOTES
Spinal Block      Patient reassessed immediately prior to procedure    Patient location during procedure: OB  Performed By  CRNA: Shelby Cesar CRNA  Preanesthetic Checklist  Completed: patient identified, IV checked, risks and benefits discussed, surgical consent, monitors and equipment checked, pre-op evaluation and timeout performed  Spinal Block Prep:  Patient Position:sitting  Sterile Tech:cap, gloves, mask and sterile barriers  Prep:Chloraprep  Patient Monitoring:blood pressure monitoring and continuous pulse oximetry  Spinal Block Procedure  Approach:midline  Guidance:palpation technique  Location:L3-L4  Needle Type:Pencan  Needle Gauge:25 G  Placement of Spinal needle event:Free flowing CSF  Paresthesia: no  Fluid Appearance:clear     Post Assessment  Patient Tolerance:patient tolerated the procedure well with no apparent complications  Complications no

## 2021-07-29 VITALS
HEART RATE: 88 BPM | OXYGEN SATURATION: 92 % | DIASTOLIC BLOOD PRESSURE: 54 MMHG | HEIGHT: 62 IN | RESPIRATION RATE: 18 BRPM | BODY MASS INDEX: 34.97 KG/M2 | SYSTOLIC BLOOD PRESSURE: 133 MMHG | WEIGHT: 190.04 LBS | TEMPERATURE: 98 F

## 2021-07-29 PROBLEM — Z78.9 DECREASED ACTIVITIES OF DAILY LIVING (ADL): Status: ACTIVE | Noted: 2021-07-29

## 2021-07-29 LAB
ANION GAP SERPL CALCULATED.3IONS-SCNC: 9 MMOL/L (ref 5–15)
BUN SERPL-MCNC: 10 MG/DL (ref 6–20)
BUN/CREAT SERPL: 15.4 (ref 7–25)
CALCIUM SPEC-SCNC: 8.7 MG/DL (ref 8.6–10.5)
CHLORIDE SERPL-SCNC: 103 MMOL/L (ref 98–107)
CO2 SERPL-SCNC: 30 MMOL/L (ref 22–29)
CREAT SERPL-MCNC: 0.65 MG/DL (ref 0.57–1)
GFR SERPL CREATININE-BSD FRML MDRD: 95 ML/MIN/1.73
GLUCOSE SERPL-MCNC: 138 MG/DL (ref 65–99)
HCT VFR BLD AUTO: 32.6 % (ref 34–46.6)
HGB BLD-MCNC: 10.5 G/DL (ref 12–15.9)
POTASSIUM SERPL-SCNC: 4 MMOL/L (ref 3.5–5.2)
SODIUM SERPL-SCNC: 142 MMOL/L (ref 136–145)

## 2021-07-29 PROCEDURE — 97110 THERAPEUTIC EXERCISES: CPT

## 2021-07-29 PROCEDURE — 97116 GAIT TRAINING THERAPY: CPT

## 2021-07-29 PROCEDURE — 85018 HEMOGLOBIN: CPT | Performed by: ORTHOPAEDIC SURGERY

## 2021-07-29 PROCEDURE — 80048 BASIC METABOLIC PNL TOTAL CA: CPT | Performed by: ORTHOPAEDIC SURGERY

## 2021-07-29 PROCEDURE — 25010000002 ONDANSETRON PER 1 MG: Performed by: ORTHOPAEDIC SURGERY

## 2021-07-29 PROCEDURE — 25010000002 CEFAZOLIN PER 500 MG: Performed by: ORTHOPAEDIC SURGERY

## 2021-07-29 PROCEDURE — 25010000003 HYDROMORPHONE 1 MG/ML SOLUTION: Performed by: ORTHOPAEDIC SURGERY

## 2021-07-29 PROCEDURE — 85014 HEMATOCRIT: CPT | Performed by: ORTHOPAEDIC SURGERY

## 2021-07-29 PROCEDURE — 97535 SELF CARE MNGMENT TRAINING: CPT | Performed by: OCCUPATIONAL THERAPIST

## 2021-07-29 RX ADMIN — OXYCODONE HYDROCHLORIDE 10 MG: 10 TABLET, FILM COATED, EXTENDED RELEASE ORAL at 08:30

## 2021-07-29 RX ADMIN — HYDROMORPHONE HYDROCHLORIDE 1 MG: 1 INJECTION, SOLUTION INTRAMUSCULAR; INTRAVENOUS; SUBCUTANEOUS at 10:31

## 2021-07-29 RX ADMIN — OXYCODONE HYDROCHLORIDE 10 MG: 5 TABLET ORAL at 11:45

## 2021-07-29 RX ADMIN — HYDROMORPHONE HYDROCHLORIDE 1 MG: 1 INJECTION, SOLUTION INTRAMUSCULAR; INTRAVENOUS; SUBCUTANEOUS at 13:48

## 2021-07-29 RX ADMIN — DIAZEPAM 5 MG: 5 TABLET ORAL at 11:45

## 2021-07-29 RX ADMIN — GABAPENTIN 100 MG: 100 CAPSULE ORAL at 08:30

## 2021-07-29 RX ADMIN — OXYCODONE HYDROCHLORIDE 10 MG: 5 TABLET ORAL at 04:47

## 2021-07-29 RX ADMIN — SODIUM CHLORIDE 2 G: 9 INJECTION, SOLUTION INTRAVENOUS at 00:13

## 2021-07-29 RX ADMIN — Medication 1 TABLET: at 08:31

## 2021-07-29 RX ADMIN — Medication 25 MG: at 08:31

## 2021-07-29 RX ADMIN — POLYETHYLENE GLYCOL 3350 17 G: 17 POWDER, FOR SOLUTION ORAL at 08:31

## 2021-07-29 RX ADMIN — GABAPENTIN 100 MG: 100 CAPSULE ORAL at 15:52

## 2021-07-29 RX ADMIN — OXYBUTYNIN CHLORIDE 5 MG: 5 TABLET, EXTENDED RELEASE ORAL at 08:31

## 2021-07-29 RX ADMIN — HYDROMORPHONE HYDROCHLORIDE 1 MG: 1 INJECTION, SOLUTION INTRAMUSCULAR; INTRAVENOUS; SUBCUTANEOUS at 17:32

## 2021-07-29 RX ADMIN — ONDANSETRON 4 MG: 2 INJECTION INTRAMUSCULAR; INTRAVENOUS at 04:55

## 2021-07-29 RX ADMIN — OXYCODONE HYDROCHLORIDE 10 MG: 5 TABLET ORAL at 17:58

## 2021-07-29 RX ADMIN — LEVOTHYROXINE SODIUM 75 MCG: 75 TABLET ORAL at 08:31

## 2021-07-29 RX ADMIN — DIAZEPAM 5 MG: 5 TABLET ORAL at 17:29

## 2021-07-29 RX ADMIN — ONDANSETRON 4 MG: 2 INJECTION INTRAMUSCULAR; INTRAVENOUS at 15:47

## 2021-07-29 RX ADMIN — Medication 500 MCG: at 08:30

## 2021-07-29 RX ADMIN — OXYCODONE HYDROCHLORIDE 10 MG: 5 TABLET ORAL at 00:13

## 2021-08-05 ENCOUNTER — TRANSCRIBE ORDERS (OUTPATIENT)
Dept: HOME HEALTH SERVICES | Facility: HOME HEALTHCARE | Age: 56
End: 2021-08-05

## 2021-08-05 ENCOUNTER — HOME HEALTH ADMISSION (OUTPATIENT)
Dept: HOME HEALTH SERVICES | Facility: HOME HEALTHCARE | Age: 56
End: 2021-08-05

## 2021-08-05 DIAGNOSIS — Z96.651 AFTERCARE FOLLOWING RIGHT KNEE JOINT REPLACEMENT SURGERY: Primary | ICD-10-CM

## 2021-08-05 DIAGNOSIS — Z47.1 AFTERCARE FOLLOWING RIGHT KNEE JOINT REPLACEMENT SURGERY: Primary | ICD-10-CM

## 2021-08-06 ENCOUNTER — HOME CARE VISIT (OUTPATIENT)
Dept: HOME HEALTH SERVICES | Facility: CLINIC | Age: 56
End: 2021-08-06

## 2021-08-06 PROCEDURE — G0299 HHS/HOSPICE OF RN EA 15 MIN: HCPCS

## 2021-08-06 PROCEDURE — G0151 HHCP-SERV OF PT,EA 15 MIN: HCPCS

## 2021-08-06 NOTE — HOME HEALTH
57 yo dx RTKA 7-28-21 . Pt presents chronic R knee pain progressive requiring RTKA .    PMHX : R knee pain   PLOF : [employer] -    works 40 hrs week    HOME ENVIRONMENT : lives with family - 2 step entry   DME needs : na   Homebound status assessment :Pt presents altered gait , balance and strength

## 2021-08-07 VITALS
TEMPERATURE: 97.4 F | OXYGEN SATURATION: 97 % | DIASTOLIC BLOOD PRESSURE: 76 MMHG | SYSTOLIC BLOOD PRESSURE: 126 MMHG | RESPIRATION RATE: 18 BRPM | HEART RATE: 64 BPM

## 2021-08-07 VITALS
RESPIRATION RATE: 18 BRPM | SYSTOLIC BLOOD PRESSURE: 126 MMHG | OXYGEN SATURATION: 97 % | DIASTOLIC BLOOD PRESSURE: 76 MMHG | HEART RATE: 64 BPM | TEMPERATURE: 97.4 F

## 2021-08-09 ENCOUNTER — HOME CARE VISIT (OUTPATIENT)
Dept: HOME HEALTH SERVICES | Facility: CLINIC | Age: 56
End: 2021-08-09

## 2021-08-09 VITALS
DIASTOLIC BLOOD PRESSURE: 80 MMHG | HEART RATE: 90 BPM | SYSTOLIC BLOOD PRESSURE: 140 MMHG | RESPIRATION RATE: 18 BRPM | OXYGEN SATURATION: 96 % | TEMPERATURE: 97.3 F

## 2021-08-09 PROCEDURE — G0157 HHC PT ASSISTANT EA 15: HCPCS

## 2021-08-10 ENCOUNTER — HOME CARE VISIT (OUTPATIENT)
Dept: HOME HEALTH SERVICES | Facility: CLINIC | Age: 56
End: 2021-08-10

## 2021-08-10 PROCEDURE — 99283 EMERGENCY DEPT VISIT LOW MDM: CPT

## 2021-08-10 PROCEDURE — G0300 HHS/HOSPICE OF LPN EA 15 MIN: HCPCS

## 2021-08-10 PROCEDURE — 36415 COLL VENOUS BLD VENIPUNCTURE: CPT

## 2021-08-10 RX ORDER — ONDANSETRON 2 MG/ML
4 INJECTION INTRAMUSCULAR; INTRAVENOUS ONCE
Status: COMPLETED | OUTPATIENT
Start: 2021-08-10 | End: 2021-08-11

## 2021-08-10 RX ORDER — SODIUM CHLORIDE 0.9 % (FLUSH) 0.9 %
10 SYRINGE (ML) INJECTION AS NEEDED
Status: DISCONTINUED | OUTPATIENT
Start: 2021-08-10 | End: 2021-08-11 | Stop reason: HOSPADM

## 2021-08-11 ENCOUNTER — HOSPITAL ENCOUNTER (EMERGENCY)
Facility: HOSPITAL | Age: 56
Discharge: HOME | End: 2021-08-11
Attending: EMERGENCY MEDICINE | Admitting: EMERGENCY MEDICINE

## 2021-08-11 ENCOUNTER — HOME CARE VISIT (OUTPATIENT)
Dept: HOME HEALTH SERVICES | Facility: CLINIC | Age: 56
End: 2021-08-11

## 2021-08-11 ENCOUNTER — APPOINTMENT (OUTPATIENT)
Dept: GENERAL RADIOLOGY | Facility: HOSPITAL | Age: 56
End: 2021-08-11

## 2021-08-11 ENCOUNTER — APPOINTMENT (OUTPATIENT)
Dept: CT IMAGING | Facility: HOSPITAL | Age: 56
End: 2021-08-11

## 2021-08-11 ENCOUNTER — APPOINTMENT (OUTPATIENT)
Dept: ULTRASOUND IMAGING | Facility: HOSPITAL | Age: 56
End: 2021-08-11

## 2021-08-11 VITALS
SYSTOLIC BLOOD PRESSURE: 122 MMHG | RESPIRATION RATE: 20 BRPM | TEMPERATURE: 98 F | WEIGHT: 192 LBS | OXYGEN SATURATION: 99 % | HEIGHT: 63 IN | DIASTOLIC BLOOD PRESSURE: 76 MMHG | HEART RATE: 76 BPM | BODY MASS INDEX: 34.02 KG/M2

## 2021-08-11 VITALS
DIASTOLIC BLOOD PRESSURE: 80 MMHG | SYSTOLIC BLOOD PRESSURE: 120 MMHG | TEMPERATURE: 97.6 F | HEART RATE: 98 BPM | OXYGEN SATURATION: 97 % | RESPIRATION RATE: 18 BRPM

## 2021-08-11 VITALS
SYSTOLIC BLOOD PRESSURE: 140 MMHG | DIASTOLIC BLOOD PRESSURE: 60 MMHG | TEMPERATURE: 98.9 F | HEART RATE: 112 BPM | RESPIRATION RATE: 18 BRPM | OXYGEN SATURATION: 96 %

## 2021-08-11 DIAGNOSIS — R20.2 PARESTHESIA: Primary | ICD-10-CM

## 2021-08-11 DIAGNOSIS — M79.89 LEG SWELLING: ICD-10-CM

## 2021-08-11 LAB
ALBUMIN SERPL-MCNC: 4.4 G/DL (ref 3.5–5.2)
ALBUMIN/GLOB SERPL: 1.6 G/DL
ALP SERPL-CCNC: 203 U/L (ref 39–117)
ALT SERPL W P-5'-P-CCNC: 30 U/L (ref 1–33)
ANION GAP SERPL CALCULATED.3IONS-SCNC: 12 MMOL/L (ref 5–15)
APTT PPP: 34.8 SECONDS (ref 24.1–35)
AST SERPL-CCNC: 22 U/L (ref 1–32)
BASOPHILS # BLD AUTO: 0.05 10*3/MM3 (ref 0–0.2)
BASOPHILS NFR BLD AUTO: 0.8 % (ref 0–1.5)
BILIRUB SERPL-MCNC: 0.3 MG/DL (ref 0–1.2)
BUN SERPL-MCNC: 14 MG/DL (ref 6–20)
BUN/CREAT SERPL: 16.9 (ref 7–25)
CALCIUM SPEC-SCNC: 9.8 MG/DL (ref 8.6–10.5)
CHLORIDE SERPL-SCNC: 100 MMOL/L (ref 98–107)
CO2 SERPL-SCNC: 26 MMOL/L (ref 22–29)
CREAT SERPL-MCNC: 0.83 MG/DL (ref 0.57–1)
D DIMER PPP FEU-MCNC: 6.99 MG/L (FEU) (ref 0–0.5)
D-LACTATE SERPL-SCNC: 1.1 MMOL/L (ref 0.5–2)
DEPRECATED RDW RBC AUTO: 41.7 FL (ref 37–54)
EOSINOPHIL # BLD AUTO: 0.22 10*3/MM3 (ref 0–0.4)
EOSINOPHIL NFR BLD AUTO: 3.6 % (ref 0.3–6.2)
ERYTHROCYTE [DISTWIDTH] IN BLOOD BY AUTOMATED COUNT: 12.9 % (ref 12.3–15.4)
GFR SERPL CREATININE-BSD FRML MDRD: 71 ML/MIN/1.73
GLOBULIN UR ELPH-MCNC: 2.7 GM/DL
GLUCOSE SERPL-MCNC: 112 MG/DL (ref 65–99)
HCT VFR BLD AUTO: 29.8 % (ref 34–46.6)
HGB BLD-MCNC: 10 G/DL (ref 12–15.9)
HOLD SPECIMEN: NORMAL
IMM GRANULOCYTES # BLD AUTO: 0.03 10*3/MM3 (ref 0–0.05)
IMM GRANULOCYTES NFR BLD AUTO: 0.5 % (ref 0–0.5)
INR PPP: 1.14 (ref 0.91–1.09)
LYMPHOCYTES # BLD AUTO: 1.76 10*3/MM3 (ref 0.7–3.1)
LYMPHOCYTES NFR BLD AUTO: 28.8 % (ref 19.6–45.3)
MCH RBC QN AUTO: 29.8 PG (ref 26.6–33)
MCHC RBC AUTO-ENTMCNC: 33.6 G/DL (ref 31.5–35.7)
MCV RBC AUTO: 88.7 FL (ref 79–97)
MONOCYTES # BLD AUTO: 0.42 10*3/MM3 (ref 0.1–0.9)
MONOCYTES NFR BLD AUTO: 6.9 % (ref 5–12)
NEUTROPHILS NFR BLD AUTO: 3.64 10*3/MM3 (ref 1.7–7)
NEUTROPHILS NFR BLD AUTO: 59.4 % (ref 42.7–76)
NRBC BLD AUTO-RTO: 0 /100 WBC (ref 0–0.2)
PLATELET # BLD AUTO: 406 10*3/MM3 (ref 140–450)
PMV BLD AUTO: 9.2 FL (ref 6–12)
POTASSIUM SERPL-SCNC: 4.1 MMOL/L (ref 3.5–5.2)
PROCALCITONIN SERPL-MCNC: 0.05 NG/ML (ref 0–0.25)
PROT SERPL-MCNC: 7.1 G/DL (ref 6–8.5)
PROTHROMBIN TIME: 13.7 SECONDS (ref 11.5–13.4)
RBC # BLD AUTO: 3.36 10*6/MM3 (ref 3.77–5.28)
SODIUM SERPL-SCNC: 138 MMOL/L (ref 136–145)
T4 FREE SERPL-MCNC: 1.19 NG/DL (ref 0.93–1.7)
TSH SERPL DL<=0.05 MIU/L-ACNC: 6.08 UIU/ML (ref 0.27–4.2)
WBC # BLD AUTO: 6.12 10*3/MM3 (ref 3.4–10.8)

## 2021-08-11 PROCEDURE — 96375 TX/PRO/DX INJ NEW DRUG ADDON: CPT

## 2021-08-11 PROCEDURE — 80053 COMPREHEN METABOLIC PANEL: CPT | Performed by: PHYSICIAN ASSISTANT

## 2021-08-11 PROCEDURE — 36415 COLL VENOUS BLD VENIPUNCTURE: CPT

## 2021-08-11 PROCEDURE — 87040 BLOOD CULTURE FOR BACTERIA: CPT | Performed by: PHYSICIAN ASSISTANT

## 2021-08-11 PROCEDURE — 83605 ASSAY OF LACTIC ACID: CPT | Performed by: PHYSICIAN ASSISTANT

## 2021-08-11 PROCEDURE — 85379 FIBRIN DEGRADATION QUANT: CPT | Performed by: PHYSICIAN ASSISTANT

## 2021-08-11 PROCEDURE — 84443 ASSAY THYROID STIM HORMONE: CPT | Performed by: PHYSICIAN ASSISTANT

## 2021-08-11 PROCEDURE — 25010000002 ONDANSETRON PER 1 MG: Performed by: PHYSICIAN ASSISTANT

## 2021-08-11 PROCEDURE — 85730 THROMBOPLASTIN TIME PARTIAL: CPT | Performed by: PHYSICIAN ASSISTANT

## 2021-08-11 PROCEDURE — 93971 EXTREMITY STUDY: CPT | Performed by: SURGERY

## 2021-08-11 PROCEDURE — 96374 THER/PROPH/DIAG INJ IV PUSH: CPT

## 2021-08-11 PROCEDURE — 85610 PROTHROMBIN TIME: CPT | Performed by: PHYSICIAN ASSISTANT

## 2021-08-11 PROCEDURE — 93971 EXTREMITY STUDY: CPT

## 2021-08-11 PROCEDURE — 85025 COMPLETE CBC W/AUTO DIFF WBC: CPT | Performed by: PHYSICIAN ASSISTANT

## 2021-08-11 PROCEDURE — 84145 PROCALCITONIN (PCT): CPT | Performed by: PHYSICIAN ASSISTANT

## 2021-08-11 PROCEDURE — 84439 ASSAY OF FREE THYROXINE: CPT | Performed by: PHYSICIAN ASSISTANT

## 2021-08-11 PROCEDURE — 25010000002 MORPHINE PER 10 MG: Performed by: EMERGENCY MEDICINE

## 2021-08-11 PROCEDURE — 73562 X-RAY EXAM OF KNEE 3: CPT

## 2021-08-11 PROCEDURE — 70450 CT HEAD/BRAIN W/O DYE: CPT

## 2021-08-11 PROCEDURE — G0157 HHC PT ASSISTANT EA 15: HCPCS

## 2021-08-11 RX ADMIN — MORPHINE SULFATE 4 MG: 4 INJECTION, SOLUTION INTRAMUSCULAR; INTRAVENOUS at 02:25

## 2021-08-11 RX ADMIN — ONDANSETRON 4 MG: 2 INJECTION INTRAMUSCULAR; INTRAVENOUS at 02:25

## 2021-08-11 NOTE — ED PROVIDER NOTES
Patient was seen here last night for knee pain.  X-ray was performed and was initially read as negative.  Radiologist called back this morning saying the patient has some abnormalities on the medial femoral condyle concerning for a tiny bone fragment versus periosteal reaction.  I informed the patient of these results.  She requested we fax them to Dr. Foley at the [facility].  Results were faxed.  Patient was advised to follow-up with Dr. Foley for further evaluation or to return to the ER if symptoms worsen.     Jody Boyer MD  08/11/21 1088

## 2021-08-11 NOTE — ED PROVIDER NOTES
Subjective   History of Present Illness    Patient is a 56-year-old female presenting to ED with right lower extremity swelling and pain, left facial numbness.  PMH significant for right total knee replacement 2 weeks ago, hypothyroidism, history TIA.  Patient states 2 weeks ago she had a right knee replacement performed by Dr. Foley and she had been doing well until over the past couple days that she has had increasing swelling.  Patient reports that she has noticed bruising behind her right knee as well as increased calf pain.  Patient reports that tonight at 7 PM she was eating dinner when she felt a sudden numbness in her left cheek.  Patient denies any other neurological symptoms including slurred speech, facial droop, extremity numbness or weakness, or any changes in her gait.  Patient reports he became concerned because she has a history of a previous TIA.  Patient reports after 3 h of symptoms she contacted a physician she knows who advised she come to the ER for evaluation of the leg swelling to rule out a blood clot.  Patient reports at this time the facial numbness has resolved and she has no further neurological symptoms.  Patient did note that since her surgery she has been taking Roxicodone's which is abnormal for her to be taking on a regular basis.  Patient reports that she has been compliant with her Xarelto as well as thyroid medications however she has stopped taking her vitamin B12.  Patient denies any injuries or falls since her procedure.  Patient denies fevers, chills, diaphoresis, shortness of breath, chest pain.    Records reviewed show patient had right total knee replacement performed on 7/28/21 by Dr. Foley.     Patient has held home health visits since on 8/6/2021, yesterday, as well as today.    Review of Systems   Constitutional: Negative.  Negative for fever.   HENT: Negative.    Eyes: Negative.    Respiratory: Negative.  Negative for shortness of breath.    Cardiovascular: Positive  for leg swelling (right calf). Negative for chest pain.   Gastrointestinal: Negative.    Genitourinary: Negative.    Musculoskeletal: Positive for arthralgias (right knee) and joint swelling (right knee).   Skin: Positive for color change (redness and bruising to right knee).   Neurological: Positive for numbness (left cheek). Negative for weakness and headaches.        Denies head injury   Psychiatric/Behavioral: Negative.    All other systems reviewed and are negative.      Past Medical History:   Diagnosis Date   • Arthritis    • Cancer (CMS/HCC)     lymphoma of small bowel   • GERD (gastroesophageal reflux disease)    • Glaucoma    • Hyperlipidemia    • Hypothyroidism    • TIA (transient ischemic attack)        No Known Allergies    Past Surgical History:   Procedure Laterality Date   • BREAST EXCISIONAL BIOPSY Left 01/2016    benign   • BREAST SURGERY Left 2017   • CHOLECYSTECTOMY     • COLON RESECTION SMALL BOWEL  2014   • COLONOSCOPY  2019   • ENDOSCOPY     • KNEE ARTHROSCOPY Right 7/14/2020    Procedure: RIGHT KNEE PARTIAL MEDIAL MENISCECTOMY;  Surgeon: Vijay Grewal MD;  Location: [facility] OR;  Service: Orthopedics;  Laterality: Right;   • LAPAROSCOPIC TUBAL LIGATION     • TOTAL KNEE ARTHROPLASTY Right 7/28/2021    Procedure: RIGHT TOTAL KNEE REPLACEMENT;  Surgeon: Eliecer Foley MD;  Location: [facility] OR;  Service: Orthopedics;  Laterality: Right;   • TUBAL ABDOMINAL LIGATION     • VAGINAL MESH REVISION      2010/2016   • WISDOM TOOTH EXTRACTION         Family History   Problem Relation Age of Onset   • Cancer Maternal Aunt    • No Known Problems Father    • Heart defect Mother    • No Known Problems Sister    • Colon cancer Paternal Aunt 68   • Colon cancer Paternal Aunt 70   • Liver cancer Paternal Aunt    • Colon cancer Paternal Aunt 80   • Breast cancer Neg Hx    • Ovarian cancer Neg Hx    • Uterine cancer Neg Hx    • Melanoma Neg Hx    • Prostate cancer Neg Hx        Social History      Socioeconomic History   • Marital status:      Spouse name: Not on file   • Number of children: Not on file   • Years of education: Not on file   • Highest education level: Not on file   Tobacco Use   • Smoking status: Former Smoker     Packs/day: 0.50     Years: 20.00     Pack years: 10.00     Types: Cigarettes     Quit date:      Years since quittin.6   • Smokeless tobacco: Never Used   • Tobacco comment: quit    Vaping Use   • Vaping Use: Never used   Substance and Sexual Activity   • Alcohol use: No   • Drug use: No   • Sexual activity: Defer           Objective   Physical Exam  Vitals and nursing note reviewed.   Constitutional:       General: She is not in acute distress.     Appearance: Normal appearance. She is well-developed, well-groomed and overweight. She is not ill-appearing or diaphoretic.   HENT:      Head: Normocephalic.      Mouth/Throat:      Mouth: Mucous membranes are moist.      Pharynx: Oropharynx is clear.   Eyes:      Extraocular Movements: Extraocular movements intact.      Right eye: No nystagmus.      Left eye: No nystagmus.      Conjunctiva/sclera: Conjunctivae normal.      Pupils: Pupils are equal, round, and reactive to light.   Cardiovascular:      Rate and Rhythm: Regular rhythm. Tachycardia present.      Comments: Reproducible tenderness to palpitation of right calf  Pulmonary:      Effort: Pulmonary effort is normal.      Breath sounds: Normal breath sounds.   Abdominal:      General: Bowel sounds are normal.      Palpations: Abdomen is soft.      Tenderness: There is no abdominal tenderness.   Musculoskeletal:      Cervical back: Normal range of motion and neck supple.      Right hip: Normal.      Right knee: Swelling, erythema and ecchymosis present. Decreased range of motion. Tenderness present. Normal pulse.      Right ankle: Normal.      Right foot: Normal.        Legs:       Comments: Long vertical incision to right anterior knee consistent with recent  replacement.  Overlying Steri-Strips.  No evidence of wound dehiscence, wound discharge, or wound bleeding.  Significant swelling to the right knee which extends into the right calf.  Erythema and bruising noted to the right posterior calf as well as right popliteal region.  Decreased range of motion of the right knee due to pain as well as swelling.  Bilateral lower extremities are neurovascular intact distally.  Normal inspection of right upper leg as well as hip.  No edema or calf tenderness to the left lower extremity.    Normal and symmetric sensation to bilateral lower extremities   Skin:     General: Skin is warm and dry.      Findings: Bruising (As described in MSK section) and erythema (As described in MSK section) present.   Neurological:      General: No focal deficit present.      Mental Status: She is alert and oriented to person, place, and time. Mental status is at baseline.      Cranial Nerves: Cranial nerves are intact.      Sensory: Sensation is intact.      Motor: Motor function is intact.      Coordination: Coordination is intact.   Psychiatric:         Attention and Perception: Attention normal.         Mood and Affect: Mood and affect normal.         Speech: Speech normal.         Behavior: Behavior normal. Behavior is cooperative.         Procedures           ED Course      ED Course as of Aug 11 0251   Wed Aug 11, 2021   0115 Case discussed at this time with Dr. Mitchel Townsend who will be assuming care of patient. Pending results of labs and imaging Dr. Townsend will reevaluate and disposition accordingly. Please see Dr. Townsend's note below for further ED course as well as final diagnosis.     Working diagnosis: Right knee swelling, post operative complication, facial parasthesias.     [JS]   0129 RLE: no thrombus vis  CT head: no acute findings     [JH]   0225 Patient was endorsed to me at shift change. Please see primary providers full note for H and P.       I am unsure of why a dimer  would be ordred when we are already getting an US on a patient who just completed surgery. Of course this is elevated but again I would be surprised if it wasn't She has no SOB and no CP so will not do a CTA. She has no further symptoms of numbness or tingling. Her imaging and lab work as all unrevealing at this time. Her NIH is 0. At this time we will dc to home.     [JH]      ED Course User Index  [JH] Mitchel Townsend MD  [JS] Lane Elkins PA-C            XR Knee 3 View Right   ED Interpretation   nad      US Venous Doppler Lower Extremity Right (duplex)    (Results Pending)   CT Head Without Contrast    (Results Pending)     Labs Reviewed   COMPREHENSIVE METABOLIC PANEL - Abnormal; Notable for the following components:       Result Value    Glucose 112 (*)     Alkaline Phosphatase 203 (*)     All other components within normal limits    Narrative:     GFR Normal >60  Chronic Kidney Disease <60  Kidney Failure <15     PROTIME-INR - Abnormal; Notable for the following components:    Protime 13.7 (*)     INR 1.14 (*)     All other components within normal limits   D-DIMER, QUANTITATIVE - Abnormal; Notable for the following components:    D-Dimer, Quantitative 6.99 (*)     All other components within normal limits    Narrative:     Reference Range is 0-0.50 mg/L FEU. However, results <0.50 mg/L FEU tends to rule out DVT or PE. Results >0.50 mg/L FEU are not useful in predicting absence or presence of DVT or PE.     CBC WITH AUTO DIFFERENTIAL - Abnormal; Notable for the following components:    RBC 3.36 (*)     Hemoglobin 10.0 (*)     Hematocrit 29.8 (*)     All other components within normal limits   TSH - Abnormal; Notable for the following components:    TSH 6.080 (*)     All other components within normal limits   APTT - Normal   LACTIC ACID, PLASMA - Normal   PROCALCITONIN - Normal    Narrative:     As a Marker for Sepsis (Non-Neonates):     1. <0.5 ng/mL represents a low risk of severe sepsis and/or  "septic shock.  2. >2 ng/mL represents a high risk of severe sepsis and/or septic shock.    As a Marker for Lower Respiratory Tract Infections that require antibiotic therapy:  PCT on Admission     Antibiotic Therapy             6-12 Hrs later  >0.5                          Strongly Recommended            >0.25 - <0.5             Recommended  0.1 - 0.25                  Discouraged                       Remeasure/reassess PCT  <0.1                         Strongly Discouraged         Remeasure/reassess PCT      As 28 day mortality risk marker: \"Change in Procalcitonin Result\" (>80% or <=80%) if Day 0 (or Day 1) and Day 4 values are available. Refer to http://www.[employer] Softwarepct-calculator.com/    Change in PCT <=80 %   A decrease of PCT levels below or equal to 80% defines a positive change in PCT test result representing a higher risk for 28-day all-cause mortality of patients diagnosed with severe sepsis or septic shock.    Change in PCT >80 %   A decrease of PCT levels of more than 80% defines a negative change in PCT result representing a lower risk for 28-day all-cause mortality of patients diagnosed with severe sepsis or septic shock.               T4, FREE - Normal    Narrative:     Results may be falsely increased if patient taking Biotin.     BLOOD CULTURE   BLOOD CULTURE   RAINBOW DRAW    Narrative:     The following orders were created for panel order [place] Draw.  Procedure                               Abnormality         Status                     ---------                               -----------         ------                     Red Top[[id number]]                                          Final result                 Please view results for these tests on the individual orders.   CBC AND DIFFERENTIAL    Narrative:     The following orders were created for panel order CBC & Differential.  Procedure                               Abnormality         Status                     ---------                       "         -----------         ------                     CBC Auto Differential[[id number]]        Abnormal            Final result                 Please view results for these tests on the individual orders.   RED TOP                                       MDM  Number of Diagnoses or Management Options     Amount and/or Complexity of Data Reviewed  Clinical lab tests: reviewed and ordered  Tests in the radiology section of CPT®: reviewed and ordered  Tests in the medicine section of CPT®: reviewed and ordered  Decide to obtain previous medical records or to obtain history from someone other than the patient: yes  Review and summarize past medical records: yes  Discuss the patient with other providers: yes (Dr. Mitchel Townsend (attending))        Final diagnoses:   Paresthesia   Leg swelling       ED Disposition  ED Disposition     ED Disposition Condition Comment    Discharge Stable           Jeannette Justin DO  [address] [place] [id number] [phone]               Medication List      No changes were made to your prescriptions during this visit.          Mitchel Townsend MD  08/11/21 0252

## 2021-08-12 ENCOUNTER — OFFICE VISIT (OUTPATIENT)
Dept: INTERNAL MEDICINE | Facility: CLINIC | Age: 56
End: 2021-08-12

## 2021-08-12 VITALS
BODY MASS INDEX: 32.67 KG/M2 | HEART RATE: 86 BPM | OXYGEN SATURATION: 99 % | RESPIRATION RATE: 18 BRPM | SYSTOLIC BLOOD PRESSURE: 130 MMHG | WEIGHT: 184.4 LBS | DIASTOLIC BLOOD PRESSURE: 79 MMHG | HEIGHT: 63 IN | TEMPERATURE: 98.2 F

## 2021-08-12 DIAGNOSIS — M79.89 RIGHT LEG SWELLING: ICD-10-CM

## 2021-08-12 DIAGNOSIS — Z96.651 HISTORY OF RIGHT KNEE JOINT REPLACEMENT: Primary | ICD-10-CM

## 2021-08-12 DIAGNOSIS — M17.11 PRIMARY OSTEOARTHRITIS OF RIGHT KNEE: ICD-10-CM

## 2021-08-12 PROCEDURE — 99214 OFFICE O/P EST MOD 30 MIN: CPT | Performed by: FAMILY MEDICINE

## 2021-08-12 RX ORDER — CYCLOBENZAPRINE HCL 5 MG
5 TABLET ORAL 3 TIMES DAILY PRN
Qty: 90 TABLET | Refills: 2 | OUTPATIENT
Start: 2021-08-12

## 2021-08-12 RX ORDER — CYCLOBENZAPRINE HCL 5 MG
5 TABLET ORAL 3 TIMES DAILY PRN
Qty: 90 TABLET | Refills: 2 | Status: SHIPPED | OUTPATIENT
Start: 2021-08-12 | End: 2021-08-26 | Stop reason: SDUPTHER

## 2021-08-13 ENCOUNTER — HOME CARE VISIT (OUTPATIENT)
Dept: HOME HEALTH SERVICES | Facility: CLINIC | Age: 56
End: 2021-08-13

## 2021-08-13 VITALS
SYSTOLIC BLOOD PRESSURE: 128 MMHG | OXYGEN SATURATION: 93 % | DIASTOLIC BLOOD PRESSURE: 78 MMHG | TEMPERATURE: 97.4 F | HEART RATE: 93 BPM | RESPIRATION RATE: 20 BRPM

## 2021-08-13 PROCEDURE — G0157 HHC PT ASSISTANT EA 15: HCPCS

## 2021-08-13 NOTE — HOME HEALTH
SUBJECTIVE: Patient states she has been resting today after being at the doctor yesterday. She says she got her sutures removed yesterday while at the doctor. She denies falls, changes to medication or insurance.

## 2021-08-13 NOTE — HOME HEALTH
Patient reported she went to ER the morning of 8/11/21 , with complaints of left side of face tingling and blurred vision . They did blood work , CT scan and venous doppler of R calf .The venous was negative but has not heard from anything else at this time . Did not get home this morning until around 4 .Physical Therapy withheld today due to patient's report of events that has occurred .

## 2021-08-16 ENCOUNTER — HOME CARE VISIT (OUTPATIENT)
Dept: HOME HEALTH SERVICES | Facility: CLINIC | Age: 56
End: 2021-08-16

## 2021-08-16 VITALS
OXYGEN SATURATION: 96 % | SYSTOLIC BLOOD PRESSURE: 135 MMHG | DIASTOLIC BLOOD PRESSURE: 70 MMHG | TEMPERATURE: 98.4 F | RESPIRATION RATE: 18 BRPM | HEART RATE: 93 BPM

## 2021-08-16 LAB
BACTERIA SPEC AEROBE CULT: NORMAL
BACTERIA SPEC AEROBE CULT: NORMAL

## 2021-08-16 PROCEDURE — G0157 HHC PT ASSISTANT EA 15: HCPCS

## 2021-08-17 NOTE — HOME HEALTH
Patient reported no open wounds, no new medications,no falls and no insurance changes .    Patient reported ache  going from knee to ankle and felix pain in medial side of right knee, PTA advised patient to contact 's office and discuss with them.    Patient reported she is trying to decrease amount of pain medication she is taking .

## 2021-08-18 ENCOUNTER — HOME CARE VISIT (OUTPATIENT)
Dept: HOME HEALTH SERVICES | Facility: CLINIC | Age: 56
End: 2021-08-18

## 2021-08-18 VITALS
DIASTOLIC BLOOD PRESSURE: 80 MMHG | SYSTOLIC BLOOD PRESSURE: 120 MMHG | RESPIRATION RATE: 16 BRPM | OXYGEN SATURATION: 97 % | HEART RATE: 115 BPM | TEMPERATURE: 97.8 F

## 2021-08-18 PROCEDURE — G0157 HHC PT ASSISTANT EA 15: HCPCS

## 2021-08-19 ENCOUNTER — HOME CARE VISIT (OUTPATIENT)
Dept: HOME HEALTH SERVICES | Facility: CLINIC | Age: 56
End: 2021-08-19

## 2021-08-19 PROCEDURE — G0300 HHS/HOSPICE OF LPN EA 15 MIN: HCPCS

## 2021-08-20 ENCOUNTER — HOME CARE VISIT (OUTPATIENT)
Dept: HOME HEALTH SERVICES | Facility: CLINIC | Age: 56
End: 2021-08-20

## 2021-08-20 VITALS
SYSTOLIC BLOOD PRESSURE: 128 MMHG | HEART RATE: 98 BPM | OXYGEN SATURATION: 98 % | TEMPERATURE: 98.7 F | RESPIRATION RATE: 18 BRPM | DIASTOLIC BLOOD PRESSURE: 80 MMHG

## 2021-08-20 PROCEDURE — G0151 HHCP-SERV OF PT,EA 15 MIN: HCPCS

## 2021-08-23 ENCOUNTER — HOME CARE VISIT (OUTPATIENT)
Dept: HOME HEALTH SERVICES | Facility: CLINIC | Age: 56
End: 2021-08-23

## 2021-08-23 PROCEDURE — G0157 HHC PT ASSISTANT EA 15: HCPCS

## 2021-08-23 NOTE — HOME HEALTH
Pt reports she had a hard time sleeping last night.  Reports she is doing her exercises and is not using her Walker or cane to ambulate at this time

## 2021-08-24 ENCOUNTER — HOME CARE VISIT (OUTPATIENT)
Dept: HOME HEALTH SERVICES | Facility: CLINIC | Age: 56
End: 2021-08-24

## 2021-08-24 PROCEDURE — G0299 HHS/HOSPICE OF RN EA 15 MIN: HCPCS

## 2021-08-25 ENCOUNTER — HOME CARE VISIT (OUTPATIENT)
Dept: HOME HEALTH SERVICES | Facility: CLINIC | Age: 56
End: 2021-08-25

## 2021-08-25 VITALS
OXYGEN SATURATION: 99 % | HEART RATE: 104 BPM | RESPIRATION RATE: 16 BRPM | DIASTOLIC BLOOD PRESSURE: 80 MMHG | SYSTOLIC BLOOD PRESSURE: 120 MMHG | TEMPERATURE: 97.9 F

## 2021-08-25 VITALS
TEMPERATURE: 97.3 F | HEART RATE: 104 BPM | DIASTOLIC BLOOD PRESSURE: 64 MMHG | SYSTOLIC BLOOD PRESSURE: 118 MMHG | OXYGEN SATURATION: 97 % | RESPIRATION RATE: 20 BRPM

## 2021-08-25 PROCEDURE — G0157 HHC PT ASSISTANT EA 15: HCPCS

## 2021-08-25 NOTE — HOME HEALTH
Patient reported :  no falls  no newor changes in medications  no insurance changes   no open wounds

## 2021-08-26 ENCOUNTER — HOME CARE VISIT (OUTPATIENT)
Dept: HOME HEALTH SERVICES | Facility: CLINIC | Age: 56
End: 2021-08-26

## 2021-08-26 VITALS
DIASTOLIC BLOOD PRESSURE: 82 MMHG | HEART RATE: 104 BPM | RESPIRATION RATE: 16 BRPM | OXYGEN SATURATION: 98 % | TEMPERATURE: 97.3 F | SYSTOLIC BLOOD PRESSURE: 122 MMHG

## 2021-08-26 DIAGNOSIS — M17.11 PRIMARY OSTEOARTHRITIS OF RIGHT KNEE: ICD-10-CM

## 2021-08-26 PROCEDURE — G0151 HHCP-SERV OF PT,EA 15 MIN: HCPCS

## 2021-08-26 RX ORDER — CYCLOBENZAPRINE HCL 5 MG
5 TABLET ORAL 3 TIMES DAILY PRN
Qty: 90 TABLET | Refills: 2 | Status: SHIPPED | OUTPATIENT
Start: 2021-08-26 | End: 2021-09-02

## 2021-08-26 NOTE — CASE COMMUNICATION
Discharging from [facility] services with all PT goals met. Pt will have her follow up appoitment with you on 8/27/2021 and she has been encouraged to ask questions regarding her return back to work, clearance to drive and whether OPPT services will be necessary.

## 2021-08-26 NOTE — HOME HEALTH
PHYSICAL THERAPY DISCHARGE VISIT    SUBJECTIVE:I have my follow up appointment with Dr Foley tomorrow.      INSURANCE UPDATE: No new changes     MEDICATION CHANGES: No new changes    FALLS SINCE LAST VISIT:No falls     MENTAL STATUS:A&Ox4     PAIN:0>3/10 right knee     EDEMA:Grade 1 consistent with TKR     WOUND / SKIN CONDITION:Intact. No dressing and no sterile strips   TODAY'S INTERVENTIONS / EDUCATION / PATIENT'S RESPONSE / GOAL STATUS:  Tinetti test performed today 26/28.  MMT of the RLE 4>4+/5.  Goniometeric measurement of the right knee 4>122    DISCHARGE STATUS :    TO SELF     DISCHARGE CONDITION:    EXCELLENT GOOD      DISCHARGE REASON    GOALS MET      INSTRUCTIONS HOME PROGRAM PROVIDED TO: Desirae Bonilla           CONTENT: WHEP as per TKR protocol           PATIENT CAREGIVER LEVEL OF COMPLIANCE: Independent with issued HEP    SERVICES CONTINUING: None         PATIENT AND CAREGIVER ARE AGREEABLE WITH DISCHARGE PLAN

## 2021-08-27 DIAGNOSIS — G60.9 HEREDITARY PERIPHERAL NEUROPATHY: ICD-10-CM

## 2021-08-27 DIAGNOSIS — M54.50 ACUTE LOW BACK PAIN WITHOUT SCIATICA, UNSPECIFIED BACK PAIN LATERALITY: ICD-10-CM

## 2021-08-27 RX ORDER — GABAPENTIN 100 MG/1
100 CAPSULE ORAL 3 TIMES DAILY
Qty: 90 CAPSULE | Refills: 0 | Status: SHIPPED | OUTPATIENT
Start: 2021-08-27 | End: 2021-11-07 | Stop reason: SDUPTHER

## 2021-08-27 RX ORDER — CYCLOBENZAPRINE HCL 5 MG
5 TABLET ORAL 3 TIMES DAILY PRN
Qty: 30 TABLET | Refills: 2 | Status: CANCELLED | OUTPATIENT
Start: 2021-08-27

## 2021-09-01 DIAGNOSIS — M54.50 LOW BACK PAIN: ICD-10-CM

## 2021-09-02 RX ORDER — CYCLOBENZAPRINE HCL 5 MG
TABLET ORAL
Qty: 30 TABLET | Refills: 2 | Status: SHIPPED | OUTPATIENT
Start: 2021-09-02 | End: 2022-02-14 | Stop reason: SDUPTHER

## 2021-11-07 DIAGNOSIS — G60.9 HEREDITARY PERIPHERAL NEUROPATHY: ICD-10-CM

## 2021-11-09 RX ORDER — GABAPENTIN 100 MG/1
100 CAPSULE ORAL 3 TIMES DAILY
Qty: 90 CAPSULE | Refills: 0 | Status: SHIPPED | OUTPATIENT
Start: 2021-11-09 | End: 2021-12-22

## 2021-11-15 ENCOUNTER — TELEPHONE (OUTPATIENT)
Dept: INTERNAL MEDICINE | Facility: CLINIC | Age: 56
End: 2021-11-15

## 2021-11-15 DIAGNOSIS — E03.9 ACQUIRED HYPOTHYROIDISM: Primary | ICD-10-CM

## 2021-11-15 NOTE — TELEPHONE ENCOUNTER
Pt would like to know if Dr. Justin will send in a referral for her to see Dr. Timmons. Please advise.

## 2021-11-18 RX ORDER — DICLOFENAC SODIUM 75 MG/1
75 TABLET, DELAYED RELEASE ORAL 2 TIMES DAILY
Qty: 60 TABLET | Refills: 3 | Status: SHIPPED | OUTPATIENT
Start: 2021-11-18 | End: 2022-06-24 | Stop reason: HOSPADM

## 2021-11-18 NOTE — TELEPHONE ENCOUNTER
Rx Refill Note  Requested Prescriptions     Pending Prescriptions Disp Refills   • diclofenac (VOLTAREN) 75 MG EC tablet       Sig: Take 1 tablet by mouth 2 (Two) Times a Day.      Last office visit with prescribing clinician: 8/12/2021      Next office visit with prescribing clinician: Visit date not found            Maggie Neil MA  11/18/21, 08:00 CST

## 2021-12-06 ENCOUNTER — OFFICE VISIT (OUTPATIENT)
Dept: INTERNAL MEDICINE | Facility: CLINIC | Age: 56
End: 2021-12-06

## 2021-12-06 DIAGNOSIS — M54.6 ACUTE BILATERAL THORACIC BACK PAIN: Primary | ICD-10-CM

## 2021-12-06 PROCEDURE — 99213 OFFICE O/P EST LOW 20 MIN: CPT

## 2021-12-06 NOTE — PROGRESS NOTES
"        Subjective     No chief complaint on file.      History of Present Illness  Patient is a 56 year old female who presents with complaints of thoracic back pain that radiates across both shoulder blades . States she recently went  her 70 pound dog, and when she did she felt something pull in her \"mid back.\" She states that the pain feels like an \"achy pressure.\" Denies radiation of pain into her lower back or into her neck. She has been taking muscle relaxers and they are helping, but she wants to make sure nothing is wrong with her back     Patient's PMR from outside medical facility reviewed and noted.    Review of Systems   Constitutional: Negative for chills, fatigue and fever.   HENT: Negative for ear discharge, ear pain, postnasal drip and rhinorrhea.    Eyes: Negative for pain.   Respiratory: Negative for cough, chest tightness and shortness of breath.    Cardiovascular: Negative for chest pain and leg swelling.   Gastrointestinal: Negative for abdominal pain.   Genitourinary: Negative for difficulty urinating and hematuria.   Musculoskeletal: Positive for back pain.        Patient complains of  Acute mid back pain and right chronic knee pain.    Skin: Negative for color change, rash and wound.   Neurological: Negative for dizziness, weakness, light-headedness, numbness and headaches.   Psychiatric/Behavioral: Negative for behavioral problems and self-injury. The patient is not nervous/anxious.         Otherwise complete ROS reviewed and negative except as mentioned in the HPI.    Past Medical History:   Past Medical History:   Diagnosis Date   • Arthritis    • Cancer (CMS/HCC)     lymphoma of small bowel   • GERD (gastroesophageal reflux disease)    • Glaucoma    • Hyperlipidemia    • Hypothyroidism    • TIA (transient ischemic attack)      Past Surgical History:  Past Surgical History:   Procedure Laterality Date   • BREAST EXCISIONAL BIOPSY Left 01/2016    benign   • BREAST SURGERY Left 2017 "   • CHOLECYSTECTOMY     • COLON RESECTION SMALL BOWEL  2014   • COLONOSCOPY  2019   • ENDOSCOPY     • KNEE ARTHROSCOPY Right 7/14/2020    Procedure: RIGHT KNEE PARTIAL MEDIAL MENISCECTOMY;  Surgeon: Vijay Grewal MD;  Location:  [facility] OR;  Service: Orthopedics;  Laterality: Right;   • LAPAROSCOPIC TUBAL LIGATION     • TOTAL KNEE ARTHROPLASTY Right 7/28/2021    Procedure: RIGHT TOTAL KNEE REPLACEMENT;  Surgeon: Eliecer Foley MD;  Location:  [facility] OR;  Service: Orthopedics;  Laterality: Right;   • TUBAL ABDOMINAL LIGATION     • VAGINAL MESH REVISION      2010/2016   • WISDOM TOOTH EXTRACTION       Social History:  reports that she quit smoking about 20 years ago. Her smoking use included cigarettes. She has a 10.00 pack-year smoking history. She has never used smokeless tobacco. She reports that she does not drink alcohol and does not use drugs.    Family History: family history includes Cancer in her maternal aunt; Colon cancer (age of onset: 68) in her paternal aunt; Colon cancer (age of onset: 70) in her paternal aunt; Colon cancer (age of onset: 80) in her paternal aunt; Heart defect in her mother; Liver cancer in her paternal aunt; No Known Problems in her father and sister.      Allergies:  No Known Allergies  Medications:  Prior to Admission medications    Medication Sig Start Date End Date Taking? Authorizing Provider   cyclobenzaprine (FLEXERIL) 5 MG tablet Take 1 tablet by mouth 3 (Three) Times a Day As Needed for Muscle Spasms. 9/2/21   Jeannette Justin DO   diclofenac (VOLTAREN) 75 MG EC tablet Take 1 tablet by mouth 2 (Two) Times a Day. 11/18/21   Jeannette Justin DO   gabapentin (Neurontin) 100 MG capsule Take 1 capsule by mouth 3 (Three) Times a Day---Needs office appt for additional refills 11/9/21   Jeannette Justin DO   NON FORMULARY Pure encapsulation vitamins    Provider, MD Rosalba   ondansetron (ZOFRAN) 4 MG tablet Take 1 tablet by mouth every 6 hours 7/27/21       oxyCODONE (ROXICODONE) 5 MG immediate release tablet Take 1-2 Tablets by mouth every 4- 6 hours as needed for pain 8/27/21      rivaroxaban (Xarelto) 10 MG tablet Take 1 tablet by mouth every day for 21 days post op 7/27/21      saccharomyces boulardii (FLORASTOR) 250 MG capsule Take 250 mg by mouth 2 (Two) Times a Day.    Rosalba Zarate MD   Synthroid 75 MCG tablet TAKE 1 TABLET DAILY 6/15/21   Jeannette Justin DO   therapeutic multivitamin-minerals (THERAGRAN-M) tablet Take 1 tablet by mouth Daily.    Rosalba Zarate MD   THYROID PO Take 1 capsule by mouth Daily.    Rosalba Zarate MD   venlafaxine XR (EFFEXOR-XR) 75 MG 24 hr capsule Take 1 capsule by mouth Daily. 5/19/21   Jeannette Justin DO   venlafaxine XR (EFFEXOR-XR) 75 MG 24 hr capsule Take 1 capsule by mouth Daily. 6/22/21   Apple Ivan APRN   vitamin B-12 (CYANOCOBALAMIN) 500 MCG tablet Take 1 tablet by mouth Daily. 8/5/19   CATIE Howe MD   vitamin B-6 (PYRIDOXINE) 25 MG tablet Take 1 tablet by mouth Daily. 8/5/19   CATIE Howe MD   vitamin D3 125 MCG (5000 UT) capsule capsule Take 5,000 Units by mouth Daily.    Rosalba Zarate MD       Objective     Vital Signs: [facility] 06/30/2019   Physical Exam  Constitutional:       Appearance: Normal appearance. She is normal weight.   HENT:      Head: Normocephalic and atraumatic.      Nose: Nose normal.      Mouth/Throat:      Mouth: Mucous membranes are moist.   Cardiovascular:      Heart sounds: Normal heart sounds.   Pulmonary:      Effort: Pulmonary effort is normal.      Breath sounds: Normal breath sounds.   Abdominal:      General: There is no distension.   Musculoskeletal:         General: Tenderness present. No deformity. Normal range of motion.      Cervical back: Normal range of motion and neck supple.      Comments: Mild tenderness of thoracic spine with palpation.    Skin:     General: Skin is warm and dry.   Neurological:      General: No focal deficit  present.      Mental Status: She is alert and oriented to person, place, and time. Mental status is at baseline.      Motor: No weakness.   Psychiatric:         Mood and Affect: Mood normal.         Behavior: Behavior normal.       Patient's There is no height or weight on file to calculate BMI. indicating that she is within normal range (BMI 18.5-24.9). No BMI management plan needed..      Results Reviewed:  Glucose   Date Value Ref Range Status   08/11/2021 112 (H) 65 - 99 mg/dL Final     BUN   Date Value Ref Range Status   08/11/2021 14 6 - 20 mg/dL Final     Creatinine   Date Value Ref Range Status   08/11/2021 0.83 0.57 - 1.00 mg/dL Final   09/17/2019 0.70 0.60 - 1.30 mg/dL Final     Comment:     Serial Number: [id number]:  [id number]     Sodium   Date Value Ref Range Status   08/11/2021 138 136 - 145 mmol/L Final     Potassium   Date Value Ref Range Status   08/11/2021 4.1 3.5 - 5.2 mmol/L Final     Chloride   Date Value Ref Range Status   08/11/2021 100 98 - 107 mmol/L Final     CO2   Date Value Ref Range Status   08/11/2021 26.0 22.0 - 29.0 mmol/L Final     Calcium   Date Value Ref Range Status   08/11/2021 9.8 8.6 - 10.5 mg/dL Final     ALT (SGPT)   Date Value Ref Range Status   08/11/2021 30 1 - 33 U/L Final     AST (SGOT)   Date Value Ref Range Status   08/11/2021 22 1 - 32 U/L Final     WBC   Date Value Ref Range Status   08/11/2021 6.12 3.40 - 10.80 10*3/mm3 Final   09/02/2020 4.8 3.4 - 10.8 x10E3/uL Final     Hematocrit   Date Value Ref Range Status   08/11/2021 29.8 (L) 34.0 - 46.6 % Final     Platelets   Date Value Ref Range Status   08/11/2021 406 140 - 450 10*3/mm3 Final     Total Cholesterol   Date Value Ref Range Status   07/30/2019 252 (H) 130 - 200 mg/dL Final     Triglycerides   Date Value Ref Range Status   07/30/2019 261 (H) 0 - 149 mg/dL Final     HDL Cholesterol   Date Value Ref Range Status   07/30/2019 84 >=50 mg/dL Final     LDL Cholesterol    Date Value Ref Range Status   07/30/2019  124 (H) 0 - 99 mg/dL Final     LDL/HDL Ratio   Date Value Ref Range Status   07/30/2019 1.38  Final     Reviewed results of Thoracic spine xray with patient:  EXAMINATION: Thoracic spine 3 views 12/6/2021     HISTORY: Back pain after picking up dog.     FINDINGS: Three-view exam of the thoracic spine demonstrates no fracture  or listhesis. The disc space heights are well-maintained.     IMPRESSION:  1. No evidence of fracture or listhesis.  This report was finalized on 12/06/2021 16:11 by Dr. Cooper Ibrahim MD.    Assessment / Plan     Assessment/Plan:  1. Acute bilateral thoracic back pain  - XR Spine Thoracic 3 View (In Office)      Return if symptoms worsen or fail to improve. unless patient needs to be seen sooner or acute issues arise.    Code Status: Full    I have discussed the patient results/orders and and plan/recommendation with them at today's visit.      Carmen Cuevas, NAVEED   12/06/2021

## 2021-12-07 ENCOUNTER — CLINICAL SUPPORT (OUTPATIENT)
Dept: INTERNAL MEDICINE | Facility: CLINIC | Age: 56
End: 2021-12-07

## 2021-12-07 ENCOUNTER — TELEPHONE (OUTPATIENT)
Dept: INTERNAL MEDICINE | Facility: CLINIC | Age: 56
End: 2021-12-07

## 2021-12-07 DIAGNOSIS — R05.9 COUGH: Primary | ICD-10-CM

## 2021-12-07 DIAGNOSIS — J02.9 SORE THROAT: ICD-10-CM

## 2021-12-07 LAB — SARS-COV-2 RNA PNL SPEC NAA+PROBE: NOT DETECTED

## 2021-12-07 PROCEDURE — 87635 SARS-COV-2 COVID-19 AMP PRB: CPT | Performed by: FAMILY MEDICINE

## 2021-12-07 PROCEDURE — 99211 OFF/OP EST MAY X REQ PHY/QHP: CPT | Performed by: INTERNAL MEDICINE

## 2021-12-07 NOTE — TELEPHONE ENCOUNTER
COVID-19 Test Result   Telephone Encounter    Patient Name: Desirae Alberto   : 1965   MRN: [id number]     COVID19   Date Value Ref Range Status   2021 Not Detected Not Detected - Ref. Range Final        Patient was counseled as follows:  • (-) negative COVID-19 test result with or without symptoms   • The test is not perfect, so there is a chance it could be falsely negative or the virus level is too low for detection due to being very early in the infectious process.   • The optimal duration of home isolation is uncertain. The [place] Centers for Disease Control and Prevention (CDC) has issued recommendations on discontinuation of home isolation.   • For this reason, Desirae is strongly encouraged to practice the safest standards in protecting their health and others given the current pandemic concerns. She is advised to:   o Practice social distancing in the community by staying at least 6 feet away from people   o Encouraged to use face mask while out in public   o Continue to wash their hands frequently with soap and hot water, and cover their mouth while coughing.   • If Desirae is asymptomatic, she should self isolate for a total of 14 days from time of potential contact with Covid-19.   • If Desirae is symptomatic then she may discontinue home isolation when the following criteria are met:   o At least seven days have passed since symptoms first appeared AND   o At least three days (72 hours) have passed since recovery of symptoms (defined as resolution of fever without the use of fever-reducing medications and improvement in respiratory symptoms [e.g., cough, shortness of breath])   • If Desirae has been asymptomatic but then develops non-emergent symptoms such as mild increased shortness of breath, fever, cough, or for other questions, she  was asked to please call their primary care physician’s office or the [place] [employer] at [phone].   · Questions were engaged and answered to the best  of my ability. She         expressed verbal understanding of their test results and my advice.    Primary Care Physician verified as being: Jeannette Justin DO      Electronically signed by Maggie Neil MA, 12/07/21, 1:50 PM CST.

## 2021-12-07 NOTE — PROGRESS NOTES
Desirae ARIZMENDI Dolly  1965  [id number]    Verified patient's NAME and  before test was performed.     COVID-19 Test Performed:   Nasopharyngeal Swab     Location:  PATIENT VEHICLE     How did the patient tolerate the test?   Well tolerated by patient..    Staff Member Performing Test:  Deisy Wynn CMA    PPE Worn:    mask, gloves, eye protection, face shield and gown            Patient presented for COVID-19 testing as ordered by the provider. Appropriate PPE donned. Patient tolerated well. Patient was given COVID-19 Fact Sheet, How to Quarantine at Home Instructions, and Infection Prevention in the Home handout. Patient advised that results are expected within 2-4 days depending on the time of test.     While waiting for results of test, patient was asked to please call their primary care physician's office or the [place] hotline at [phone] for support of non-emergent symptoms expected with this virus such as increased shortness of breath, fever, cough, or other questions.    Patient was advised to seek care in the Emergency Department should extreme symptoms arise such as new onset confusion, extreme lethargy, hypoxia, or new hypotension.     Questions were engaged and answered to the best of my ability, patient expressed verbal understanding.

## 2021-12-08 DIAGNOSIS — R05.9 COUGH: Primary | ICD-10-CM

## 2021-12-08 RX ORDER — PROMETHAZINE HYDROCHLORIDE AND CODEINE PHOSPHATE 6.25; 1 MG/5ML; MG/5ML
5 SYRUP ORAL EVERY 4 HOURS PRN
Qty: 180 ML | Refills: 0 | Status: SHIPPED | OUTPATIENT
Start: 2021-12-08 | End: 2021-12-21 | Stop reason: SDUPTHER

## 2021-12-21 ENCOUNTER — TELEPHONE (OUTPATIENT)
Dept: NEUROSURGERY | Facility: CLINIC | Age: 56
End: 2021-12-21

## 2021-12-21 DIAGNOSIS — R05.9 COUGH: ICD-10-CM

## 2021-12-21 RX ORDER — PROMETHAZINE HYDROCHLORIDE AND CODEINE PHOSPHATE 6.25; 1 MG/5ML; MG/5ML
5 SYRUP ORAL EVERY 4 HOURS PRN
Qty: 180 ML | Refills: 0 | Status: SHIPPED | OUTPATIENT
Start: 2021-12-21 | End: 2022-02-28

## 2021-12-21 NOTE — TELEPHONE ENCOUNTER
Caller: Desirae Alberto    Relationship to patient: Self    Best call back number: [phone]    Patient is needing:PATIENT STATES THAT AFTER HER KNEE SURGERY SHE IS FEELING MUCH BETTER.  SHE DIDN'T WANT TO CANCEL AND NEVER COME BACK, BUT AT THIS TIME SHE IS FEELING FINE, PLEASE ADVISE IF PATIENT IS NEEDING TO HAVE A FOLLOW UP.    THANK YOU

## 2021-12-21 NOTE — TELEPHONE ENCOUNTER
Rx Refill Note  Requested Prescriptions     Pending Prescriptions Disp Refills   • promethazine-codeine (PHENERGAN with CODEINE) 6.25-10 MG/5ML syrup 180 mL 0     Sig: Take 5 mL by mouth Every 4 (Four) Hours As Needed for Cough.      Last office visit with prescribing clinician: 8/12/2021      Next office visit with prescribing clinician: Visit date not found            Maggie Neil MA  12/21/21, 12:21 CST

## 2021-12-22 DIAGNOSIS — G60.9 HEREDITARY PERIPHERAL NEUROPATHY: ICD-10-CM

## 2021-12-22 RX ORDER — GABAPENTIN 100 MG/1
100 CAPSULE ORAL 3 TIMES DAILY
Qty: 90 CAPSULE | Refills: 0 | Status: SHIPPED | OUTPATIENT
Start: 2021-12-22 | End: 2022-07-08

## 2021-12-22 NOTE — TELEPHONE ENCOUNTER
Rx Refill Note  Requested Prescriptions     Pending Prescriptions Disp Refills   • gabapentin (NEURONTIN) 100 MG capsule [Pharmacy Med Name: Gabapentin 100 MG Oral Capsule (Neurontin)] 90 capsule 0     Sig: Take 1 capsule by mouth 3 (Three) Times a Day---Needs office appt for additional refills      Last office visit with prescribing clinician: 8/12/2021      Next office visit with prescribing clinician: Visit date not found            Maggie Neil MA  12/22/21, 12:32 CST

## 2022-01-27 DIAGNOSIS — R10.9 RIGHT FLANK PAIN: Primary | ICD-10-CM

## 2022-02-12 DIAGNOSIS — M54.50 LOW BACK PAIN: ICD-10-CM

## 2022-02-14 DIAGNOSIS — M54.50 LOW BACK PAIN: ICD-10-CM

## 2022-02-14 RX ORDER — CYCLOBENZAPRINE HCL 5 MG
TABLET ORAL
Qty: 30 TABLET | Refills: 2 | OUTPATIENT
Start: 2022-02-14

## 2022-02-14 RX ORDER — CYCLOBENZAPRINE HCL 5 MG
TABLET ORAL
Qty: 30 TABLET | Refills: 2 | Status: SHIPPED | OUTPATIENT
Start: 2022-02-14 | End: 2022-07-08

## 2022-02-14 NOTE — TELEPHONE ENCOUNTER
Rx Refill Note  Requested Prescriptions     Pending Prescriptions Disp Refills   • cyclobenzaprine (FLEXERIL) 5 MG tablet 30 tablet 2     Sig: Take 1 tablet by mouth 3 (Three) Times a Day As Needed for Muscle Spasms.   Med last filled:  9/2/21  Last office visit with prescribing clinician: 8/12/2021      Next office visit with prescribing clinician: Visit date not found            Rachel Randolph RN  02/14/22, 14:45 CST

## 2022-02-17 ENCOUNTER — CLINICAL SUPPORT (OUTPATIENT)
Dept: INTERNAL MEDICINE | Facility: CLINIC | Age: 57
End: 2022-02-17

## 2022-02-17 ENCOUNTER — TELEPHONE (OUTPATIENT)
Dept: INTERNAL MEDICINE | Facility: CLINIC | Age: 57
End: 2022-02-17

## 2022-02-17 DIAGNOSIS — Z20.822 EXPOSURE TO COVID-19 VIRUS: ICD-10-CM

## 2022-02-17 DIAGNOSIS — R05.9 COUGH: ICD-10-CM

## 2022-02-17 DIAGNOSIS — R09.81 NASAL CONGESTION: Primary | ICD-10-CM

## 2022-02-17 LAB — SARS-COV-2 RNA PNL SPEC NAA+PROBE: NOT DETECTED

## 2022-02-17 PROCEDURE — 87635 SARS-COV-2 COVID-19 AMP PRB: CPT | Performed by: NURSE PRACTITIONER

## 2022-02-17 PROCEDURE — 99211 OFF/OP EST MAY X REQ PHY/QHP: CPT | Performed by: FAMILY MEDICINE

## 2022-02-17 NOTE — PROGRESS NOTES
Desirae ARIZMENDI Dolly  1965  [id number]    Verified patient's NAME and  before test was performed.     COVID-19 Test Performed:   Nasopharyngeal Swab     Location:  [facility] ROOM     How did the patient tolerate the test?   Well tolerated by patient..    Staff Member Performing Test:  Maren Onofre CMA    PPE Worn:    mask, gloves, eye protection, face shield and gown            Patient presented for COVID-19 testing as ordered by the provider. Appropriate PPE donned. Patient tolerated well. Patient was given COVID-19 Fact Sheet, How to Quarantine at Home Instructions, and Infection Prevention in the Home handout. Patient advised that results are expected within 2-4 days depending on the time of test.     While waiting for results of test, patient was asked to please call their primary care physician's office or the [place] hotline at [phone] for support of non-emergent symptoms expected with this virus such as increased shortness of breath, fever, cough, or other questions.    Patient was advised to seek care in the Emergency Department should extreme symptoms arise such as new onset confusion, extreme lethargy, hypoxia, or new hypotension.     Questions were engaged and answered to the best of my ability, patient expressed verbal understanding.

## 2022-02-17 NOTE — TELEPHONE ENCOUNTER
COVID-19 Test Result   Telephone Encounter    Patient Name: Desirae Alberto   : 1965   MRN: [id number]     COVID19   Date Value Ref Range Status   2022 Not Detected Not Detected - Ref. Range Final        Patient was counseled as follows:  • (-) negative COVID-19 test result with or without symptoms   • The test is not perfect, so there is a chance it could be falsely negative or the virus level is too low for detection due to being very early in the infectious process.   • The optimal duration of home isolation is uncertain. The [place] Centers for Disease Control and Prevention (CDC) has issued recommendations on discontinuation of home isolation.   • For this reason, Desirae is strongly encouraged to practice the safest standards in protecting their health and others given the current pandemic concerns. She is advised to:   o Practice social distancing in the community by staying at least 6 feet away from people   o Encouraged to use face mask while out in public   o Continue to wash their hands frequently with soap and hot water, and cover their mouth while coughing.   • If Desirae is asymptomatic, she should self isolate for a total of 14 days from time of potential contact with Covid-19.   • If Desirae is symptomatic then she may discontinue home isolation when the following criteria are met:   o At least seven days have passed since symptoms first appeared AND   o At least three days (72 hours) have passed since recovery of symptoms (defined as resolution of fever without the use of fever-reducing medications and improvement in respiratory symptoms [e.g., cough, shortness of breath])   • If Desirae has been asymptomatic but then develops non-emergent symptoms such as mild increased shortness of breath, fever, cough, or for other questions, she  was asked to please call their primary care physician’s office or the [place] [employer] at [phone].   · Questions were engaged and answered to the best  of my ability. She         expressed verbal understanding of their test results and my advice.    Primary Care Physician verified as being: Jeannette Justin DO      Electronically signed by Maggie Neil MA, 02/17/22, 2:46 PM CST.

## 2022-02-28 ENCOUNTER — OFFICE VISIT (OUTPATIENT)
Dept: ENDOCRINOLOGY | Facility: CLINIC | Age: 57
End: 2022-02-28

## 2022-02-28 ENCOUNTER — LAB (OUTPATIENT)
Dept: LAB | Facility: HOSPITAL | Age: 57
End: 2022-02-28

## 2022-02-28 ENCOUNTER — SPECIALTY PHARMACY (OUTPATIENT)
Dept: ENDOCRINOLOGY | Facility: CLINIC | Age: 57
End: 2022-02-28

## 2022-02-28 VITALS
WEIGHT: 196 LBS | OXYGEN SATURATION: 96 % | HEART RATE: 108 BPM | BODY MASS INDEX: 34.73 KG/M2 | SYSTOLIC BLOOD PRESSURE: 120 MMHG | HEIGHT: 63 IN | DIASTOLIC BLOOD PRESSURE: 70 MMHG

## 2022-02-28 DIAGNOSIS — E03.8 HYPOTHYROIDISM DUE TO HASHIMOTO'S THYROIDITIS: Primary | ICD-10-CM

## 2022-02-28 DIAGNOSIS — E06.3 HYPOTHYROIDISM DUE TO HASHIMOTO'S THYROIDITIS: Primary | ICD-10-CM

## 2022-02-28 DIAGNOSIS — R73.03 PREDIABETES: Primary | ICD-10-CM

## 2022-02-28 DIAGNOSIS — R73.03 PREDIABETES: ICD-10-CM

## 2022-02-28 DIAGNOSIS — E66.09 CLASS 1 OBESITY DUE TO EXCESS CALORIES WITH SERIOUS COMORBIDITY AND BODY MASS INDEX (BMI) OF 34.0 TO 34.9 IN ADULT: ICD-10-CM

## 2022-02-28 DIAGNOSIS — E78.5 DYSLIPIDEMIA: ICD-10-CM

## 2022-02-28 LAB
ALBUMIN SERPL-MCNC: 5 G/DL (ref 3.5–5.2)
ALBUMIN/GLOB SERPL: 1.9 G/DL
ALP SERPL-CCNC: 116 U/L (ref 39–117)
ALT SERPL W P-5'-P-CCNC: 54 U/L (ref 1–33)
ANION GAP SERPL CALCULATED.3IONS-SCNC: 13 MMOL/L (ref 5–15)
AST SERPL-CCNC: 37 U/L (ref 1–32)
BASOPHILS # BLD AUTO: 0.03 10*3/MM3 (ref 0–0.2)
BASOPHILS NFR BLD AUTO: 0.6 % (ref 0–1.5)
BILIRUB SERPL-MCNC: 0.3 MG/DL (ref 0–1.2)
BUN SERPL-MCNC: 18 MG/DL (ref 6–20)
BUN/CREAT SERPL: 25.4 (ref 7–25)
CALCIUM SPEC-SCNC: 9.8 MG/DL (ref 8.6–10.5)
CHLORIDE SERPL-SCNC: 103 MMOL/L (ref 98–107)
CHOLEST SERPL-MCNC: 284 MG/DL (ref 0–200)
CO2 SERPL-SCNC: 26 MMOL/L (ref 22–29)
CREAT SERPL-MCNC: 0.71 MG/DL (ref 0.57–1)
DEPRECATED RDW RBC AUTO: 41.1 FL (ref 37–54)
EGFRCR SERPLBLD CKD-EPI 2021: 99.9 ML/MIN/1.73
EOSINOPHIL # BLD AUTO: 0.09 10*3/MM3 (ref 0–0.4)
EOSINOPHIL NFR BLD AUTO: 1.7 % (ref 0.3–6.2)
ERYTHROCYTE [DISTWIDTH] IN BLOOD BY AUTOMATED COUNT: 13.2 % (ref 12.3–15.4)
EXPIRATION DATE: ABNORMAL
GLOBULIN UR ELPH-MCNC: 2.6 GM/DL
GLUCOSE SERPL-MCNC: 108 MG/DL (ref 65–99)
HBA1C MFR BLD: 5.8 %
HCT VFR BLD AUTO: 40.7 % (ref 34–46.6)
HDLC SERPL-MCNC: 67 MG/DL (ref 40–60)
HGB BLD-MCNC: 13.5 G/DL (ref 12–15.9)
IMM GRANULOCYTES # BLD AUTO: 0.01 10*3/MM3 (ref 0–0.05)
IMM GRANULOCYTES NFR BLD AUTO: 0.2 % (ref 0–0.5)
LDLC SERPL CALC-MCNC: 159 MG/DL (ref 0–100)
LDLC/HDLC SERPL: 2.3 {RATIO}
LYMPHOCYTES # BLD AUTO: 2.19 10*3/MM3 (ref 0.7–3.1)
LYMPHOCYTES NFR BLD AUTO: 42.4 % (ref 19.6–45.3)
Lab: ABNORMAL
MCH RBC QN AUTO: 28.7 PG (ref 26.6–33)
MCHC RBC AUTO-ENTMCNC: 33.2 G/DL (ref 31.5–35.7)
MCV RBC AUTO: 86.4 FL (ref 79–97)
MONOCYTES # BLD AUTO: 0.39 10*3/MM3 (ref 0.1–0.9)
MONOCYTES NFR BLD AUTO: 7.6 % (ref 5–12)
NEUTROPHILS NFR BLD AUTO: 2.45 10*3/MM3 (ref 1.7–7)
NEUTROPHILS NFR BLD AUTO: 47.5 % (ref 42.7–76)
NRBC BLD AUTO-RTO: 0 /100 WBC (ref 0–0.2)
PLATELET # BLD AUTO: 271 10*3/MM3 (ref 140–450)
PMV BLD AUTO: 9.9 FL (ref 6–12)
POTASSIUM SERPL-SCNC: 4.2 MMOL/L (ref 3.5–5.2)
PROT SERPL-MCNC: 7.6 G/DL (ref 6–8.5)
RBC # BLD AUTO: 4.71 10*6/MM3 (ref 3.77–5.28)
SODIUM SERPL-SCNC: 142 MMOL/L (ref 136–145)
TRIGL SERPL-MCNC: 313 MG/DL (ref 0–150)
TSH SERPL DL<=0.05 MIU/L-ACNC: 5.21 UIU/ML (ref 0.27–4.2)
VLDLC SERPL-MCNC: 58 MG/DL (ref 5–40)
WBC NRBC COR # BLD: 5.16 10*3/MM3 (ref 3.4–10.8)

## 2022-02-28 PROCEDURE — 83036 HEMOGLOBIN GLYCOSYLATED A1C: CPT | Performed by: INTERNAL MEDICINE

## 2022-02-28 PROCEDURE — 80050 GENERAL HEALTH PANEL: CPT | Performed by: INTERNAL MEDICINE

## 2022-02-28 PROCEDURE — 82570 ASSAY OF URINE CREATININE: CPT | Performed by: INTERNAL MEDICINE

## 2022-02-28 PROCEDURE — 82607 VITAMIN B-12: CPT | Performed by: INTERNAL MEDICINE

## 2022-02-28 PROCEDURE — 99204 OFFICE O/P NEW MOD 45 MIN: CPT | Performed by: INTERNAL MEDICINE

## 2022-02-28 PROCEDURE — 82306 VITAMIN D 25 HYDROXY: CPT | Performed by: INTERNAL MEDICINE

## 2022-02-28 PROCEDURE — 36415 COLL VENOUS BLD VENIPUNCTURE: CPT | Performed by: INTERNAL MEDICINE

## 2022-02-28 PROCEDURE — 82043 UR ALBUMIN QUANTITATIVE: CPT | Performed by: INTERNAL MEDICINE

## 2022-02-28 PROCEDURE — 80061 LIPID PANEL: CPT | Performed by: INTERNAL MEDICINE

## 2022-02-28 PROCEDURE — 86376 MICROSOMAL ANTIBODY EACH: CPT | Performed by: INTERNAL MEDICINE

## 2022-02-28 RX ORDER — ROSUVASTATIN CALCIUM 5 MG/1
5 TABLET, COATED ORAL NIGHTLY
Qty: 30 TABLET | Refills: 11 | Status: SHIPPED | OUTPATIENT
Start: 2022-02-28 | End: 2022-06-08

## 2022-02-28 RX ORDER — PROCHLORPERAZINE 25 MG/1
1 SUPPOSITORY RECTAL ONCE
Qty: 1 EACH | Refills: 1 | Status: SHIPPED | OUTPATIENT
Start: 2022-02-28 | End: 2022-02-28

## 2022-02-28 RX ORDER — ESTRADIOL 0.03 MG/D
1 FILM, EXTENDED RELEASE TRANSDERMAL 2 TIMES WEEKLY
Qty: 8 PATCH | Refills: 11 | Status: SHIPPED | OUTPATIENT
Start: 2022-02-28 | End: 2022-09-14

## 2022-02-28 RX ORDER — ICOSAPENT ETHYL 1000 MG/1
2 CAPSULE ORAL 2 TIMES DAILY WITH MEALS
Qty: 120 CAPSULE | Refills: 11 | Status: SHIPPED | OUTPATIENT
Start: 2022-02-28 | End: 2022-07-08

## 2022-02-28 RX ORDER — CALCIUM CARBONATE 260MG(650)
10 TABLET,CHEWABLE ORAL DAILY
COMMUNITY
End: 2022-08-09

## 2022-02-28 RX ORDER — PROGESTERONE 100 MG/1
100 CAPSULE ORAL DAILY
Qty: 30 CAPSULE | Refills: 11 | Status: SHIPPED | OUTPATIENT
Start: 2022-02-28 | End: 2022-08-09

## 2022-02-28 RX ORDER — MULTIVIT-MIN/IRON/FOLIC ACID/K 18-600-40
1 CAPSULE ORAL DAILY
COMMUNITY

## 2022-02-28 RX ORDER — PROCHLORPERAZINE 25 MG/1
1 SUPPOSITORY RECTAL ONCE
Qty: 1 EACH | Refills: 3 | Status: SHIPPED | OUTPATIENT
Start: 2022-02-28 | End: 2022-03-01

## 2022-02-28 RX ORDER — PROCHLORPERAZINE 25 MG/1
SUPPOSITORY RECTAL AS NEEDED
Qty: 9 EACH | Refills: 3 | Status: SHIPPED | OUTPATIENT
Start: 2022-02-28 | End: 2022-06-08

## 2022-02-28 RX ORDER — SEMAGLUTIDE 0.25 MG/.5ML
0.25 INJECTION, SOLUTION SUBCUTANEOUS WEEKLY
Qty: 2 ML | Refills: 11 | Status: SHIPPED | OUTPATIENT
Start: 2022-02-28 | End: 2022-03-23

## 2022-02-28 NOTE — PROGRESS NOTES
"Chief Complaint   Patient presents with   • Hypothyroidism         History of Present Illness    57 y.o. female         ==========================================  Physical Exam  /70   Pulse 108   Ht 160 cm (63\")   Wt 88.9 kg (196 lb)   LMP 06/30/2019   SpO2 96%   BMI 34.72 kg/m²   AOx3  No Goiter , no carotid bruit  RRR  CTA  No Edema     ==========================================    Laboratory Workup    Lab Results   Component Value Date    WBC 6.12 08/11/2021    HGB 10.0 (L) 08/11/2021    HCT 29.8 (L) 08/11/2021    MCV 88.7 08/11/2021     08/11/2021       Lab Results   Component Value Date    GLUCOSE 112 (H) 08/11/2021    BUN 14 08/11/2021    CREATININE 0.83 08/11/2021    EGFRIFNONA 71 08/11/2021    EGFRIFAFRI 84 09/11/2020    BCR 16.9 08/11/2021     08/11/2021    K 4.1 08/11/2021    CO2 26.0 08/11/2021    CALCIUM 9.8 08/11/2021    PROTENTOTREF 7.1 09/11/2020    ALBUMIN 4.40 08/11/2021    LABIL2 1.7 09/11/2020    AST 22 08/11/2021    ALT 30 08/11/2021           ==========================================      ICD-10-CM ICD-9-CM   1. Hypothyroidism due to Hashimoto's thyroiditis  E03.8 244.8    E06.3 245.2   2. Class 1 obesity due to excess calories with serious comorbidity and body mass index (BMI) of 34.0 to 34.9 in adult  E66.09 278.00    Z68.34 V85.34   3. Prediabetes  R73.03 790.29   4. Dyslipidemia  E78.5 272.4   -    You have prediabetes    A normal waking sugar is 99 or less    Diabetes is a waking sugar of 125 or more    Monitor your glucose in the morning and your goal is to be less than 130 per if you can achieve normal waking sugars then that is even better    ======    Abnormal glucose reading 2 hours after eating is less than 140    Diabetes is glucose readings 2 hours after eating exceeding 200    Monitor your glucose 2 hours after eating and your goal is to be less than 180.    Your hemoglobin A1c is 5.8%    A normal hemoglobin A1c is 5.6 or less, diabetes is an A1c of 6.5 " or more.    The goal is to have an A1c less than 7% but if we can achieve a lower A1c without having a low glucose then that is even better.    Diabetes guidelines suggest the following    Try to exercise 30 minutes daily, 5 days of the week    Try to consume no more than 45 to 50 g of carbohydrate per meal.    We can use a device called Dexcom that requires a Dexcom [employer] lisha on your phone or a reader that comes with the prescription and this can be used to track your sugar.    ============    Dyslipidemia    Lab Results   Component Value Date    CHOL 252 (H) 07/30/2019    TRIG 261 (H) 07/30/2019    HDL 84 07/30/2019     (H) 07/30/2019     Since you have had a previous TIA, the goal cholesterol should be less than 70 mg/dL.    We will start with a low-dose of rosuvastatin 5 mg at night    Ideally the dose of rosuvastatin should be somewhere between 20 to 40 mg but let us just start with 5 mg to see if you tolerate it    If possible, also take Vascepa, the prescription is 2 g twice daily but you can start with only 1 g at night or in the morning.    Both Vascepa and rosuvastatin decrease a future event (cardiovascular event or stroke or death) by about 30 to 40%      ------------    Obesity    Weight loss is certainly associated with less risk of cardiovascular events and strokes.    We will start with goal be 0.25 mg weekly.    On a monthly basis we can escalate as follows    0.5 mg, 1 mg, 1.7, 2.4 mg.    There is no need to escalate if you are obtaining a good weight loss on the present dose    -----------    For management of hot flashes we will start:    Estrogen patches 0.5 mg/ 24 h on a patch that is applied twice weekly    To protect the endometrium from the estrogen effects we needs to take Prometrium 100 mg daily    Taking estrogen increases the risk of clots between 2-4 per [place] woman for a 5-year.    --------------------      Hypothyroidism, subclinical    A rising TSH implies hypothyroidism but  there is no benefit in treatment unless the TSH rises above 10  As long as less than 10 we can just observe    It is possible that you can tolerate levothyroxine replacement once estrogen is normal      Orders Placed This Encounter   Procedures   • CBC Auto Differential     Order Specific Question:   Release to patient     Answer:   Immediate   • Comprehensive Metabolic Panel     Order Specific Question:   Release to patient     Answer:   Immediate   • Lipid Panel   • Microalbumin / Creatinine Urine Ratio - Urine, Clean Catch     Order Specific Question:   Release to patient     Answer:   Immediate   • TSH     Order Specific Question:   Release to patient     Answer:   Immediate   • Vitamin B12     Order Specific Question:   Release to patient     Answer:   Immediate   • Vitamin D 25 Hydroxy     Order Specific Question:   Release to patient     Answer:   Immediate   • Thyroid Peroxidase Antibody     Order Specific Question:   Release to patient     Answer:   Immediate                This document has been electronically signed by Kalen Timmons MD on February 28, 2022 14:44 CST

## 2022-02-28 NOTE — PATIENT INSTRUCTIONS
You have prediabetes    A normal waking sugar is 99 or less    Diabetes is a waking sugar of 125 or more    Monitor your glucose in the morning and your goal is to be less than 130 per if you can achieve normal waking sugars then that is even better    ======    Abnormal glucose reading 2 hours after eating is less than 140    Diabetes is glucose readings 2 hours after eating exceeding 200    Monitor your glucose 2 hours after eating and your goal is to be less than 180.    Your hemoglobin A1c is 5.8%    A normal hemoglobin A1c is 5.6 or less, diabetes is an A1c of 6.5 or more.    The goal is to have an A1c less than 7% but if we can achieve a lower A1c without having a low glucose then that is even better.    Diabetes guidelines suggest the following    Try to exercise 30 minutes daily, 5 days of the week    Try to consume no more than 45 to 50 g of carbohydrate per meal.    We can use a device called Dexcom that requires a Dexcom G6 lisha on your phone or a reader that comes with the prescription and this can be used to track your sugar.    ============    Dyslipidemia    Lab Results   Component Value Date    CHOL 252 (H) 07/30/2019    TRIG 261 (H) 07/30/2019    HDL 84 07/30/2019     (H) 07/30/2019     Since you have had a previous TIA, the goal cholesterol should be less than 70 mg/dL.    We will start with a low-dose of rosuvastatin 5 mg at night    Ideally the dose of rosuvastatin should be somewhere between 20 to 40 mg but let us just start with 5 mg to see if you tolerate it    If possible, also take Vascepa, the prescription is 2 g twice daily but you can start with only 1 g at night or in the morning.    Both Vascepa and rosuvastatin decrease a future event (cardiovascular event or stroke or death) by about 30 to 40%      ------------    Obesity    Weight loss is certainly associated with less risk of cardiovascular events and strokes.    We will start with goal be 0.25 mg weekly.    On a monthly basis  we can escalate as follows    0.5 mg, 1 mg, 1.7, 2.4 mg.    There is no need to escalate if you are obtaining a good weight loss on the present dose    -----------    For management of hot flashes we will start:    Estrogen patches 0.5 mg/ 24 h on a patch that is applied twice weekly    To protect the endometrium from the estrogen effects we needs to take Prometrium 100 mg daily    Taking estrogen increases the risk of clots between 2-4 per [place] woman for a 5-year.    --------------------      Hypothyroidism, subclinical    A rising TSH implies hypothyroidism but there is no benefit in treatment unless the TSH rises above 10  As long as less than 10 we can just observe    It is possible that you can tolerate levothyroxine replacement once estrogen is normal

## 2022-03-01 LAB
25(OH)D3 SERPL-MCNC: 38 NG/ML (ref 30–100)
ALBUMIN UR-MCNC: <1.2 MG/DL
CREAT UR-MCNC: 31.5 MG/DL
MICROALBUMIN/CREAT UR: NORMAL MG/G{CREAT}
VIT B12 BLD-MCNC: 557 PG/ML (ref 211–946)

## 2022-03-01 NOTE — PROGRESS NOTES
Specialty Pharmacy Program - Endocrinology       Desirae Alberto is a 57 y.o. female with Other (Specify): prediabetes. Hyperlipidemia, and BMI>30 enrolled in the Endocrinology Patient Management program offered by [facility].  An initial outreach was conducted, including assessment of therapy appropriateness and specialty medication education for Wegovy, Vascepa, and Dexcom. The patient was introduced to services offered by our specialty pharmacy program, including: regular assessments, refill coordination, curbside pick-up or mail order delivery options, prior authorization maintenance, and financial assistance programs as applicable. The patient was also provided with contact information for the pharmacy team.     Insurance Coverage & Financial Support  Commercial[employer]    Relevant Past Medical History and Comorbidities  Relevant medical history and concomitant health conditions were discussed with the patient. The patient's chart has been reviewed for relevant past medical history and comorbid health conditions and updated as necessary.   Past Medical History:   Diagnosis Date   • Arthritis    • Cancer (HCC)     lymphoma of small bowel   • GERD (gastroesophageal reflux disease)    • Glaucoma    • Hyperlipidemia    • Hypothyroidism    • TIA (transient ischemic attack)      Social History     Socioeconomic History   • Marital status:    Tobacco Use   • Smoking status: Former Smoker     Packs/day: 0.50     Years: 20.00     Pack years: 10.00     Types: Cigarettes     Quit date:      Years since quittin.1   • Smokeless tobacco: Never Used   • Tobacco comment: quit    Vaping Use   • Vaping Use: Never used   Substance and Sexual Activity   • Alcohol use: No   • Drug use: No   • Sexual activity: Defer       Problem list reviewed by Wiliam Fernandez, PharmD on 3/1/2022 at  8:42 AM    Allergies  Known allergies and reactions were discussed with the patient. The patient's  chart has been reviewed for  allergy information and updated as necessary.   Patient has no known allergies.    Allergies reviewed by Wiliam Fernandez, PharmD on 3/1/2022 at  8:41 AM    Current Medication List  This medication list has been reviewed with the patient and evaluated for any interactions or necessary modifications/recommendations, and updated to include all prescription medications, OTC medications, and supplements the patient is currently taking.  This list reflects what is contained in the patient's profile, which has also been marked as reviewed to communicate to other providers it is the most up to date version of the patient's current medication therapy.     Current Outpatient Medications:   •  Ascorbic Acid (Vitamin C) 500 MG capsule, Take  by mouth Daily., Disp: , Rfl:   •  Continuous Blood Gluc Sensor (Dexcom G6 Sensor), As Needed (glucose control). Every 10 daysDexcom G6 Sensor Kit [address]-71 Use as directed for continuous glucose monitoring, Disp: 9 each, Rfl: 3  •  cyclobenzaprine (FLEXERIL) 5 MG tablet, Take 1 tablet by mouth 3 (Three) Times a Day As Needed for Muscle Spasms., Disp: 30 tablet, Rfl: 2  •  diclofenac (VOLTAREN) 75 MG EC tablet, Take 1 tablet by mouth 2 (Two) Times a Day., Disp: 60 tablet, Rfl: 3  •  estradiol (Vivelle-Dot) 0.025 MG/24HR patch, Place 1 patch on the skin as directed by provider 2 (Two) Times a Week., Disp: 8 patch, Rfl: 11  •  gabapentin (NEURONTIN) 100 MG capsule, Take 1 capsule by mouth 3 (Three) Times a Day---Needs office appt for additional refills, Disp: 90 capsule, Rfl: 0  •  icosapent ethyl (Vascepa) 1 g capsule capsule, Take 2 capsules by mouth 2 (Two) Times a Day With Meals., Disp: 120 capsule, Rfl: 11  •  NON FORMULARY, Pure encapsulation vitamins, Disp: , Rfl:   •  Progesterone (Prometrium) 100 MG capsule, Take 1 capsule by mouth Daily., Disp: 30 capsule, Rfl: 11  •  rosuvastatin (Crestor) 5 MG tablet, Take 1 tablet by mouth Every Night., Disp:  30 tablet, Rfl: 11  •  saccharomyces boulardii (FLORASTOR) 250 MG capsule, Take 250 mg by mouth 2 (Two) Times a Day., Disp: , Rfl:   •  Semaglutide-Weight Management (Wegovy) 0.25 MG/0.5ML solution auto-injector, Inject 0.25 mg (1 pen) under the skin into the appropriate area as directed 1 (One) Time Per Week., Disp: 2 mL, Rfl: 11  •  Synthroid 75 MCG tablet, TAKE 1 TABLET DAILY, Disp: 90 tablet, Rfl: 3  •  venlafaxine XR (EFFEXOR-XR) 75 MG 24 hr capsule, Take 1 capsule by mouth Daily., Disp: 30 capsule, Rfl: 3  •  vitamin B-12 (CYANOCOBALAMIN) 500 MCG tablet, Take 1 tablet by mouth Daily., Disp: 30 tablet, Rfl: 11  •  vitamin B-6 (PYRIDOXINE) 25 MG tablet, Take 1 tablet by mouth Daily., Disp: 30 tablet, Rfl: 5  •  vitamin D3 125 MCG (5000 UT) capsule capsule, Take 5,000 Units by mouth Daily., Disp: , Rfl:   •  Zinc 10 MG lozenge, Dissolve  in the mouth., Disp: , Rfl:     Medicines reviewed by Wiliam Fernandez, PharmD on 3/1/2022 at  8:42 AM    Drug Interactions  None    Recommended Medications Assessment  • Statin - Currently Taking (new start)    Relevant Laboratory Values  A1C Last 3 Results    HGBA1C Last 3 Results 2/28/22   Hemoglobin A1C 5.8           Lab Results   Component Value Date    HGBA1C 5.8 02/28/2022     Lab Results   Component Value Date    GLUCOSE 108 (H) 02/28/2022    CALCIUM 9.8 02/28/2022     02/28/2022    K 4.2 02/28/2022    CO2 26.0 02/28/2022     02/28/2022    BUN 18 02/28/2022    CREATININE 0.71 02/28/2022    EGFRIFAFRI 84 09/11/2020    EGFRIFNONA 71 08/11/2021    BCR 25.4 (H) 02/28/2022    ANIONGAP 13.0 02/28/2022     Lab Results   Component Value Date    CHOL 284 (H) 02/28/2022    TRIG 313 (H) 02/28/2022    HDL 67 (H) 02/28/2022     (H) 02/28/2022     Microalbumin    Microalbumin 2/28/22   Microalbumin, Urine <1.2             Initial Education Provided for Specialty Medication  The patient has been provided with the following education and any applicable administration  techniques (i.e. self-injection) have been demonstrated for the therapies indicated. All questions and concerns have been addressed prior to the patient receiving the medication, and the patient has verbalized understanding of the education and any materials provided.  Additional patient education shall be provided and documented upon request by the patient, provider or payer.      WEGOVY™ (semaglutide)  Medication Expectations   Why am I taking this medication? You are taking Wegovy to help you lose weight and to help with weight-related medical problems.  Wegovy will also help you keep your weight controlled.  It should be used along with a reduced calorie diet and increased physical activity.   What should I expect while on this medication? You should lose some weight; however, people respond to medications differently.  Talk with your healthcare provider about realistic expectations for your weight loss goals.  In clinical trials, patients lost an average of ~35 lbs. over a ~15-month period.   How does the medication work? Wegovy is an injection that works with one of your body's natural responses to weight loss.  It works like a hormone, called GLP1, that helps regulate your appetite; this helps you eat less and can lead to weight loss.  This medication also slows down food from leaving your stomach, making you feel duffy for longer.   How long will I be on this medication for? The amount of time you will be on this medication will be determined by your doctor based on your weight loss and how well you tolerate the medication. Do not abruptly stop this medication without talking to your doctor first.    How do I take this medication? Take as directed on your prescription label. Wegovy is supplied in a single-use pen for each dose and you will use a new pre-filled pen each week.  It is injected under the skin (subcutaneously) of your stomach, thigh or upper arm.  You may inject in the same body area each week,  but make sure to use a different spot each time.  Use this medication once weekly, on the same day each week, with or without food.      First, choose your injection site and clean the area, allowing it to dry completely.  Remove the pen cap by pulling it straight off.    • Push the pen firmly against your skin at your injection site.    • You will hear a click once the injection starts and a second click indicating the injection is in process.  The injection will take about 5-10 seconds.    • Continue pressing against your skin until the yellow bar stops moving, and then you will slowly lift the pen from your skin and dispose of the pen (detailed below in “Medication Storage / Handling”). •   What are some possible side effects? You may notice you don't feel as hungry, especially when you first start using Wegovy.  Some common side effects are nausea, diarrhea, vomiting, stomach pain, gas, bloating, heartburn and constipation.  Additionally, headache, tiredness, and dizziness may occur.  Redness, itching, and/or swelling can occur where the shot was given.  You should also monitor for low blood sugar (hypoglycemia), especially if you are taking Wegovy with other medications that cause low blood sugar.      Stop using Wegovy and call your doctor immediately if you have severe pain in your stomach area that will not go away as this could be a sign of pancreatitis (inflammation of your pancreas).  Talk with your doctor if you have changes in vision while taking Wegovy.   What happens if I miss a dose? If you miss a dose, take it as soon as you remember as long as your next scheduled dose is more than 2 days away.  If your next scheduled dose is within 2 days, take that regularly scheduled dose and skip your missed dose.      If you miss more than 2 weeks of doses, take the next dose on the regularly scheduled day or call your healthcare provider to talk about how to restart your Wegovy.     Medication Safety   What  are things I should warn my doctor immediately about? Do not use Wegovy if you or a family member have ever had medullary thyroid cancer (MTC) or Multiple Endocrine Neoplasia syndrome type 2 (MEN 2).  Tell your doctor if you get a lump or swelling in your neck, hoarseness, difficulty swallowing, or feel short of breath (these may be symptoms of thyroid cancer).    Tell your doctor if you have or have had problems with your kidneys, pancreas, or liver, or if you have a history of diabetic retinopathy.  Tell your doctor if you have problem digesting food or slowed emptying of your stomach (gastroparesis).  Talk to your doctor if you are pregnant, planning to become pregnant, or breastfeeding. Also tell your doctor if you notice any signs/symptoms of an allergic reaction (rash, hives, difficulty breathing, etc.).   What are things that I should be cautious of? Be cautious of any side effect from this medication. Talk to your doctor if any new ones develop or aren't getting better.   What are some medications that can interact with this one? Taking Wegovy with other medications that may also lower your blood sugar such as insulin and glipizide/glimepiride/glyburide may increase the risk of low blood sugar (hypoglycemia). Your doctor may reduce the dose of these medications when you start Wegovy to minimize low blood sugars.  It should not be taken with other medicines called GLP-1 receptor agonists, because these work the same way as Wegovy.  Because Wegovy slows stomach emptying, it can affect the way some medicines work. Always tell your doctor or pharmacist immediately if you start taking any new medications, including over-the-counter medications, vitamins, and herbal supplements.      Medication Storage/Handling   How should I handle this medication? Keep this medication out of reach of pets/children and keep the pen capped when not in use.  Do not share your pens with others.   How does this medication need to be  stored? Store in the refrigerator [2°C to 8°C (36°F to 46°F)]. Prior to cap removal, the pen can be kept at room temperature [8°C to 30°C (46°F to 86°F)] for up to 28 days. Do not freeze; protect from light. Keep in original carton until time of administration.   How should I dispose of this medication? Used Wegovy pens should discarded after each use (for single use only). Place your used Wegovy pen and needle in an approved sharps container after use.  If you do not have a sharps container, you may use a household container made of heavy-duty plastic with a tight-fitting lid that is leak resistant (e.g., heavy-duty plastic laundry detergent bottle).    If your doctor decides to stop this medication, take to your local police station for proper disposal. Some pharmacies also have take-back bins for medication drop-off.      Resources/Support   How can I remind myself to take this medication? You can download reminder apps to help you manage your refills. You may also set an alarm on your phone to remind you.    Is financial support available?  [employer] can provide co-pay cards if you have commercial insurance or patient assistance if you have Medicare or no insurance.    Which vaccines are recommended for me? Talk to your doctor about these vaccines:   • Influenza (flu)  • Coronavirus (COVID-19)  • Pneumococcal (pneumonia)  • Tdap (tetanus, diphtheria, pertussis)  • Hepatitis B  • Zoster (shingles)      VASCEPA® (icosapent ethyl)  Medication Expectations   Why am I taking this medication? You are taking this medication to help lower the level of a type of fat called triglycerides in your blood.  This medication also helps reduce the risk of heart attack or stroke causing hospitalization in patients with:  - Very high triglycerides   - Cardiovascular disease  - Diabetes and additional risk factors for heart disease    What should I expect while on this medication? It is reasonable to expect your triglyceride  level to decrease.  This medication may be added to other medications that reduce cholesterol levels (statins).    How does the medication work? Vascepa works by lowering the amount of triglycerides made in the liver. It can also help to remove triglycerides from your blood.    How long will I be on this medication for? The amount of time you will be on this medication will be determined by your doctor. It is possible you will be on this medication or a similar medication another throughout your life. Do not abruptly stop this or any medication without talking to your doctor first.   How do I take this medication? Take as directed on your prescription label.  This medication is usually taken twice a day with food. You should take Vascepa capsules whole. Do not break, open, crush, dissolve, or chew Vascepa.    What are some possible side effects? You may experience increased risk of bleeding when taking Vascepa. You should monitor for signs of bleeding such as bruising, blood in your urine or stool, nosebleeds without a known cause, or bleeding that won't stop from a cut or injury.  This risk is increased if you take medications that affect blood clotting (anti-platelets or blood thinners). Your doctor will want to monitor you for signs of bleeding. Other side effects could include irregular heartbeat, joint pain, constipation, and dizziness. You should call your doctor if you notice any of these side effects.     What happens if I miss a dose? If you miss a dose, take it as soon as you remember. If it is close to your next dose, skip it (do not take 2 doses at once)     Medication Safety   What are things I should warn my doctor immediately about? Tell your doctor if you have an allergy or sensitivity to fish or shellfish. Stop taking Vascepa and tell your doctor right away or get emergency medical help if you have any signs or symptoms of an allergic reaction (rash, hives, difficulty breathing, swelling of the  lips/tongue/face, etc.).  You should also tell your doctor if you have heart rhythm problems (a-fib or atrial flutter), have liver problems, or are experiencing the signs and symptoms of bleeding mentioned above.   What are things that I should be cautious or aware of? Be cautious of any side effects from this medication. Talk to your doctor if any new ones develop or aren't getting better. Vascepa should be used with a healthy diet and regular exercise.    What are some medications that can interact with this one? Some medications that can interact with Vascepa include medications that affect blood clotting (anti-platelets and blood thinners) and Ibrutinib.  Your doctor will monitor you closely for interactions and may adjust the dose of these medications to reduce the chance that they will interact with Vascepa. Always tell your doctor or pharmacist immediately if you start taking any new medications, including over-the-counter medications, vitamins, and herbal supplements.      Medication Storage/Handling   How should I handle this medication? Keep this medication out of reach of pets/children in tightly sealed container.    How does this medication need to be stored? Store at room temperature and keep dry (don't keep in bathroom or other room that is humid or has a lot of moisture)    How should I dispose of this medication? There should not be a need to dispose of this medication unless your provider decides to change the dose or therapy. If that is the case, take to your local police station for proper disposal. Some pharmacies also have take-back bins for medication disposal.      Resources/Support   How can I remind myself to take this medication? You can download reminder apps to help you manage your refills. You may also set an alarm on your phone to remind you. The pharmacy carries pill boxes that you can place next to an area you pass everyday (such as where you place your car keys or where you charge  your phone)   Is financial support available?  Ask your doctor or pharmacist if there are programs for financial support. The drug  ([employer]) can also provide co-pay cards if you have commercial insurance or patient assistance if you have Medicare or no insurance.  Go to [email or web address].com for details.    Which vaccines are recommended for me? Talk to your doctor about these vaccines that may be recommended for you : Flu, Coronavirus (COVID-19), Pneumococcal (pneumonia), Tdap, Hepatitis B, Zoster (shingles)          Adherence and Self-Administration  • Barriers to Patient Adherence and/or Self-Administration: none   • Methods for Supporting Patient Adherence and/or Self-Administration: none     Goals of Therapy  Goals     • Specialty Pharmacy General Goal      Lose 10 pounds in 2 months           Reassessment Plan & Follow-Up  1. Medication Therapy Changes: Start wegovy, vascepa, estrogen/prog, crestor, and dexcom   2. Additional Plans, Therapy Recommendations, or Therapy Problems to Be Addressed: none   3. Pharmacist to perform regular reassessments no more than (6) months from the previous assessment.  4. Welcome information and patient satisfaction survey to be sent by retail team with patient's initial fill.  5. Care Coordinator to set up future refill outreaches, coordinate prescription delivery, and escalate clinical questions to pharmacist.     Attestation  I attest that the initiated specialty medication(s) are appropriate for the patient based on my assessment.  If the prescribed therapy is at any point deemed not appropriate based on the current or future assessments, a consultation will be initiated with the patient's specialty care provider to determine the best course of action. The revised plan of therapy will be documented along with any additional patient education provided.     Charlie Fernandez, ClauD, MPH, BCPS    3/1/2022  08:47 CST

## 2022-03-02 LAB — THYROPEROXIDASE AB SERPL-ACNC: 16 IU/ML (ref 0–34)

## 2022-03-03 DIAGNOSIS — E03.8 HYPOTHYROIDISM DUE TO HASHIMOTO'S THYROIDITIS: Primary | ICD-10-CM

## 2022-03-03 DIAGNOSIS — R73.03 PREDIABETES: ICD-10-CM

## 2022-03-03 DIAGNOSIS — E78.5 DYSLIPIDEMIA: ICD-10-CM

## 2022-03-03 DIAGNOSIS — E06.3 HYPOTHYROIDISM DUE TO HASHIMOTO'S THYROIDITIS: Primary | ICD-10-CM

## 2022-03-08 DIAGNOSIS — M25.541 PAIN IN THUMB JOINT WITH MOVEMENT, RIGHT: Primary | ICD-10-CM

## 2022-03-23 ENCOUNTER — SPECIALTY PHARMACY (OUTPATIENT)
Dept: ENDOCRINOLOGY | Facility: CLINIC | Age: 57
End: 2022-03-23

## 2022-03-23 RX ORDER — SEMAGLUTIDE 0.5 MG/.5ML
0.5 INJECTION, SOLUTION SUBCUTANEOUS WEEKLY
Qty: 2 ML | Refills: 11 | Status: SHIPPED | OUTPATIENT
Start: 2022-03-23 | End: 2022-04-15

## 2022-04-10 RX ORDER — VENLAFAXINE HYDROCHLORIDE 75 MG/1
75 CAPSULE, EXTENDED RELEASE ORAL DAILY
Qty: 30 CAPSULE | Refills: 3 | Status: SHIPPED | OUTPATIENT
Start: 2022-04-10 | End: 2022-08-09 | Stop reason: SDUPTHER

## 2022-04-15 ENCOUNTER — SPECIALTY PHARMACY (OUTPATIENT)
Dept: ENDOCRINOLOGY | Facility: CLINIC | Age: 57
End: 2022-04-15

## 2022-04-15 RX ORDER — SEMAGLUTIDE 1 MG/.5ML
1 INJECTION, SOLUTION SUBCUTANEOUS WEEKLY
Qty: 2 ML | Refills: 11 | Status: SHIPPED | OUTPATIENT
Start: 2022-04-15 | End: 2022-05-12

## 2022-05-12 ENCOUNTER — SPECIALTY PHARMACY (OUTPATIENT)
Dept: ENDOCRINOLOGY | Facility: CLINIC | Age: 57
End: 2022-05-12

## 2022-05-12 RX ORDER — SEMAGLUTIDE 1.7 MG/.75ML
1.7 INJECTION, SOLUTION SUBCUTANEOUS WEEKLY
Qty: 3 ML | Refills: 11 | Status: SHIPPED | OUTPATIENT
Start: 2022-05-12 | End: 2022-06-08

## 2022-05-12 NOTE — PROGRESS NOTES
Doing well on wegovy  No missed doses  Eating much better  Taking statin sometimes  We will increase wegovy this time

## 2022-06-07 ENCOUNTER — SPECIALTY PHARMACY (OUTPATIENT)
Dept: ENDOCRINOLOGY | Facility: CLINIC | Age: 57
End: 2022-06-07

## 2022-06-07 ENCOUNTER — CLINICAL SUPPORT (OUTPATIENT)
Dept: INTERNAL MEDICINE | Facility: CLINIC | Age: 57
End: 2022-06-07

## 2022-06-07 DIAGNOSIS — E03.8 HYPOTHYROIDISM DUE TO HASHIMOTO'S THYROIDITIS: ICD-10-CM

## 2022-06-07 DIAGNOSIS — E55.9 VITAMIN D DEFICIENCY: ICD-10-CM

## 2022-06-07 DIAGNOSIS — E78.5 DYSLIPIDEMIA: ICD-10-CM

## 2022-06-07 DIAGNOSIS — E06.3 HYPOTHYROIDISM DUE TO HASHIMOTO'S THYROIDITIS: ICD-10-CM

## 2022-06-07 DIAGNOSIS — E53.8 VITAMIN B12 DEFICIENCY: ICD-10-CM

## 2022-06-07 DIAGNOSIS — R73.03 PREDIABETES: Primary | ICD-10-CM

## 2022-06-07 PROCEDURE — 36415 COLL VENOUS BLD VENIPUNCTURE: CPT

## 2022-06-07 NOTE — PROGRESS NOTES
Venipuncture Blood Specimen Collection  Venipuncture performed in right ac by Maggie Neil MA with good hemostasis. Patient tolerated the procedure well without complications.   06/07/22   Maggie Neil MA

## 2022-06-07 NOTE — PROGRESS NOTES
Specialty Pharmacy Refill Coordination Note     Desirae is a 57 y.o. female contacted today regarding refills of  Wegovy specialty medication(s).    Reviewed and verified with patient:  Allergies         Specialty medication(s) and dose(s) confirmed: yes    Refill Questions    Flowsheet Row Most Recent Value   Changes to allergies? No   Changes to medications? No   New conditions since last clinic visit No   Unplanned office visit, urgent care, ED, or hospital admission in the last 4 weeks  No   How does patient/caregiver feel medication is working? Very good   Financial problems or insurance changes  No   If yes, describe changes in insurance or financial issues. na   Since the previous refill, were any specialty medication doses or scheduled injections missed or delayed?  No   If yes, please provide the amount 0   Does this patient require a clinical escalation to a pharmacist? No          Delivery Questions    Flowsheet Row Most Recent Value   Delivery method FedEx   Delivery address correct? No  [Mail to [facility] in [place]]   Number of medications in delivery 2   Medication being filled and delivered wegovy and brett   Doses left of specialty medications 1   Is there any medication that is due not being filled? No   Supplies needed? No supplies needed   Cooler needed? Yes   Do any medications need mixed or dated? No   Copay form of payment Credit card on file   Questions or concerns for the pharmacist? No   Are any medications first time fills? No        Set to refill on 6/16/22 no issues to report.          Follow-up: 1 month.      Abdon Vasquez  Specialty Pharmacy Technician

## 2022-06-08 ENCOUNTER — OFFICE VISIT (OUTPATIENT)
Dept: ENDOCRINOLOGY | Facility: CLINIC | Age: 57
End: 2022-06-08

## 2022-06-08 VITALS
OXYGEN SATURATION: 99 % | DIASTOLIC BLOOD PRESSURE: 80 MMHG | WEIGHT: 178 LBS | HEIGHT: 63 IN | HEART RATE: 87 BPM | BODY MASS INDEX: 31.54 KG/M2 | SYSTOLIC BLOOD PRESSURE: 110 MMHG

## 2022-06-08 DIAGNOSIS — E06.3 HYPOTHYROIDISM DUE TO HASHIMOTO'S THYROIDITIS: Primary | ICD-10-CM

## 2022-06-08 DIAGNOSIS — E78.5 DYSLIPIDEMIA: ICD-10-CM

## 2022-06-08 DIAGNOSIS — R73.03 PREDIABETES: ICD-10-CM

## 2022-06-08 DIAGNOSIS — E03.8 HYPOTHYROIDISM DUE TO HASHIMOTO'S THYROIDITIS: Primary | ICD-10-CM

## 2022-06-08 DIAGNOSIS — E66.09 CLASS 1 OBESITY DUE TO EXCESS CALORIES WITH SERIOUS COMORBIDITY AND BODY MASS INDEX (BMI) OF 34.0 TO 34.9 IN ADULT: ICD-10-CM

## 2022-06-08 LAB
25(OH)D3+25(OH)D2 SERPL-MCNC: 45.8 NG/ML (ref 30–100)
ALBUMIN SERPL-MCNC: 4.6 G/DL (ref 3.8–4.9)
ALBUMIN/GLOB SERPL: 2.1 {RATIO} (ref 1.2–2.2)
ALP SERPL-CCNC: 86 IU/L (ref 44–121)
ALT SERPL-CCNC: 36 IU/L (ref 0–32)
AST SERPL-CCNC: 34 IU/L (ref 0–40)
BASOPHILS # BLD AUTO: 0 X10E3/UL (ref 0–0.2)
BASOPHILS NFR BLD AUTO: 1 %
BILIRUB SERPL-MCNC: 0.3 MG/DL (ref 0–1.2)
BUN SERPL-MCNC: 15 MG/DL (ref 6–24)
BUN/CREAT SERPL: 19 (ref 9–23)
CALCIUM SERPL-MCNC: 9.4 MG/DL (ref 8.7–10.2)
CHLORIDE SERPL-SCNC: 103 MMOL/L (ref 96–106)
CHOLEST SERPL-MCNC: 195 MG/DL (ref 100–199)
CO2 SERPL-SCNC: 25 MMOL/L (ref 20–29)
CREAT SERPL-MCNC: 0.77 MG/DL (ref 0.57–1)
EGFRCR SERPLBLD CKD-EPI 2021: 90 ML/MIN/1.73
EOSINOPHIL # BLD AUTO: 0.1 X10E3/UL (ref 0–0.4)
EOSINOPHIL NFR BLD AUTO: 3 %
ERYTHROCYTE [DISTWIDTH] IN BLOOD BY AUTOMATED COUNT: 13.5 % (ref 11.7–15.4)
GLOBULIN SER CALC-MCNC: 2.2 G/DL (ref 1.5–4.5)
GLUCOSE SERPL-MCNC: 91 MG/DL (ref 65–99)
HBA1C MFR BLD: 5.8 % (ref 4.8–5.6)
HCT VFR BLD AUTO: 40.9 % (ref 34–46.6)
HDLC SERPL-MCNC: 61 MG/DL
HGB BLD-MCNC: 13.5 G/DL (ref 11.1–15.9)
IMM GRANULOCYTES # BLD AUTO: 0 X10E3/UL (ref 0–0.1)
IMM GRANULOCYTES NFR BLD AUTO: 0 %
LDLC SERPL CALC-MCNC: 117 MG/DL (ref 0–99)
LYMPHOCYTES # BLD AUTO: 1.6 X10E3/UL (ref 0.7–3.1)
LYMPHOCYTES NFR BLD AUTO: 41 %
MCH RBC QN AUTO: 29.2 PG (ref 26.6–33)
MCHC RBC AUTO-ENTMCNC: 33 G/DL (ref 31.5–35.7)
MCV RBC AUTO: 88 FL (ref 79–97)
MONOCYTES # BLD AUTO: 0.3 X10E3/UL (ref 0.1–0.9)
MONOCYTES NFR BLD AUTO: 8 %
NEUTROPHILS # BLD AUTO: 1.8 X10E3/UL (ref 1.4–7)
NEUTROPHILS NFR BLD AUTO: 47 %
PLATELET # BLD AUTO: 233 X10E3/UL (ref 150–450)
POTASSIUM SERPL-SCNC: 4.3 MMOL/L (ref 3.5–5.2)
PROT SERPL-MCNC: 6.8 G/DL (ref 6–8.5)
RBC # BLD AUTO: 4.63 X10E6/UL (ref 3.77–5.28)
SODIUM SERPL-SCNC: 140 MMOL/L (ref 134–144)
TRIGL SERPL-MCNC: 93 MG/DL (ref 0–149)
TSH SERPL DL<=0.005 MIU/L-ACNC: 2.98 UIU/ML (ref 0.45–4.5)
VIT B12 SERPL-MCNC: 515 PG/ML (ref 232–1245)
VLDLC SERPL CALC-MCNC: 17 MG/DL (ref 5–40)
WBC # BLD AUTO: 3.8 X10E3/UL (ref 3.4–10.8)

## 2022-06-08 PROCEDURE — 99214 OFFICE O/P EST MOD 30 MIN: CPT | Performed by: INTERNAL MEDICINE

## 2022-06-08 RX ORDER — SEMAGLUTIDE 2.4 MG/.75ML
2.4 INJECTION, SOLUTION SUBCUTANEOUS WEEKLY
Qty: 3 ML | Refills: 11 | Status: SHIPPED | OUTPATIENT
Start: 2022-06-08 | End: 2022-09-14 | Stop reason: SDUPTHER

## 2022-06-08 NOTE — PROGRESS NOTES
"Chief Complaint   Patient presents with   • Hypothyroidism         History of Present Illness    57 y.o. female   w obesity , now lost 25 lbs on wegovy       ==========================================  Physical Exam  /80   Pulse 87   Ht 160 cm (63\")   Wt 80.7 kg (178 lb)   LMP 06/30/2019   SpO2 99%   BMI 31.53 kg/m²   AOx3  No Goiter , no carotid bruit  RRR  CTA  No Edema     ==========================================    Laboratory Workup    Lab Results   Component Value Date    WBC 3.8 06/07/2022    HGB 13.5 06/07/2022    HCT 40.9 06/07/2022    MCV 88 06/07/2022     06/07/2022       Lab Results   Component Value Date    GLUCOSE 91 06/07/2022    BUN 15 06/07/2022    CREATININE 0.77 06/07/2022    EGFRIFNONA 71 08/11/2021    EGFRIFAFRI 84 09/11/2020    BCR 19 06/07/2022     06/07/2022    K 4.3 06/07/2022    CO2 25 06/07/2022    CALCIUM 9.4 06/07/2022    PROTENTOTREF 6.8 06/07/2022    ALBUMIN 4.6 06/07/2022    LABIL2 2.1 06/07/2022    AST 34 06/07/2022    ALT 36 (H) 06/07/2022           ==========================================      ICD-10-CM ICD-9-CM   1. Hypothyroidism due to Hashimoto's thyroiditis  E03.8 244.8    E06.3 245.2   2. Prediabetes  R73.03 790.29   3. Dyslipidemia  E78.5 272.4   4. Class 1 obesity due to excess calories with serious comorbidity and body mass index (BMI) of 34.0 to 34.9 in adult  E66.09 278.00    Z68.34 V85.34   -    Prediabetes    On wegovy , has lost 25 lbs     ============    Dyslipidemia    Lab Results   Component Value Date    CHOL 284 (H) 02/28/2022    CHLPL 195 06/07/2022    TRIG 93 06/07/2022    HDL 61 06/07/2022     (H) 06/07/2022     TIA ,questionable    No crestor     vascepa , she can or can't take     The 10-year ASCVD risk score (Josuémihaela JUAN Jr., et al., 2013) is: 1.5%    Values used to calculate the score:      Age: 57 years      Sex: Female      Is Non- : No      Diabetic: No      Tobacco smoker: No      Systolic Blood " Pressure: 110 mmHg      Is BP treated: No      HDL Cholesterol: 61 mg/dL      Total Cholesterol: 195 mg/dL      ------------    Obesity    wegovy successful  Increase to 2.4     -----------    Hot flashes     Estrogen patches 0.25 mg/ 24 h on a patch that is applied twice weekly      Prometrium 100 mg daily    Taking estrogen increases the risk of clots between 2-4 per [place] woman for a 5-year.    Also on venlafaxine     She will contemplate coming off venlafaxine and increasing estrogen    --------------------      Hypothyroidism, subclinical    tpo ab neg  We stopped and TSH is normal       No orders of the defined types were placed in this encounter.               This document has been electronically signed by Kalen Timmons MD on June 8, 2022 08:33 CDT

## 2022-06-16 ENCOUNTER — HOSPITAL ENCOUNTER (EMERGENCY)
Facility: HOSPITAL | Age: 57
Discharge: HOME | End: 2022-06-16
Admitting: EMERGENCY MEDICINE

## 2022-06-16 ENCOUNTER — APPOINTMENT (OUTPATIENT)
Dept: CT IMAGING | Facility: HOSPITAL | Age: 57
End: 2022-06-16

## 2022-06-16 VITALS
SYSTOLIC BLOOD PRESSURE: 123 MMHG | TEMPERATURE: 97.9 F | WEIGHT: 178 LBS | HEIGHT: 63 IN | RESPIRATION RATE: 20 BRPM | DIASTOLIC BLOOD PRESSURE: 77 MMHG | HEART RATE: 77 BPM | OXYGEN SATURATION: 99 % | BODY MASS INDEX: 31.54 KG/M2

## 2022-06-16 DIAGNOSIS — R11.2 NAUSEA AND VOMITING, UNSPECIFIED VOMITING TYPE: Primary | ICD-10-CM

## 2022-06-16 DIAGNOSIS — R79.89 ELEVATED LFTS: ICD-10-CM

## 2022-06-16 DIAGNOSIS — K59.00 CONSTIPATION, UNSPECIFIED CONSTIPATION TYPE: ICD-10-CM

## 2022-06-16 DIAGNOSIS — R93.41 ABNORMAL CT SCAN, BLADDER: ICD-10-CM

## 2022-06-16 DIAGNOSIS — R93.89 ABNORMAL CT SCAN: ICD-10-CM

## 2022-06-16 DIAGNOSIS — N32.9 LESION OF BLADDER: Primary | ICD-10-CM

## 2022-06-16 LAB
ALBUMIN SERPL-MCNC: 4.8 G/DL (ref 3.5–5.2)
ALBUMIN/GLOB SERPL: 1.7 G/DL
ALP SERPL-CCNC: 95 U/L (ref 39–117)
ALT SERPL W P-5'-P-CCNC: 61 U/L (ref 1–33)
ANION GAP SERPL CALCULATED.3IONS-SCNC: 9 MMOL/L (ref 5–15)
AST SERPL-CCNC: 36 U/L (ref 1–32)
BACTERIA UR QL AUTO: ABNORMAL /HPF
BASOPHILS # BLD AUTO: 0.03 10*3/MM3 (ref 0–0.2)
BASOPHILS NFR BLD AUTO: 0.5 % (ref 0–1.5)
BILIRUB SERPL-MCNC: 0.5 MG/DL (ref 0–1.2)
BILIRUB UR QL STRIP: NEGATIVE
BUN SERPL-MCNC: 9 MG/DL (ref 6–20)
BUN/CREAT SERPL: 12.9 (ref 7–25)
CALCIUM SPEC-SCNC: 9.5 MG/DL (ref 8.6–10.5)
CHLORIDE SERPL-SCNC: 103 MMOL/L (ref 98–107)
CLARITY UR: CLEAR
CO2 SERPL-SCNC: 29 MMOL/L (ref 22–29)
COLOR UR: YELLOW
CREAT SERPL-MCNC: 0.7 MG/DL (ref 0.57–1)
D-LACTATE SERPL-SCNC: 0.8 MMOL/L (ref 0.5–2)
DEPRECATED RDW RBC AUTO: 42.6 FL (ref 37–54)
DEVELOPER EXPIRATION DATE: NORMAL
DEVELOPER LOT NUMBER: 215
EGFRCR SERPLBLD CKD-EPI 2021: 101.6 ML/MIN/1.73
EOSINOPHIL # BLD AUTO: 0.1 10*3/MM3 (ref 0–0.4)
EOSINOPHIL NFR BLD AUTO: 1.8 % (ref 0.3–6.2)
ERYTHROCYTE [DISTWIDTH] IN BLOOD BY AUTOMATED COUNT: 13 % (ref 12.3–15.4)
EXPIRATION DATE: NORMAL
FECAL OCCULT BLOOD SCREEN, POC: NEGATIVE
FLUAV RNA RESP QL NAA+PROBE: NOT DETECTED
FLUBV RNA RESP QL NAA+PROBE: NOT DETECTED
GLOBULIN UR ELPH-MCNC: 2.8 GM/DL
GLUCOSE SERPL-MCNC: 93 MG/DL (ref 65–99)
GLUCOSE UR STRIP-MCNC: NEGATIVE MG/DL
HCT VFR BLD AUTO: 44.4 % (ref 34–46.6)
HGB BLD-MCNC: 14.3 G/DL (ref 12–15.9)
HGB UR QL STRIP.AUTO: NEGATIVE
HYALINE CASTS UR QL AUTO: ABNORMAL /LPF
IMM GRANULOCYTES # BLD AUTO: 0.02 10*3/MM3 (ref 0–0.05)
IMM GRANULOCYTES NFR BLD AUTO: 0.4 % (ref 0–0.5)
KETONES UR QL STRIP: NEGATIVE
LEUKOCYTE ESTERASE UR QL STRIP.AUTO: ABNORMAL
LIPASE SERPL-CCNC: 42 U/L (ref 13–60)
LYMPHOCYTES # BLD AUTO: 1.67 10*3/MM3 (ref 0.7–3.1)
LYMPHOCYTES NFR BLD AUTO: 29.3 % (ref 19.6–45.3)
Lab: 215
MCH RBC QN AUTO: 29.1 PG (ref 26.6–33)
MCHC RBC AUTO-ENTMCNC: 32.2 G/DL (ref 31.5–35.7)
MCV RBC AUTO: 90.2 FL (ref 79–97)
MONOCYTES # BLD AUTO: 0.36 10*3/MM3 (ref 0.1–0.9)
MONOCYTES NFR BLD AUTO: 6.3 % (ref 5–12)
NEGATIVE CONTROL: NEGATIVE
NEUTROPHILS NFR BLD AUTO: 3.52 10*3/MM3 (ref 1.7–7)
NEUTROPHILS NFR BLD AUTO: 61.7 % (ref 42.7–76)
NITRITE UR QL STRIP: NEGATIVE
NRBC BLD AUTO-RTO: 0 /100 WBC (ref 0–0.2)
PH UR STRIP.AUTO: 6.5 [PH] (ref 5–8)
PLATELET # BLD AUTO: 237 10*3/MM3 (ref 140–450)
PMV BLD AUTO: 9.7 FL (ref 6–12)
POSITIVE CONTROL: POSITIVE
POTASSIUM SERPL-SCNC: 4.1 MMOL/L (ref 3.5–5.2)
PROCALCITONIN SERPL-MCNC: 0.05 NG/ML (ref 0–0.25)
PROT SERPL-MCNC: 7.6 G/DL (ref 6–8.5)
PROT UR QL STRIP: NEGATIVE
RBC # BLD AUTO: 4.92 10*6/MM3 (ref 3.77–5.28)
RBC # UR STRIP: ABNORMAL /HPF
REF LAB TEST METHOD: ABNORMAL
SARS-COV-2 RNA RESP QL NAA+PROBE: NOT DETECTED
SODIUM SERPL-SCNC: 141 MMOL/L (ref 136–145)
SP GR UR STRIP: 1.01 (ref 1–1.03)
SQUAMOUS #/AREA URNS HPF: ABNORMAL /HPF
UROBILINOGEN UR QL STRIP: ABNORMAL
WBC # UR STRIP: ABNORMAL /HPF
WBC NRBC COR # BLD: 5.7 10*3/MM3 (ref 3.4–10.8)

## 2022-06-16 PROCEDURE — 83605 ASSAY OF LACTIC ACID: CPT | Performed by: PHYSICIAN ASSISTANT

## 2022-06-16 PROCEDURE — 74176 CT ABD & PELVIS W/O CONTRAST: CPT

## 2022-06-16 PROCEDURE — 82270 OCCULT BLOOD FECES: CPT | Performed by: PHYSICIAN ASSISTANT

## 2022-06-16 PROCEDURE — 84145 PROCALCITONIN (PCT): CPT | Performed by: PHYSICIAN ASSISTANT

## 2022-06-16 PROCEDURE — 25010000002 ONDANSETRON PER 1 MG: Performed by: PHYSICIAN ASSISTANT

## 2022-06-16 PROCEDURE — 96374 THER/PROPH/DIAG INJ IV PUSH: CPT

## 2022-06-16 PROCEDURE — 85025 COMPLETE CBC W/AUTO DIFF WBC: CPT | Performed by: PHYSICIAN ASSISTANT

## 2022-06-16 PROCEDURE — 96375 TX/PRO/DX INJ NEW DRUG ADDON: CPT

## 2022-06-16 PROCEDURE — 81001 URINALYSIS AUTO W/SCOPE: CPT | Performed by: PHYSICIAN ASSISTANT

## 2022-06-16 PROCEDURE — 83690 ASSAY OF LIPASE: CPT | Performed by: PHYSICIAN ASSISTANT

## 2022-06-16 PROCEDURE — 99283 EMERGENCY DEPT VISIT LOW MDM: CPT

## 2022-06-16 PROCEDURE — 80053 COMPREHEN METABOLIC PANEL: CPT | Performed by: PHYSICIAN ASSISTANT

## 2022-06-16 PROCEDURE — 87636 SARSCOV2 & INF A&B AMP PRB: CPT | Performed by: PHYSICIAN ASSISTANT

## 2022-06-16 RX ORDER — ONDANSETRON 2 MG/ML
4 INJECTION INTRAMUSCULAR; INTRAVENOUS ONCE
Status: COMPLETED | OUTPATIENT
Start: 2022-06-16 | End: 2022-06-16

## 2022-06-16 RX ORDER — SODIUM CHLORIDE 0.9 % (FLUSH) 0.9 %
10 SYRINGE (ML) INJECTION AS NEEDED
Status: DISCONTINUED | OUTPATIENT
Start: 2022-06-16 | End: 2022-06-16 | Stop reason: HOSPADM

## 2022-06-16 RX ORDER — MAGNESIUM CARB/ALUMINUM HYDROX 105-160MG
30 TABLET,CHEWABLE ORAL ONCE
Status: COMPLETED | OUTPATIENT
Start: 2022-06-16 | End: 2022-06-16

## 2022-06-16 RX ORDER — PANTOPRAZOLE SODIUM 40 MG/10ML
40 INJECTION, POWDER, LYOPHILIZED, FOR SOLUTION INTRAVENOUS ONCE
Status: COMPLETED | OUTPATIENT
Start: 2022-06-16 | End: 2022-06-16

## 2022-06-16 RX ORDER — MAGNESIUM CARB/ALUMINUM HYDROX 105-160MG
300 TABLET,CHEWABLE ORAL ONCE
Status: COMPLETED | OUTPATIENT
Start: 2022-06-16 | End: 2022-06-16

## 2022-06-16 RX ORDER — DOCUSATE SODIUM 100 MG/1
100 CAPSULE, LIQUID FILLED ORAL 2 TIMES DAILY PRN
Qty: 6 CAPSULE | Refills: 0 | Status: SHIPPED | OUTPATIENT
Start: 2022-06-16

## 2022-06-16 RX ORDER — ONDANSETRON 4 MG/1
4 TABLET, ORALLY DISINTEGRATING ORAL EVERY 6 HOURS PRN
Qty: 12 TABLET | Refills: 0 | Status: ON HOLD | OUTPATIENT
Start: 2022-06-16 | End: 2022-07-12 | Stop reason: SDUPTHER

## 2022-06-16 RX ADMIN — SODIUM CHLORIDE, POTASSIUM CHLORIDE, SODIUM LACTATE AND CALCIUM CHLORIDE 1000 ML: 600; 310; 30; 20 INJECTION, SOLUTION INTRAVENOUS at 13:37

## 2022-06-16 RX ADMIN — ONDANSETRON 4 MG: 2 INJECTION INTRAMUSCULAR; INTRAVENOUS at 14:25

## 2022-06-16 RX ADMIN — Medication 30 ML: at 16:41

## 2022-06-16 RX ADMIN — Medication 300 ML: at 16:42

## 2022-06-16 RX ADMIN — PANTOPRAZOLE SODIUM 40 MG: 40 INJECTION, POWDER, FOR SOLUTION INTRAVENOUS at 14:28

## 2022-06-16 NOTE — DISCHARGE INSTRUCTIONS
As we discussed please use the Zofran for nausea control.  In regards to your constipation please increase hydration with water, increase fiber in your diet, he may use MiraLAX every day for the next 2 weeks, and use the Dulcolax as needed.  Please continue to follow-up with your primary care provider for monitoring of the nausea vomiting, side effects of Wegovy.  Please discuss a referral to GI for further evaluation and possible upper endoscopy scoping.  You also need to follow-up with urology for further evaluation, possible cystoscopy, and other possible treatments.  Should you develop any new or worsening symptoms please return to the ER for further evaluation.

## 2022-06-16 NOTE — ED PROVIDER NOTES
Subjective   History of Present Illness    Patient is a 57-year-old female presenting to ED with hematemesis.  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  Patient states yesterday she began taking her Wegovy again and reports that she does have difficulties with nausea and burping which she developed last night however she felt her nausea was more so than normal.  Patient states this morning when brushing her teeth she became nauseous and had a couple episodes of vomiting which is also common for her.  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  Patient was given a dose of ODT Zofran and states that her nausea is improving however she became concerned as she has never thrown up blood.  Patient denies bleeding of any other source including epistaxis, easy gum bleeding, hemoptysis, vaginal bleeding, hematuria, or any black/bloody/tarry stools.  Patient denies diarrhea and reports she has been constipated over the past few days which was concerning to her as well as she does have a history of a small bowel obstruction.  Patient denies fevers, chills, or diaphoresis.  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    Records reviewed show patient last seen in the outpatient setting at the endocrinology office on 6/8/2022 for management of hypothyroidism, Hashimoto's, prediabetes.    Review of Systems   Constitutional: Negative.  Negative for chills, diaphoresis and fever.        Reports + excessive burping   HENT: Negative.  Negative for nosebleeds.    Eyes: Negative.    Respiratory: Negative.    Cardiovascular: Negative.  Negative for chest pain and palpitations.   Gastrointestinal: Positive for abdominal  pain, constipation, nausea and vomiting. Negative for blood in stool and diarrhea.        Reports + hematemesis   Genitourinary: Negative.  Negative for hematuria.   Musculoskeletal: Negative.    Skin: Negative.  Negative for pallor.   Neurological: Negative.  Negative for dizziness and light-headedness.   Hematological: Does not bruise/bleed easily.   Psychiatric/Behavioral: Negative.    All other systems reviewed and are negative.      Past Medical History:   Diagnosis Date   • Arthritis    • Cancer (HCC)     lymphoma of small bowel   • GERD (gastroesophageal reflux disease)    • Glaucoma    • Hyperlipidemia    • Hypothyroidism    • TIA (transient ischemic attack)        No Known Allergies    Past Surgical History:   Procedure Laterality Date   • BREAST EXCISIONAL BIOPSY Left 01/2016    benign   • BREAST SURGERY Left 2017   • CHOLECYSTECTOMY     • COLON RESECTION SMALL BOWEL  2014   • COLONOSCOPY  2019   • ENDOSCOPY     • KNEE ARTHROSCOPY Right 7/14/2020    Procedure: RIGHT KNEE PARTIAL MEDIAL MENISCECTOMY;  Surgeon: Vijay Grewal MD;  Location: [facility] OR;  Service: Orthopedics;  Laterality: Right;   • LAPAROSCOPIC TUBAL LIGATION     • TOTAL KNEE ARTHROPLASTY Right 7/28/2021    Procedure: RIGHT TOTAL KNEE REPLACEMENT;  Surgeon: Eliecer Foley MD;  Location: [facility] OR;  Service: Orthopedics;  Laterality: Right;   • TUBAL ABDOMINAL LIGATION     • VAGINAL MESH REVISION      2010/2016   • WISDOM TOOTH EXTRACTION         Family History   Problem Relation Age of Onset   • Cancer Maternal Aunt    • No Known Problems Father    • Heart defect Mother    • No Known Problems Sister    • Colon cancer Paternal Aunt 68   • Colon cancer Paternal Aunt 70   • Liver cancer Paternal Aunt    • Colon cancer Paternal Aunt 80   • Breast cancer Neg Hx    • Ovarian cancer Neg Hx    • Uterine cancer Neg Hx    • Melanoma Neg Hx    • Prostate cancer Neg Hx        Social History     Socioeconomic History   • Marital status:     Tobacco Use   • Smoking status: Former Smoker     Packs/day: 0.50     Years: 20.00     Pack years: 10.00     Types: Cigarettes     Quit date:      Years since quittin.4   • Smokeless tobacco: Never Used   • Tobacco comment: quit    Vaping Use   • Vaping Use: Never used   Substance and Sexual Activity   • Alcohol use: No   • Drug use: No   • Sexual activity: Defer           Objective   Physical Exam  Vitals and nursing note reviewed. Exam conducted with a chaperone present (Chaperone present for entire examination, Vicki ROWLAND).   Constitutional:       General: She is not in acute distress.     Appearance: Normal appearance. She is well-developed, well-groomed and overweight. She is not toxic-appearing or diaphoretic.   HENT:      Head: Normocephalic.      Nose:      Comments: No evidence of epistaxis     Mouth/Throat:      Mouth: Mucous membranes are moist.      Pharynx: Oropharynx is clear.      Comments: Normal inspection of gums as well as no intraoral rashes/lesions/petechiae  Eyes:      Conjunctiva/sclera: Conjunctivae normal.      Pupils: Pupils are equal, round, and reactive to light.      Comments: No conjunctival pallor   Cardiovascular:      Rate and Rhythm: Normal rate and regular rhythm.   Pulmonary:      Effort: Pulmonary effort is normal.      Breath sounds: Normal breath sounds.   Abdominal:      General: Bowel sounds are normal. There is no distension.      Palpations: Abdomen is soft.      Tenderness: There is abdominal tenderness (Upper, epigastric). There is no right CVA tenderness, left CVA tenderness or guarding.   Genitourinary:     Rectum: Guaiac result negative. No tenderness, anal fissure or external hemorrhoid. Normal anal tone.   Musculoskeletal:         General: Normal range of motion.      Cervical back: Normal range of motion.   Skin:     General: Skin is warm and dry.      Coloration: Skin is not pale.   Neurological:      Mental Status: She is alert and oriented  to person, place, and time.      Gait: Gait normal.   Psychiatric:         Attention and Perception: Attention normal.         Mood and Affect: Mood and affect normal.         Speech: Speech normal.         Behavior: Behavior normal. Behavior is cooperative.         Procedures           ED Course                                                 MDM  Number of Diagnoses or Management Options     Amount and/or Complexity of Data Reviewed  Clinical lab tests: reviewed and ordered  Tests in the radiology section of CPT®: reviewed and ordered  Tests in the medicine section of CPT®: ordered and reviewed  Decide to obtain previous medical records or to obtain history from someone other than the patient: yes  Review and summarize past medical records: yes  Discuss the patient with other providers: yes (Dr. Sam Luther (attending))    Patient Progress  Patient progress: improved    Patient is a 57-year-old female presenting to ED with hematemesis.  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  Lab work with no evidence of infection including normal blood cell count 5.7, normal lactic acid at 0.8, procalcitonin 0.05.  H&H stable.  CMP with ALT 61, AST 36 which is consistent with patient's baseline.  No further electrolyte abnormalities, normal renal and hepatic function otherwise.  Urinalysis with trace leukocytes, 0-2 WBC however no bacteria, negative nitrates, 3-6, epithelium.  0-2 RBCs.  Occult testing negative.  Influenza testing negative.  COVID testing negative.  CT imaging of the abdomen and pelvis without contrast due to national shortage showed: 1.8 cm rounded intrinsically dense lesion seen in the midline at the base of the urinary bladder Evaluation limited by the fact the urinary bladder is decompressed, recommended urology referral, no acute process, large volume colonic stool, small bowel loops decompressed.  Patient was given a  dose of Zofran and Protonix upon arrival and remained in no acute distress with no pain, no discomfort, no nausea, no vomiting.  Patient was given initial treatment for constipation with oral mineral oil as well as magnesium citrate and an IV fluid bolus.  Discussed with patient that she will need to continue to follow-up with GI outpatient for further evaluation and monitoring and possible upper EGD studies.  Discussed need for dietary modifications including hydration, fiber, and appropriate outpatient treatment of constipation.  Discussed with patient importance of urology referral for further bladder lesion evaluation.  Discussed need for PCP follow-up within the next 24 to 48 hours.  Advised of strict return precautions and need for immediate return to ED should she develop any new or worsening symptoms.  Patient is tolerating p.o. fluids without difficulty with no further questions at this time and is stable for discharge.    Final diagnoses:   Nausea and vomiting, unspecified vomiting type   Elevated LFTs   Abnormal CT scan, bladder   Constipation, unspecified constipation type       ED Disposition  ED Disposition     ED Disposition   Discharge    Condition   Stable    Comment   --             Jeannette Justin DO  [address]  [place] [id number] [phone]    Schedule an appointment as soon as possible for a visit in 2 day(s)      Nikolai Villarreal MD  [address] [facility]  [address]  [place] [id number]  [phone]    Schedule an appointment as soon as possible for a visit in 2 day(s)      Cleve Small MD  [address] [facility]  [address]  [place] [id number]  [phone]    Schedule an appointment as soon as possible for a visit in 2 day(s)           Medication List      New Prescriptions    docusate sodium 100 MG capsule  Commonly known as: COLACE  Take 1 capsule by mouth 2 (Two) Times a Day As Needed for Constipation.     ondansetron ODT 4 MG disintegrating tablet  Commonly known as:  ZOFRAN-ODT  Place 1 tablet on the tongue Every 6 (Six) Hours As Needed for Nausea.        Changed    gabapentin 100 MG capsule  Commonly known as: NEURONTIN  Take 1 capsule by mouth 3 (Three) Times a Day---Needs office appt for additional refills  What changed:   · when to take this  · reasons to take this           Where to Get Your Medications      These medications were sent to [facility]  [address] [place] [id number]    Hours: 7AM-5PM Mon-Fri Phone: [phone]   · docusate sodium 100 MG capsule  · ondansetron ODT 4 MG disintegrating tablet          Lane Elkins PA-C  06/17/22 [id number]

## 2022-06-18 PROBLEM — K92.0 HEMATEMESIS: Status: ACTIVE | Noted: 2022-06-18

## 2022-06-19 NOTE — PROGRESS NOTES
"Primary Physician: Jeannette Justin DO    Chief Complaint   Patient presents with   • Vomiting Blood     Pt had an episode last week while at work of vomiting \"a lot of blood\"-ended up in [facility] ER 6/16/22; Pt states she hasn't had that happen since-states she was hoarse for several days from so much vomiting that day         Subjective     Desirae Alberto is a 57 y.o. female.    HPI   Hematemesis  She has complaints of intermittent nausea in the morning chronically, but on June 16 it was worse than usual leading to vomiting repeatedly which then led to bright red emesis.  She went to the emergency room for evaluation deemed stable for discharge.  Her hemoglobin was normal.  Of note she does have abnormal liver tests.  She was then added into my office schedule today.  She has no prior history of epistaxis, easy gum bleeding, hemoptysis, melena or hematochezia.  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  Noncontrast CT in ER neg from GI point of view.  She takes Voltaren imtermittently, but none since the hemetemesis.  She had an endoscopy 9/5/2019 by Dr. Tabares in [place] was suggestive of Hightower's esophagus but pathology was negative.  She has not had any interval endoscopy.    Abnormal LFTs  Noted upon chart review, this is been a problem since February 2022.  Noncontrast CT 6/2022 neg for hepatobiliary issues.       Past Medical History:   Diagnosis Date   • Arthritis    • Cancer (HCC)     lymphoma of small bowel   • Family history of colon cancer    • GERD (gastroesophageal reflux disease)    • Glaucoma    • Hyperlipidemia    • Hypothyroidism    • TIA (transient ischemic attack)        Past Surgical History:   Procedure Laterality Date   • BREAST EXCISIONAL BIOPSY Left 01/2016    benign   • BREAST SURGERY Left 2017   • CHOLECYSTECTOMY     • COLON RESECTION SMALL BOWEL  2014   • COLONOSCOPY  09/05/2019    Dr. Tabares-Internal and external hemorrhoids; Repeat " 10 years   • ENDOSCOPY  09/05/2019    Dr. Tabares-Possible Hightower's esophagus-biopsied   • KNEE ARTHROSCOPY Right 07/14/2020    Procedure: RIGHT KNEE PARTIAL MEDIAL MENISCECTOMY;  Surgeon: Vijay Grewal MD;  Location:  [facility] OR;  Service: Orthopedics;  Laterality: Right;   • LAPAROSCOPIC TUBAL LIGATION     • TOTAL KNEE ARTHROPLASTY Right 07/28/2021    Procedure: RIGHT TOTAL KNEE REPLACEMENT;  Surgeon: Eliceer Foley MD;  Location:  [facility] OR;  Service: Orthopedics;  Laterality: Right;   • TUBAL ABDOMINAL LIGATION     • VAGINAL MESH REVISION      2010/2016   • WISDOM TOOTH EXTRACTION          Current Outpatient Medications:   •  Ascorbic Acid (Vitamin C) 500 MG capsule, Take 1 capsule by mouth Daily., Disp: , Rfl:   •  cyclobenzaprine (FLEXERIL) 5 MG tablet, Take 1 tablet by mouth 3 (Three) Times a Day As Needed for Muscle Spasms., Disp: 30 tablet, Rfl: 2  •  diclofenac (VOLTAREN) 75 MG EC tablet, Take 1 tablet by mouth 2 (Two) Times a Day. (Patient taking differently: Take 75 mg by mouth As Needed.), Disp: 60 tablet, Rfl: 3  •  docusate sodium (COLACE) 100 MG capsule, Take 1 capsule by mouth 2 (Two) Times a Day As Needed for Constipation., Disp: 6 capsule, Rfl: 0  •  estradiol (Vivelle-Dot) 0.025 MG/24HR patch, Place 1 patch on the skin as directed by provider 2 (Two) Times a Week., Disp: 8 patch, Rfl: 11  •  gabapentin (NEURONTIN) 100 MG capsule, Take 1 capsule by mouth 3 (Three) Times a Day---Needs office appt for additional refills (Patient taking differently: Take 100 mg by mouth As Needed.), Disp: 90 capsule, Rfl: 0  •  icosapent ethyl (Vascepa) 1 g capsule capsule, Take 2 capsules by mouth 2 (Two) Times a Day With Meals. (Patient taking differently: Take 2 g by mouth As Needed.), Disp: 120 capsule, Rfl: 11  •  NON FORMULARY, Pure encapsulation vitamins, Disp: , Rfl:   •  ondansetron ODT (ZOFRAN-ODT) 4 MG disintegrating tablet, Place 1 tablet on the tongue Every 6 (Six) Hours As Needed for  Nausea., Disp: 12 tablet, Rfl: 0  •  Progesterone (Prometrium) 100 MG capsule, Take 1 capsule by mouth Daily., Disp: 30 capsule, Rfl: 11  •  saccharomyces boulardii (FLORASTOR) 250 MG capsule, Take 250 mg by mouth 2 (Two) Times a Day., Disp: , Rfl:   •  Semaglutide-Weight Management (Wegovy) 2.4 MG/0.75ML solution auto-injector, Inject 2.4 mg (1 pen) under the skin into the appropriate area as directed 1 (One) Time Per Week., Disp: 3 mL, Rfl: 11  •  venlafaxine XR (EFFEXOR-XR) 75 MG 24 hr capsule, Take 1 capsule by mouth Daily., Disp: 30 capsule, Rfl: 3  •  vitamin B-12 (CYANOCOBALAMIN) 500 MCG tablet, Take 1 tablet by mouth Daily., Disp: 30 tablet, Rfl: 11  •  vitamin B-6 (PYRIDOXINE) 25 MG tablet, Take 1 tablet by mouth Daily., Disp: 30 tablet, Rfl: 5  •  vitamin D3 125 MCG (5000 UT) capsule capsule, Take 5,000 Units by mouth Daily., Disp: , Rfl:   •  Zinc 10 MG lozenge, Dissolve  in the mouth., Disp: , Rfl:     No Known Allergies    Social History     Socioeconomic History   • Marital status:    Tobacco Use   • Smoking status: Former Smoker     Packs/day: 0.50     Years: 20.00     Pack years: 10.00     Types: Cigarettes     Quit date:      Years since quittin.4   • Smokeless tobacco: Never Used   • Tobacco comment: quit    Vaping Use   • Vaping Use: Never used   Substance and Sexual Activity   • Alcohol use: Not Currently   • Drug use: No   • Sexual activity: Defer       Family History   Problem Relation Age of Onset   • Heart defect Mother    • Diverticulitis Mother    • No Known Problems Father    • No Known Problems Sister    • Cancer Maternal Aunt    • Colon cancer Paternal Aunt 68   • Colon cancer Paternal Aunt 70   • Liver cancer Paternal Aunt    • Colon cancer Paternal Aunt 80   • Breast cancer Neg Hx    • Ovarian cancer Neg Hx    • Uterine cancer Neg Hx    • Melanoma Neg Hx    • Prostate cancer Neg Hx    • Colon polyps Neg Hx    • Esophageal cancer Neg Hx    • Liver disease Neg Hx    •  "Stomach cancer Neg Hx    • Rectal cancer Neg Hx        Review of Systems   Respiratory: Negative for shortness of breath.    Cardiovascular: Negative for chest pain.   Endocrine: Positive for heat intolerance.        H/o thyroid, but off of meds now and doing well.       Objective     /72 (BP Location: Left arm, Patient Position: Sitting, Cuff Size: Adult)   Pulse 95   Temp 97.7 °F (36.5 °C) (Infrared)   Ht 160 cm (63\")   Wt 79.4 kg (175 lb)   LMP 06/30/2019   SpO2 97%   Breastfeeding No   BMI 31.00 kg/m²     Physical Exam  Constitutional:       Appearance: She is well-developed.   Pulmonary:      Effort: Pulmonary effort is normal.   Musculoskeletal:         General: Normal range of motion.   Skin:     General: Skin is warm.   Neurological:      Mental Status: She is alert and oriented to person, place, and time.   Psychiatric:         Behavior: Behavior normal.         Lab Results - Last 18 Months   Lab Units 06/16/22  1331 06/07/22  0833 02/28/22  1528 08/11/21  0106 07/29/21  0459 07/28/21  1113 07/09/21  1435   GLUCOSE mg/dL 93 91 108* 112* 138* 125* 108*   BUN mg/dL 9 15 18 14 10 14 13   CREATININE mg/dL 0.70 0.77 0.71 0.83 0.65 0.64 0.88   SODIUM mmol/L 141 140 142 138 142 139 140   POTASSIUM mmol/L 4.1 4.3 4.2 4.1 4.0 4.7 4.1   CHLORIDE mmol/L 103 103 103 100 103 104 105   TOTAL CO2 mmol/L  --  25  --   --   --   --   --    CO2 mmol/L 29.0  --  26.0 26.0 30.0* 27.0 28.0   TOTAL PROTEIN g/dL 7.6  --  7.6 7.1  --   --  6.8   ALBUMIN g/dL 4.80 4.6 5.00 4.40  --   --  4.60   ALT (SGPT) U/L 61* 36* 54* 30  --   --  37*   AST (SGOT) U/L 36* 34 37* 22  --   --  28   ALK PHOS U/L 95 86 116 203*  --   --  93   BILIRUBIN mg/dL 0.5 0.3 0.3 0.3  --   --  0.2   GLOBULIN gm/dL 2.8  --  2.6 2.7  --   --  2.2       Lab Results - Last 18 Months   Lab Units 06/16/22  1331 06/07/22  0833 02/28/22  1528 08/11/21  0106 07/29/21  0459 07/28/21  1113 07/09/21  1435   HEMOGLOBIN g/dL 14.3 13.5 13.5 10.0* 10.5* 12.3 " 12.8   HEMATOCRIT % 44.4 40.9 40.7 29.8* 32.6* 36.9 37.6   MCV fL 90.2 88 86.4 88.7  --   --  86.6   WBC 10*3/mm3 5.70 3.8 5.16 6.12  --   --  3.80   RDW % 13.0 13.5 13.2 12.9  --   --  12.2*   MPV fL 9.7  --  9.9 9.2  --   --  9.7   PLATELETS 10*3/mm3 237 233 271 406  --   --  211   INR   --   --   --  1.14*  --   --  0.97       Lab Results - Last 18 Months   Lab Units 06/07/22  0833 02/28/22  1528 08/11/21  0106   TSH uIU/mL 2.980 5.210* 6.080*   VIT D 25 HYDROXY ng/mL 45.8 38.0  --           CT ABDOMEN PELVIS WO CONTRAST- 6/16/2022 1:45 PM CDT     HISTORY: Excessive burping, nausea vomiting, hematemesis, history of  small bowel obstruction, constipation      COMPARISON: CT scan dated 9/17/2019      DOSE LENGTH PRODUCT: 491 mGy cm. Automated exposure control was also  utilized to decrease patient radiation dose.     TECHNIQUE: Noncontrast enhanced images of the abdomen and pelvis  obtained without oral contrast. Multiplanar reformatted images were  provided for review.      FINDINGS:   LOWER CHEST: The lung bases and base of the heart are unremarkable.      LIVER: No focal liver lesion within limits of a noncontrast study.      BILIARY SYSTEM: The gallbladder has been removed. There is no evidence  of biliary ductal dilation.      PANCREAS: No focal pancreatic lesion within limits of a noncontrast  study.      SPLEEN: Unremarkable.      KIDNEYS: Bilateral kidneys are unremarkable. The ureters are  decompressed and normal in appearance.     ADRENALS: Unremarkable.     RETROPERITONEUM: No mass, lymphadenopathy or hemorrhage.      GI TRACT: Stomach is decompressed. Small bowel loops are nondilated.  Previous partial small bowel resection with anastomosis left of midline  in the mid abdomen. Large volume colonic stool. No inflammatory changes  identified along the colon.     OTHER: There is no mesenteric mass, lymphadenopathy or fluid collection.  No acute bony abnormality. L4-5 and L5-S1 level degenerative  changes  with loss of disc height, endplate sclerosis and facet arthropathy.      PELVIS: Prior hysterectomy. There is an approximately 1.8 cm rounded,  fairly well-circumscribed and intrinsically dense lesion identified in  the midline at the base the urinary bladder (series 2-image 25, series  1-image 81, series 3-image 38).     IMPRESSION:  1. There is a 1.8 cm rounded, intrinsically dense lesion which is seen  in the midline at the base the urinary bladder, perhaps immediately  anterior to the orifice of the urethra based on the position on the  sagittal images. Evaluation is slightly limited by the fact that the  urinary bladder is decompressed. This would be somewhat unusual for a  neoplastic lesion given its rounded appearance and intrinsic density, as  neoplasia is not typically hyperdense without contrast. My differential  would include a cystic lesion with internal high density debris, such as  can be seen in cystitis cystica. Differential would also include  neoplasia or perhaps an inflammatory pseudotumor. I would recommend  urology referral.  2. No acute process identified.   3. Large volume colonic stool, perhaps constipation.  4. I do not see any acute inflammatory process along the bowel. Previous  partial small bowel resection with unremarkable surgical anastomosis.  Small bowel loops are decompressed.  This report was finalized on 06/16/2022 14:30 by Dr Home Funes, .    IMPRESSION/PLAN:    Assessment & Plan      Problem List Items Addressed This Visit        Gastrointestinal Abdominal     Elevated liver enzymes    Overview     New problem since 2/8/2022.  She is status post cholecystectomy.  Unenhanced CT 6/2022 negative for liver lesions.  Will recheck labs in about a month           Relevant Orders    Comprehensive Metabolic Panel    Hematemesis - Primary    Overview     New problem as of 6/16/2022.  She had been on volteran.  No further hemetesis.  Will plan endoscopy thanh with her prior h/o  lymphoma           Current Assessment & Plan     The risks, benefits, and alternatives of endoscopy were reviewed with the patient today.  Risks including perforation, with or without dilation, possibly requiring surgery.  Additional risks include risk of bleeding.  There is also the risk of a drug reaction or problems with anesthesia.  This will be discussed with the further by the anesthesia team on the day of the procedure. The benefits include the diagnosis and management of disease of the upper digestive tract.  Alternatives to endoscopy include upper GI series, laboratory testing, radiographic evaluation, or no intervention.  The patient verbalizes understanding and agrees.    In accordance with requirements under the Affordable Care Act, [facility] has provided pricing for all hospital services and items on each of its websites. However, a patient's actual cost may differ based on the services the patient receives to meet individual healthcare needs and based on the benefits provided under the patient’s insurance coverage.             Relevant Orders    Case Request (Completed)                          Clotilde Friedman MD  06/20/22  15:31 CDT    Much of this encounter note is an electronic transcription/translation of spoken language to printed text. The electronic translation of spoken language may permit erroneous, or at times, nonsensical words or phrases to be inadvertently transcribed; although I have reviewed the note for such errors, some may still exist.

## 2022-06-20 ENCOUNTER — OFFICE VISIT (OUTPATIENT)
Dept: GASTROENTEROLOGY | Facility: CLINIC | Age: 57
End: 2022-06-20

## 2022-06-20 VITALS
WEIGHT: 175 LBS | HEART RATE: 95 BPM | SYSTOLIC BLOOD PRESSURE: 130 MMHG | DIASTOLIC BLOOD PRESSURE: 72 MMHG | BODY MASS INDEX: 31.01 KG/M2 | HEIGHT: 63 IN | TEMPERATURE: 97.7 F | OXYGEN SATURATION: 97 %

## 2022-06-20 DIAGNOSIS — K92.0 HEMATEMESIS WITH NAUSEA: Primary | ICD-10-CM

## 2022-06-20 DIAGNOSIS — Z01.818 PRE-PROCEDURAL EXAMINATION: Primary | ICD-10-CM

## 2022-06-20 DIAGNOSIS — R74.8 ELEVATED LIVER ENZYMES: ICD-10-CM

## 2022-06-20 PROCEDURE — 99204 OFFICE O/P NEW MOD 45 MIN: CPT | Performed by: INTERNAL MEDICINE

## 2022-06-20 NOTE — ASSESSMENT & PLAN NOTE
The risks, benefits, and alternatives of endoscopy were reviewed with the patient today.  Risks including perforation, with or without dilation, possibly requiring surgery.  Additional risks include risk of bleeding.  There is also the risk of a drug reaction or problems with anesthesia.  This will be discussed with the further by the anesthesia team on the day of the procedure. The benefits include the diagnosis and management of disease of the upper digestive tract.  Alternatives to endoscopy include upper GI series, laboratory testing, radiographic evaluation, or no intervention.  The patient verbalizes understanding and agrees.    In accordance with requirements under the Affordable Care Act, [facility] has provided pricing for all hospital services and items on each of its websites. However, a patient's actual cost may differ based on the services the patient receives to meet individual healthcare needs and based on the benefits provided under the patient’s insurance coverage.

## 2022-06-22 ENCOUNTER — SPECIALTY PHARMACY (OUTPATIENT)
Dept: ENDOCRINOLOGY | Facility: CLINIC | Age: 57
End: 2022-06-22

## 2022-06-22 NOTE — PROGRESS NOTES
Attempted to call Ms. Alberto x3 and the phone goes straight to voicemail  I will move her clinical assessment to next week  It does look like she has been having some GI issues and it is undetermined if this is in coordination with her wegovy or not. We will continue to follow when possible

## 2022-06-23 ENCOUNTER — CLINICAL SUPPORT (OUTPATIENT)
Dept: INTERNAL MEDICINE | Facility: CLINIC | Age: 57
End: 2022-06-23

## 2022-06-23 ENCOUNTER — TELEPHONE (OUTPATIENT)
Dept: GASTROENTEROLOGY | Facility: CLINIC | Age: 57
End: 2022-06-23

## 2022-06-23 ENCOUNTER — OFFICE VISIT (OUTPATIENT)
Dept: UROLOGY | Facility: CLINIC | Age: 57
End: 2022-06-23

## 2022-06-23 DIAGNOSIS — N32.89 BLADDER MASS: Primary | ICD-10-CM

## 2022-06-23 DIAGNOSIS — R74.8 ELEVATED LIVER ENZYMES: ICD-10-CM

## 2022-06-23 DIAGNOSIS — Z01.818 PRE-OP TESTING: Primary | ICD-10-CM

## 2022-06-23 LAB
ALBUMIN SERPL-MCNC: 4.7 G/DL (ref 3.5–5.2)
ALBUMIN/GLOB SERPL: 2.2 G/DL
ALP SERPL-CCNC: 92 U/L (ref 39–117)
ALT SERPL W P-5'-P-CCNC: 36 U/L (ref 1–33)
ANION GAP SERPL CALCULATED.3IONS-SCNC: 10.3 MMOL/L (ref 5–15)
AST SERPL-CCNC: 27 U/L (ref 1–32)
BILIRUB SERPL-MCNC: 0.3 MG/DL (ref 0–1.2)
BUN SERPL-MCNC: 14 MG/DL (ref 6–20)
BUN/CREAT SERPL: 14.7 (ref 7–25)
CALCIUM SPEC-SCNC: 9.6 MG/DL (ref 8.6–10.5)
CHLORIDE SERPL-SCNC: 102 MMOL/L (ref 98–107)
CO2 SERPL-SCNC: 26.7 MMOL/L (ref 22–29)
CREAT SERPL-MCNC: 0.95 MG/DL (ref 0.57–1)
EGFRCR SERPLBLD CKD-EPI 2021: 70.5 ML/MIN/1.73
GLOBULIN UR ELPH-MCNC: 2.1 GM/DL
GLUCOSE SERPL-MCNC: 84 MG/DL (ref 65–99)
POTASSIUM SERPL-SCNC: 4.7 MMOL/L (ref 3.5–5.2)
PROT SERPL-MCNC: 6.8 G/DL (ref 6–8.5)
SARS-COV-2 RNA PNL SPEC NAA+PROBE: NOT DETECTED
SODIUM SERPL-SCNC: 139 MMOL/L (ref 136–145)

## 2022-06-23 PROCEDURE — 36415 COLL VENOUS BLD VENIPUNCTURE: CPT

## 2022-06-23 PROCEDURE — 99211 OFF/OP EST MAY X REQ PHY/QHP: CPT

## 2022-06-23 PROCEDURE — 87635 SARS-COV-2 COVID-19 AMP PRB: CPT | Performed by: INTERNAL MEDICINE

## 2022-06-23 PROCEDURE — 99203 OFFICE O/P NEW LOW 30 MIN: CPT | Performed by: UROLOGY

## 2022-06-23 PROCEDURE — 80053 COMPREHEN METABOLIC PANEL: CPT

## 2022-06-23 NOTE — TELEPHONE ENCOUNTER
Pt getting covid test done at [facility] through [facility] and having their  bring it to the hospital today.

## 2022-06-23 NOTE — PROGRESS NOTES
Desirae UGO PAT  1965  [id number]    Verified patient's NAME and  before test was performed.     COVID-19 Test Performed:   Nasopharyngeal Swab     Location:  COVID ROOM     How did the patient tolerate the test?   Well tolerated by patient..    Staff Member Performing Test:  Maggie Neil MA    PPE Worn:    mask, gloves, eye protection, face shield and gown       Venipuncture Blood Specimen Collection  Venipuncture performed in right ac by Maggie Neil MA with good hemostasis. Patient tolerated the procedure well without complications.   22   Maggie Neil MA

## 2022-06-24 ENCOUNTER — ANESTHESIA EVENT (OUTPATIENT)
Dept: GASTROENTEROLOGY | Facility: HOSPITAL | Age: 57
End: 2022-06-24

## 2022-06-24 ENCOUNTER — HOSPITAL ENCOUNTER (OUTPATIENT)
Facility: HOSPITAL | Age: 57
Setting detail: HOSPITAL OUTPATIENT SURGERY
Discharge: HOME | End: 2022-06-24
Attending: INTERNAL MEDICINE | Admitting: INTERNAL MEDICINE

## 2022-06-24 ENCOUNTER — TELEPHONE (OUTPATIENT)
Dept: GASTROENTEROLOGY | Facility: CLINIC | Age: 57
End: 2022-06-24

## 2022-06-24 ENCOUNTER — ANESTHESIA (OUTPATIENT)
Dept: GASTROENTEROLOGY | Facility: HOSPITAL | Age: 57
End: 2022-06-24

## 2022-06-24 VITALS
WEIGHT: 173 LBS | OXYGEN SATURATION: 97 % | SYSTOLIC BLOOD PRESSURE: 123 MMHG | TEMPERATURE: 97.3 F | HEART RATE: 69 BPM | DIASTOLIC BLOOD PRESSURE: 80 MMHG | HEIGHT: 63 IN | RESPIRATION RATE: 15 BRPM | BODY MASS INDEX: 30.65 KG/M2

## 2022-06-24 DIAGNOSIS — R74.8 ELEVATED LIVER ENZYMES: Primary | ICD-10-CM

## 2022-06-24 PROCEDURE — 25010000002 PROPOFOL 10 MG/ML EMULSION: Performed by: NURSE ANESTHETIST, CERTIFIED REGISTERED

## 2022-06-24 PROCEDURE — 43239 EGD BIOPSY SINGLE/MULTIPLE: CPT | Performed by: INTERNAL MEDICINE

## 2022-06-24 RX ORDER — SODIUM CHLORIDE 9 MG/ML
500 INJECTION, SOLUTION INTRAVENOUS CONTINUOUS PRN
Status: DISCONTINUED | OUTPATIENT
Start: 2022-06-24 | End: 2022-06-24 | Stop reason: HOSPADM

## 2022-06-24 RX ORDER — PANTOPRAZOLE SODIUM 40 MG/1
40 TABLET, DELAYED RELEASE ORAL DAILY
Qty: 90 TABLET | Refills: 3 | Status: SHIPPED | OUTPATIENT
Start: 2022-06-24

## 2022-06-24 RX ORDER — PROPOFOL 10 MG/ML
VIAL (ML) INTRAVENOUS AS NEEDED
Status: DISCONTINUED | OUTPATIENT
Start: 2022-06-24 | End: 2022-06-24 | Stop reason: SURG

## 2022-06-24 RX ORDER — LIDOCAINE HYDROCHLORIDE 20 MG/ML
INJECTION, SOLUTION EPIDURAL; INFILTRATION; INTRACAUDAL; PERINEURAL AS NEEDED
Status: DISCONTINUED | OUTPATIENT
Start: 2022-06-24 | End: 2022-06-24 | Stop reason: SURG

## 2022-06-24 RX ORDER — SODIUM CHLORIDE 0.9 % (FLUSH) 0.9 %
10 SYRINGE (ML) INJECTION AS NEEDED
Status: DISCONTINUED | OUTPATIENT
Start: 2022-06-24 | End: 2022-06-24 | Stop reason: HOSPADM

## 2022-06-24 RX ADMIN — LIDOCAINE HYDROCHLORIDE 100 MG: 20 INJECTION, SOLUTION EPIDURAL; INFILTRATION; INTRACAUDAL; PERINEURAL at 09:48

## 2022-06-24 RX ADMIN — PROPOFOL 200 MG: 10 INJECTION, EMULSION INTRAVENOUS at 09:48

## 2022-06-24 RX ADMIN — SODIUM CHLORIDE 500 ML: 9 INJECTION, SOLUTION INTRAVENOUS at 08:18

## 2022-06-24 NOTE — TELEPHONE ENCOUNTER
She will need a repeat endoscopy in about 6 weeks.  When she has her repeat endoscopy scheduled, please make sure that she is aware to stay on clear liquids for the day before because she had all that food still on her stomach on her scope today.    Clotilde rFiedman MD

## 2022-06-24 NOTE — ANESTHESIA POSTPROCEDURE EVALUATION
Patient: Desirae STEWART    Procedure Summary     Date: 06/24/22 Room / Location: [facility] ENDOSCOPY 6 / [facility]    Anesthesia Start: 0948 Anesthesia Stop: 0957    Procedure: ESOPHAGOGASTRODUODENOSCOPY WITH ANESTHESIA (N/A ) Diagnosis:       Hematemesis with nausea      (Hematemesis with nausea [K92.0])    Surgeons: Clotilde Friedman MD Provider: Gumaro Elmore CRNA    Anesthesia Type: MAC ASA Status: 2          Anesthesia Type: MAC    Vitals  Vitals Value Taken Time   /74 06/24/22 0957   Temp     Pulse 75 06/24/22 0957   Resp 13 06/24/22 0957   SpO2 95 % 06/24/22 0957           Post Anesthesia Care and Evaluation    Patient location during evaluation: PHASE II

## 2022-06-24 NOTE — PROGRESS NOTES
57-year-old female new patient referred for incidentally noted 1.8 cm possible bladder lesion on CT abdomen and pelvis without contrast in the ER on 6/16/2022 for evaluation of abdominal pain.  She does note history of an episode of hematuria in the past.  She had normal urine microscopy in the ER on 6/15/2022.  She does have a history of malignant lymphoma of the small bowel treated with resection in [place] on 1/29/2014.  She had normal PET scan November 2015.  We spent significant time today reviewing her CT scan from the ER along with her previous imaging in 2019 including an abdominal ultrasound from 9/7/2019 and CT abdomen and pelvis with contrast on 9/17/2019.  She was found to have a 1.6 cm rounded appearing left lateral posterior urinary bladder lesion felt to represent possible mass, but patient cannot recall discussing this lesion previously.  CT scan report did not comment on this lesion but there does appear to be a 1.6 cm lesion adjacent to the posterior bladder wall possibly in relation to the uterus that coincides with ultrasound and the CT scan this year.  She does have history of uterine fibroma.  She denies dysuria or flank pain.  She is scheduled to have an upper endoscopy to evaluate her GI symptoms tomorrow morning.  We discussed the role for cystoscopy in office to evaluate for urothelial tumor versus submucosal mass versus extravesical lesion.  We discussed that the cystoscopy would likely need to be done after her upper endoscopy in my [place] office as her GI procedures tomorrow.  We will get her set up for cystoscopy in my [place] office in the next few weeks.     I spent 30 minutes with the patient today reviewing her previous imaging, pathology records, and discussing options for evaluation, etc.      This document has been signed by LEDY Poe MD on June 24, 2022 18:19 CDT

## 2022-06-24 NOTE — INTERVAL H&P NOTE
H&P updated. The patient was examined and the following changes are noted:        Pt aware LFTs still elevated on recheck.

## 2022-06-24 NOTE — DISCHARGE INSTRUCTIONS
Get your labs drawn in 1 month, repeat your endo in 6 weeks, the office will call to schedule, avoid nsaids

## 2022-06-24 NOTE — ANESTHESIA PREPROCEDURE EVALUATION
Anesthesia Evaluation     Patient summary reviewed and Nursing notes reviewed   no history of anesthetic complications:  NPO Solid Status: > 8 hours  NPO Liquid Status: > 8 hours           Airway   Mallampati: I  TM distance: >3 FB  Neck ROM: full  No difficulty expected  Dental      Pulmonary    (-) sleep apnea, not a smoker  Cardiovascular   Exercise tolerance: good (4-7 METS)    (+) hyperlipidemia,       Neuro/Psych  (+) TIA,    (-) seizures  GI/Hepatic/Renal/Endo    (+) obesity,  GERD,    (-) liver disease, no renal disease, diabetes, no thyroid disorder    Musculoskeletal     Abdominal    Substance History      OB/GYN          Other      history of cancer (non hodgkins lymphoma in remission) remission      Other Comment: Glauoma  MTHFR mutation                  Anesthesia Plan    ASA 2     MAC     intravenous induction     Anesthetic plan, risks, benefits, and alternatives have been provided, discussed and informed consent has been obtained with: patient.        CODE STATUS:

## 2022-07-01 ENCOUNTER — PREP FOR SURGERY (OUTPATIENT)
Dept: OTHER | Facility: HOSPITAL | Age: 57
End: 2022-07-01

## 2022-07-01 DIAGNOSIS — K21.00 GASTROESOPHAGEAL REFLUX DISEASE WITH ESOPHAGITIS WITHOUT HEMORRHAGE: Primary | ICD-10-CM

## 2022-07-01 DIAGNOSIS — K92.0 HEMATEMESIS WITH NAUSEA: ICD-10-CM

## 2022-07-08 ENCOUNTER — PROCEDURE VISIT (OUTPATIENT)
Dept: UROLOGY | Facility: CLINIC | Age: 57
End: 2022-07-08

## 2022-07-08 ENCOUNTER — PRE-ADMISSION TESTING (OUTPATIENT)
Dept: PREADMISSION TESTING | Facility: HOSPITAL | Age: 57
End: 2022-07-08

## 2022-07-08 ENCOUNTER — LAB (OUTPATIENT)
Dept: LAB | Facility: HOSPITAL | Age: 57
End: 2022-07-08

## 2022-07-08 VITALS
HEART RATE: 106 BPM | WEIGHT: 175.04 LBS | DIASTOLIC BLOOD PRESSURE: 81 MMHG | OXYGEN SATURATION: 96 % | RESPIRATION RATE: 18 BRPM | BODY MASS INDEX: 31.02 KG/M2 | SYSTOLIC BLOOD PRESSURE: 125 MMHG | HEIGHT: 63 IN

## 2022-07-08 DIAGNOSIS — N32.89 BLADDER MASS: ICD-10-CM

## 2022-07-08 DIAGNOSIS — N32.89 BLADDER MASS: Primary | ICD-10-CM

## 2022-07-08 LAB
ANION GAP SERPL CALCULATED.3IONS-SCNC: 9 MMOL/L (ref 5–15)
BILIRUB BLD-MCNC: NEGATIVE MG/DL
BUN SERPL-MCNC: 14 MG/DL (ref 6–20)
BUN/CREAT SERPL: 17.5 (ref 7–25)
CALCIUM SPEC-SCNC: 9.3 MG/DL (ref 8.6–10.5)
CHLORIDE SERPL-SCNC: 106 MMOL/L (ref 98–107)
CLARITY, POC: CLEAR
CO2 SERPL-SCNC: 26 MMOL/L (ref 22–29)
COLOR UR: YELLOW
CREAT SERPL-MCNC: 0.8 MG/DL (ref 0.57–1)
DEPRECATED RDW RBC AUTO: 40 FL (ref 37–54)
EGFRCR SERPLBLD CKD-EPI 2021: 86.6 ML/MIN/1.73
ERYTHROCYTE [DISTWIDTH] IN BLOOD BY AUTOMATED COUNT: 12.4 % (ref 12.3–15.4)
GLUCOSE SERPL-MCNC: 117 MG/DL (ref 65–99)
GLUCOSE UR STRIP-MCNC: NEGATIVE MG/DL
HCT VFR BLD AUTO: 39.2 % (ref 34–46.6)
HGB BLD-MCNC: 13 G/DL (ref 12–15.9)
KETONES UR QL: NEGATIVE
LEUKOCYTE EST, POC: NEGATIVE
MCH RBC QN AUTO: 29.1 PG (ref 26.6–33)
MCHC RBC AUTO-ENTMCNC: 33.2 G/DL (ref 31.5–35.7)
MCV RBC AUTO: 87.7 FL (ref 79–97)
NITRITE UR-MCNC: NEGATIVE MG/ML
PH UR: 7 [PH] (ref 5–8)
PLATELET # BLD AUTO: 222 10*3/MM3 (ref 140–450)
PMV BLD AUTO: 9.5 FL (ref 6–12)
POTASSIUM SERPL-SCNC: 4.1 MMOL/L (ref 3.5–5.2)
PROT UR STRIP-MCNC: NEGATIVE MG/DL
RBC # BLD AUTO: 4.47 10*6/MM3 (ref 3.77–5.28)
RBC # UR STRIP: NEGATIVE /UL
SARS-COV-2 RNA PNL SPEC NAA+PROBE: NOT DETECTED
SODIUM SERPL-SCNC: 141 MMOL/L (ref 136–145)
SP GR UR: 1.02 (ref 1–1.03)
UROBILINOGEN UR QL: NORMAL
WBC NRBC COR # BLD: 3.74 10*3/MM3 (ref 3.4–10.8)

## 2022-07-08 PROCEDURE — 93005 ELECTROCARDIOGRAM TRACING: CPT | Performed by: UROLOGY

## 2022-07-08 PROCEDURE — 85027 COMPLETE CBC AUTOMATED: CPT

## 2022-07-08 PROCEDURE — 52000 CYSTOURETHROSCOPY: CPT | Performed by: UROLOGY

## 2022-07-08 PROCEDURE — 93010 ELECTROCARDIOGRAM REPORT: CPT | Performed by: INTERNAL MEDICINE

## 2022-07-08 PROCEDURE — 80048 BASIC METABOLIC PNL TOTAL CA: CPT

## 2022-07-08 PROCEDURE — C9803 HOPD COVID-19 SPEC COLLECT: HCPCS

## 2022-07-08 PROCEDURE — 36415 COLL VENOUS BLD VENIPUNCTURE: CPT

## 2022-07-08 PROCEDURE — 87635 SARS-COV-2 COVID-19 AMP PRB: CPT

## 2022-07-08 PROCEDURE — 81001 URINALYSIS AUTO W/SCOPE: CPT | Performed by: UROLOGY

## 2022-07-09 LAB
QT INTERVAL: 346 MS
QTC INTERVAL: 423 MS

## 2022-07-10 NOTE — H&P (VIEW-ONLY)
CC: I am here for the doctor to look at my bladder    Cystoscopy procedure note  Pre- operative diagnosis:  1.8 cm hyperdense possible bladder mass on CT scan.  Previous history of small bowel lymphoma 2014    Post operative diagnosis:  Solid mural based mass at the left aspect of the bladder neck consistent with previous imaging covered with normal urothelium.  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  The urethra was anesthetized with 2% lidocaine jelly.  A flexible cystoscope was introduced per urethra.      Urethra:  Normal    Bladder:  Normal mucosa and there is approximately 2 cm submucosal mass covered with normal urothelium near the left aspect of the bladder neck best seen on retroflexed view.    Ureteral orifices:  Normal position bilaterally and Clear efflux bilaterally    Patient tolerated the procedure well    Complications: none    Blood loss: minimal    Diagnoses and all orders for this visit:    1. Bladder mass (Primary)  -     POC Urinalysis Dipstick, Multipro  -     Case Request; Standing  -     COVID PRE-OP / PRE-PROCEDURE SCREENING ORDER (NO ISOLATION) - Swab, Nasopharynx; Future  -     CBC (No Diff); Future  -     Basic Metabolic Panel; Future  -     Case Request    Other orders  -     Follow Anesthesia Guidelines / Standing Orders; Future  -     Obtain Informed Consent; Future  -     Provide NPO Instructions to Patient; Future  -     Chlorhexidine Skin Prep; Future            Follow up:    Given her previous history of lymphoma, I recommended scheduling cystoscopy under anesthesia for transurethral resection of bladder tumor/biopsy of this lesion.  She would like to proceed next week.  We discussed risks of the procedure including but not limited to infection, bleeding, need for additional procedures, need for postoperative catheter, possibility of benign/malignant disease, injury  and/or perforation to the bladder, complications of anesthesia.  She voices understanding and provide informed consent to proceed with TURBT next Tuesday, 7/12/2022.      This document has been signed by LEDY Poe MD on July 10, 2022 12:45 CDT        Joel Poe MD  7/10/2022  12:41 CDT

## 2022-07-12 ENCOUNTER — ANESTHESIA EVENT (OUTPATIENT)
Dept: PERIOP | Facility: HOSPITAL | Age: 57
End: 2022-07-12

## 2022-07-12 ENCOUNTER — ANESTHESIA (OUTPATIENT)
Dept: PERIOP | Facility: HOSPITAL | Age: 57
End: 2022-07-12

## 2022-07-12 ENCOUNTER — HOSPITAL ENCOUNTER (OUTPATIENT)
Facility: HOSPITAL | Age: 57
Setting detail: HOSPITAL OUTPATIENT SURGERY
Discharge: HOME | End: 2022-07-12
Attending: UROLOGY | Admitting: UROLOGY

## 2022-07-12 VITALS
RESPIRATION RATE: 18 BRPM | OXYGEN SATURATION: 93 % | TEMPERATURE: 96.9 F | HEART RATE: 70 BPM | SYSTOLIC BLOOD PRESSURE: 118 MMHG | DIASTOLIC BLOOD PRESSURE: 67 MMHG

## 2022-07-12 DIAGNOSIS — N32.89 BLADDER MASS: ICD-10-CM

## 2022-07-12 PROCEDURE — 25010000002 DROPERIDOL PER 5 MG: Performed by: ANESTHESIOLOGY

## 2022-07-12 PROCEDURE — 25010000002 MIDAZOLAM PER 1 MG: Performed by: ANESTHESIOLOGY

## 2022-07-12 PROCEDURE — 88307 TISSUE EXAM BY PATHOLOGIST: CPT | Performed by: UROLOGY

## 2022-07-12 PROCEDURE — 52235 CYSTOSCOPY AND TREATMENT: CPT | Performed by: UROLOGY

## 2022-07-12 PROCEDURE — 25010000002 DEXAMETHASONE PER 1 MG: Performed by: NURSE ANESTHETIST, CERTIFIED REGISTERED

## 2022-07-12 PROCEDURE — 25010000002 PROPOFOL 10 MG/ML EMULSION: Performed by: NURSE ANESTHETIST, CERTIFIED REGISTERED

## 2022-07-12 PROCEDURE — 25010000002 LEVOFLOXACIN PER 250 MG: Performed by: UROLOGY

## 2022-07-12 PROCEDURE — 25010000002 ONDANSETRON PER 1 MG: Performed by: NURSE ANESTHETIST, CERTIFIED REGISTERED

## 2022-07-12 PROCEDURE — 25010000002 DEXAMETHASONE PER 1 MG: Performed by: ANESTHESIOLOGY

## 2022-07-12 PROCEDURE — 25010000002 FENTANYL CITRATE (PF) 100 MCG/2ML SOLUTION: Performed by: NURSE ANESTHETIST, CERTIFIED REGISTERED

## 2022-07-12 RX ORDER — FENTANYL CITRATE 50 UG/ML
25 INJECTION, SOLUTION INTRAMUSCULAR; INTRAVENOUS
Status: DISCONTINUED | OUTPATIENT
Start: 2022-07-12 | End: 2022-07-12 | Stop reason: HOSPADM

## 2022-07-12 RX ORDER — ONDANSETRON 2 MG/ML
INJECTION INTRAMUSCULAR; INTRAVENOUS AS NEEDED
Status: DISCONTINUED | OUTPATIENT
Start: 2022-07-12 | End: 2022-07-12 | Stop reason: SURG

## 2022-07-12 RX ORDER — LEVOFLOXACIN 5 MG/ML
500 INJECTION, SOLUTION INTRAVENOUS ONCE
Status: COMPLETED | OUTPATIENT
Start: 2022-07-12 | End: 2022-07-12

## 2022-07-12 RX ORDER — HYDROCODONE BITARTRATE AND ACETAMINOPHEN 7.5; 325 MG/1; MG/1
1 TABLET ORAL EVERY 4 HOURS PRN
Qty: 12 TABLET | Refills: 0 | Status: SHIPPED | OUTPATIENT
Start: 2022-07-12 | End: 2022-08-05 | Stop reason: HOSPADM

## 2022-07-12 RX ORDER — SODIUM CHLORIDE 0.9 % (FLUSH) 0.9 %
3 SYRINGE (ML) INJECTION EVERY 12 HOURS SCHEDULED
Status: DISCONTINUED | OUTPATIENT
Start: 2022-07-12 | End: 2022-07-12 | Stop reason: HOSPADM

## 2022-07-12 RX ORDER — DROPERIDOL 2.5 MG/ML
0.62 INJECTION, SOLUTION INTRAMUSCULAR; INTRAVENOUS ONCE AS NEEDED
Status: COMPLETED | OUTPATIENT
Start: 2022-07-12 | End: 2022-07-12

## 2022-07-12 RX ORDER — MIDAZOLAM HYDROCHLORIDE 1 MG/ML
2 INJECTION INTRAMUSCULAR; INTRAVENOUS
Status: DISCONTINUED | OUTPATIENT
Start: 2022-07-12 | End: 2022-07-12 | Stop reason: HOSPADM

## 2022-07-12 RX ORDER — ONDANSETRON 4 MG/1
4 TABLET, ORALLY DISINTEGRATING ORAL EVERY 6 HOURS PRN
Qty: 6 TABLET | Refills: 1 | Status: SHIPPED | OUTPATIENT
Start: 2022-07-12 | End: 2022-08-02 | Stop reason: SDUPTHER

## 2022-07-12 RX ORDER — PROPOFOL 10 MG/ML
VIAL (ML) INTRAVENOUS AS NEEDED
Status: DISCONTINUED | OUTPATIENT
Start: 2022-07-12 | End: 2022-07-12 | Stop reason: SURG

## 2022-07-12 RX ORDER — LIDOCAINE HYDROCHLORIDE 20 MG/ML
INJECTION, SOLUTION EPIDURAL; INFILTRATION; INTRACAUDAL; PERINEURAL AS NEEDED
Status: DISCONTINUED | OUTPATIENT
Start: 2022-07-12 | End: 2022-07-12 | Stop reason: SURG

## 2022-07-12 RX ORDER — ONDANSETRON 2 MG/ML
4 INJECTION INTRAMUSCULAR; INTRAVENOUS ONCE AS NEEDED
Status: DISCONTINUED | OUTPATIENT
Start: 2022-07-12 | End: 2022-07-12 | Stop reason: HOSPADM

## 2022-07-12 RX ORDER — SODIUM CHLORIDE, SODIUM LACTATE, POTASSIUM CHLORIDE, CALCIUM CHLORIDE 600; 310; 30; 20 MG/100ML; MG/100ML; MG/100ML; MG/100ML
1000 INJECTION, SOLUTION INTRAVENOUS CONTINUOUS
Status: DISCONTINUED | OUTPATIENT
Start: 2022-07-12 | End: 2022-07-12 | Stop reason: HOSPADM

## 2022-07-12 RX ORDER — MAGNESIUM HYDROXIDE 1200 MG/15ML
LIQUID ORAL AS NEEDED
Status: DISCONTINUED | OUTPATIENT
Start: 2022-07-12 | End: 2022-07-12 | Stop reason: HOSPADM

## 2022-07-12 RX ORDER — LIDOCAINE HYDROCHLORIDE 40 MG/ML
SOLUTION TOPICAL AS NEEDED
Status: DISCONTINUED | OUTPATIENT
Start: 2022-07-12 | End: 2022-07-12 | Stop reason: SURG

## 2022-07-12 RX ORDER — NALOXONE HCL 0.4 MG/ML
0.4 VIAL (ML) INJECTION AS NEEDED
Status: DISCONTINUED | OUTPATIENT
Start: 2022-07-12 | End: 2022-07-12 | Stop reason: HOSPADM

## 2022-07-12 RX ORDER — LIDOCAINE HYDROCHLORIDE 10 MG/ML
0.5 INJECTION, SOLUTION EPIDURAL; INFILTRATION; INTRACAUDAL; PERINEURAL ONCE AS NEEDED
Status: DISCONTINUED | OUTPATIENT
Start: 2022-07-12 | End: 2022-07-12 | Stop reason: HOSPADM

## 2022-07-12 RX ORDER — HYDROCODONE BITARTRATE AND ACETAMINOPHEN 7.5; 325 MG/1; MG/1
1 TABLET ORAL ONCE AS NEEDED
Status: DISCONTINUED | OUTPATIENT
Start: 2022-07-12 | End: 2022-07-12 | Stop reason: HOSPADM

## 2022-07-12 RX ORDER — SODIUM CHLORIDE, SODIUM LACTATE, POTASSIUM CHLORIDE, CALCIUM CHLORIDE 600; 310; 30; 20 MG/100ML; MG/100ML; MG/100ML; MG/100ML
100 INJECTION, SOLUTION INTRAVENOUS CONTINUOUS
Status: DISCONTINUED | OUTPATIENT
Start: 2022-07-12 | End: 2022-07-12 | Stop reason: HOSPADM

## 2022-07-12 RX ORDER — SODIUM CHLORIDE 0.9 % (FLUSH) 0.9 %
3-10 SYRINGE (ML) INJECTION AS NEEDED
Status: DISCONTINUED | OUTPATIENT
Start: 2022-07-12 | End: 2022-07-12 | Stop reason: HOSPADM

## 2022-07-12 RX ORDER — ACETAMINOPHEN 500 MG
1000 TABLET ORAL ONCE
Status: COMPLETED | OUTPATIENT
Start: 2022-07-12 | End: 2022-07-12

## 2022-07-12 RX ORDER — ROCURONIUM BROMIDE 10 MG/ML
INJECTION, SOLUTION INTRAVENOUS AS NEEDED
Status: DISCONTINUED | OUTPATIENT
Start: 2022-07-12 | End: 2022-07-12 | Stop reason: SURG

## 2022-07-12 RX ORDER — SULFAMETHOXAZOLE AND TRIMETHOPRIM 800; 160 MG/1; MG/1
1 TABLET ORAL 2 TIMES DAILY
Qty: 6 TABLET | Refills: 0 | Status: SHIPPED | OUTPATIENT
Start: 2022-07-12 | End: 2022-07-15

## 2022-07-12 RX ORDER — OXYCODONE AND ACETAMINOPHEN 10; 325 MG/1; MG/1
1 TABLET ORAL ONCE AS NEEDED
Status: DISCONTINUED | OUTPATIENT
Start: 2022-07-12 | End: 2022-07-12 | Stop reason: HOSPADM

## 2022-07-12 RX ORDER — PHENAZOPYRIDINE HYDROCHLORIDE 100 MG/1
100 TABLET, FILM COATED ORAL 3 TIMES DAILY PRN
Qty: 20 TABLET | Refills: 1 | Status: SHIPPED | OUTPATIENT
Start: 2022-07-12

## 2022-07-12 RX ORDER — OXYBUTYNIN CHLORIDE 5 MG/1
5 TABLET ORAL EVERY 8 HOURS PRN
Qty: 30 TABLET | Refills: 1 | Status: SHIPPED | OUTPATIENT
Start: 2022-07-12 | End: 2022-08-09

## 2022-07-12 RX ORDER — NEOSTIGMINE METHYLSULFATE 5 MG/5 ML
SYRINGE (ML) INTRAVENOUS AS NEEDED
Status: DISCONTINUED | OUTPATIENT
Start: 2022-07-12 | End: 2022-07-12 | Stop reason: SURG

## 2022-07-12 RX ORDER — FLUMAZENIL 0.1 MG/ML
0.2 INJECTION INTRAVENOUS AS NEEDED
Status: DISCONTINUED | OUTPATIENT
Start: 2022-07-12 | End: 2022-07-12 | Stop reason: HOSPADM

## 2022-07-12 RX ORDER — DEXAMETHASONE SODIUM PHOSPHATE 4 MG/ML
INJECTION, SOLUTION INTRA-ARTICULAR; INTRALESIONAL; INTRAMUSCULAR; INTRAVENOUS; SOFT TISSUE AS NEEDED
Status: DISCONTINUED | OUTPATIENT
Start: 2022-07-12 | End: 2022-07-12 | Stop reason: SURG

## 2022-07-12 RX ORDER — OXYCODONE AND ACETAMINOPHEN 7.5; 325 MG/1; MG/1
2 TABLET ORAL EVERY 4 HOURS PRN
Status: DISCONTINUED | OUTPATIENT
Start: 2022-07-12 | End: 2022-07-12 | Stop reason: HOSPADM

## 2022-07-12 RX ORDER — IBUPROFEN 600 MG/1
600 TABLET ORAL ONCE AS NEEDED
Status: DISCONTINUED | OUTPATIENT
Start: 2022-07-12 | End: 2022-07-12 | Stop reason: HOSPADM

## 2022-07-12 RX ORDER — DEXAMETHASONE SODIUM PHOSPHATE 4 MG/ML
4 INJECTION, SOLUTION INTRA-ARTICULAR; INTRALESIONAL; INTRAMUSCULAR; INTRAVENOUS; SOFT TISSUE ONCE AS NEEDED
Status: COMPLETED | OUTPATIENT
Start: 2022-07-12 | End: 2022-07-12

## 2022-07-12 RX ORDER — SODIUM CHLORIDE 0.9 % (FLUSH) 0.9 %
3 SYRINGE (ML) INJECTION AS NEEDED
Status: DISCONTINUED | OUTPATIENT
Start: 2022-07-12 | End: 2022-07-12 | Stop reason: HOSPADM

## 2022-07-12 RX ORDER — LABETALOL HYDROCHLORIDE 5 MG/ML
5 INJECTION, SOLUTION INTRAVENOUS
Status: DISCONTINUED | OUTPATIENT
Start: 2022-07-12 | End: 2022-07-12 | Stop reason: HOSPADM

## 2022-07-12 RX ORDER — FENTANYL CITRATE 50 UG/ML
INJECTION, SOLUTION INTRAMUSCULAR; INTRAVENOUS AS NEEDED
Status: DISCONTINUED | OUTPATIENT
Start: 2022-07-12 | End: 2022-07-12 | Stop reason: SURG

## 2022-07-12 RX ADMIN — LIDOCAINE HYDROCHLORIDE 80 MG: 20 INJECTION, SOLUTION EPIDURAL; INFILTRATION; INTRACAUDAL at 13:56

## 2022-07-12 RX ADMIN — PROPOFOL 150 MG: 10 INJECTION, EMULSION INTRAVENOUS at 13:56

## 2022-07-12 RX ADMIN — ONDANSETRON 4 MG: 2 INJECTION INTRAMUSCULAR; INTRAVENOUS at 14:06

## 2022-07-12 RX ADMIN — DEXAMETHASONE SODIUM PHOSPHATE 8 MG: 4 INJECTION, SOLUTION INTRA-ARTICULAR; INTRALESIONAL; INTRAMUSCULAR; INTRAVENOUS; SOFT TISSUE at 14:01

## 2022-07-12 RX ADMIN — ROCURONIUM BROMIDE 15 MG: 10 SOLUTION INTRAVENOUS at 13:56

## 2022-07-12 RX ADMIN — ACETAMINOPHEN 1000 MG: 500 TABLET, FILM COATED ORAL at 13:36

## 2022-07-12 RX ADMIN — GLYCOPYRROLATE 0.4 MG: 0.2 INJECTION INTRAMUSCULAR; INTRAVENOUS at 14:36

## 2022-07-12 RX ADMIN — DROPERIDOL 0.62 MG: 2.5 INJECTION, SOLUTION INTRAMUSCULAR; INTRAVENOUS at 14:57

## 2022-07-12 RX ADMIN — SODIUM CHLORIDE, POTASSIUM CHLORIDE, SODIUM LACTATE AND CALCIUM CHLORIDE: 600; 310; 30; 20 INJECTION, SOLUTION INTRAVENOUS at 14:22

## 2022-07-12 RX ADMIN — Medication 2 MG: at 14:36

## 2022-07-12 RX ADMIN — SODIUM CHLORIDE, POTASSIUM CHLORIDE, SODIUM LACTATE AND CALCIUM CHLORIDE 1000 ML: 600; 310; 30; 20 INJECTION, SOLUTION INTRAVENOUS at 09:57

## 2022-07-12 RX ADMIN — LEVOFLOXACIN 500 MG: 500 INJECTION, SOLUTION INTRAVENOUS at 13:07

## 2022-07-12 RX ADMIN — FENTANYL CITRATE 100 MCG: 50 INJECTION, SOLUTION INTRAMUSCULAR; INTRAVENOUS at 13:53

## 2022-07-12 RX ADMIN — LIDOCAINE HYDROCHLORIDE 1 EACH: 40 SOLUTION TOPICAL at 13:57

## 2022-07-12 RX ADMIN — DEXAMETHASONE SODIUM PHOSPHATE 4 MG: 4 INJECTION INTRA-ARTICULAR; INTRALESIONAL; INTRAMUSCULAR; INTRAVENOUS; SOFT TISSUE at 13:36

## 2022-07-12 RX ADMIN — MIDAZOLAM 2 MG: 1 INJECTION INTRAMUSCULAR; INTRAVENOUS at 13:36

## 2022-07-12 NOTE — BRIEF OP NOTE
CYSTOSCOPY TRANSURETHRAL RESECTION OF BLADDER TUMOR  Progress Note    Desirae STEWART  7/12/2022    Pre-op Diagnosis:   Bladder mass [N32.89]   History of lymphoma       Post-Op Diagnosis Codes:     * Bladder mass [N32.89]   Same    Procedure/CPT® Codes:        Procedure(s):  CYSTOSCOPY TRANSURETHRAL RESECTION OF BLADDER TUMOR (3cm)    Surgeon(s):  Joel Poe MD    Anesthesia: General    Staff:   Circulator: Chris Horta RN; Divine Lynn RN  Scrub Person: Shannan Riggs  Assistant: Stanley Cole  Assistant: Stanley Cole      Estimated Blood Loss: minimal    Urine Voided: * No values recorded between 7/12/2022  1:51 PM and 7/12/2022  2:46 PM *    Specimens:                Specimens     ID Source Type Tests Collected By Collected At Frozen?    A Urinary Bladder Tissue · TISSUE PATHOLOGY EXAM   Joel Poe MD 7/12/22 1417     Description: BLADDER MASS HISTORY OF LYMPHOMA                Drains:   Continuous Bladder Irrigation Triple-lumen 20 Fr (Active)   Daily Indications Selected surgeries ( tract, abdomen) 07/12/22 1447   Site Assessment Clean;Skin intact 07/12/22 1447   Collection Container Standard drainage bag 07/12/22 1447   Securement Method Securing device 07/12/22 1447       Findings: 3cm solid firm mural submucosal mass at left bladder neck.         Complications: None        Joel Poe MD     Date: 7/12/2022  Time: 14:53 CDT

## 2022-07-12 NOTE — ANESTHESIA PREPROCEDURE EVALUATION
Anesthesia Evaluation     Patient summary reviewed   no history of anesthetic complications:  NPO Solid Status: > 8 hours  NPO Liquid Status: > 8 hours           Airway   Mallampati: I  TM distance: >3 FB  Neck ROM: full  No difficulty expected  Dental - normal exam     Pulmonary    (-) asthma, sleep apnea, not a smoker  Cardiovascular   Exercise tolerance: good (4-7 METS)    (+) hyperlipidemia,   (-) past MI, CAD, dysrhythmias, cardiac stents      Neuro/Psych  (+) TIA,    (-) seizures, CVA  GI/Hepatic/Renal/Endo    (+) obesity,  GERD,  thyroid problem hypothyroidism  (-) liver disease, no renal disease, diabetes    Musculoskeletal     Abdominal    Substance History      OB/GYN          Other   arthritis,    history of cancer    ROS/Med Hx Other: Bladder mass                Anesthesia Plan    ASA 2     general     intravenous induction     Anesthetic plan, risks, benefits, and alternatives have been provided, discussed and informed consent has been obtained with: patient.        CODE STATUS:

## 2022-07-12 NOTE — OP NOTE
Operative Summary    Desirae STEWART  Date of Procedure: 7/12/2022    Pre-op Diagnosis:   Bladder mass [N32.89]    Post-op Diagnosis:     Post-Op Diagnosis Codes:     * Bladder mass [N32.89]    Procedure/CPT® Codes:      Procedure(s):  CYSTOSCOPY TRANSURETHRAL RESECTION OF BLADDER TUMOR (3CM)    Surgeon(s):  Joel Poe MD    Anesthesia: General    Staff:   Circulator: Chris Horta RN; Divine Lynn RN  Scrub Person: Shannan Riggs  Assistant: Stanley Cole    Indications for procedure:  57-year-old female with history of lymphoma found to have a solid-appearing hyperdense mass next of the bladder on CT.  Cystoscopy in office demonstrated some mucosal mural based mass at the left aspect of the bladder neck for which he presented for transurethral resection    Findings:   Cystoscopy: 3 cm solid firm mural submucosal mass at the left anterior bladder neck      Procedure details:  The patient is identified in the preoperative holding area.  The informed consent process had been completed In the office documented by my last progress note that included a discussion of the risks of this procedure, alternative management options, and the potential benefits of this procedure.  The patient voiced a good recollection of that discussion.  The patient voiced no additional questions.     The patient is taken to the cystoscopic suite and given effective general anesthesia. The patient is then placed in theBody position: dorsal lithotomy position with care being placed to appropriately pad the patient to avoid excessive compression with the Kendall stirrups, especially laterally to the leg on the peroneal nerve.  The patient is then prepped and draped in the usual sterile fashion.  At this point appropriate timeout protocol was observed.  IV antibiotics were ministered.    A 23 Stateless cystoscope sheath with 30° lens is inserted into the bladder.  I also used a 70° lens to carry out a panendoscopic inspection of  the urinary bladder.  Findings are described above.     I then removed the cystoscope and placed a 26 Faroese continuous-flow resectoscope sheath with visual obturator in the bladder.  Visualize the lobular submucosal mass at the left anterior bladder neck.  I then used the loop electrode to perform transurethral resection of this mass.  The mass measured approximately 3 cm.  The mass was very firm and had a woody consistency.  It did not appear like a classic bladder tumor.  The mass was so firm that the electrode had difficulty cutting it.  Care was taken to avoid perforation.  Resection was carried down safely as possible to the surrounding normal appearing urothelium from approximately 12:00 to 4:00 at the bladder neck. An Chartbeat evacuator was used to remove bladder tumor chips and these were passed off as a specimen.  Hemostasis was achieved with electrocautery.    .     Estimated Blood Loss: Minimal    Specimens:                Specimens     ID Source Type Tests Collected By Collected At Frozen?    A Urinary Bladder Tissue · TISSUE PATHOLOGY EXAM   Joel Poe MD 7/12/22 1415 No    Description: BLADDER MASS HISTORY OF LYMPHOMA            Drains: 22 Faroese three-way Dang catheter to gravity with irrigation port capped  Continuous Bladder Irrigation Triple-lumen 20 Fr (Active)   Daily Indications Selected surgeries ( tract, abdomen) 07/12/22 1515   Site Assessment Clean;Skin intact 07/12/22 1447   Collection Container Standard drainage bag 07/12/22 1447   Securement Method Securing device 07/12/22 1515       Complications: none    Plan: She will follow-up with me in my [place] office on 7/14 for catheter removal.    Joel Poe MD     Date: 7/12/2022  Time: 16:11 CDT

## 2022-07-12 NOTE — ANESTHESIA POSTPROCEDURE EVALUATION
Patient: Desirae STEWART    Procedure Summary     Date: 07/12/22 Room / Location:  [facility] OR  /  [facility]    Anesthesia Start: 1353 Anesthesia Stop: 1451    Procedure: CYSTOSCOPY TRANSURETHRAL RESECTION OF BLADDER TUMOR (N/A Bladder) Diagnosis:       Bladder mass      (Bladder mass [N32.89])    Surgeons: Joel Poe MD Provider: Grant Gill CRNA    Anesthesia Type: general ASA Status: 2          Anesthesia Type: general    Vitals  Vitals Value Taken Time   /56 07/12/22 1522   Temp 96.9 °F (36.1 °C) 07/12/22 1447   Pulse 68 07/12/22 1526   Resp 16 07/12/22 1522   SpO2 94 % 07/12/22 1526   Vitals shown include unvalidated device data.        Post Anesthesia Care and Evaluation    Patient location during evaluation: bedside  Patient participation: complete - patient participated  Level of consciousness: awake and alert  Pain score: 0  Pain management: adequate    Airway patency: patent  Anesthetic complications: No anesthetic complications  PONV Status: none  Cardiovascular status: acceptable  Respiratory status: acceptable  Hydration status: acceptable

## 2022-07-12 NOTE — ANESTHESIA PROCEDURE NOTES
Airway  Urgency: elective    Date/Time: 7/12/2022 1:58 PM  Airway not difficult    General Information and Staff    Patient location during procedure: OR  CRNA/CAA: Grant Glil CRNA    Indications and Patient Condition  Indications for airway management: airway protection    Preoxygenated: yes  Mask difficulty assessment: 1 - vent by mask    Final Airway Details  Final airway type: endotracheal airway      Successful airway: ETT  Cuffed: yes   Successful intubation technique: direct laryngoscopy  Facilitating devices/methods: intubating stylet  Endotracheal tube insertion site: oral  Blade: Cartwright  Blade size: 2  ETT size (mm): 7.0  Cormack-Lehane Classification: grade I - full view of glottis  Placement verified by: chest auscultation and capnometry   Cuff volume (mL): 5  Measured from: lips  ETT/EBT  to lips (cm): 20  Number of attempts at approach: 1  Assessment: lips, teeth, and gum same as pre-op and atraumatic intubation

## 2022-07-12 NOTE — ANESTHESIA POSTPROCEDURE EVALUATION
Patient: Desirae STEWART    Procedure Summary     Date: 07/12/22 Room / Location:  [facility] OR 01 /  [facility]    Anesthesia Start: 1353 Anesthesia Stop: 1451    Procedure: CYSTOSCOPY TRANSURETHRAL RESECTION OF BLADDER TUMOR (N/A Bladder) Diagnosis:       Bladder mass      (Bladder mass [N32.89])    Surgeons: Joel Poe MD Provider: Grant Gill CRNA    Anesthesia Type: general ASA Status: 2          Anesthesia Type: general    Vitals  Vitals Value Taken Time   /56 07/12/22 1522   Temp 96.9 °F (36.1 °C) 07/12/22 1447   Pulse 68 07/12/22 1526   Resp 16 07/12/22 1522   SpO2 94 % 07/12/22 1526   Vitals shown include unvalidated device data.        Post Anesthesia Care and Evaluation    Patient location during evaluation: bedside  Patient participation: complete - patient participated  Level of consciousness: awake and awake and alert  Pain score: 0  Pain management: adequate    Airway patency: patent  Anesthetic complications: No anesthetic complications  PONV Status: none  Cardiovascular status: acceptable  Respiratory status: acceptable  Hydration status: acceptable    Comments: Patient discharged according to acceptable Meredith score per RN assessment. See nursing records for further information.     Blood pressure 118/67, pulse 70, temperature 96.9 °F (36.1 °C), temperature source Temporal, resp. rate 18, last menstrual period 06/30/2019, SpO2 93 %, not currently breastfeeding.

## 2022-07-13 ENCOUNTER — SPECIALTY PHARMACY (OUTPATIENT)
Dept: ENDOCRINOLOGY | Facility: CLINIC | Age: 57
End: 2022-07-13

## 2022-07-13 LAB
CYTO UR: NORMAL
LAB AP CASE REPORT: NORMAL
Lab: NORMAL
PATH REPORT.FINAL DX SPEC: NORMAL
PATH REPORT.GROSS SPEC: NORMAL

## 2022-07-14 ENCOUNTER — OFFICE VISIT (OUTPATIENT)
Dept: UROLOGY | Facility: CLINIC | Age: 57
End: 2022-07-14

## 2022-07-14 DIAGNOSIS — N32.89 BLADDER MASS: Primary | ICD-10-CM

## 2022-07-14 PROCEDURE — 99211 OFF/OP EST MAY X REQ PHY/QHP: CPT | Performed by: UROLOGY

## 2022-07-14 NOTE — PROGRESS NOTES
Desirae STEWART is a 57 y.o. female who is here today for catheter removal. Patient of Dr. Poe. 10cc syringe used to deflate the balloon and the catheter was removed without difficulty.  The patient tolerated this well and will return in 6 weeks to see Dr. Poe in the office for follow up. Dr. Poe was in the office at the time of procedure.     Patient was advised to drink clear fluids for the next couple hours and urinate. The patient was also advised she may experience some blood in the urine and burning with urination for the next couple days. If the patient is unable to urinate or develops fever, chills, N&V or suprapubic pain she will call to return for an appt at clinic or seek medical treatment at [facility] ER, PCP or Urgent Care after hours. Patient verbalized understanding and all questions were answered. Shira TAM MA    I reviewed and agree with medical assistance documentation above

## 2022-07-18 ENCOUNTER — TELEPHONE (OUTPATIENT)
Dept: INTERNAL MEDICINE | Facility: CLINIC | Age: 57
End: 2022-07-18

## 2022-08-02 DIAGNOSIS — N32.89 BLADDER MASS: ICD-10-CM

## 2022-08-02 RX ORDER — ONDANSETRON 4 MG/1
4 TABLET, ORALLY DISINTEGRATING ORAL EVERY 6 HOURS PRN
Qty: 12 TABLET | Refills: 1 | Status: SHIPPED | OUTPATIENT
Start: 2022-08-02

## 2022-08-02 RX ORDER — CYCLOBENZAPRINE HCL 5 MG
5 TABLET ORAL 3 TIMES DAILY PRN
Qty: 30 TABLET | Refills: 1 | Status: SHIPPED | OUTPATIENT
Start: 2022-08-02

## 2022-08-04 ENCOUNTER — CLINICAL SUPPORT (OUTPATIENT)
Dept: INTERNAL MEDICINE | Facility: CLINIC | Age: 57
End: 2022-08-04

## 2022-08-04 DIAGNOSIS — Z01.818 PRE-PROCEDURAL EXAMINATION: ICD-10-CM

## 2022-08-04 LAB — SARS-COV-2 RNA PNL SPEC NAA+PROBE: NOT DETECTED

## 2022-08-04 PROCEDURE — 99211 OFF/OP EST MAY X REQ PHY/QHP: CPT | Performed by: INTERNAL MEDICINE

## 2022-08-04 PROCEDURE — 87635 SARS-COV-2 COVID-19 AMP PRB: CPT | Performed by: INTERNAL MEDICINE

## 2022-08-04 NOTE — PROGRESS NOTES
Desirae ARIZMENDI Dolly  1965  [id number]    Verified patient's NAME and  before test was performed.     COVID-19 Test Performed:   Nasopharyngeal Swab     Location:  [facility] ROOM     How did the patient tolerate the test?   Well tolerated by patient..    Staff Member Performing Test:  Deisy Wynn CMA    PPE Worn:    mask, gloves, eye protection and gown            Patient presented for COVID-19 testing as ordered by the provider. Appropriate PPE donned. Patient tolerated well. Patient was given COVID-19 Fact Sheet, How to Quarantine at Home Instructions, and Infection Prevention in the Home handout. Patient advised that results are expected within 2-4 days depending on the time of test.     While waiting for results of test, patient was asked to please call their primary care physician's office or the [place] hotline at [phone] for support of non-emergent symptoms expected with this virus such as increased shortness of breath, fever, cough, or other questions.    Patient was advised to seek care in the Emergency Department should extreme symptoms arise such as new onset confusion, extreme lethargy, hypoxia, or new hypotension.     Questions were engaged and answered to the best of my ability, patient expressed verbal understanding.

## 2022-08-05 ENCOUNTER — ANESTHESIA EVENT (OUTPATIENT)
Dept: GASTROENTEROLOGY | Facility: HOSPITAL | Age: 57
End: 2022-08-05

## 2022-08-05 ENCOUNTER — ANESTHESIA (OUTPATIENT)
Dept: GASTROENTEROLOGY | Facility: HOSPITAL | Age: 57
End: 2022-08-05

## 2022-08-05 ENCOUNTER — HOSPITAL ENCOUNTER (OUTPATIENT)
Facility: HOSPITAL | Age: 57
Setting detail: HOSPITAL OUTPATIENT SURGERY
Discharge: HOME | End: 2022-08-05
Attending: INTERNAL MEDICINE | Admitting: INTERNAL MEDICINE

## 2022-08-05 VITALS
WEIGHT: 168 LBS | RESPIRATION RATE: 17 BRPM | DIASTOLIC BLOOD PRESSURE: 67 MMHG | TEMPERATURE: 97.5 F | HEART RATE: 76 BPM | SYSTOLIC BLOOD PRESSURE: 112 MMHG | HEIGHT: 63 IN | OXYGEN SATURATION: 95 % | BODY MASS INDEX: 29.77 KG/M2

## 2022-08-05 DIAGNOSIS — K92.0 HEMATEMESIS WITH NAUSEA: ICD-10-CM

## 2022-08-05 PROCEDURE — 43235 EGD DIAGNOSTIC BRUSH WASH: CPT | Performed by: INTERNAL MEDICINE

## 2022-08-05 PROCEDURE — 25010000002 PROPOFOL 10 MG/ML EMULSION: Performed by: NURSE ANESTHETIST, CERTIFIED REGISTERED

## 2022-08-05 RX ORDER — SODIUM CHLORIDE 0.9 % (FLUSH) 0.9 %
10 SYRINGE (ML) INJECTION EVERY 12 HOURS SCHEDULED
Status: CANCELLED | OUTPATIENT
Start: 2022-08-05

## 2022-08-05 RX ORDER — SODIUM CHLORIDE 9 MG/ML
500 INJECTION, SOLUTION INTRAVENOUS CONTINUOUS PRN
Status: DISCONTINUED | OUTPATIENT
Start: 2022-08-05 | End: 2022-08-05 | Stop reason: HOSPADM

## 2022-08-05 RX ORDER — LIDOCAINE HYDROCHLORIDE 20 MG/ML
INJECTION, SOLUTION EPIDURAL; INFILTRATION; INTRACAUDAL; PERINEURAL AS NEEDED
Status: DISCONTINUED | OUTPATIENT
Start: 2022-08-05 | End: 2022-08-05 | Stop reason: SURG

## 2022-08-05 RX ORDER — SODIUM CHLORIDE 9 MG/ML
100 INJECTION, SOLUTION INTRAVENOUS CONTINUOUS
Status: CANCELLED | OUTPATIENT
Start: 2022-08-05

## 2022-08-05 RX ORDER — LIDOCAINE HYDROCHLORIDE 10 MG/ML
0.5 INJECTION, SOLUTION EPIDURAL; INFILTRATION; INTRACAUDAL; PERINEURAL ONCE AS NEEDED
Status: DISCONTINUED | OUTPATIENT
Start: 2022-08-05 | End: 2022-08-05 | Stop reason: HOSPADM

## 2022-08-05 RX ORDER — SODIUM CHLORIDE 0.9 % (FLUSH) 0.9 %
10 SYRINGE (ML) INJECTION AS NEEDED
Status: CANCELLED | OUTPATIENT
Start: 2022-08-05

## 2022-08-05 RX ORDER — SODIUM CHLORIDE 0.9 % (FLUSH) 0.9 %
10 SYRINGE (ML) INJECTION AS NEEDED
Status: DISCONTINUED | OUTPATIENT
Start: 2022-08-05 | End: 2022-08-05 | Stop reason: HOSPADM

## 2022-08-05 RX ORDER — PROPOFOL 10 MG/ML
VIAL (ML) INTRAVENOUS AS NEEDED
Status: DISCONTINUED | OUTPATIENT
Start: 2022-08-05 | End: 2022-08-05 | Stop reason: SURG

## 2022-08-05 RX ADMIN — LIDOCAINE HYDROCHLORIDE 150 MG: 20 INJECTION, SOLUTION EPIDURAL; INFILTRATION; INTRACAUDAL; PERINEURAL at 07:38

## 2022-08-05 RX ADMIN — PROPOFOL 100 MG: 10 INJECTION, EMULSION INTRAVENOUS at 07:38

## 2022-08-05 RX ADMIN — SODIUM CHLORIDE 500 ML: 9 INJECTION, SOLUTION INTRAVENOUS at 07:24

## 2022-08-05 NOTE — ANESTHESIA PREPROCEDURE EVALUATION
Anesthesia Evaluation     Patient summary reviewed   no history of anesthetic complications:  NPO Solid Status: > 8 hours  NPO Liquid Status: > 8 hours           Airway   Mallampati: I  TM distance: >3 FB  Neck ROM: full  No difficulty expected  Dental - normal exam     Pulmonary    (-) asthma, sleep apnea, not a smoker  Cardiovascular   Exercise tolerance: good (4-7 METS)    (+) hyperlipidemia,   (-) past MI, CAD, dysrhythmias, cardiac stents      Neuro/Psych  (+) TIA,    (-) seizures, CVA  GI/Hepatic/Renal/Endo    (+) obesity,  GERD,  thyroid problem hypothyroidism  (-) liver disease, no renal disease, diabetes    Musculoskeletal     Abdominal    Substance History      OB/GYN          Other   arthritis,    history of cancer    ROS/Med Hx Other: Bladder mass                    Anesthesia Plan    ASA 2     MAC     intravenous induction     Anesthetic plan, risks, benefits, and alternatives have been provided, discussed and informed consent has been obtained with: patient.        CODE STATUS:

## 2022-08-05 NOTE — ANESTHESIA POSTPROCEDURE EVALUATION
"Patient: Desirae Alberto    Procedure Summary     Date: 08/05/22 Room / Location: [facility] ENDOSCOPY 5 / [facility] END[facility]    Anesthesia Start: 0736 Anesthesia Stop: 0747    Procedure: ESOPHAGOGASTRODUODENOSCOPY WITH ANESTHESIA (N/A ) Diagnosis:       Hematemesis with nausea      (Hematemesis with nausea [K92.0])    Surgeons: Clotilde Friedman MD Provider: Magdi Devi CRNA    Anesthesia Type: MAC ASA Status: 2          Anesthesia Type: MAC    Vitals  Vitals Value Taken Time   /68 08/05/22 0746   Temp     Pulse 75 08/05/22 0749   Resp 13 08/05/22 0745   SpO2 94 % 08/05/22 0749   Vitals shown include unvalidated device data.        Post Anesthesia Care and Evaluation    Patient location during evaluation: PACU  Patient participation: complete - patient participated  Level of consciousness: awake and alert  Pain management: adequate    Airway patency: patent  Anesthetic complications: No anesthetic complications    Cardiovascular status: acceptable  Respiratory status: acceptable  Hydration status: acceptable    Comments: Blood pressure 104/69, pulse 78, temperature 97.5 °F (36.4 °C), temperature source Temporal, resp. rate 13, height 160 cm (63\"), weight 76.2 kg (168 lb), last menstrual period 06/30/2019, SpO2 94 %, not currently breastfeeding.    Pt discharged from PACU based on jennifer score >8      "

## 2022-08-05 NOTE — H&P
Chief Complaint:   Esophagitis and gastroparesis    Subjective     HPI:   Endoscopy 6/24/2022 showed LA grade C esophagitis and gastroparesis with retained gastric contents.  Most of the body of the stomach was not visualized.  This exam is to document healing and to reassess the stomach.    Past Medical History:   Past Medical History:   Diagnosis Date   • Arthritis    • Cancer (HCC)     lymphoma of small bowel   • Family history of colon cancer    • GERD (gastroesophageal reflux disease)    • Glaucoma    • Hyperlipidemia    • Hypothyroidism    • TIA (transient ischemic attack)        Past Surgical History:  Past Surgical History:   Procedure Laterality Date   • BREAST EXCISIONAL BIOPSY Left 01/2016    benign   • BREAST SURGERY Left 2017   • CHOLECYSTECTOMY     • COLON RESECTION SMALL BOWEL  2014   • COLONOSCOPY  09/05/2019    Dr. Tabares-Internal and external hemorrhoids; Repeat 10 years   • ENDOSCOPY  09/05/2019    Dr. Tabares-Possible Hightower's esophagus-biopsied   • ENDOSCOPY N/A 6/24/2022    Procedure: ESOPHAGOGASTRODUODENOSCOPY WITH ANESTHESIA;  Surgeon: Clotilde Friedman MD;  Location: [facility] ENDOSCOPY;  Service: Gastroenterology;  Laterality: N/A;  Pre: Hematemesis  Post: esophagitis  Jeannette Justin DO   • KNEE ARTHROSCOPY Right 07/14/2020    Procedure: RIGHT KNEE PARTIAL MEDIAL MENISCECTOMY;  Surgeon: Vijay Grewal MD;  Location: [facility] OR;  Service: Orthopedics;  Laterality: Right;   • LAPAROSCOPIC TUBAL LIGATION     • TOTAL KNEE ARTHROPLASTY Right 07/28/2021    Procedure: RIGHT TOTAL KNEE REPLACEMENT;  Surgeon: Eliecer Foley MD;  Location:  [facility] OR;  Service: Orthopedics;  Laterality: Right;   • TRANSURETHRAL RESECTION OF BLADDER TUMOR N/A 7/12/2022    Procedure: CYSTOSCOPY TRANSURETHRAL RESECTION OF BLADDER TUMOR;  Surgeon: Joel Poe MD;  Location:  [facility] OR;  Service: Urology;  Laterality: N/A;   • TUBAL ABDOMINAL LIGATION     • VAGINAL MESH REVISION      2010/2016   •  WISDOM TOOTH EXTRACTION         Family History:  Family History   Problem Relation Age of Onset   • Heart defect Mother    • Diverticulitis Mother    • No Known Problems Father    • No Known Problems Sister    • Cancer Maternal Aunt    • Colon cancer Paternal Aunt 68   • Colon cancer Paternal Aunt 70   • Liver cancer Paternal Aunt    • Colon cancer Paternal Aunt 80   • Breast cancer Neg Hx    • Ovarian cancer Neg Hx    • Uterine cancer Neg Hx    • Melanoma Neg Hx    • Prostate cancer Neg Hx    • Colon polyps Neg Hx    • Esophageal cancer Neg Hx    • Liver disease Neg Hx    • Stomach cancer Neg Hx    • Rectal cancer Neg Hx        Social History:   reports that she quit smoking about 21 years ago. Her smoking use included cigarettes. She has a 10.00 pack-year smoking history. She has never used smokeless tobacco. She reports previous alcohol use. She reports that she does not use drugs.    Medications:   Medications Prior to Admission   Medication Sig Dispense Refill Last Dose   • Ascorbic Acid (Vitamin C) 500 MG capsule Take 1 capsule by mouth Daily.   Past Week at Unknown time   • docusate sodium (COLACE) 100 MG capsule Take 1 capsule by mouth 2 (Two) Times a Day As Needed for Constipation. 6 capsule 0 8/4/2022 at Unknown time   • ondansetron ODT (Zofran ODT) 4 MG disintegrating tablet Place 1 tablet on the tongue Every 6 (Six) Hours As Needed for Nausea. 12 tablet 1 8/4/2022 at Unknown time   • oxybutynin (DITROPAN) 5 MG tablet Take 1 tablet by mouth Every 8 (Eight) Hours As Needed (bladder spasms). 30 tablet 1 Past Month at Unknown time   • pantoprazole (PROTONIX) 40 MG EC tablet Take 1 tablet by mouth Daily. 90 tablet 3 8/4/2022 at Unknown time   • phenazopyridine (PYRIDIUM) 100 MG tablet Take 1 tablet by mouth 3 (Three) Times a Day As Needed (urinary burning). 20 tablet 1 Past Week at Unknown time   • Progesterone (Prometrium) 100 MG capsule Take 1 capsule by mouth Daily. 30 capsule 11 8/4/2022 at Unknown time  "  • saccharomyces boulardii (FLORASTOR) 250 MG capsule Take 250 mg by mouth 2 (Two) Times a Day.   Past Week at Unknown time   • venlafaxine XR (EFFEXOR-XR) 75 MG 24 hr capsule Take 1 capsule by mouth Daily. 30 capsule 3 8/4/2022 at Unknown time   • vitamin B-6 (PYRIDOXINE) 25 MG tablet Take 1 tablet by mouth Daily. 30 tablet 5 Past Week at Unknown time   • vitamin D3 125 MCG (5000 UT) capsule capsule Take 5,000 Units by mouth Daily.   Past Week at Unknown time   • cyclobenzaprine (FLEXERIL) 5 MG tablet Take 1 tablet by mouth 3 (Three) Times a Day As Needed for Muscle Spasms. 30 tablet 1    • estradiol (Vivelle-Dot) 0.025 MG/24HR patch Place 1 patch on the skin as directed by provider 2 (Two) Times a Week. 8 patch 11    • HYDROcodone-acetaminophen (NORCO) 7.5-325 MG per tablet Take 1 tablet by mouth Every 4 (Four) Hours As Needed for Severe Pain  (postop pain). 12 tablet 0 More than a month at Unknown time   • NON FORMULARY Pure encapsulation vitamins      • Semaglutide-Weight Management (Wegovy) 2.4 MG/0.75ML solution auto-injector Inject 2.4 mg (1 pen) under the skin into the appropriate area as directed 1 (One) Time Per Week. 3 mL 11 8/3/2022   • vitamin B-12 (CYANOCOBALAMIN) 500 MCG tablet Take 1 tablet by mouth Daily. 30 tablet 11 Unknown at Unknown time   • Zinc 10 MG lozenge Dissolve 10 mg in the mouth Daily.   Unknown at Unknown time       Allergies:  Patient has no known allergies.    ROS:    Resp: No SOA  Cardiovascular: No CP      Objective     /74 (Patient Position: Sitting)   Pulse 91   Temp 97.5 °F (36.4 °C) (Temporal)   Resp 18   Ht 160 cm (63\")   Wt 76.2 kg (168 lb)   LMP 06/30/2019   SpO2 94%   BMI 29.76 kg/m²     Physical Exam   Constitutional: Pt is oriented to person, place, and in no distress.   Pulmonary/Chest: No distress.  No audible wheezes  Psychiatric: Mood, memory, affect and judgment appear normal.     Assessment & Plan     Diagnosis:  LA grade C " esophagitis  Gastroparesis    Anticipated Surgical Procedure:  Endoscopy    The risks, benefits, and alternatives of endoscopy were reviewed with the patient today.  Risks including perforation, with or without dilation, possibly requiring surgery.  Additional risks include risk of bleeding.  There is also the risk of a drug reaction or problems with anesthesia.  This will be discussed with the further by the anesthesia team on the day of the procedure. The benefits include the diagnosis and management of disease of the upper digestive tract.  Alternatives to endoscopy include upper GI series, laboratory testing, radiographic evaluation, or no intervention.  The patient verbalizes understanding and agrees.    Much of this encounter note is an electronic transcription/translation of spoken language to printed text. The electronic translation of spoken language may permit erroneous, or at times, nonsensical words or phrases to be inadvertently transcribed; although I have reviewed the note for such errors, some may still exist.

## 2022-08-08 ENCOUNTER — SPECIALTY PHARMACY (OUTPATIENT)
Dept: ENDOCRINOLOGY | Facility: CLINIC | Age: 57
End: 2022-08-08

## 2022-08-08 RX ORDER — VENLAFAXINE HYDROCHLORIDE 75 MG/1
75 CAPSULE, EXTENDED RELEASE ORAL DAILY
Qty: 30 CAPSULE | Refills: 3 | Status: CANCELLED | OUTPATIENT
Start: 2022-08-08

## 2022-08-08 NOTE — TELEPHONE ENCOUNTER
Spoke with patient this rx request should have gone to [facility].  She has an appt with her tomorrow she will get her to do rx.

## 2022-08-08 NOTE — PROGRESS NOTES
Specialty Pharmacy Refill Coordination Note     Desirae is a 57 y.o. female contacted today regarding refills of  Wegovy specialty medication(s).    Reviewed and verified with patient:       Specialty medication(s) and dose(s) confirmed: yes    Refill Questions    Flowsheet Row Most Recent Value   Changes to allergies? No   Changes to medications? No   New conditions since last clinic visit No   Unplanned office visit, urgent care, ED, or hospital admission in the last 4 weeks  No   How does patient/caregiver feel medication is working? Very good   Financial problems or insurance changes  No   Since the previous refill, were any specialty medication doses or scheduled injections missed or delayed?  No   Does this patient require a clinical escalation to a pharmacist? No          Delivery Questions    Flowsheet Row Most Recent Value   Delivery method [employer]   Delivery address correct? No  [[facility]]   Delivery phone number [phone]   Number of medications in delivery 1   Medication being filled and delivered wegovy   Is there any medication that is due not being filled? No   Supplies needed? No supplies needed   Cooler needed? Yes   Do any medications need mixed or dated? No   Copay form of payment Credit card on file   Questions or concerns for the pharmacist? No   Are any medications first time fills? No        Patient is doing well, she is ready for a refill of her wegovy.  This was sent in for delivery to the [facility] on Monday.          Follow-up: 1 month.      Abdon Vasquez  Specialty Pharmacy Technician

## 2022-08-09 ENCOUNTER — OFFICE VISIT (OUTPATIENT)
Dept: OBSTETRICS AND GYNECOLOGY | Facility: CLINIC | Age: 57
End: 2022-08-09

## 2022-08-09 VITALS
SYSTOLIC BLOOD PRESSURE: 118 MMHG | HEIGHT: 63 IN | WEIGHT: 174 LBS | BODY MASS INDEX: 30.83 KG/M2 | DIASTOLIC BLOOD PRESSURE: 78 MMHG

## 2022-08-09 DIAGNOSIS — C85.80 OTHER SPECIFIED TYPE OF NON-HODGKIN LYMPHOMA, UNSPECIFIED BODY REGION: ICD-10-CM

## 2022-08-09 DIAGNOSIS — Z12.31 ENCOUNTER FOR SCREENING MAMMOGRAM FOR BREAST CANCER: ICD-10-CM

## 2022-08-09 DIAGNOSIS — N39.3 SUI (STRESS URINARY INCONTINENCE, FEMALE): ICD-10-CM

## 2022-08-09 DIAGNOSIS — N95.1 MENOPAUSAL SYMPTOMS: ICD-10-CM

## 2022-08-09 DIAGNOSIS — G45.9 TIA (TRANSIENT ISCHEMIC ATTACK): ICD-10-CM

## 2022-08-09 DIAGNOSIS — Z01.419 WELL WOMAN EXAM WITH ROUTINE GYNECOLOGICAL EXAM: Primary | ICD-10-CM

## 2022-08-09 PROCEDURE — 99396 PREV VISIT EST AGE 40-64: CPT | Performed by: NURSE PRACTITIONER

## 2022-08-09 PROCEDURE — G0123 SCREEN CERV/VAG THIN LAYER: HCPCS | Performed by: NURSE PRACTITIONER

## 2022-08-09 PROCEDURE — 87624 HPV HI-RISK TYP POOLED RSLT: CPT | Performed by: NURSE PRACTITIONER

## 2022-08-09 PROCEDURE — 99213 OFFICE O/P EST LOW 20 MIN: CPT | Performed by: NURSE PRACTITIONER

## 2022-08-09 RX ORDER — ESTRADIOL 0.03 MG/D
1 FILM, EXTENDED RELEASE TRANSDERMAL 2 TIMES WEEKLY
Qty: 8 PATCH | Refills: 11 | Status: CANCELLED | OUTPATIENT
Start: 2022-08-11

## 2022-08-09 RX ORDER — VENLAFAXINE HYDROCHLORIDE 75 MG/1
75 CAPSULE, EXTENDED RELEASE ORAL DAILY
Qty: 90 CAPSULE | Refills: 3 | Status: SHIPPED | OUTPATIENT
Start: 2022-08-09

## 2022-08-09 NOTE — PROGRESS NOTES
"Subjective     Desirae Alberto is a 57 y.o. female    Patient is here today for yearly checkup.  She has complaints of BERNICE.  She has had previous bladder surgery and then had to have her mesh removed.  She states that the BERNICE is worse than it has ever been.    Gynecologic Exam  The patient's pertinent negatives include no pelvic pain, vaginal bleeding or vaginal discharge. The patient is experiencing no pain. Associated symptoms include constipation, frequency and urgency. Pertinent negatives include no abdominal pain, anorexia, back pain, chills, diarrhea, discolored urine, dysuria, fever, flank pain, headaches, hematuria, joint pain, joint swelling, nausea, painful intercourse, rash, sore throat or vomiting. She is sexually active. She is postmenopausal.         /78   Ht 160 cm (62.99\")   Wt 78.9 kg (174 lb)   LMP 06/30/2019   BMI 30.83 kg/m²     Outpatient Encounter Medications as of 8/9/2022   Medication Sig Dispense Refill   • Ascorbic Acid (Vitamin C) 500 MG capsule Take 1 capsule by mouth Daily.     • cyclobenzaprine (FLEXERIL) 5 MG tablet Take 1 tablet by mouth 3 (Three) Times a Day As Needed for Muscle Spasms. 30 tablet 1   • docusate sodium (COLACE) 100 MG capsule Take 1 capsule by mouth 2 (Two) Times a Day As Needed for Constipation. 6 capsule 0   • estradiol (Vivelle-Dot) 0.025 MG/24HR patch Place 1 patch on the skin as directed by provider 2 (Two) Times a Week. 8 patch 11   • NON FORMULARY Pure encapsulation vitamins     • ondansetron ODT (Zofran ODT) 4 MG disintegrating tablet Place 1 tablet on the tongue Every 6 (Six) Hours As Needed for Nausea. 12 tablet 1   • pantoprazole (PROTONIX) 40 MG EC tablet Take 1 tablet by mouth Daily. 90 tablet 3   • phenazopyridine (PYRIDIUM) 100 MG tablet Take 1 tablet by mouth 3 (Three) Times a Day As Needed (urinary burning). 20 tablet 1   • saccharomyces boulardii (FLORASTOR) 250 MG capsule Take 250 mg by mouth 2 (Two) Times a Day.     • Semaglutide-Weight " Management (Wegovy) 2.4 MG/0.75ML solution auto-injector Inject 2.4 mg (1 pen) under the skin into the appropriate area as directed 1 (One) Time Per Week. 3 mL 11   • venlafaxine XR (EFFEXOR-XR) 75 MG 24 hr capsule Take 1 capsule by mouth Daily. 90 capsule 3   • vitamin B-12 (CYANOCOBALAMIN) 500 MCG tablet Take 1 tablet by mouth Daily. 30 tablet 11   • vitamin B-6 (PYRIDOXINE) 25 MG tablet Take 1 tablet by mouth Daily. 30 tablet 5   • vitamin D3 125 MCG (5000 UT) capsule capsule Take 5,000 Units by mouth Daily.     • [DISCONTINUED] Progesterone (Prometrium) 100 MG capsule Take 1 capsule by mouth Daily. 30 capsule 11   • [DISCONTINUED] venlafaxine XR (EFFEXOR-XR) 75 MG 24 hr capsule Take 1 capsule by mouth Daily. 30 capsule 3   • [DISCONTINUED] oxybutynin (DITROPAN) 5 MG tablet Take 1 tablet by mouth Every 8 (Eight) Hours As Needed (bladder spasms). 30 tablet 1   • [DISCONTINUED] Zinc 10 MG lozenge Dissolve 10 mg in the mouth Daily.       No facility-administered encounter medications on file as of 8/9/2022.       Surgical History  Past Surgical History:   Procedure Laterality Date   • BREAST EXCISIONAL BIOPSY Left 01/2016    benign   • BREAST SURGERY Left 2017   • CHOLECYSTECTOMY     • COLON RESECTION SMALL BOWEL  2014   • COLONOSCOPY  09/05/2019    Dr. Tabares-Internal and external hemorrhoids; Repeat 10 years   • ENDOSCOPY  09/05/2019    Dr. Tabares-Possible Hightower's esophagus-biopsied   • ENDOSCOPY N/A 6/24/2022    Procedure: ESOPHAGOGASTRODUODENOSCOPY WITH ANESTHESIA;  Surgeon: Clotilde Friedman MD;  Location: [facility] ENDOSCOPY;  Service: Gastroenterology;  Laterality: N/A;  Pre: Hematemesis  Post: esophagitis  Jeannette Justin DO   • ENDOSCOPY N/A 8/5/2022    Procedure: ESOPHAGOGASTRODUODENOSCOPY WITH ANESTHESIA;  Surgeon: Clotilde Friedman MD;  Location: [facility] ENDOSCOPY;  Service: Gastroenterology;  Laterality: N/A;  pre: esophagitis  post: normal  Jeannette Justin DO   • KNEE ARTHROSCOPY Right 07/14/2020     Procedure: RIGHT KNEE PARTIAL MEDIAL MENISCECTOMY;  Surgeon: Vijay Grewal MD;  Location:  [facility] OR;  Service: Orthopedics;  Laterality: Right;   • LAPAROSCOPIC TUBAL LIGATION     • TOTAL KNEE ARTHROPLASTY Right 07/28/2021    Procedure: RIGHT TOTAL KNEE REPLACEMENT;  Surgeon: Eliecer Foley MD;  Location:  [facility] OR;  Service: Orthopedics;  Laterality: Right;   • TRANSURETHRAL RESECTION OF BLADDER TUMOR N/A 7/12/2022    Procedure: CYSTOSCOPY TRANSURETHRAL RESECTION OF BLADDER TUMOR;  Surgeon: Joel Poe MD;  Location:  [facility] OR;  Service: Urology;  Laterality: N/A;   • TUBAL ABDOMINAL LIGATION     • VAGINAL MESH REVISION      2010/2016   • WISDOM TOOTH EXTRACTION         Family History  Family History   Problem Relation Age of Onset   • Heart defect Mother    • Diverticulitis Mother    • No Known Problems Father    • No Known Problems Sister    • Cancer Maternal Aunt    • Colon cancer Paternal Aunt 68   • Colon cancer Paternal Aunt 70   • Liver cancer Paternal Aunt    • Colon cancer Paternal Aunt 80   • Breast cancer Neg Hx    • Ovarian cancer Neg Hx    • Uterine cancer Neg Hx    • Melanoma Neg Hx    • Prostate cancer Neg Hx    • Colon polyps Neg Hx    • Esophageal cancer Neg Hx    • Liver disease Neg Hx    • Stomach cancer Neg Hx    • Rectal cancer Neg Hx        The following portions of the patient's history were reviewed and updated as appropriate: allergies, current medications, past family history, past medical history, past social history, past surgical history, and problem list.    Review of Systems   Constitutional: Negative for activity change, appetite change, chills, diaphoresis, fatigue, fever, unexpected weight gain and unexpected weight loss.   HENT: Negative for congestion, dental problem, drooling, ear discharge, ear pain, facial swelling, hearing loss, mouth sores, nosebleeds, postnasal drip, rhinorrhea, sinus pressure, sneezing, sore throat, swollen glands, tinnitus,  trouble swallowing and voice change.    Eyes: Negative for blurred vision, double vision, photophobia, pain, discharge, redness, itching and visual disturbance.   Respiratory: Negative for apnea, cough, choking, chest tightness, shortness of breath, wheezing and stridor.    Cardiovascular: Negative for chest pain, palpitations and leg swelling.   Gastrointestinal: Positive for constipation. Negative for abdominal distention, abdominal pain, anal bleeding, anorexia, blood in stool, diarrhea, nausea, rectal pain, vomiting, GERD and indigestion.   Endocrine: Positive for heat intolerance. Negative for cold intolerance, polydipsia, polyphagia and polyuria.   Genitourinary: Positive for frequency, urgency and urinary incontinence. Negative for amenorrhea, breast discharge, breast lump, breast pain, decreased libido, decreased urine volume, difficulty urinating, dyspareunia, dysuria, flank pain, genital sores, hematuria, menstrual problem, pelvic pain, pelvic pressure, vaginal bleeding, vaginal discharge and vaginal pain.   Musculoskeletal: Negative for arthralgias, back pain, gait problem, joint pain, joint swelling, myalgias, neck pain, neck stiffness and bursitis.   Skin: Negative for color change, dry skin and rash.   Allergic/Immunologic: Negative for environmental allergies, food allergies and immunocompromised state.   Neurological: Negative for dizziness, tremors, seizures, syncope, facial asymmetry, speech difficulty, weakness, light-headedness, numbness, headache, memory problem and confusion.   Hematological: Negative for adenopathy. Does not bruise/bleed easily.   Psychiatric/Behavioral: Negative for agitation, behavioral problems, decreased concentration, dysphoric mood, hallucinations, self-injury, sleep disturbance, suicidal ideas, negative for hyperactivity, depressed mood and stress. The patient is not nervous/anxious.        Objective   Physical Exam  Vitals and nursing note reviewed. Exam conducted with  a chaperone present.   Constitutional:       General: She is not in acute distress.     Appearance: She is well-developed. She is not diaphoretic.   HENT:      Head: Normocephalic.      Right Ear: External ear normal.      Left Ear: External ear normal.      Nose: Nose normal.   Eyes:      General: No scleral icterus.        Right eye: No discharge.         Left eye: No discharge.      Conjunctiva/sclera: Conjunctivae normal.      Pupils: Pupils are equal, round, and reactive to light.   Neck:      Thyroid: No thyromegaly.      Vascular: No carotid bruit.      Trachea: No tracheal deviation.   Cardiovascular:      Rate and Rhythm: Normal rate and regular rhythm.      Heart sounds: Normal heart sounds. No murmur heard.  Pulmonary:      Effort: Pulmonary effort is normal. No respiratory distress.      Breath sounds: Normal breath sounds. No wheezing.   Chest:   Breasts: Breasts are symmetrical.      Right: Normal. No swelling, bleeding, inverted nipple, mass, nipple discharge, skin change, tenderness, axillary adenopathy or supraclavicular adenopathy.      Left: Normal. No swelling, bleeding, inverted nipple, mass, nipple discharge, skin change, tenderness, axillary adenopathy or supraclavicular adenopathy.       Abdominal:      General: There is no distension.      Palpations: Abdomen is soft. There is no mass.      Tenderness: There is no abdominal tenderness. There is no right CVA tenderness, left CVA tenderness or guarding.      Hernia: No hernia is present. There is no hernia in the left inguinal area or right inguinal area.   Genitourinary:     General: Normal vulva.      Exam position: Lithotomy position.      Labia:         Right: No rash, tenderness, lesion or injury.         Left: No rash, tenderness, lesion or injury.       Vagina: Normal. No signs of injury and foreign body. No vaginal discharge, erythema, tenderness or bleeding.      Cervix: Normal.      Uterus: Normal. Not enlarged, not fixed and not  tender.       Adnexa: Right adnexa normal and left adnexa normal.        Right: No mass, tenderness or fullness.          Left: No mass, tenderness or fullness.        Rectum: Normal. No mass.      Comments: Patient has a grade 2 cystocele.  BSU normal  Urethral meatus  Normal  Perineum  Normal  Musculoskeletal:         General: No tenderness. Normal range of motion.      Cervical back: Normal range of motion and neck supple.   Lymphadenopathy:      Head:      Right side of head: No submental, submandibular, tonsillar, preauricular, posterior auricular or occipital adenopathy.      Left side of head: No submental, submandibular, tonsillar, preauricular, posterior auricular or occipital adenopathy.      Cervical: No cervical adenopathy.      Right cervical: No superficial, deep or posterior cervical adenopathy.     Left cervical: No superficial, deep or posterior cervical adenopathy.      Upper Body:      Right upper body: No supraclavicular, axillary or pectoral adenopathy.      Left upper body: No supraclavicular, axillary or pectoral adenopathy.      Lower Body: No right inguinal adenopathy. No left inguinal adenopathy.   Skin:     General: Skin is warm and dry.      Findings: No bruising, erythema or rash.   Neurological:      Mental Status: She is alert and oriented to person, place, and time.      Coordination: Coordination normal.   Psychiatric:         Mood and Affect: Mood normal.         Behavior: Behavior normal.         Thought Content: Thought content normal.         Judgment: Judgment normal.         Assessment & Plan   Diagnoses and all orders for this visit:    1. Well woman exam with routine gynecological exam (Primary)  Normal GYN exam. Will have lab work at PCP. Encouraged SBE, pt is aware how to do self breast exam and the importance of same. Discussed weight management and importance of maintaining a healthy weight. Discussed Vitamin D intake and the importance of adequate vitamin D for both Bone  Health and a healthy immune system.  Discussed Daily exercise and the importance of same, in regards to a healthy heart as well as helping to maintain her weight and improving her mental health.  BMI 30.8.  Colonoscopy is up to date.  Mammogram will be scheduled at [facility].  Pap smear is done per ASCCP guidelines.  -     Liquid-based Pap Smear, Screening    2. Encounter for screening mammogram for breast cancer  Comments:  Patient will have a mammogram at [facility].  Orders:  -     Mammo Screening Digital Tomosynthesis Bilateral With CAD; Future    3. Menopausal symptoms  Comments:  Patient was having menopausal symptoms.  Dr. Timmons started her on Vivelle patches and Prometrium.  She is doing much better.  She will continue the Effexor.  We discussed that she and Dr. Timmons talked about her previous TIA and hormones.  She wished to try them.  Orders:  -     venlafaxine XR (EFFEXOR-XR) 75 MG 24 hr capsule; Take 1 capsule by mouth Daily.  Dispense: 90 capsule; Refill: 3    4. BERNICE (stress urinary incontinence, female)  Comments:  Patient has significant BERNICE.  She has had previous bladder surgery and then had to have the mesh removed.  She has had also Botox in her bladder.  She is referred for pelvic floor physical therapy.  We also briefly discussed Jennifer Vazquez.  Orders:  -     Ambulatory Referral to Physical Therapy Pelvic Floor    5. TIA (transient ischemic attack)  Comments:  Patient has history of a TIA.  She states she discussed this with Dr. Timmons before she started her medication for menopausal symptoms.    6. Other specified type of non-Hodgkin lymphoma, unspecified body region (HCC)  Comments:  Patient is followed by PCP.         BMI is >= 30 and <35. (Class 1 Obesity). The following options were offered after discussion;: exercise counseling/recommendations and nutrition counseling/recommendations      Apple Ivan, APRN  8/9/2022

## 2022-08-10 LAB
GEN CATEG CVX/VAG CYTO-IMP: NORMAL
HPV I/H RISK 4 DNA CVX QL PROBE+SIG AMP: NOT DETECTED
LAB AP CASE REPORT: NORMAL
LAB AP GYN ADDITIONAL INFORMATION: NORMAL
LAB AP GYN OTHER FINDINGS: NORMAL
Lab: NORMAL
PATH INTERP SPEC-IMP: NORMAL
STAT OF ADQ CVX/VAG CYTO-IMP: NORMAL

## 2022-08-23 ENCOUNTER — CLINICAL SUPPORT (OUTPATIENT)
Dept: INTERNAL MEDICINE | Facility: CLINIC | Age: 57
End: 2022-08-23

## 2022-08-23 DIAGNOSIS — R30.0 BURNING WITH URINATION: ICD-10-CM

## 2022-08-23 DIAGNOSIS — R35.0 URINARY FREQUENCY: Primary | ICD-10-CM

## 2022-08-23 LAB
BILIRUB BLD-MCNC: NEGATIVE MG/DL
CLARITY, POC: CLEAR
COLOR UR: YELLOW
GLUCOSE UR STRIP-MCNC: NEGATIVE MG/DL
KETONES UR QL: NEGATIVE
LEUKOCYTE EST, POC: NEGATIVE
NITRITE UR-MCNC: NEGATIVE MG/ML
PROT UR STRIP-MCNC: NEGATIVE MG/DL
RBC # UR STRIP: ABNORMAL /UL
SP GR UR: 1.02 (ref 1–1.03)
UROBILINOGEN UR QL: NORMAL

## 2022-08-23 PROCEDURE — 81003 URINALYSIS AUTO W/O SCOPE: CPT | Performed by: NURSE PRACTITIONER

## 2022-08-25 ENCOUNTER — SPECIALTY PHARMACY (OUTPATIENT)
Dept: ENDOCRINOLOGY | Facility: CLINIC | Age: 57
End: 2022-08-25

## 2022-08-25 RX ORDER — PROGESTERONE 100 MG/1
100 CAPSULE ORAL DAILY
Qty: 30 CAPSULE | Refills: 11 | Status: SHIPPED | OUTPATIENT
Start: 2022-08-25 | End: 2022-09-14 | Stop reason: SDUPTHER

## 2022-08-26 ENCOUNTER — HOSPITAL ENCOUNTER (OUTPATIENT)
Dept: MAMMOGRAPHY | Facility: HOSPITAL | Age: 57
Discharge: HOME | End: 2022-08-26
Admitting: NURSE PRACTITIONER

## 2022-08-26 DIAGNOSIS — Z12.31 ENCOUNTER FOR SCREENING MAMMOGRAM FOR BREAST CANCER: ICD-10-CM

## 2022-08-26 PROCEDURE — 77067 SCR MAMMO BI INCL CAD: CPT

## 2022-08-26 PROCEDURE — 77063 BREAST TOMOSYNTHESIS BI: CPT

## 2022-09-06 ENCOUNTER — SPECIALTY PHARMACY (OUTPATIENT)
Dept: ENDOCRINOLOGY | Facility: CLINIC | Age: 57
End: 2022-09-06

## 2022-09-06 NOTE — PROGRESS NOTES
Specialty Pharmacy Refill Coordination Note     Desirae is a 57 y.o. female contacted today regarding refills of  wegovy specialty medication(s).    Reviewed and verified with patient:  Allergies       Specialty medication(s) and dose(s) confirmed: yes    Refill Questions    Flowsheet Row Most Recent Value   Changes to allergies? No   Changes to medications? No   New conditions since last clinic visit No   Unplanned office visit, urgent care, ED, or hospital admission in the last 4 weeks  No   How does patient/caregiver feel medication is working? Very good   Financial problems or insurance changes  No   Since the previous refill, were any specialty medication doses or scheduled injections missed or delayed?  No   Does this patient require a clinical escalation to a pharmacist? No          Delivery Questions    Flowsheet Row Most Recent Value   Delivery method [employer]   Delivery address correct? No   Delivery phone number [phone]   Number of medications in delivery 1   Medication being filled and delivered wegovy   Doses left of specialty medications 1   Is there any medication that is due not being filled? No   Cooler needed? Yes   Do any medications need mixed or dated? No   Copay form of payment Credit card on file   Questions or concerns for the pharmacist? No   Are any medications first time fills? No        Mailing to her work.          Follow-up: 1 month.      Abdon Vasquez  Specialty Pharmacy Technician

## 2022-09-14 ENCOUNTER — OFFICE VISIT (OUTPATIENT)
Dept: ENDOCRINOLOGY | Facility: CLINIC | Age: 57
End: 2022-09-14

## 2022-09-14 VITALS
DIASTOLIC BLOOD PRESSURE: 50 MMHG | HEART RATE: 88 BPM | SYSTOLIC BLOOD PRESSURE: 100 MMHG | BODY MASS INDEX: 30.65 KG/M2 | WEIGHT: 173 LBS | HEIGHT: 63 IN | OXYGEN SATURATION: 96 %

## 2022-09-14 DIAGNOSIS — E66.09 CLASS 1 OBESITY DUE TO EXCESS CALORIES WITH SERIOUS COMORBIDITY AND BODY MASS INDEX (BMI) OF 34.0 TO 34.9 IN ADULT: ICD-10-CM

## 2022-09-14 DIAGNOSIS — E06.3 HYPOTHYROIDISM DUE TO HASHIMOTO'S THYROIDITIS: Primary | ICD-10-CM

## 2022-09-14 DIAGNOSIS — E78.5 DYSLIPIDEMIA: ICD-10-CM

## 2022-09-14 DIAGNOSIS — R73.03 PREDIABETES: ICD-10-CM

## 2022-09-14 DIAGNOSIS — E03.8 HYPOTHYROIDISM DUE TO HASHIMOTO'S THYROIDITIS: Primary | ICD-10-CM

## 2022-09-14 PROCEDURE — 99214 OFFICE O/P EST MOD 30 MIN: CPT | Performed by: INTERNAL MEDICINE

## 2022-09-14 RX ORDER — SEMAGLUTIDE 2.4 MG/.75ML
2.4 INJECTION, SOLUTION SUBCUTANEOUS WEEKLY
Qty: 3 ML | Refills: 11 | Status: SHIPPED | OUTPATIENT
Start: 2022-09-14 | End: 2023-03-15 | Stop reason: SDUPTHER

## 2022-09-14 RX ORDER — ESTRADIOL 0.05 MG/D
FILM, EXTENDED RELEASE TRANSDERMAL
Qty: 8 PATCH | Refills: 11 | Status: SHIPPED | OUTPATIENT
Start: 2022-09-14 | End: 2023-03-15 | Stop reason: SDUPTHER

## 2022-09-14 RX ORDER — PROGESTERONE 100 MG/1
100 CAPSULE ORAL DAILY
Qty: 30 CAPSULE | Refills: 11 | Status: SHIPPED | OUTPATIENT
Start: 2022-09-14 | End: 2023-03-15 | Stop reason: SDUPTHER

## 2022-09-14 RX ORDER — MULTIPLE VITAMINS W/ MINERALS TAB 9MG-400MCG
1 TAB ORAL DAILY
COMMUNITY

## 2022-09-14 NOTE — PROGRESS NOTES
"Chief Complaint   Patient presents with   • Hashimoto's Thyroiditis   • Hypothyroidism         History of Present Illness    57 y.o. female   w obesity , now lost 25 lbs on wegovy       ==========================================  Physical Exam  /50   Pulse 88   Ht 160 cm (63\")   Wt 78.5 kg (173 lb)   LMP 06/30/2019   SpO2 96%   BMI 30.65 kg/m²   AOx3  No Goiter , no carotid bruit  RRR  CTA  No Edema     ==========================================    Laboratory Workup    Lab Results   Component Value Date    WBC 3.74 07/08/2022    HGB 13.0 07/08/2022    HCT 39.2 07/08/2022    MCV 87.7 07/08/2022     07/08/2022       Lab Results   Component Value Date    GLUCOSE 117 (H) 07/08/2022    BUN 14 07/08/2022    CREATININE 0.80 07/08/2022    EGFRIFNONA 71 08/11/2021    EGFRIFAFRI 84 09/11/2020    BCR 17.5 07/08/2022     07/08/2022    K 4.1 07/08/2022    CO2 26.0 07/08/2022    CALCIUM 9.3 07/08/2022    PROTENTOTREF 6.8 06/07/2022    ALBUMIN 4.70 06/23/2022    LABIL2 2.1 06/07/2022    AST 27 06/23/2022    ALT 36 (H) 06/23/2022           ==========================================      ICD-10-CM ICD-9-CM   1. Hypothyroidism due to Hashimoto's thyroiditis  E03.8 244.8    E06.3 245.2   2. Prediabetes  R73.03 790.29   3. Dyslipidemia  E78.5 272.4   4. Class 1 obesity due to excess calories with serious comorbidity and body mass index (BMI) of 34.0 to 34.9 in adult  E66.09 278.00    Z68.34 V85.34   -    Prediabetes    On wegovy , has lost 25 lbs     ============    Dyslipidemia    Lab Results   Component Value Date    CHOL 284 (H) 02/28/2022    CHLPL 195 06/07/2022    TRIG 93 06/07/2022    HDL 61 06/07/2022     (H) 06/07/2022     TIA ,questionable    No crestor     vascepa , she can or can't take     The 10-year ASCVD risk score (Josuémihaela JUAN Jr., et al., 2013) is: 1.2%    Values used to calculate the score:      Age: 57 years      Sex: Female      Is Non- : No      Diabetic: No      " Tobacco smoker: No      Systolic Blood Pressure: 100 mmHg      Is BP treated: No      HDL Cholesterol: 61 mg/dL      Total Cholesterol: 195 mg/dL      ------------    Obesity    wegovy successful    2.4     -----------    Hot flashes     Estrogen patches 25 mcg/ 24 h on a patch that is applied twice weekly- increase to 50 mcgs       Prometrium 100 mg daily    Taking estrogen increases the risk of clots between 2-4 per [place] woman for a 5-year.    Also on venlafaxine     She will contemplate coming off venlafaxine and increasing estrogen    --------------------      Hypothyroidism, subclinical    tpo ab neg  We stopped and TSH is normal       No orders of the defined types were placed in this encounter.               This document has been electronically signed by Kalen Timmons MD on September 14, 2022 16:47 CDT

## 2022-09-23 ENCOUNTER — TREATMENT (OUTPATIENT)
Dept: PHYSICAL THERAPY | Facility: CLINIC | Age: 57
End: 2022-09-23

## 2022-09-23 DIAGNOSIS — N39.46 MIXED STRESS AND URGE URINARY INCONTINENCE: Primary | ICD-10-CM

## 2022-09-23 PROCEDURE — 97162 PT EVAL MOD COMPLEX 30 MIN: CPT | Performed by: PHYSICAL THERAPIST

## 2022-09-23 PROCEDURE — 97535 SELF CARE MNGMENT TRAINING: CPT | Performed by: PHYSICAL THERAPIST

## 2022-09-23 PROCEDURE — 97110 THERAPEUTIC EXERCISES: CPT | Performed by: PHYSICAL THERAPIST

## 2022-09-23 NOTE — PROGRESS NOTES
Physical Therapy Initial Evaluation and Plan of Care    Patient: Desirae Alberto              : 1965  Today's Date: 2022  Referring practitioner: NAVEED Menon  Date of Initial Visit: 2022  Patient seen for 1 sessions    Visit Diagnoses:    ICD-10-CM ICD-9-CM   1. Mixed stress and urge urinary incontinence  N39.46 788.33     Past Medical History:   Diagnosis Date   • Arthritis    • Cancer (HCC)     lymphoma of small bowel   • Family history of colon cancer    • GERD (gastroesophageal reflux disease)    • Glaucoma    • Hyperlipidemia    • Hypothyroidism    • TIA (transient ischemic attack)      Past Surgical History:   Procedure Laterality Date   • BREAST EXCISIONAL BIOPSY Left 2016    benign   • BREAST SURGERY Left 2017   • CHOLECYSTECTOMY     • COLON RESECTION SMALL BOWEL     • COLONOSCOPY  2019    Dr. Tabares-Internal and external hemorrhoids; Repeat 10 years   • ENDOSCOPY  2019    Dr. Tabares-Possible Hightower's esophagus-biopsied   • ENDOSCOPY N/A 2022    Procedure: ESOPHAGOGASTRODUODENOSCOPY WITH ANESTHESIA;  Surgeon: Clotilde Friedman MD;  Location: [facility] ENDOSCOPY;  Service: Gastroenterology;  Laterality: N/A;  Pre: Hematemesis  Post: esophagitis  Jeannette Justin DO   • ENDOSCOPY N/A 2022    Procedure: ESOPHAGOGASTRODUODENOSCOPY WITH ANESTHESIA;  Surgeon: Clotilde Friedman MD;  Location: [facility] ENDOSCOPY;  Service: Gastroenterology;  Laterality: N/A;  pre: esophagitis  post: normal  Jeannette Justin DO   • KNEE ARTHROSCOPY Right 2020    Procedure: RIGHT KNEE PARTIAL MEDIAL MENISCECTOMY;  Surgeon: Vijay Grewal MD;  Location: [facility] OR;  Service: Orthopedics;  Laterality: Right;   • LAPAROSCOPIC TUBAL LIGATION     • TOTAL KNEE ARTHROPLASTY Right 2021    Procedure: RIGHT TOTAL KNEE REPLACEMENT;  Surgeon: Eliecer Foley MD;  Location: [facility] OR;  Service: Orthopedics;  Laterality:  Right;   • TRANSURETHRAL RESECTION OF BLADDER TUMOR N/A 7/12/2022    Procedure: CYSTOSCOPY TRANSURETHRAL RESECTION OF BLADDER TUMOR;  Surgeon: Joel Poe MD;  Location:  [facility] OR;  Service: Urology;  Laterality: N/A;   • TUBAL ABDOMINAL LIGATION     • VAGINAL MESH REVISION      2010/2016   • WISDOM TOOTH EXTRACTION         SUBJECTIVE     Subjective She had bladder mesh surgery and immediately had symptoms, had to go to [facility] to have it removed. She had ransurethral bulking agent injection with Macroplastique in 2016.  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 Had spot in bladder, but it was from this injection, however Dr. Poe performed procedure to try to remove. She gets up 2 times per night. She does drink a lot of water. Caffeine is a irritant for her and makes her leak more.   [facility] in [place]. M-TH  Keep grandchildren 7, 4 and 3.     Outcome Measure:   PFDI-20: 31.25, 81.25, 87.5 (200/300)     Subjective            OBJECTIVE     Objective   Objective          Passive Hip ROM    Hip IR  Hip ER   Right 10 Right 38   Left 15 Left 38     Hip Abduction Strength (Glut Med): R 4- L 4-      SLS R WFL L WFL, but with limited hip stability         Therapy Education/Self Care 14691   Education offered today Anatomy, POC, prior surgery/procedures   [employer] Code [email or web address]   Ongoing HEP   New   Timed Minutes 10     Therapeutic Exercises    97063 Comments   Pillow prop    Breathing     bridge + exhale            Timed Minutes 10         Total Timed Treatment:     20   mins  Total Time of Visit:            55   mins    ASSESSMENT/PLAN      Goals                                                                           Progress Note due by 10/22/22                                                                                        Re-cert due by 12/21/22               STG by: 6 weeks Comments Status   Pt will demonstrate proper PF 3+/5 or greater and TA activation in supine in order to progress with more  functional strengthening.      Pt will be independent with initial HEP, in order to accelerate progress.        Pt will perform sit to stand without UI       Pt will report 50% less pad use.       LTG by: 12 weeks      Pt will be independent with HEP including core, hip and PF strengthening.       Pt will demonstrate 4/5 or greater hip abduction strength.        Pt will score 75 or less on the PFDI-20.   31.25, 81.25, 87.5 (200/300)    Pt will be able to cough without UI           Assessment & Plan     Assessment  Impairments: impaired physical strength and lacks appropriate home exercise program  Functional Limitations: lifting, walking, pulling and pushing  Assessment details: Desirae presents with complaints of mixed UI.She does have a rather complicated surgerical history including sling placement, then removal. Upon assessment today she had decreased rib expansion, poor hip and core control.  She also has decreased hip ROM. Skilled PT needed to address these deficits and progress towards independent HEP.   Prognosis: good    Plan  Therapy options: will be seen for skilled therapy services  Planned modality interventions: dry needling, TENS, low level laser therapy and electrical stimulation/Russian stimulation  Planned therapy interventions: abdominal trunk stabilization, manual therapy, motor coordination training, neuromuscular re-education, ADL retraining, soft tissue mobilization, spinal/joint mobilization, strengthening, stretching, therapeutic activities, transfer training, IADL retraining, joint mobilization, home exercise program, functional ROM exercises, balance/weight-bearing training, postural training, flexibility and gait training  Frequency: 1x week  Duration in weeks: 12  Treatment plan discussed with: patient  Plan details: Initially we will work on breathing mechanics and transitional movements. We will then work on pelvic floor coordination and assessing pelvic floor internally to check for  scar tissue and pelvic floor function. We will then progress with core and hip strengthening, progressing HEP to reflect.         SIGNATURE: Savannah Connors PT [place], [place] License #: [id number]  Electronically Signed on 9/23/2022    Initial Certification  Certification Period: 9/23/2022 through 12/21/2022  I certify that the therapy services are furnished while this patient is under my care.  The services outlined above are required by this patient, and will be reviewed every 90 days.     PHYSICIAN: Apple Ivan APRN (NPI: [id number])    Signature:________________________________________DATE: _________    Please sign and return via fax to [phone].   Thank you so much for letting us work with Desirae. I appreciate your letting us work with your patients. If you have any questions or concerns, please don't hesitate to contact me.          [address] [address]  [phone]

## 2022-10-05 ENCOUNTER — SPECIALTY PHARMACY (OUTPATIENT)
Dept: ENDOCRINOLOGY | Facility: CLINIC | Age: 57
End: 2022-10-05

## 2022-10-05 NOTE — PROGRESS NOTES
Specialty Pharmacy Refill Coordination Note     Desirae is a 57 y.o. female contacted today regarding refills of  wegovy specialty medication(s).    Reviewed and verified with patient:       Specialty medication(s) and dose(s) confirmed: yes    Refill Questions    Flowsheet Row Most Recent Value   Changes to allergies? No   Changes to medications? No   New conditions since last clinic visit No   Unplanned office visit, urgent care, ED, or hospital admission in the last 4 weeks  No   How does patient/caregiver feel medication is working? Very good   Financial problems or insurance changes  No   Since the previous refill, were any specialty medication doses or scheduled injections missed or delayed?  No   Does this patient require a clinical escalation to a pharmacist? No          Delivery Questions    Flowsheet Row Most Recent Value   Delivery method FedEx   Delivery address correct? Yes   Delivery phone number [phone]   Number of medications in delivery 1   Medication being filled and delivered wegovy   Is there any medication that is due not being filled? No   Supplies needed? No supplies needed   Cooler needed? Yes   Do any medications need mixed or dated? No   Questions or concerns for the pharmacist? No   Are any medications first time fills? No                 Follow-up: 28 day(s)     Patt Cantor, Pharmacy Technician  Specialty Pharmacy Technician

## 2022-10-21 ENCOUNTER — TREATMENT (OUTPATIENT)
Dept: PHYSICAL THERAPY | Facility: CLINIC | Age: 57
End: 2022-10-21

## 2022-10-21 DIAGNOSIS — N39.46 MIXED STRESS AND URGE URINARY INCONTINENCE: Primary | ICD-10-CM

## 2022-10-21 PROCEDURE — 97530 THERAPEUTIC ACTIVITIES: CPT | Performed by: PHYSICAL THERAPIST

## 2022-10-21 PROCEDURE — 97110 THERAPEUTIC EXERCISES: CPT | Performed by: PHYSICAL THERAPIST

## 2022-10-21 NOTE — PROGRESS NOTES
Physical Therapy Treatment Note  [address] [place] [address]    Patient: Desirae Alberto                                                 Visit Date: 10/21/2022  :     1965    Referring practitioner:    NAVEED Menon  Date of Initial Visit:          Type: THERAPY  Noted: 2022    Patient seen for 2 sessions    Visit Diagnoses:    ICD-10-CM ICD-9-CM   1. Mixed stress and urge urinary incontinence  N39.46 788.33     SUBJECTIVE     Subjective She is going  she is going to have a botox injection by Dr. Ham. She's had one in the past and it was very helpful. She is trying not to push her urine out and this is very difficult.     PAIN: 0/10         OBJECTIVE     Objective      Therapeutic Exercises    89131 Comments   HL Clams with red TB 2 X 10    BKFO with red TB X 20     Updated/reviewed HEP             Timed Minutes 20     Therapeutic Activities    93877 Comments   Bladder holding, liquid amount, voiding schedule                    Timed Minutes 25       Therapy Education/Self Care 03037   Education offered today    [facility] Code [email or web address]   Ongoing HEP   Access Code: [email or web address]  URL: [email or web address]  Date: 10/21/2022  Prepared by: Savannah Connors    Exercises  Pillow Prop - 1-2 x daily  Hooklying Rib Cage Breathing - 2-3 x daily - 1-2 sets - 2-3 reps  Beginner Bridge - 3 x weekly - 2-3 sets - 10 reps  Hooklying Clamshell with Resistance - 3 x weekly - 2 sets - 10-15 reps  Hooklying Single Leg Bent Knee Fallouts with Resistance - 3 x weekly - 2 sets - 20 reps  Seated Hip Adduction Squeeze with Ball - 3 x weekly - 2 sets - 10 reps  Sit to Stand with Armchair   Timed Minutes        Total Timed Treatment:     45   mins  Total Time of Visit:             45   mins         ASSESSMENT/PLAN     GOALS  Goals                                                                           Progress Note due by 10/22/22                                                                                         Re-cert due by 12/21/22               STG by: 6 weeks Comments Status   Pt will demonstrate proper PF 3+/5 or greater and TA activation in supine in order to progress with more functional strengthening.       Pt will be independent with initial HEP, in order to accelerate progress.     met    Pt will perform sit to stand without UI    ongoing    Pt will report 50% less pad use.        LTG by: 12 weeks       Pt will be independent with HEP including core, hip and PF strengthening.        Pt will demonstrate 4/5 or greater hip abduction strength.         Pt will score 75 or less on the PFDI-20.   31.25, 81.25, 87.5 (200/300)     Pt will be able to cough without UI            Assessment/Plan     ASSESSMENT: She is working on HEP, having difficulty not pushing urine out. We reviewed liquid intake, bladder holding and bladder voiding schedule.     PLAN: Assess pelvic floor internally, progress with biofeedback, hip and core strengthening.     SIGNATURE: Savannah Connors, PT DPT, [place] License #: [id number]  Electronically Signed on 10/21/2022        [address] [place] [address] [place]  [place] [address]  [phone]

## 2022-10-24 ENCOUNTER — TELEPHONE (OUTPATIENT)
Dept: INTERNAL MEDICINE | Facility: CLINIC | Age: 57
End: 2022-10-24

## 2022-10-24 DIAGNOSIS — R05.1 ACUTE COUGH: Primary | ICD-10-CM

## 2022-10-24 RX ORDER — GUAIFENESIN AND CODEINE PHOSPHATE 100; 10 MG/5ML; MG/5ML
5 SOLUTION ORAL 3 TIMES DAILY PRN
Qty: 180 ML | Refills: 0 | Status: SHIPPED | OUTPATIENT
Start: 2022-10-24

## 2022-10-24 NOTE — TELEPHONE ENCOUNTER
Patient is coughing and is requesting RX with codeine for the cough to be called in if appropriate.     Patient took a covid test and it was negative. Believes it to be seasonal allergies.

## 2022-10-27 ENCOUNTER — SPECIALTY PHARMACY (OUTPATIENT)
Dept: ENDOCRINOLOGY | Facility: CLINIC | Age: 57
End: 2022-10-27

## 2022-10-27 NOTE — PROGRESS NOTES
Specialty Pharmacy Refill Coordination Note     Desirae is a 57 y.o. female contacted today regarding refills of  Wegovy specialty medication(s).    Reviewed and verified with patient:       Specialty medication(s) and dose(s) confirmed: yes    Refill Questions    Flowsheet Row Most Recent Value   Changes to allergies? No   Changes to medications? No   New conditions since last clinic visit No   Unplanned office visit, urgent care, ED, or hospital admission in the last 4 weeks  No   How does patient/caregiver feel medication is working? Very good   Financial problems or insurance changes  No   Since the previous refill, were any specialty medication doses or scheduled injections missed or delayed?  No   Does this patient require a clinical escalation to a pharmacist? No          Delivery Questions    Flowsheet Row Most Recent Value   Delivery method FedEx   Delivery address correct? No   Delivery phone number [phone]   Number of medications in delivery 1   Medication being filled and delivered wegovy   Is there any medication that is due not being filled? No   Supplies needed? No supplies needed   Cooler needed? Yes   Do any medications need mixed or dated? No   Copay form of payment Credit card on file   Questions or concerns for the pharmacist? No   Are any medications first time fills? No          Pt requested these mailed to her office.         Follow-up: 1 month.      Abdon Vasquez  Specialty Pharmacy Technician

## 2022-11-04 ENCOUNTER — TREATMENT (OUTPATIENT)
Dept: PHYSICAL THERAPY | Facility: CLINIC | Age: 57
End: 2022-11-04

## 2022-11-04 DIAGNOSIS — N39.46 MIXED STRESS AND URGE URINARY INCONTINENCE: Primary | ICD-10-CM

## 2022-11-04 PROCEDURE — 97112 NEUROMUSCULAR REEDUCATION: CPT | Performed by: PHYSICAL THERAPIST

## 2022-11-04 PROCEDURE — 97530 THERAPEUTIC ACTIVITIES: CPT | Performed by: PHYSICAL THERAPIST

## 2022-11-04 PROCEDURE — 97110 THERAPEUTIC EXERCISES: CPT | Performed by: PHYSICAL THERAPIST

## 2022-11-04 NOTE — PROGRESS NOTES
Physical Therapy 30 Day Progress Note  [address] [place] [address]    Patient: Desirae Alberto                                                 Visit Date: 2022  :     1965    Referring practitioner:    NAVEED Menon  Date of Initial Visit:          Type: THERAPY  Noted: 2022    Patient seen for 3 sessions    Visit Diagnoses:    ICD-10-CM ICD-9-CM   1. Mixed stress and urge urinary incontinence  N39.46 788.33     SUBJECTIVE     Subjective She reports leaking with sit to stand while holding baby.     PAIN: 0/10         OBJECTIVE     Objective      Therapeutic Exercises    59532 Comments   MMT PFM: 3/5        Updated/reviewed HEP             Timed Minutes 20     Therapeutic Activities    87215 Comments   Sit to stand/lifting with exhale                    Timed Minutes 10      22 1000   Pelvic Floor Muscle   Patient/Parent/Guardian Consented to Internal Pelvic Floor Exam Yes   Strength (Right) 3: Squeeze with/without lift   Strength (Left) 3: Squeeze with/without lift   Symmetry of Sustained Maximal Contraction Symmetrical   Endurance (Ability to Hold Maximal Contraction) 8 sec   Internal Pelvic Floor Comments no tenderness or guarding noted.     Neuromuscular Reeducation     90968 Comments   PFM activation                     Timed Minutes 10       Therapy Education/Self Care 48777   Education offered today    [employer] Code [email or web address]   Ongoing HEP   Access Code: [email or web address]  URL: [email or web address]  Date: 10/21/2022  Prepared by: Savannah Connors    Exercises  Pillow Prop - 1-2 x daily  Hooklying Rib Cage Breathing - 2-3 x daily - 1-2 sets - 2-3 reps  Beginner Bridge - 3 x weekly - 2-3 sets - 10 reps  Hooklying Clamshell with Resistance - 3 x weekly - 2 sets - 10-15 reps  Hooklying Single Leg Bent Knee Fallouts with Resistance - 3 x weekly - 2 sets - 20 reps  Seated Hip Adduction Squeeze with Ball - 3 x weekly - 2  sets - 10 reps  Sit to Stand with Armchair   Timed Minutes        Total Timed Treatment:     40   mins  Total Time of Visit:             45   mins         ASSESSMENT/PLAN     GOALS  Goals                                                                           Progress Note due by 12/4/22                                                                                        Re-cert due by 12/21/22               STG by: 6 weeks Comments Status   Pt will demonstrate proper PF 3+/5 or greater and TA activation in supine in order to progress with more functional strengthening.  3/5 ongoing   Pt will be independent with initial HEP, in order to accelerate progress.     met    Pt will perform sit to stand without UI  leaked with holding small child  ongoing    Pt will report 50% less pad use.   ongoing use ongoing   LTG by: 12 weeks       Pt will be independent with HEP including core, hip and PF strengthening.   progressing ongoing   Pt will demonstrate 4/5 or greater hip abduction strength.    ongoing ongoing   Pt will score 75 or less on the PFDI-20.  Did not assess   31.25, 81.25, 87.5 (200/300) ongoing   Pt will be able to cough without UI   ongoin ongoing       Assessment & Plan     Assessment  Impairments: impaired physical strength and lacks appropriate home exercise program  Functional Limitations: lifting, walking, pulling and pushingPrognosis: good    Plan  Therapy options: will be seen for skilled therapy services  Planned modality interventions: dry needling, TENS, low level laser therapy and electrical stimulation/Russian stimulation  Planned therapy interventions: abdominal trunk stabilization, manual therapy, motor coordination training, neuromuscular re-education, ADL retraining, soft tissue mobilization, spinal/joint mobilization, strengthening, stretching, therapeutic activities, transfer training, IADL retraining, joint mobilization, home exercise program, functional ROM exercises,  balance/weight-bearing training, postural training, flexibility and gait training  Frequency: 1x week  Duration in weeks: 12  Treatment plan discussed with: patient         ASSESSMENT: Today is only her 2nd treatment session since evaluation secondary to scheduling. Her pelvic floor strength was not super weak, but did have difficulty with the lift and proper breathing mechanics.     PLAN: Progress with biofeedback, hip and core strengthening. Update HEP as able.     SIGNATURE: Savannah Connors PT DPT, [place] License #: [id number]  Electronically Signed on 11/4/2022        [address] [place] [address] [place] [address]  [phone]

## 2022-11-11 ENCOUNTER — TREATMENT (OUTPATIENT)
Dept: PHYSICAL THERAPY | Facility: CLINIC | Age: 57
End: 2022-11-11

## 2022-11-11 DIAGNOSIS — N39.46 MIXED STRESS AND URGE URINARY INCONTINENCE: Primary | ICD-10-CM

## 2022-11-11 PROCEDURE — 97530 THERAPEUTIC ACTIVITIES: CPT | Performed by: PHYSICAL THERAPIST

## 2022-11-11 PROCEDURE — 97112 NEUROMUSCULAR REEDUCATION: CPT | Performed by: PHYSICAL THERAPIST

## 2022-11-11 PROCEDURE — 97110 THERAPEUTIC EXERCISES: CPT | Performed by: PHYSICAL THERAPIST

## 2022-11-11 NOTE — PROGRESS NOTES
Physical Therapy Treatment Note  [address] [place] [address]    Patient: Desirae Alberto                                                 Visit Date: 2022  :     1965    Referring practitioner:    NAVEED Menon  Date of Initial Visit:          Type: THERAPY  Noted: 2022    Patient seen for 4 sessions    Visit Diagnoses:    ICD-10-CM ICD-9-CM   1. Mixed stress and urge urinary incontinence  N39.46 788.33     SUBJECTIVE     Subjective She can keep herself from leaking on the way to the bathroom.     PAIN: 0/10         OBJECTIVE     Objective      Therapeutic Exercises    95760 Comments   Standing hip abduction 2 x 15 each leg + exhale    Plank march 2 x 20     Updated/reviewed HEP             Timed Minutes 20     Therapeutic Activities    99065 Comments   Urge suppression/breathing techniques                    Timed Minutes 10     sEMG Biofeedback (20899) Activity Position Resting Tone (mV) Sets/reps Time (sec) Comments    sitting 5-7 1  5 4-5  0                         Timed Minutes: 15       Therapy Education/Self Care 46361   Education offered today    [employer] Code [email or web address]   Ongoing HEP   Access Code: [email or web address]  URL: [email or web address]  Date: 10/21/2022  Prepared by: Savannah Connors    Exercises  Pillow Prop - 1-2 x daily  Hooklying Rib Cage Breathing - 2-3 x daily - 1-2 sets - 2-3 reps  Beginner Bridge - 3 x weekly - 2-3 sets - 10 reps  Hooklying Clamshell with Resistance - 3 x weekly - 2 sets - 10-15 reps  Hooklying Single Leg Bent Knee Fallouts with Resistance - 3 x weekly - 2 sets - 20 reps  Seated Hip Adduction Squeeze with Ball - 3 x weekly - 2 sets - 10 reps  Sit to Stand with Armchair   Timed Minutes        Total Timed Treatment:     40   mins  Total Time of Visit:             45   mins         ASSESSMENT/PLAN     GOALS  Goals                                                                            Progress Note due by 12/4/22                                                                                        Re-cert due by 12/21/22               STG by: 6 weeks Comments Status   Pt will demonstrate proper PF 3+/5 or greater and TA activation in supine in order to progress with more functional strengthening.  3/5 ongoing   Pt will be independent with initial HEP, in order to accelerate progress.     met    Pt will perform sit to stand without UI  leaked with holding small child  ongoing    Pt will report 50% less pad use.   ongoing use ongoing   LTG by: 12 weeks       Pt will be independent with HEP including core, hip and PF strengthening.   progressing ongoing   Pt will demonstrate 4/5 or greater hip abduction strength.    ongoing ongoing   Pt will score 75 or less on the PFDI-20.  Did not assess   31.25, 81.25, 87.5 (200/300) ongoing   Pt will be able to cough without UI   ongoing ongoing       Assessment/Plan     ASSESSMENT: She reports being able to hold urine better with urgency. She did show decreased endurance with seated pelvic floor contractions. We were able to update standing hip/core work.     PLAN: Progress with co-contractions and working more upright.     SIGNATURE: Savannah Connors, PT DPT, [place] License #: [id number]  Electronically Signed on 11/11/2022        [address] [place] [address], [place] [address]  [phone]

## 2022-11-29 ENCOUNTER — SPECIALTY PHARMACY (OUTPATIENT)
Dept: ENDOCRINOLOGY | Facility: CLINIC | Age: 57
End: 2022-11-29

## 2022-11-29 NOTE — PROGRESS NOTES
Specialty Pharmacy Refill Coordination Note     Desirae is a 57 y.o. female contacted today regarding refills of  wegovy specialty medication(s).    Reviewed and verified with patient:  Allergies       Specialty medication(s) and dose(s) confirmed: yes    Refill Questions    Flowsheet Row Most Recent Value   Changes to allergies? No   Changes to medications? No   New conditions since last clinic visit No   Unplanned office visit, urgent care, ED, or hospital admission in the last 4 weeks  No   How does patient/caregiver feel medication is working? Very good   Financial problems or insurance changes  No   Since the previous refill, were any specialty medication doses or scheduled injections missed or delayed?  No   Does this patient require a clinical escalation to a pharmacist? No          Delivery Questions    Flowsheet Row Most Recent Value   Delivery method FedEx   Number of medications in delivery 3   Medication being filled and delivered wegovy, brett and progesterone   Doses left of specialty medications 1   Is there any medication that is due not being filled? No   Supplies needed? No supplies needed   Cooler needed? Yes   Do any medications need mixed or dated? No   Copay form of payment Credit card on file   Questions or concerns for the pharmacist? No   Are any medications first time fills? No        We are mailing out her wegovy, brett and progesterone to her clinic in [place].          Follow-up: 1 month.      Abdon Vasquez  Specialty Pharmacy Technician

## 2022-12-16 ENCOUNTER — TREATMENT (OUTPATIENT)
Dept: PHYSICAL THERAPY | Facility: CLINIC | Age: 57
End: 2022-12-16

## 2022-12-16 DIAGNOSIS — N39.46 MIXED STRESS AND URGE URINARY INCONTINENCE: Primary | ICD-10-CM

## 2022-12-16 PROCEDURE — 97530 THERAPEUTIC ACTIVITIES: CPT | Performed by: PHYSICAL THERAPIST

## 2022-12-16 PROCEDURE — 97112 NEUROMUSCULAR REEDUCATION: CPT | Performed by: PHYSICAL THERAPIST

## 2022-12-16 PROCEDURE — 97110 THERAPEUTIC EXERCISES: CPT | Performed by: PHYSICAL THERAPIST

## 2022-12-16 NOTE — PROGRESS NOTES
Physical Therapy Treatment Note, 30 Day Progress Note and 90 Day Recertification Note  [address] [place] [address]    Patient: Desirae Alberto                                                 Visit Date: 2022  :     1965    Referring practitioner:    NAVEED Menon  Date of Initial Visit:          Type: THERAPY  Noted: 2022    Patient seen for 5 sessions    Visit Diagnoses:    ICD-10-CM ICD-9-CM   1. Mixed stress and urge urinary incontinence  N39.46 788.33     SUBJECTIVE     Subjective She went to [facility] last Friday, they want to do cystoscopy () then discuss treatment.   She was placed Trospium and notices difference thus far (started Monday night), helping with urgency/leaking. Changing pads 4 times per day, but noticing improvements with therapy. She is still getting up 1-2 times per night. She does admit to not drinking much secondary to leaking, worse with caffeine.     PAIN: 0/10         OBJECTIVE     Objective      Therapeutic Exercises    86955 Comments   SL clams with TrA 2 x 10 each side        Updated/reviewed HEP         Hip MMT: R 4/5 L 4-5/     Timed Minutes 23     Therapeutic Activities    49933 Comments   Reviewed symptoms/medication    Sit to stand with hip adduction against ball x 10                Timed Minutes 10     Neuromuscular Reeducation     29698 Comments   Standing on 1/2 foam hip abduction . 2 x 10 each                    Timed Minutes 8       Therapy Education/Self Care 72431   Education offered today    Medbride Code [email or web address]   Ongoing HEP   Access Code: [email or web address]  URL: [email or web address]  Date: 10/21/2022  Prepared by: Savannah Connors    Exercises  Pillow Prop - 1-2 x daily  Hooklying Rib Cage Breathing - 2-3 x daily - 1-2 sets - 2-3 reps  Beginner Bridge - 3 x weekly - 2-3 sets - 10 reps  Hooklying Clamshell with Resistance - 3 x weekly - 2 sets - 10-15 reps  Hooklying Single  Leg Bent Knee Fallouts with Resistance - 3 x weekly - 2 sets - 20 reps  Seated Hip Adduction Squeeze with Ball - 3 x weekly - 2 sets - 10 reps  Sit to Stand with Armchair   Timed Minutes        Total Timed Treatment:     41   mins  Total Time of Visit:             45   mins         ASSESSMENT/PLAN     GOALS  Goals                                                                           Progress Note due by 1/15/23                                                                                        Re-cert due by 3/15/23               STG by: 6 weeks Comments Status   Pt will demonstrate proper PF 3+/5 or greater and TA activation in supine in order to progress with more functional strengthening. Did not assess today    3/5 prior ongoing   Pt will be independent with initial HEP, in order to accelerate progress.     met    Pt will perform sit to stand without UI Improved   ongoing    Pt will report 50% less pad use.   ongoing use 4 PPD, but improved this last week ongoing   LTG by: 12 weeks       Pt will be independent with HEP including core, hip and PF strengthening.   progressing ongoing   Pt will demonstrate 4/5 or greater hip abduction strength.    R 4/5 L 4-/5  ongoing   Pt will score 75 or less on the PFDI-20.  16.75, 31.25, 70.75 (118.75/300)     ongoing   Pt will be able to cough without UI   ongoing ongoing       Assessment & Plan     Assessment  Impairments: impaired physical strength and lacks appropriate home exercise program  Functional Limitations: lifting, walking, pulling and pushingPrognosis: good    Plan  Therapy options: will be seen for skilled therapy services  Planned modality interventions: dry needling, TENS, low level laser therapy and electrical stimulation/Russian stimulation  Planned therapy interventions: abdominal trunk stabilization, manual therapy, motor coordination training, neuromuscular re-education, ADL retraining, soft tissue mobilization, spinal/joint mobilization,  strengthening, stretching, therapeutic activities, transfer training, IADL retraining, joint mobilization, home exercise program, functional ROM exercises, balance/weight-bearing training, postural training, flexibility and gait training  Frequency: 1x week  Duration in weeks: 12  Treatment plan discussed with: patient         ASSESSMENT: Her PFDI-20 improved greatly. She reports some improvements with PT as well as new medication she was given last week. She has ongoing UI with activity and ongoing pad use. She is compliant with HEP and we are continuing to progress this. Today is her 4th treatment session since evaluation on 9/23/22 secondary to scheduling within our patient and patient's work schedule.       PLAN: Re-assess pelvic floor muscles, progress as needed. Work on hip/core strengthening.     SIGNATURE: Savannah Connors PT DPT, [place] License #: [id number]  Electronically Signed on 12/16/2022    Clinical Progress: improved  Home Program Compliance: Yes  Progress toward previous goals: Partially Met      90 Day Recertification  Certification Period: 12/16/2022 through 3/15/2023  I certify that the therapy services are furnished while this patient is under my care.  The services outlined above are required by this patient, and will be reviewed every 90 days.     PHYSICIAN: Apple Ivan APRN (NPI: [id number])    Signature:___________________________________________DATE: _________    Please sign and return via fax to [phone].   Thank you so much for letting us work with Desirae. I appreciate your letting us work with your patients. If you have any questions or concerns, please don't hesitate to contact me.            [address] [address]  [phone]

## 2022-12-20 ENCOUNTER — SPECIALTY PHARMACY (OUTPATIENT)
Dept: ENDOCRINOLOGY | Facility: CLINIC | Age: 57
End: 2022-12-20
Payer: COMMERCIAL

## 2022-12-30 ENCOUNTER — TREATMENT (OUTPATIENT)
Dept: PHYSICAL THERAPY | Facility: CLINIC | Age: 57
End: 2022-12-30

## 2022-12-30 DIAGNOSIS — N39.46 MIXED STRESS AND URGE URINARY INCONTINENCE: Primary | ICD-10-CM

## 2022-12-30 PROCEDURE — 97110 THERAPEUTIC EXERCISES: CPT | Performed by: PHYSICAL THERAPIST

## 2022-12-30 PROCEDURE — 97112 NEUROMUSCULAR REEDUCATION: CPT | Performed by: PHYSICAL THERAPIST

## 2022-12-30 PROCEDURE — 97530 THERAPEUTIC ACTIVITIES: CPT | Performed by: PHYSICAL THERAPIST

## 2022-12-30 NOTE — PROGRESS NOTES
Physical Therapy Treatment Note  [address] [place] [address]    Patient: Desirae Alberto                                                 Visit Date: 2022  :     1965    Referring practitioner:    NAVEED Menon  Date of Initial Visit:          Type: THERAPY  Noted: 2022    Patient seen for 6 sessions    Visit Diagnoses:    ICD-10-CM ICD-9-CM   1. Mixed stress and urge urinary incontinence  N39.46 788.33     SUBJECTIVE     Subjective She is only taking medication one time per day, she is having some trouble with medication upsetting her stomach. She was able to hold urgency without leaking.She feels like everything is getting better.       PAIN: 0/10         OBJECTIVE     Objective      Therapeutic Exercises    64319 Comments   SL ball press + TrA x10 each side  SL hip adduction + PFM x 10 each side    HL bridge with hip adduction x 10     Updated/reviewed HEP             Timed Minutes 23     Therapeutic Activities    95583 Comments   Wall slides with exhale up x 10     Upcoming testing                Timed Minutes 10     Neuromuscular Reeducation     62381 Comments   Standing on 1/2 foam hip abduction . 2 x 10 each    Sitting on SB  Breathing  Marches  LAQ     Ball hugs breathing  Ball press + exhale            Timed Minutes 10       Therapy Education/Self Care 23587   Education offered today    Medjosé anton[employer] Code [email or web address]   Ongoing HEP   Access Code: [email or web address]  URL: [email or web address]  Date: 10/21/2022  Prepared by: Savannah Connors    Exercises  Pillow Prop - 1-2 x daily  Hooklying Rib Cage Breathing - 2-3 x daily - 1-2 sets - 2-3 reps  Beginner Bridge - 3 x weekly - 2-3 sets - 10 reps  Hooklying Clamshell with Resistance - 3 x weekly - 2 sets - 10-15 reps  Hooklying Single Leg Bent Knee Fallouts with Resistance - 3 x weekly - 2 sets - 20 reps  Seated Hip Adduction Squeeze with Ball - 3 x weekly - 2 sets - 10 reps  Sit  to Stand with Armchair   Timed Minutes        Total Timed Treatment:     43   mins  Total Time of Visit:             47   mins         ASSESSMENT/PLAN     GOALS  Goals                                                                           Progress Note due by 1/15/23                                                                                        Re-cert due by 3/15/23               STG by: 6 weeks Comments Status   Pt will demonstrate proper PF 3+/5 or greater and TA activation in supine in order to progress with more functional strengthening. Did not assess today    3/5 prior ongoing   Pt will be independent with initial HEP, in order to accelerate progress.     met    Pt will perform sit to stand without UI Improved   ongoing    Pt will report 50% less pad use.   ongoing use 4 PPD, but improved this last week ongoing   LTG by: 12 weeks       Pt will be independent with HEP including core, hip and PF strengthening.   progressing ongoing   Pt will demonstrate 4/5 or greater hip abduction strength.    R 4/5 L 4-/5  ongoing   Pt will score 75 or less on the PFDI-20.  16.75, 31.25, 70.75 (118.75/300)     ongoing   Pt will be able to cough without UI  Able to hold urgency without UI  ongoing       Assessment/Plan     ASSESSMENT: She reports improvements in UI, including one particular time or urgency without a pad and she did not leak while having to lock door at work, then going to bathroom. She is progressing with with hip and core stability and compliant with her HEP. We are still progressing with hip, core, breathing and pelvic floor work.     PLAN: Progress note next visit. Discuss/review testing that is happening next week in [place]. Re-assess pelvic floor muscles, progress as needed. Work on hip/core strengthening.     SIGNATURE: Savannah Connors, PT DPT, [place] License #: [id number]  Electronically Signed on 12/30/2022        [address] [place] [address], [place] [address]  [phone]

## 2023-01-26 ENCOUNTER — SPECIALTY PHARMACY (OUTPATIENT)
Dept: ENDOCRINOLOGY | Facility: CLINIC | Age: 58
End: 2023-01-26
Payer: COMMERCIAL

## 2023-01-26 NOTE — PROGRESS NOTES
Specialty Pharmacy Refill Coordination Note     Desriae is a 58 y.o. female contacted today regarding refills of  Soledad, progesterone and wegovy specialty medication(s).    Reviewed and verified with patient:  Allergies       Specialty medication(s) and dose(s) confirmed: yes    Refill Questions    Flowsheet Row Most Recent Value   Changes to allergies? No   Changes to medications? No   New conditions since last clinic visit No   Unplanned office visit, urgent care, ED, or hospital admission in the last 4 weeks  No   How does patient/caregiver feel medication is working? Very good   Financial problems or insurance changes  No   Since the previous refill, were any specialty medication doses or scheduled injections missed or delayed?  No   Does this patient require a clinical escalation to a pharmacist? No          Delivery Questions    Flowsheet Row Most Recent Value   Delivery method [employer]   Delivery address correct? Yes   Delivery phone number [phone]   Number of medications in delivery 3   Medication being filled and delivered wegovy, soledad and progesterone   Is there any medication that is due not being filled? No   Do any medications need mixed or dated? No   Copay form of payment Credit card on file   Questions or concerns for the pharmacist? No                 Follow-up: 3 months on routine meds and 1 month of wegovy.      Abdon L. Pedro  Specialty Pharmacy Technician

## 2023-01-27 ENCOUNTER — TREATMENT (OUTPATIENT)
Dept: PHYSICAL THERAPY | Facility: CLINIC | Age: 58
End: 2023-01-27
Payer: COMMERCIAL

## 2023-01-27 DIAGNOSIS — N39.46 MIXED STRESS AND URGE URINARY INCONTINENCE: Primary | ICD-10-CM

## 2023-01-27 PROCEDURE — 97535 SELF CARE MNGMENT TRAINING: CPT | Performed by: PHYSICAL THERAPIST

## 2023-01-27 PROCEDURE — 97110 THERAPEUTIC EXERCISES: CPT | Performed by: PHYSICAL THERAPIST

## 2023-01-27 PROCEDURE — 97112 NEUROMUSCULAR REEDUCATION: CPT | Performed by: PHYSICAL THERAPIST

## 2023-01-27 NOTE — PROGRESS NOTES
Physical Therapy Treatment Note and 30 Day Progress Note  [address] [place] [address]    Patient: Desirae Alberto                                                 Visit Date: 2023  :     1965    Referring practitioner:    NAVEED Menon  Date of Initial Visit:          Type: THERAPY  Noted: 2022    Patient seen for 7 sessions    Visit Diagnoses:    ICD-10-CM ICD-9-CM   1. Mixed stress and urge urinary incontinence  N39.46 788.33     SUBJECTIVE     Subjective She went from thicker pad to smaller pad. If she drinks caffeine she has less control. She drinks coffee in the morning. Had cystoscopy and then bulking injections in urethra. She has stopped medication.UI occurs with sit to stand, moves quickly. Leaking does occur more at work, but she reports pushing urination while working.  She does get up one time per night to urinate.     PAIN: 0/10         OBJECTIVE     Objective      Therapeutic Exercises    78327 Comments     SL hip adduction + PFM x 10 each side  SL side bridge with hip adduction X 5         Updated/reviewed HEP             Timed Minutes 15       Neuromuscular Reeducation     46795 Comments   Standing on  foam:   tandem   hip abduction/extension/diagnonal x 20 each side                      Timed Minutes 10       Therapy Education/Self Care 21056   Education offered today Reviewed HEP    Medbride Code [email or web address]   Ongoing HEP   Access Code: [email or web address]  URL: [email or web address]  Date: 10/21/2022  Prepared by: Savananh Connors    Exercises  Pillow Prop - 1-2 x daily  Hooklying Rib Cage Breathing - 2-3 x daily - 1-2 sets - 2-3 reps  Beginner Bridge - 3 x weekly - 2-3 sets - 10 reps  Hooklying Clamshell with Resistance - 3 x weekly - 2 sets - 10-15 reps  Hooklying Single Leg Bent Knee Fallouts with Resistance - 3 x weekly - 2 sets - 20 reps  Seated Hip Adduction Squeeze with Ball - 3 x weekly - 2 sets - 10  reps  Sit to Stand with Armchair   Timed Minutes 10       Total Timed Treatment:     35   mins  Total Time of Visit:             40   mins         ASSESSMENT/PLAN     GOALS  Goals                                                                           Progress Note due by 2/26/23                                                                                        Re-cert due by 3/15/23               STG by: 6 weeks Comments Status   Pt will demonstrate proper PF 3+/5 or greater and TA activation in supine in order to progress with more functional strengthening. Did not assess today    3/5 prior ongoing   Pt will be independent with initial HEP, in order to accelerate progress.     met    Pt will perform sit to stand without UI Improved, but ongoing at work  ongoing    Pt will report 50% less pad use.   MET   LTG by: 12 weeks       Pt will be independent with HEP including core, hip and PF strengthening.   progressing ongoing   Pt will demonstrate 4/5 or greater hip abduction strength.   Did not assess   R 4/5 L 4-/5  ongoing   Pt will score 75 or less on the PFDI-20.  Did not assess  16.75, 31.25, 70.75 (118.75/300)     ongoing   Pt will be able to cough without UI  improving ongoing       Assessment & Plan     Assessment  Impairments: impaired physical strength and lacks appropriate home exercise program  Functional Limitations: lifting, walking, pulling and pushingPrognosis: good    Plan  Therapy options: will be seen for skilled therapy services  Planned modality interventions: dry needling, TENS, low level laser therapy and electrical stimulation/Russian stimulation  Planned therapy interventions: abdominal trunk stabilization, manual therapy, motor coordination training, neuromuscular re-education, ADL retraining, soft tissue mobilization, spinal/joint mobilization, strengthening, stretching, therapeutic activities, transfer training, IADL retraining, joint mobilization, home exercise program, functional  ROM exercises, balance/weight-bearing training, postural training, flexibility and gait training  Frequency: 1x week  Duration in weeks: 8  Treatment plan discussed with: patient         ASSESSMENT: She's had further improvements in UI post bulking injections. She does still have some deficits with core/hip strength. She is having to use less protective pads and absorbency. She does still have UI with activity that occurs more at work.     PLAN: Biofeedback next visit. Progress with HEP as needed. Continue to progress with co-contraction, core and hip work.     SIGNATURE: Savannah Connors, PT DPT, [place] License #: [id number]  Electronically Signed on 1/27/2023        [place] [address]  [phone]

## 2023-02-24 ENCOUNTER — TREATMENT (OUTPATIENT)
Dept: PHYSICAL THERAPY | Facility: CLINIC | Age: 58
End: 2023-02-24
Payer: COMMERCIAL

## 2023-02-24 DIAGNOSIS — N39.46 MIXED STRESS AND URGE URINARY INCONTINENCE: Primary | ICD-10-CM

## 2023-02-24 PROCEDURE — 97112 NEUROMUSCULAR REEDUCATION: CPT | Performed by: PHYSICAL THERAPIST

## 2023-02-24 PROCEDURE — 97535 SELF CARE MNGMENT TRAINING: CPT | Performed by: PHYSICAL THERAPIST

## 2023-02-24 PROCEDURE — 97110 THERAPEUTIC EXERCISES: CPT | Performed by: PHYSICAL THERAPIST

## 2023-02-24 NOTE — PROGRESS NOTES
Physical Therapy Treatment Note and Discharge Note  [address] [place] [address]    Patient: Desirae Alberto                                                 Visit Date: 2023  :     1965    Referring practitioner:    NAVEED Menon  Date of Initial Visit:          Type: THERAPY  Noted: 2022    Patient seen for 8 sessions    Visit Diagnoses:    ICD-10-CM ICD-9-CM   1. Mixed stress and urge urinary incontinence  N39.46 788.33     SUBJECTIVE     Subjective She's been carrying for her grand kids and had a new puppy, so she hasn't had much sleep. She does still use one PPD, but was using 4. Caffeine makes it worse.     PAIN: 0/10       OBJECTIVE     Objective      Therapeutic Exercises    29748 Comments            reviewed HEP             Timed Minutes 15     sEMG Biofeedback (26027) Activity Position Resting Tone (mV) Sets/reps Time (sec) Comments     sitting 7 1  5 10  0                                           Timed Minutes: 15       Therapy Education/Self Care 34678   Education offered today discussed ongoing exercises/communtiy based activities   Medbride Code [email or web address]   Ongoing HEP   Access Code: [email or web address]  URL: [email or web address]  Date: 10/21/2022  Prepared by: Savannah Connors    Exercises  Pillow Prop - 1-2 x daily  Hooklying Rib Cage Breathing - 2-3 x daily - 1-2 sets - 2-3 reps  Beginner Bridge - 3 x weekly - 2-3 sets - 10 reps  Hooklying Clamshell with Resistance - 3 x weekly - 2 sets - 10-15 reps  Hooklying Single Leg Bent Knee Fallouts with Resistance - 3 x weekly - 2 sets - 20 reps  Seated Hip Adduction Squeeze with Ball - 3 x weekly - 2 sets - 10 reps  Sit to Stand with Armchair   Timed Minutes 10     Total Timed Treatment:     40   mins  Total Time of Visit:             40   mins         ASSESSMENT/PLAN     GOALS  Goals                                                                           Progress Note  due by 2/26/23                                                                                        Re-cert due by 3/15/23               STG by: 6 weeks Comments Status   Pt will demonstrate proper PF 3+/5 or greater and TA activation in supine in order to progress with more functional strengthening.  MET   Pt will be independent with initial HEP, in order to accelerate progress.     met    Pt will perform sit to stand without UI Only with caffeine  met    Pt will report 50% less pad use.   MET   LTG by: 12 weeks       Pt will be independent with HEP including core, hip and PF strengthening.   progressing MET   Pt will demonstrate 4/5 or greater hip abduction strength.   Did not assess   R 4/5 L 4-/5  Not MET   Pt will score 75 or less on the PFDI-20.  16.75, 37.5, 62.5 (116.75/300)       Not Met   Pt will be able to cough without UI   Partially met     DISCHARGE SUMMARY   Discharge date 2/24/2023   Dates of this episode 9/23/22 through 2/24/23   Number of visits on this episode 8   Reason for discharge Independent   Outcomes achieved Refer to the goals table for specifics on goals   Discharge plan Continue with current home exercise program as instructed     Assessment & Plan            ASSESSMENT: She continues to have UI when she drinks caffeine, however she is down to 1 PPD from 4. She has an updated HEP to continue to progress from home.     PLAN: Discharge. She will call with any questions or concerns. She has updated HEP and plans to work on this as well as community based exercise.     SIGNATURE: Savannah Connors, PT T, [place] License #: [id number]  Electronically Signed on 2/24/2023        [address] [place] [address]. [address]  [phone]

## 2023-02-27 ENCOUNTER — LAB (OUTPATIENT)
Dept: INTERNAL MEDICINE | Facility: CLINIC | Age: 58
End: 2023-02-27
Payer: COMMERCIAL

## 2023-02-27 DIAGNOSIS — Z79.899 ENCOUNTER FOR LONG-TERM (CURRENT) USE OF OTHER MEDICATIONS: ICD-10-CM

## 2023-02-27 DIAGNOSIS — E55.9 VITAMIN D DEFICIENCY: ICD-10-CM

## 2023-02-27 DIAGNOSIS — E53.8 VITAMIN B12 DEFICIENCY: ICD-10-CM

## 2023-02-27 DIAGNOSIS — R73.03 PREDIABETES: Primary | ICD-10-CM

## 2023-02-27 DIAGNOSIS — E06.3 HYPOTHYROIDISM DUE TO HASHIMOTO'S THYROIDITIS: ICD-10-CM

## 2023-02-27 DIAGNOSIS — E78.2 MIXED HYPERCHOLESTEROLEMIA AND HYPERTRIGLYCERIDEMIA: ICD-10-CM

## 2023-02-27 DIAGNOSIS — E03.8 HYPOTHYROIDISM DUE TO HASHIMOTO'S THYROIDITIS: ICD-10-CM

## 2023-02-27 DIAGNOSIS — R73.03 PREDIABETES: ICD-10-CM

## 2023-02-28 LAB
25(OH)D3+25(OH)D2 SERPL-MCNC: 35.1 NG/ML (ref 30–100)
ALBUMIN SERPL-MCNC: 4.4 G/DL (ref 3.8–4.9)
ALBUMIN/GLOB SERPL: 1.8 {RATIO} (ref 1.2–2.2)
ALP SERPL-CCNC: 98 IU/L (ref 44–121)
ALT SERPL-CCNC: 22 IU/L (ref 0–32)
AST SERPL-CCNC: 20 IU/L (ref 0–40)
BASOPHILS # BLD AUTO: 0 X10E3/UL (ref 0–0.2)
BASOPHILS NFR BLD AUTO: 1 %
BILIRUB SERPL-MCNC: <0.2 MG/DL (ref 0–1.2)
BUN SERPL-MCNC: 14 MG/DL (ref 6–24)
BUN/CREAT SERPL: 17 (ref 9–23)
CALCIUM SERPL-MCNC: 9.1 MG/DL (ref 8.7–10.2)
CHLORIDE SERPL-SCNC: 104 MMOL/L (ref 96–106)
CHOLEST SERPL-MCNC: 210 MG/DL (ref 100–199)
CO2 SERPL-SCNC: 23 MMOL/L (ref 20–29)
CREAT SERPL-MCNC: 0.83 MG/DL (ref 0.57–1)
EGFRCR SERPLBLD CKD-EPI 2021: 82 ML/MIN/1.73
EOSINOPHIL # BLD AUTO: 0 X10E3/UL (ref 0–0.4)
EOSINOPHIL NFR BLD AUTO: 1 %
ERYTHROCYTE [DISTWIDTH] IN BLOOD BY AUTOMATED COUNT: 12.8 % (ref 11.7–15.4)
GLOBULIN SER CALC-MCNC: 2.4 G/DL (ref 1.5–4.5)
GLUCOSE SERPL-MCNC: 94 MG/DL (ref 70–99)
HBA1C MFR BLD: 5.8 % (ref 4.8–5.6)
HCT VFR BLD AUTO: 40.2 % (ref 34–46.6)
HDLC SERPL-MCNC: 60 MG/DL
HGB BLD-MCNC: 13.3 G/DL (ref 11.1–15.9)
IMM GRANULOCYTES # BLD AUTO: 0 X10E3/UL (ref 0–0.1)
IMM GRANULOCYTES NFR BLD AUTO: 0 %
LDLC SERPL CALC-MCNC: 127 MG/DL (ref 0–99)
LYMPHOCYTES # BLD AUTO: 1.7 X10E3/UL (ref 0.7–3.1)
LYMPHOCYTES NFR BLD AUTO: 43 %
MCH RBC QN AUTO: 29 PG (ref 26.6–33)
MCHC RBC AUTO-ENTMCNC: 33.1 G/DL (ref 31.5–35.7)
MCV RBC AUTO: 88 FL (ref 79–97)
MONOCYTES # BLD AUTO: 0.3 X10E3/UL (ref 0.1–0.9)
MONOCYTES NFR BLD AUTO: 8 %
NEUTROPHILS # BLD AUTO: 1.8 X10E3/UL (ref 1.4–7)
NEUTROPHILS NFR BLD AUTO: 47 %
PLATELET # BLD AUTO: 241 X10E3/UL (ref 150–450)
POTASSIUM SERPL-SCNC: 4.7 MMOL/L (ref 3.5–5.2)
PROT SERPL-MCNC: 6.8 G/DL (ref 6–8.5)
RBC # BLD AUTO: 4.59 X10E6/UL (ref 3.77–5.28)
SODIUM SERPL-SCNC: 141 MMOL/L (ref 134–144)
TRIGL SERPL-MCNC: 133 MG/DL (ref 0–149)
TSH SERPL DL<=0.005 MIU/L-ACNC: 3.57 UIU/ML (ref 0.45–4.5)
VIT B12 SERPL-MCNC: 561 PG/ML (ref 232–1245)
VLDLC SERPL CALC-MCNC: 23 MG/DL (ref 5–40)
WBC # BLD AUTO: 3.9 X10E3/UL (ref 3.4–10.8)

## 2023-03-15 ENCOUNTER — SPECIALTY PHARMACY (OUTPATIENT)
Dept: ENDOCRINOLOGY | Facility: CLINIC | Age: 58
End: 2023-03-15
Payer: COMMERCIAL

## 2023-03-15 ENCOUNTER — OFFICE VISIT (OUTPATIENT)
Dept: ENDOCRINOLOGY | Facility: CLINIC | Age: 58
End: 2023-03-15
Payer: COMMERCIAL

## 2023-03-15 VITALS
OXYGEN SATURATION: 97 % | SYSTOLIC BLOOD PRESSURE: 110 MMHG | DIASTOLIC BLOOD PRESSURE: 70 MMHG | HEART RATE: 89 BPM | BODY MASS INDEX: 30.3 KG/M2 | HEIGHT: 63 IN | WEIGHT: 171 LBS

## 2023-03-15 DIAGNOSIS — E03.8 HYPOTHYROIDISM DUE TO HASHIMOTO'S THYROIDITIS: Primary | ICD-10-CM

## 2023-03-15 DIAGNOSIS — E06.3 HYPOTHYROIDISM DUE TO HASHIMOTO'S THYROIDITIS: Primary | ICD-10-CM

## 2023-03-15 DIAGNOSIS — E78.5 DYSLIPIDEMIA: ICD-10-CM

## 2023-03-15 DIAGNOSIS — E66.09 CLASS 1 OBESITY DUE TO EXCESS CALORIES WITH SERIOUS COMORBIDITY AND BODY MASS INDEX (BMI) OF 34.0 TO 34.9 IN ADULT: ICD-10-CM

## 2023-03-15 DIAGNOSIS — R73.03 PREDIABETES: ICD-10-CM

## 2023-03-15 PROCEDURE — 99214 OFFICE O/P EST MOD 30 MIN: CPT | Performed by: INTERNAL MEDICINE

## 2023-03-15 RX ORDER — ESTRADIOL 0.05 MG/D
FILM, EXTENDED RELEASE TRANSDERMAL
Qty: 8 PATCH | Refills: 11 | Status: SHIPPED | OUTPATIENT
Start: 2023-03-15

## 2023-03-15 RX ORDER — SEMAGLUTIDE 2.4 MG/.75ML
2.4 INJECTION, SOLUTION SUBCUTANEOUS WEEKLY
Qty: 3 ML | Refills: 11 | Status: SHIPPED | OUTPATIENT
Start: 2023-03-15

## 2023-03-15 RX ORDER — PROGESTERONE 100 MG/1
100 CAPSULE ORAL DAILY
Qty: 30 CAPSULE | Refills: 11 | Status: SHIPPED | OUTPATIENT
Start: 2023-03-15

## 2023-03-15 NOTE — PROGRESS NOTES
"CC  Hypothyroidism       History of Present Illness    58 y.o. female   w obesity , now lost 25 lbs on wegovy       ==========================================  Physical Exam  /70   Pulse 89   Ht 160 cm (63\")   Wt 77.6 kg (171 lb)   LMP 06/30/2019   SpO2 97%   BMI 30.29 kg/m²   AOx3  No Goiter , no carotid bruit  RRR  CTA  No Edema     ==========================================    Laboratory Workup    Lab Results   Component Value Date    WBC 3.9 02/27/2023    HGB 13.3 02/27/2023    HCT 40.2 02/27/2023    MCV 88 02/27/2023     02/27/2023       Lab Results   Component Value Date    GLUCOSE 94 02/27/2023    BUN 14 02/27/2023    CREATININE 0.83 02/27/2023    EGFRIFNONA 71 08/11/2021    EGFRIFAFRI 84 09/11/2020    BCR 17 02/27/2023     02/27/2023    K 4.7 02/27/2023    CO2 23 02/27/2023    CALCIUM 9.1 02/27/2023    PROTENTOTREF 6.8 02/27/2023    ALBUMIN 4.4 02/27/2023    LABIL2 1.8 02/27/2023    AST 20 02/27/2023    ALT 22 02/27/2023           ==========================================      ICD-10-CM ICD-9-CM   1. Hypothyroidism due to Hashimoto's thyroiditis  E03.8 244.8    E06.3 245.2   2. Prediabetes  R73.03 790.29   3. Dyslipidemia  E78.5 272.4   4. Class 1 obesity due to excess calories with serious comorbidity and body mass index (BMI) of 34.0 to 34.9 in adult  E66.09 278.00    Z68.34 V85.34   -    Prediabetes    On wegovy , has lost 25 lbs     ============    Dyslipidemia    Lab Results   Component Value Date    CHOL 284 (H) 02/28/2022    CHLPL 210 (H) 02/27/2023    TRIG 133 02/27/2023    HDL 60 02/27/2023     (H) 02/27/2023     TIA ,questionable    No crestor     The 10-year ASCVD risk score (Iveth HESS, et al., 2019) is: 1.8%    Values used to calculate the score:      Age: 58 years      Sex: Female      Is Non- : No      Diabetic: No      Tobacco smoker: No      Systolic Blood Pressure: 110 mmHg      Is BP treated: No      HDL Cholesterol: 60 mg/dL      " Total Cholesterol: 210 mg/dL      ------------    Obesity    wegovy successful    2.4     -----------    Hot flashes     Estrogen patches 25 mcg/ 24 h on a patch that is applied twice weekly- increase to 50 mcgs - this is better       Prometrium 100 mg daily    Taking estrogen increases the risk of clots between 2-4 per [place] woman for a 5-year.    Also on venlafaxine     She will contemplate coming off venlafaxine since estrogen working but it helps her sleep better     --------------------      Hypothyroidism, subclinical    tpo ab neg  We stopped and TSH is normal   Thyroid Workup    Lab Results   Component Value Date    TSH 3.570 02/27/2023    TSH 2.980 06/07/2022    TSH 5.210 (H) 02/28/2022       Lab Results   Component Value Date    FREET4 1.19 08/11/2021    FREET4 1.52 12/04/2019    FREET4 1.65 03/01/2017       Lab Results   Component Value Date    T3FREE 2.9 09/02/2020    T3FREE 3.1 12/04/2019    T3FREE 3.0 03/01/2017       TPO antibodies    Lab Results   Component Value Date    THYROIDAB 16 02/28/2022    THYROIDAB <9 09/02/2020    THYROIDAB 10 02/18/2020            Lab Results   Component Value Date    THGAB <1.0 09/02/2020    THGAB <1.0 02/18/2020          New Medications Ordered This Visit   Medications   • Semaglutide-Weight Management (Wegovy) 2.4 MG/0.75ML solution auto-injector     Sig: Inject 2.4 mg (1 pen) under the skin into the appropriate area as directed 1 (One) Time Per Week.     Dispense:  3 mL     Refill:  11   • estradiol (MINIVELLE, VIVELLE-DOT) 0.05 MG/24HR patch     Sig: Apply 1 patch to the appropriate area as directed twice weekly **Remove old patch before applying new patch**     Dispense:  8 patch     Refill:  11   • Progesterone (Prometrium) 100 MG capsule     Sig: Take 1 capsule by mouth Daily.     Dispense:  30 capsule     Refill:  11       Orders Placed This Encounter   Procedures   • CBC Auto Differential   • Comprehensive Metabolic Panel   • Hemoglobin A1c   • Lipid Panel   •  Microalbumin / Creatinine Urine Ratio - Urine, Clean Catch   • TSH   • Vitamin B12             This document has been electronically signed by Kalen Timmons MD on March 15, 2023 09:08 CDT

## 2023-03-15 NOTE — PROGRESS NOTES
Specialty Pharmacy Refill Coordination Note     Desirae is a 58 y.o. female contacted today regarding refills of  wegovy specialty medication(s).    Reviewed and verified with patient:       Specialty medication(s) and dose(s) confirmed: yes    Refill Questions    Flowsheet Row Most Recent Value   Changes to allergies? No   Changes to medications? No   New conditions since last clinic visit No   Unplanned office visit, urgent care, ED, or hospital admission in the last 4 weeks  No   How does patient/caregiver feel medication is working? Very good   Financial problems or insurance changes  No   Since the previous refill, were any specialty medication doses or scheduled injections missed or delayed?  No   Does this patient require a clinical escalation to a pharmacist? No          Delivery Questions    Flowsheet Row Most Recent Value   Delivery method FedEx   Delivery address correct? No  [Mailing to work]   Number of medications in delivery 1   Medication being filled and delivered wegovy   Doses left of specialty medications 1   Is there any medication that is due not being filled? No   Supplies needed? No supplies needed   Cooler needed? Yes   Do any medications need mixed or dated? No   Questions or concerns for the pharmacist? No   Are any medications first time fills? No        Other RX aren't due at this time, just mailing out wegovy to her work address per pt request.  Seen in office today by provider.          Follow-up: 1 month.      Abdon Vasquez  Specialty Pharmacy Technician

## 2023-03-29 ENCOUNTER — SPECIALTY PHARMACY (OUTPATIENT)
Dept: PHARMACY | Facility: HOSPITAL | Age: 58
End: 2023-03-29
Payer: COMMERCIAL

## 2023-04-07 ENCOUNTER — SPECIALTY PHARMACY (OUTPATIENT)
Dept: ENDOCRINOLOGY | Facility: CLINIC | Age: 58
End: 2023-04-07
Payer: COMMERCIAL

## 2023-04-07 NOTE — PROGRESS NOTES
Specialty Pharmacy Refill Coordination Note     Desirae is a 58 y.o. female contacted today regarding refills of  wegovy specialty medication(s).    Reviewed and verified with patient:  Allergies       Specialty medication(s) and dose(s) confirmed: yes    Refill Questions    Flowsheet Row Most Recent Value   Changes to allergies? No   Changes to medications? No   New conditions since last clinic visit No   Unplanned office visit, urgent care, ED, or hospital admission in the last 4 weeks  No   How does patient/caregiver feel medication is working? Very good   Financial problems or insurance changes  No   Since the previous refill, were any specialty medication doses or scheduled injections missed or delayed?  No   Does this patient require a clinical escalation to a pharmacist? No          Delivery Questions    Flowsheet Row Most Recent Value   Delivery method FedEx   Delivery address correct? No   Number of medications in delivery 1   Medication being filled and delivered wegovy   Doses left of specialty medications 1   Is there any medication that is due not being filled? No   Cooler needed? Yes   Do any medications need mixed or dated? No   Copay form of payment Credit card on file   Questions or concerns for the pharmacist? No   Are any medications first time fills? No        Mailing to her at work on Monday 4/10.         Follow-up: 1 month.      Abdon Vasquez  Specialty Pharmacy Technician

## 2023-04-10 RX ORDER — CYCLOBENZAPRINE HCL 5 MG
5 TABLET ORAL 3 TIMES DAILY PRN
Qty: 30 TABLET | Refills: 1 | Status: SHIPPED | OUTPATIENT
Start: 2023-04-10

## 2023-04-10 NOTE — TELEPHONE ENCOUNTER
Rx Refill Note  Requested Prescriptions     Pending Prescriptions Disp Refills   • cyclobenzaprine (FLEXERIL) 5 MG tablet 30 tablet 1     Sig: Take 1 tablet by mouth 3 (Three) Times a Day As Needed for Muscle Spasms.      Last office visit with prescribing clinician: Visit date not found   Next office visit with prescribing clinician: Visit date not found                         Would you like a call back once the refill request has been completed: [] Yes [] No    If the office needs to give you a call back, can they leave a voicemail: [] Yes [] No    Rachel Randolph RN  04/10/23, 12:23 CDT

## 2023-04-19 DIAGNOSIS — G89.29 CHRONIC MIDLINE LOW BACK PAIN, UNSPECIFIED WHETHER SCIATICA PRESENT: Primary | ICD-10-CM

## 2023-04-19 DIAGNOSIS — M54.50 CHRONIC MIDLINE LOW BACK PAIN, UNSPECIFIED WHETHER SCIATICA PRESENT: Primary | ICD-10-CM

## 2023-04-19 RX ORDER — TRAMADOL HYDROCHLORIDE 50 MG/1
50 TABLET ORAL EVERY 6 HOURS PRN
Qty: 30 TABLET | Refills: 0 | Status: SHIPPED | OUTPATIENT
Start: 2023-04-19

## 2023-04-28 ENCOUNTER — SPECIALTY PHARMACY (OUTPATIENT)
Dept: ENDOCRINOLOGY | Facility: CLINIC | Age: 58
End: 2023-04-28
Payer: COMMERCIAL

## 2023-04-28 NOTE — PROGRESS NOTES
Specialty Pharmacy Refill Coordination Note     Desirae is a 58 y.o. female contacted today regarding refills of  specialty medication(s).    Patient called pharmacy for refill on progesterone and brett has been discontinued.                    Follow-up: 3 months.      Abdon Vasquez  Specialty Pharmacy Technician

## 2023-05-16 ENCOUNTER — SPECIALTY PHARMACY (OUTPATIENT)
Dept: ENDOCRINOLOGY | Facility: CLINIC | Age: 58
End: 2023-05-16
Payer: COMMERCIAL

## 2023-05-16 NOTE — PROGRESS NOTES
Specialty Pharmacy Refill Coordination Note     Desirae is a 58 y.o. female contacted today regarding refills of  wegovy specialty medication(s).    Reviewed and verified with patient:       Specialty medication(s) and dose(s) confirmed: yes    Refill Questions    Flowsheet Row Most Recent Value   Changes to allergies? No   Changes to medications? No   New conditions since last clinic visit No   Unplanned office visit, urgent care, ED, or hospital admission in the last 4 weeks  No   How does patient/caregiver feel medication is working? Very good   Financial problems or insurance changes  No   Since the previous refill, were any specialty medication doses or scheduled injections missed or delayed?  No   Does this patient require a clinical escalation to a pharmacist? No          Delivery Questions    Flowsheet Row Most Recent Value   Delivery method FedEx   Delivery address correct? No   Number of medications in delivery 1   Medication being filled and delivered wegovy   Doses left of specialty medications 1   Is there any medication that is due not being filled? No   Supplies needed? No supplies needed   Cooler needed? Yes   Do any medications need mixed or dated? No   Copay form of payment Credit card on file   Questions or concerns for the pharmacist? No   Are any medications first time fills? No          Mailing out tomorrow to work address.  No issues to report.        Follow-up: 1 month.      Abdon Vasquez  Specialty Pharmacy Technician

## 2023-06-09 ENCOUNTER — SPECIALTY PHARMACY (OUTPATIENT)
Dept: ENDOCRINOLOGY | Facility: CLINIC | Age: 58
End: 2023-06-09
Payer: COMMERCIAL

## 2023-06-09 NOTE — PROGRESS NOTES
Specialty Pharmacy Refill Coordination Note     Desirae is a 58 y.o. female contacted today regarding refills of  wegovy specialty medication(s).    Reviewed and verified with patient:       Specialty medication(s) and dose(s) confirmed: yes        Mailing to her work place, 6/12/23.            Follow-up: 1 month     Abdon Vasquez  Specialty Pharmacy Technician

## 2023-06-17 NOTE — TELEPHONE ENCOUNTER
June 17, 2023     Patient: Cristian Barros   YOB: 1999   Date of Visit: 6/17/2023       To Whom it May Concern:    Cristian Barros was seen in my clinic on 6/17/2023 at 9:00 am.    Please excuse Cristian for her absence from work on the date listed above to be able to make her appointment.    Sincerely,         Sobia Stout MD    Medical information is confidential and cannot be disclosed without the written consent of the patient or her representative.     Patient doesn't have to come back at this time if she is feeling better.  She can call in the future if her symptoms return and can be seen again for re-evaluation at that time.    samuel rouse CMA

## 2023-08-22 DIAGNOSIS — M54.2 NECK PAIN: ICD-10-CM

## 2023-08-22 DIAGNOSIS — M25.559 HIP PAIN, UNSPECIFIED LATERALITY: Primary | ICD-10-CM

## 2023-08-22 DIAGNOSIS — G89.29 CHRONIC BACK PAIN, UNSPECIFIED BACK LOCATION, UNSPECIFIED BACK PAIN LATERALITY: ICD-10-CM

## 2023-08-22 DIAGNOSIS — M54.9 CHRONIC BACK PAIN, UNSPECIFIED BACK LOCATION, UNSPECIFIED BACK PAIN LATERALITY: ICD-10-CM

## 2023-08-23 RX ORDER — LIDOCAINE 50 MG/G
1 PATCH TOPICAL EVERY 24 HOURS
Qty: 30 PATCH | Refills: 3 | Status: SHIPPED | OUTPATIENT
Start: 2023-08-23

## 2023-08-25 ENCOUNTER — OFFICE VISIT (OUTPATIENT)
Dept: OBSTETRICS AND GYNECOLOGY | Facility: CLINIC | Age: 58
End: 2023-08-25
Payer: COMMERCIAL

## 2023-08-25 VITALS
HEIGHT: 63 IN | BODY MASS INDEX: 29.59 KG/M2 | WEIGHT: 167 LBS | SYSTOLIC BLOOD PRESSURE: 114 MMHG | DIASTOLIC BLOOD PRESSURE: 80 MMHG

## 2023-08-25 DIAGNOSIS — Z13.820 ENCOUNTER FOR SCREENING FOR OSTEOPOROSIS: ICD-10-CM

## 2023-08-25 DIAGNOSIS — Z12.31 ENCOUNTER FOR SCREENING MAMMOGRAM FOR BREAST CANCER: ICD-10-CM

## 2023-08-25 DIAGNOSIS — Z01.419 WELL WOMAN EXAM WITH ROUTINE GYNECOLOGICAL EXAM: Primary | ICD-10-CM

## 2023-08-25 DIAGNOSIS — C85.80 OTHER SPECIFIED TYPE OF NON-HODGKIN LYMPHOMA, UNSPECIFIED BODY REGION: ICD-10-CM

## 2023-08-25 DIAGNOSIS — N95.1 MENOPAUSAL SYMPTOMS: ICD-10-CM

## 2023-08-25 PROCEDURE — G0123 SCREEN CERV/VAG THIN LAYER: HCPCS | Performed by: NURSE PRACTITIONER

## 2023-08-25 PROCEDURE — 87624 HPV HI-RISK TYP POOLED RSLT: CPT | Performed by: NURSE PRACTITIONER

## 2023-08-25 PROCEDURE — 99396 PREV VISIT EST AGE 40-64: CPT | Performed by: NURSE PRACTITIONER

## 2023-08-25 RX ORDER — VENLAFAXINE HYDROCHLORIDE 75 MG/1
75 CAPSULE, EXTENDED RELEASE ORAL DAILY
Qty: 90 CAPSULE | Refills: 3 | Status: SHIPPED | OUTPATIENT
Start: 2023-08-25

## 2023-08-25 NOTE — PROGRESS NOTES
"Subjective     Desirae Alberto is a 58 y.o. female    History of Present Illness  Patient is here today for yearly checkup.  She has no GYN complaints.  States she is on hormones through Dr. Timmons and is doing well.  Gynecologic Exam  The patient's pertinent negatives include no pelvic pain, vaginal bleeding or vaginal discharge. The patient is experiencing no pain. Pertinent negatives include no abdominal pain, anorexia, back pain, chills, constipation, diarrhea, discolored urine, dysuria, fever, flank pain, frequency, headaches, hematuria, joint pain, joint swelling, nausea, painful intercourse, rash, sore throat, urgency or vomiting. Nothing aggravates the symptoms. She is sexually active. She is postmenopausal.       /80   Ht 160 cm (62.99\")   Wt 75.8 kg (167 lb)   LMP 06/30/2019   BMI 29.59 kg/mý     Outpatient Encounter Medications as of 8/25/2023   Medication Sig Dispense Refill    cyclobenzaprine (FLEXERIL) 5 MG tablet Take 1 tablet by mouth 3 (Three) Times a Day As Needed for Muscle Spasms. 30 tablet 1    diclofenac (VOLTAREN) 50 MG EC tablet Take 1 tablet by mouth 2 (Two) Times a Day As Needed for Pain. 60 tablet 0    estradiol (MINIVELLE, VIVELLE-DOT) 0.05 MG/24HR patch Apply 1 patch to the appropriate area as directed twice weekly **Remove old patch before applying new patch** 8 patch 11    lidocaine (LIDODERM) 5 % Place 1 patch on the skin as directed by provider Daily. Remove & Discard patch within 12 hours or as directed by MD 30 patch 3    ondansetron ODT (Zofran ODT) 4 MG disintegrating tablet Place 1 tablet on the tongue Every 6 (Six) Hours As Needed for Nausea. 12 tablet 1    Progesterone (Prometrium) 100 MG capsule Take 1 capsule by mouth Daily. 30 capsule 11    saccharomyces boulardii (FLORASTOR) 250 MG capsule Take 1 capsule by mouth 2 (Two) Times a Day.      Semaglutide-Weight Management (Wegovy) 2.4 MG/0.75ML solution auto-injector Inject 2.4 mg (1 pen) under the skin into the " appropriate area as directed 1 (One) Time Per Week. 3 mL 11    trospium (SANCTURA) 20 MG tablet Take 1 tablet by mouth 2 (Two) Times a Day. 180 tablet 3    venlafaxine XR (EFFEXOR-XR) 75 MG 24 hr capsule Take 1 capsule by mouth Daily. 90 capsule 3    [DISCONTINUED] venlafaxine XR (EFFEXOR-XR) 75 MG 24 hr capsule Take 1 capsule by mouth Daily. 90 capsule 3    [DISCONTINUED] Ascorbic Acid (Vitamin C) 500 MG capsule Take 1 capsule by mouth Daily. (Patient not taking: Reported on 3/15/2023)      [DISCONTINUED] docusate sodium (COLACE) 100 MG capsule Take 1 capsule by mouth 2 (Two) Times a Day As Needed for Constipation. 6 capsule 0    [DISCONTINUED] guaiFENesin-codeine (GUAIFENESIN AC) 100-10 MG/5ML liquid Take 5 mL by mouth 3 (Three) Times a Day As Needed for Cough. (Patient not taking: Reported on 3/15/2023) 180 mL 0    [DISCONTINUED] multivitamin with minerals tablet tablet Take 1 tablet by mouth Daily.      [DISCONTINUED] NON FORMULARY Pure encapsulation vitamins (Patient not taking: Reported on 3/15/2023)      [DISCONTINUED] pantoprazole (PROTONIX) 40 MG EC tablet Take 1 tablet by mouth Daily. 90 tablet 3    [DISCONTINUED] phenazopyridine (PYRIDIUM) 100 MG tablet Take 1 tablet by mouth 3 (Three) Times a Day As Needed (urinary burning). (Patient not taking: Reported on 3/15/2023) 20 tablet 1    [DISCONTINUED] traMADol (ULTRAM) 50 MG tablet Take 1 tablet by mouth Every 6 (Six) Hours As Needed for Moderate Pain. 30 tablet 0    [DISCONTINUED] vitamin B-12 (CYANOCOBALAMIN) 500 MCG tablet Take 1 tablet by mouth Daily. (Patient not taking: Reported on 3/15/2023) 30 tablet 11    [DISCONTINUED] vitamin B-6 (PYRIDOXINE) 25 MG tablet Take 1 tablet by mouth Daily. (Patient not taking: Reported on 3/15/2023) 30 tablet 5    [DISCONTINUED] vitamin D3 125 MCG (5000 UT) capsule capsule Take 5,000 Units by mouth Daily. (Patient not taking: Reported on 3/15/2023)       No facility-administered encounter medications on file as of  8/25/2023.       Surgical History  Past Surgical History:   Procedure Laterality Date    BREAST EXCISIONAL BIOPSY Left 01/2016    benign    CHOLECYSTECTOMY      COLON RESECTION SMALL BOWEL  2014    COLONOSCOPY  09/05/2019    Dr. Tabares-Internal and external hemorrhoids; Repeat 10 years    ENDOSCOPY  09/05/2019    Dr. Tabares-Possible Hightower's esophagus-biopsied    ENDOSCOPY N/A 06/24/2022    Procedure: ESOPHAGOGASTRODUODENOSCOPY WITH ANESTHESIA;  Surgeon: Clotilde Friedman MD;  Location: [facility] ENDOSCOPY;  Service: Gastroenterology;  Laterality: N/A;  Pre: Hematemesis  Post: esophagitis  Horn, Jeannette Mckenzie, DO    ENDOSCOPY N/A 08/05/2022    Procedure: ESOPHAGOGASTRODUODENOSCOPY WITH ANESTHESIA;  Surgeon: Clotilde Friedman MD;  Location:  [facility] ENDOSCOPY;  Service: Gastroenterology;  Laterality: N/A;  pre: esophagitis  post: normal  Horn, Jeannette Mckenzie, DO    KNEE ARTHROSCOPY Right 07/14/2020    Procedure: RIGHT KNEE PARTIAL MEDIAL MENISCECTOMY;  Surgeon: Vijay Grewal MD;  Location:  [facility] OR;  Service: Orthopedics;  Laterality: Right;    LAPAROSCOPIC TUBAL LIGATION      TOTAL KNEE ARTHROPLASTY Right 07/28/2021    Procedure: RIGHT TOTAL KNEE REPLACEMENT;  Surgeon: Eliecer Foley MD;  Location:  [facility] OR;  Service: Orthopedics;  Laterality: Right;    TRANSURETHRAL RESECTION OF BLADDER TUMOR N/A 07/12/2022    Procedure: CYSTOSCOPY TRANSURETHRAL RESECTION OF BLADDER TUMOR;  Surgeon: Joel Poe MD;  Location:  [facility] OR;  Service: Urology;  Laterality: N/A;    VAGINAL MESH REVISION      2010/2016    WISDOM TOOTH EXTRACTION         Family History  Family History   Problem Relation Age of Onset    Heart defect Mother     Diverticulitis Mother     No Known Problems Father     No Known Problems Sister     Cancer Maternal Aunt     Colon cancer Paternal Aunt 68    Colon cancer Paternal Aunt 70    Liver cancer Paternal Aunt     Colon cancer Paternal Aunt 80    Breast cancer Neg Hx     Ovarian cancer Neg Hx      Uterine cancer Neg Hx     Melanoma Neg Hx     Prostate cancer Neg Hx     Colon polyps Neg Hx     Esophageal cancer Neg Hx     Liver disease Neg Hx     Stomach cancer Neg Hx     Rectal cancer Neg Hx        The following portions of the patient's history were reviewed and updated as appropriate: allergies, current medications, past family history, past medical history, past social history, past surgical history, and problem list.    Review of Systems   Constitutional:  Negative for activity change, appetite change, chills, diaphoresis, fatigue, fever, unexpected weight gain and unexpected weight loss.   HENT:  Negative for congestion, dental problem, drooling, ear discharge, ear pain, facial swelling, hearing loss, mouth sores, nosebleeds, postnasal drip, rhinorrhea, sinus pressure, sneezing, sore throat, swollen glands, tinnitus, trouble swallowing and voice change.    Eyes:  Negative for blurred vision, double vision, photophobia, pain, discharge, redness, itching and visual disturbance.   Respiratory:  Negative for apnea, cough, choking, chest tightness, shortness of breath, wheezing and stridor.    Cardiovascular:  Negative for chest pain, palpitations and leg swelling.   Gastrointestinal:  Negative for abdominal distention, abdominal pain, anal bleeding, anorexia, blood in stool, constipation, diarrhea, nausea, rectal pain, vomiting, GERD and indigestion.   Endocrine: Negative for cold intolerance, heat intolerance, polydipsia, polyphagia and polyuria.   Genitourinary:  Negative for amenorrhea, breast discharge, breast lump, breast pain, decreased libido, decreased urine volume, difficulty urinating, dyspareunia, dysuria, flank pain, frequency, genital sores, hematuria, menstrual problem, pelvic pain, pelvic pressure, urgency, urinary incontinence, vaginal bleeding, vaginal discharge and vaginal pain.   Musculoskeletal:  Negative for arthralgias, back pain, gait problem, joint pain, joint swelling, myalgias,  neck pain, neck stiffness and bursitis.   Skin:  Negative for color change, dry skin and rash.   Allergic/Immunologic: Negative for environmental allergies, food allergies and immunocompromised state.   Neurological:  Negative for dizziness, tremors, seizures, syncope, facial asymmetry, speech difficulty, weakness, light-headedness, numbness, headache, memory problem and confusion.   Hematological:  Negative for adenopathy. Does not bruise/bleed easily.   Psychiatric/Behavioral:  Negative for agitation, behavioral problems, decreased concentration, dysphoric mood, hallucinations, self-injury, sleep disturbance, suicidal ideas, negative for hyperactivity, depressed mood and stress. The patient is not nervous/anxious.      Objective   Physical Exam  Vitals and nursing note reviewed. Exam conducted with a chaperone present.   Constitutional:       General: She is not in acute distress.     Appearance: She is well-developed. She is not diaphoretic.   HENT:      Head: Normocephalic.      Right Ear: External ear normal.      Left Ear: External ear normal.      Nose: Nose normal.   Eyes:      General: No scleral icterus.        Right eye: No discharge.         Left eye: No discharge.      Conjunctiva/sclera: Conjunctivae normal.      Pupils: Pupils are equal, round, and reactive to light.   Neck:      Thyroid: No thyromegaly.      Vascular: No carotid bruit.      Trachea: No tracheal deviation.   Cardiovascular:      Rate and Rhythm: Normal rate and regular rhythm.      Heart sounds: Normal heart sounds. No murmur heard.  Pulmonary:      Effort: Pulmonary effort is normal. No respiratory distress.      Breath sounds: Normal breath sounds. No wheezing.   Chest:   Breasts:     Breasts are symmetrical.      Right: Normal. No swelling, bleeding, inverted nipple, mass, nipple discharge, skin change or tenderness.      Left: Normal. No swelling, bleeding, inverted nipple, mass, nipple discharge, skin change or tenderness.    Abdominal:      General: There is no distension.      Palpations: Abdomen is soft. There is no mass.      Tenderness: There is no abdominal tenderness. There is no right CVA tenderness, left CVA tenderness or guarding.      Hernia: No hernia is present. There is no hernia in the left inguinal area or right inguinal area.   Genitourinary:     General: Normal vulva.      Exam position: Lithotomy position.      Labia:         Right: No rash, tenderness, lesion or injury.         Left: No rash, tenderness, lesion or injury.       Vagina: Normal. No signs of injury and foreign body. No vaginal discharge, erythema, tenderness or bleeding.      Cervix: Normal.      Uterus: Normal. Not enlarged, not fixed and not tender.       Adnexa: Right adnexa normal and left adnexa normal.        Right: No mass, tenderness or fullness.          Left: No mass, tenderness or fullness.        Rectum: Normal. No mass.      Comments:   BSU normal  Urethral meatus  Normal  Perineum  Normal  Musculoskeletal:         General: No tenderness. Normal range of motion.      Cervical back: Normal range of motion and neck supple.   Lymphadenopathy:      Head:      Right side of head: No submental, submandibular, tonsillar, preauricular, posterior auricular or occipital adenopathy.      Left side of head: No submental, submandibular, tonsillar, preauricular, posterior auricular or occipital adenopathy.      Cervical: No cervical adenopathy.      Right cervical: No superficial, deep or posterior cervical adenopathy.     Left cervical: No superficial, deep or posterior cervical adenopathy.      Upper Body:      Right upper body: No supraclavicular, axillary or pectoral adenopathy.      Left upper body: No supraclavicular, axillary or pectoral adenopathy.      Lower Body: No right inguinal adenopathy. No left inguinal adenopathy.   Skin:     General: Skin is warm and dry.      Findings: No bruising, erythema or rash.   Neurological:      Mental Status:  She is alert and oriented to person, place, and time.      Coordination: Coordination normal.   Psychiatric:         Mood and Affect: Mood normal.         Behavior: Behavior normal.         Thought Content: Thought content normal.         Judgment: Judgment normal.       Assessment & Plan   Diagnoses and all orders for this visit:    1. Well woman exam with routine gynecological exam (Primary)  Normal GYN exam. Will have lab work at PCP. Encouraged SBE, pt is aware how to do self breast exam and the importance of same. Discussed weight management and importance of maintaining a healthy weight. Discussed Vitamin D intake and the importance of adequate vitamin D for both Bone Health and a healthy immune system.  Discussed Daily exercise and the importance of same, in regards to a healthy heart as well as helping to maintain her weight and improving her mental health.  BMI  29.6. Colonoscopy is up to date.  Mammogram and Bone Density will be scheduled at [facility]. Pap smear is done per ASCCP guidelines.         -     Liquid-based Pap Smear, Screening    2. Encounter for screening mammogram for breast cancer  Comments:  Patient had a mammogram at [facility].  Orders:  -     Mammo Screening Digital Tomosynthesis Bilateral With CAD; Future    3. Other specified type of non-Hodgkin lymphoma, unspecified body region  Comments:  Patient is followed at [place].    4. Menopausal symptoms  Comments:  Patient was having menopausal symptoms.  Dr. Timmons started her on Vivelle patches and Prometrium.  She is doing much better.  She is also taking Effexor 75 mg.   Orders:  -     venlafaxine XR (EFFEXOR-XR) 75 MG 24 hr capsule; Take 1 capsule by mouth Daily.  Dispense: 90 capsule; Refill: 3    5. Encounter for screening for osteoporosis  Comments:  Patient will have a bone density at [facility].  Orders:  -     DEXA Bone Density Axial; Future         BMI is >= 25 and <30. (Overweight) The following options  were offered after discussion;: weight loss educational material (shared in after visit summary), exercise counseling/recommendations, and nutrition counseling/recommendations      Apple Ivan, APRN  8/25/2023

## 2023-08-25 NOTE — PATIENT INSTRUCTIONS
Health Maintenance, Female  Adopting a healthy lifestyle and getting preventive care are important in promoting health and wellness. Ask your health care provider about:  The right schedule for you to have regular tests and exams.  Things you can do on your own to prevent diseases and keep yourself healthy.  What should I know about diet, weight, and exercise?  Eat a healthy diet    Eat a diet that includes plenty of vegetables, fruits, low-fat dairy products, and lean protein.  Do not eat a lot of foods that are high in solid fats, added sugars, or sodium.    Maintain a healthy weight  Body mass index (BMI) is used to identify weight problems. It estimates body fat based on height and weight. Your health care provider can help determine your BMI and help you achieve or maintain a healthy weight.  Get regular exercise  Get regular exercise. This is one of the most important things you can do for your health. Most adults should:  Exercise for at least 150 minutes each week. The exercise should increase your heart rate and make you sweat (moderate-intensity exercise).  Do strengthening exercises at least twice a week. This is in addition to the moderate-intensity exercise.  Spend less time sitting. Even light physical activity can be beneficial.  Start  Vitamin D 5000IU per day. Vitamin D has been shown to improve your mood, help with fatigue and increase your immune system. A normal Vitamin D in women should be 50-70.  You should have your Vitamin D checked yearly  Watch cholesterol and blood lipids  Have your blood tested for lipids and cholesterol at 20 years of age, then have this test every 3 years.  Have your cholesterol levels checked more often if:  Your lipid or cholesterol levels are high.  You are older than 30 years of age.  You are at high risk for heart disease.  What should I know about cancer screening?  Depending on your health history and family history, you may need to have cancer screening at  various ages. This may include screening for:  Breast cancer.  Cervical cancer.  Colorectal cancer.  Skin cancer.  Lung cancer.  What should I know about heart disease, diabetes, and high blood pressure?  Blood pressure and heart disease  High blood pressure causes heart disease and increases the risk of stroke. This is more likely to develop in people who have high blood pressure readings, are of  descent, or are overweight.  Have your blood pressure checked:                  Every year.  Diabetes  Have regular diabetes screenings. This checks your fasting blood sugar level. Have the screening done:  Once every year after age 30 if you are at a normal weight and have a low risk for diabetes.  More often and at a younger age if you are overweight or have a high risk for diabetes.  What should I know about preventing infection?  Hepatitis B  If you have a higher risk for hepatitis B, you should be screened for this virus. Talk with your health care provider to find out if you are at risk for hepatitis B infection.  Hepatitis C  Testing is recommended for:  Everyone born from 1945 through 1965.  Anyone with known risk factors for hepatitis C.  Sexually transmitted infections (STIs)  Get screened for STIs, including gonorrhea and chlamydia, if:  You are sexually active and are younger than 24 years of age.  You are older than 24 years of age and your health care provider tells you that you are at risk for this type of infection.  Your sexual activity has changed since you were last screened, and you are at increased risk for chlamydia or gonorrhea. Ask your health care provider if you are at risk.  Ask your health care provider about whether you are at high risk for HIV. Your health care provider may recommend a prescription medicine to help prevent HIV infection. If you choose to take medicine to prevent HIV, you should first get tested for HIV. You should then be tested every 3 months for as long as you are  taking the medicine.  Pregnancy  If you are about to stop having your period (premenopausal) and you may become pregnant, seek counseling before you get pregnant.  Take 400 to 800 micrograms (mcg) of folic acid every day if you become pregnant.  Ask for birth control (contraception) if you want to prevent pregnancy.  Osteoporosis and menopause  Osteoporosis is a disease in which the bones lose minerals and strength with aging. This can result in bone fractures. If you are 55 years old or older, or if you are at risk for osteoporosis and fractures, ask your health care provider if you should:  Be screened for bone loss.  Take a calcium or vitamin D supplement to lower your risk of fractures.  Be given hormone replacement therapy (HRT) to treat symptoms of menopause.  Follow these instructions at home:  Lifestyle  Do not use any products that contain nicotine or tobacco, such as cigarettes, e-cigarettes, and chewing tobacco. If you need help quitting, ask your health care provider.  Do not use street drugs.  Do not share needles.  Ask your health care provider for help if you need support or information about quitting drugs.  Alcohol use  Do not drink alcohol if:  Your health care provider tells you not to drink.  You are pregnant, may be pregnant, or are planning to become pregnant.  If you drink alcohol:  Limit how much you use to 0-1 drink a day.  Limit intake if you are breastfeeding.  Be aware of how much alcohol is in your drink. In the [place] one drink equals one 12 oz bottle of beer (355 mL), one 5 oz glass of wine (148 mL), or one 1« oz glass of hard liquor (44 mL).  General instructions  Schedule regular health, dental, and eye exams.  Stay current with your vaccines.  Tell your health care provider if:  You often feel depressed.  You have ever been abused or do not feel safe at home.  Summary  Adopting a healthy lifestyle and getting preventive care are important in promoting health and wellness.  Follow  your health care provider's instructions about healthy diet, exercising, and getting tested or screened for diseases.  Follow your health care provider's instructions on monitoring your cholesterol and blood pressure.  This information is not intended to replace advice given to you by your health care provider. Make sure you discuss any questions you have with your health care provider.  Document Revised: 12/11/2019 Document Reviewed: 12/11/2019  [employer] Patient Education c 2021 [employer]

## 2023-08-29 LAB
GEN CATEG CVX/VAG CYTO-IMP: NORMAL
HPV I/H RISK 4 DNA CVX QL PROBE+SIG AMP: NOT DETECTED
LAB AP CASE REPORT: NORMAL
LAB AP GYN ADDITIONAL INFORMATION: NORMAL
Lab: NORMAL
PATH INTERP SPEC-IMP: NORMAL
STAT OF ADQ CVX/VAG CYTO-IMP: NORMAL

## 2023-09-05 LAB
NCCN CRITERIA FLAG: ABNORMAL
TYRER CUZICK SCORE: 7.4

## 2023-09-06 ENCOUNTER — SPECIALTY PHARMACY (OUTPATIENT)
Dept: ENDOCRINOLOGY | Facility: CLINIC | Age: 58
End: 2023-09-06
Payer: COMMERCIAL

## 2023-09-07 ENCOUNTER — OFFICE VISIT (OUTPATIENT)
Dept: INTERNAL MEDICINE | Facility: CLINIC | Age: 58
End: 2023-09-07
Payer: COMMERCIAL

## 2023-09-07 ENCOUNTER — LAB (OUTPATIENT)
Dept: INTERNAL MEDICINE | Facility: CLINIC | Age: 58
End: 2023-09-07
Payer: COMMERCIAL

## 2023-09-07 VITALS
HEART RATE: 86 BPM | WEIGHT: 176.4 LBS | BODY MASS INDEX: 31.25 KG/M2 | DIASTOLIC BLOOD PRESSURE: 77 MMHG | TEMPERATURE: 98.6 F | HEIGHT: 63 IN | RESPIRATION RATE: 17 BRPM | OXYGEN SATURATION: 98 % | SYSTOLIC BLOOD PRESSURE: 128 MMHG

## 2023-09-07 DIAGNOSIS — Z00.00 ROUTINE GENERAL MEDICAL EXAMINATION AT A HEALTH CARE FACILITY: Primary | ICD-10-CM

## 2023-09-07 DIAGNOSIS — E06.3 HYPOTHYROIDISM DUE TO HASHIMOTO'S THYROIDITIS: Primary | ICD-10-CM

## 2023-09-07 DIAGNOSIS — Z80.0 FAMILY HISTORY OF COLON CANCER: ICD-10-CM

## 2023-09-07 DIAGNOSIS — R73.03 PREDIABETES: ICD-10-CM

## 2023-09-07 DIAGNOSIS — E03.8 HYPOTHYROIDISM DUE TO HASHIMOTO'S THYROIDITIS: Primary | ICD-10-CM

## 2023-09-07 DIAGNOSIS — M54.41 CHRONIC RIGHT-SIDED LOW BACK PAIN WITH RIGHT-SIDED SCIATICA: ICD-10-CM

## 2023-09-07 DIAGNOSIS — J30.1 SEASONAL ALLERGIC RHINITIS DUE TO POLLEN: ICD-10-CM

## 2023-09-07 DIAGNOSIS — E66.09 CLASS 1 OBESITY DUE TO EXCESS CALORIES WITH SERIOUS COMORBIDITY AND BODY MASS INDEX (BMI) OF 34.0 TO 34.9 IN ADULT: ICD-10-CM

## 2023-09-07 DIAGNOSIS — G89.29 CHRONIC RIGHT-SIDED LOW BACK PAIN WITH RIGHT-SIDED SCIATICA: ICD-10-CM

## 2023-09-07 DIAGNOSIS — N32.81 OAB (OVERACTIVE BLADDER): ICD-10-CM

## 2023-09-07 DIAGNOSIS — E78.5 DYSLIPIDEMIA: ICD-10-CM

## 2023-09-07 PROBLEM — N95.1 MENOPAUSAL SYMPTOM: Status: ACTIVE | Noted: 2019-01-25

## 2023-09-07 PROBLEM — C88.4 MALTOMA: Status: ACTIVE | Noted: 2017-02-01

## 2023-09-07 PROBLEM — R32 URINARY INCONTINENCE: Status: ACTIVE | Noted: 2023-09-07

## 2023-09-07 PROBLEM — D25.9 UTERINE LEIOMYOMA: Status: ACTIVE | Noted: 2019-01-25

## 2023-09-07 PROBLEM — R68.83 CHILLS: Status: ACTIVE | Noted: 2023-09-07

## 2023-09-07 PROBLEM — R51.9 HEADACHE: Status: ACTIVE | Noted: 2023-09-07

## 2023-09-07 PROBLEM — R11.0 NAUSEA: Status: ACTIVE | Noted: 2023-09-07

## 2023-09-07 PROBLEM — C88.40 MALTOMA: Status: ACTIVE | Noted: 2017-02-01

## 2023-09-07 PROBLEM — R10.9 ABDOMINAL PAIN: Status: ACTIVE | Noted: 2023-09-07

## 2023-09-07 PROBLEM — N64.9 LESION OF BREAST: Status: ACTIVE | Noted: 2017-02-01

## 2023-09-07 PROBLEM — N39.3 STRESS INCONTINENCE IN FEMALE: Status: ACTIVE | Noted: 2023-09-07

## 2023-09-07 PROCEDURE — 99396 PREV VISIT EST AGE 40-64: CPT | Performed by: NURSE PRACTITIONER

## 2023-09-07 RX ORDER — MONTELUKAST SODIUM 10 MG/1
10 TABLET ORAL NIGHTLY
Qty: 90 TABLET | Refills: 1 | Status: SHIPPED | OUTPATIENT
Start: 2023-09-07

## 2023-09-07 RX ORDER — LIDOCAINE 50 MG/G
1 PATCH TOPICAL EVERY 24 HOURS
Qty: 30 PATCH | Refills: 3 | Status: SHIPPED | OUTPATIENT
Start: 2023-09-07

## 2023-09-07 RX ORDER — AZELASTINE 1 MG/ML
2 SPRAY, METERED NASAL 2 TIMES DAILY
Qty: 30 ML | Refills: 12 | Status: SHIPPED | OUTPATIENT
Start: 2023-09-07

## 2023-09-07 NOTE — PROGRESS NOTES
Specialty Pharmacy Patient Management Program  Endocrinology Reassessment     Desirae Alberto is a 58 y.o. female seen by an Endocrinology provider for Prediabetes  and enrolled in the Endocrinology Patient Management program offered by [facility].  A follow-up outreach was conducted, including assessment of continued therapy appropriateness, medication adherence, and side effect incidence and management for Wegovy.      Relevant Past Medical History and Comorbidities  Relevant medical history and concomitant health conditions were discussed with the patient. The patient's chart has been reviewed for relevant past medical history and comorbid health conditions and updated as necessary.   Past Medical History:   Diagnosis Date    Arthritis     Cancer     lymphoma of small bowel    Family history of colon cancer     GERD (gastroesophageal reflux disease)     Glaucoma     Hyperlipidemia     Hypothyroidism     TIA (transient ischemic attack)      Social History     Socioeconomic History    Marital status:    Tobacco Use    Smoking status: Former     Packs/day: 0.50     Years: 20.00     Pack years: 10.00     Types: Cigarettes     Quit date:      Years since quittin.6    Smokeless tobacco: Never   Vaping Use    Vaping Use: Never used   Substance and Sexual Activity    Alcohol use: Not Currently    Drug use: No    Sexual activity: Defer            Allergies  Known allergies and reactions were discussed with the patient. The patient's chart has been reviewed for allergy information and updated as necessary.   No Known Allergies       Relevant Laboratory Values  A1C Last 3 Results          2023    08:59   HGBA1C Last 3 Results   Hemoglobin A1C 5.8      Lab Results   Component Value Date    HGBA1C 5.8 (H) 2023     Lab Results   Component Value Date    GLUCOSE 94 2023    CALCIUM 9.1 2023     2023    K 4.7 2023    CO2 23 2023      02/27/2023    BUN 14 02/27/2023    CREATININE 0.83 02/27/2023    EGFRIFAFRI 84 09/11/2020    EGFRIFNONA 71 08/11/2021    BCR 17 02/27/2023    ANIONGAP 9.0 07/08/2022     Lab Results   Component Value Date    CHOL 284 (H) 02/28/2022    CHLPL 210 (H) 02/27/2023    TRIG 133 02/27/2023    HDL 60 02/27/2023     (H) 02/27/2023       Current Medication List  This medication list has been reviewed with the patient and evaluated for any interactions or necessary modifications/recommendations, and updated to include all prescription medications, OTC medications, and supplements the patient is currently taking.  This list reflects what is contained in the patient's profile, which has also been marked as reviewed to communicate to other providers it is the most up to date version of the patient's current medication therapy.     Current Outpatient Medications:     cyclobenzaprine (FLEXERIL) 5 MG tablet, Take 1 tablet by mouth 3 (Three) Times a Day As Needed for Muscle Spasms., Disp: 30 tablet, Rfl: 1    diclofenac (VOLTAREN) 50 MG EC tablet, Take 1 tablet by mouth 2 (Two) Times a Day As Needed for Pain., Disp: 60 tablet, Rfl: 0    estradiol (MINIVELLE, VIVELLE-DOT) 0.05 MG/24HR patch, Apply 1 patch to the appropriate area as directed twice weekly **Remove old patch before applying new patch**, Disp: 8 patch, Rfl: 11    lidocaine (LIDODERM) 5 %, Place 1 patch on the skin as directed by provider Daily. Remove & Discard patch within 12 hours or as directed by MD, Disp: 30 patch, Rfl: 3    ondansetron ODT (Zofran ODT) 4 MG disintegrating tablet, Place 1 tablet on the tongue Every 6 (Six) Hours As Needed for Nausea., Disp: 12 tablet, Rfl: 1    Progesterone (Prometrium) 100 MG capsule, Take 1 capsule by mouth Daily., Disp: 30 capsule, Rfl: 11    saccharomyces boulardii (FLORASTOR) 250 MG capsule, Take 1 capsule by mouth 2 (Two) Times a Day., Disp: , Rfl:     Semaglutide-Weight Management (Wegovy) 2.4 MG/0.75ML solution  auto-injector, Inject 2.4 mg (1 pen) under the skin into the appropriate area as directed 1 (One) Time Per Week., Disp: 3 mL, Rfl: 11    trospium (SANCTURA) 20 MG tablet, Take 1 tablet by mouth 2 (Two) Times a Day., Disp: 180 tablet, Rfl: 3    venlafaxine XR (EFFEXOR-XR) 75 MG 24 hr capsule, Take 1 capsule by mouth Daily., Disp: 90 capsule, Rfl: 3         Drug Interactions  No new drug interactions        Adherence, Self-Administration, and Current Therapy Problems  Patient has been missing doses of Wegovy. She says she is still taking it, but that she forgets to take it weekly so may end up taking it every 10 days or so. Copays are affordable.    Goals of Therapy  A1C <7: goal met, last A1C 5.8 (2/27/23)          Reassessment Plan & Follow-Up  1. Medication Therapy Changes: No changes at this time.  2. Related Plans, Therapy Recommendations, or Issues to Be Addressed: Counseled patient on adherence.  3. Pharmacist to perform regular assessments no more than (6) months from the previous assessment.  4. Care Coordinator to set up future refill outreaches, coordinate prescription delivery, and escalate clinical questions to pharmacist.     Elaine Walter, Pharmacy Intern  Specialty Pharmacy Technician

## 2023-09-07 NOTE — PROGRESS NOTES
Subjective     Chief Complaint   Patient presents with    Annual Exam    Allergies       History of Present Illness  Desirae Alberto is a 58-year-old female who presents today for a physical exam.    Ms. Desirae Alberto states she has not had a recent physical.    The patient has an appointment with Dr. Napoles next week.    She reports leaving a urine specimen. She recently completed a CBC, CMP,  A1c, lipid panel, microalbumin, TSH and B12 labs. Her vitamin D level was 35 mg/dL when checked 02/2023. She has had a vitamin D deficiency in the past. She does not currently take vitamin D.    Ms. Desirae Alberto states she has an allergy cough. She has been taking Claritin-D and Flonase twice daily. The patient states her sinuses feels swollen. She uses [facility] pharmacy. She denies any issues with appetite, weight, angina, or dyspnea.    The patient takes venlafaxine for hot flashes. She states her hot flashes worsened after beginning venlafaxine. She continues to take venlafaxine as a sleep aid. She reports seeing Dr. Timmons previously.    She reports vomiting all day after taking 2 doses of [place], allergy medicine and using diclofenac on 09/04/2023.    She was previously prescribed lidocaine patches for back pain. She states the lidocaine patches were not approved. She reports continued nerve pain from her knee which she believes stems from her back. She states her right leg hurts the most. She states her hips are affected also. She reports being unable to sleep comfortably due to back pain.    She has taken Myrbetriq in the past. She states it worked well.  Allergies  Associated symptoms include coughing and fatigue. Pertinent negatives include no chest pain.     Patient's PMR from outside medical facility reviewed and noted.    Review of Systems   Constitutional:  Positive for fatigue.   Eyes:  Positive for visual disturbance (wears glasses).   Respiratory:  Positive for cough.    Cardiovascular:  Negative for  chest pain, palpitations and leg swelling.   Endocrine: Negative for polydipsia, polyphagia and polyuria.   Genitourinary:  Negative for dysuria and frequency.    Otherwise complete ROS reviewed and negative except as mentioned in the HPI.    Past Medical History:   Past Medical History:   Diagnosis Date    Arthritis     Cancer     lymphoma of small bowel    Family history of colon cancer     GERD (gastroesophageal reflux disease)     Glaucoma     Hyperlipidemia     Hypothyroidism     TIA (transient ischemic attack)      Past Surgical History:  Past Surgical History:   Procedure Laterality Date    BREAST EXCISIONAL BIOPSY Left 01/2016    benign    CHOLECYSTECTOMY      COLON RESECTION SMALL BOWEL  2014    COLONOSCOPY  09/05/2019    Dr. Tabares-Internal and external hemorrhoids; Repeat 10 years    ENDOSCOPY  09/05/2019    Dr. Tabares-Possible Hightower's esophagus-biopsied    ENDOSCOPY N/A 06/24/2022    Procedure: ESOPHAGOGASTRODUODENOSCOPY WITH ANESTHESIA;  Surgeon: Clotilde Friedman MD;  Location: [facility] ENDOSCOPY;  Service: Gastroenterology;  Laterality: N/A;  Pre: Hematemesis  Post: esophagitis  Jeannette Justin,     ENDOSCOPY N/A 08/05/2022    Procedure: ESOPHAGOGASTRODUODENOSCOPY WITH ANESTHESIA;  Surgeon: Clotilde Friedman MD;  Location: [facility] ENDOSCOPY;  Service: Gastroenterology;  Laterality: N/A;  pre: esophagitis  post: normal  Jeannette Justin, DO    KNEE ARTHROSCOPY Right 07/14/2020    Procedure: RIGHT KNEE PARTIAL MEDIAL MENISCECTOMY;  Surgeon: Vijay Grewal MD;  Location: [facility] OR;  Service: Orthopedics;  Laterality: Right;    LAPAROSCOPIC TUBAL LIGATION      TOTAL KNEE ARTHROPLASTY Right 07/28/2021    Procedure: RIGHT TOTAL KNEE REPLACEMENT;  Surgeon: Eliecer Foley MD;  Location: [facility] OR;  Service: Orthopedics;  Laterality: Right;    TRANSURETHRAL RESECTION OF BLADDER TUMOR N/A 07/12/2022    Procedure: CYSTOSCOPY TRANSURETHRAL RESECTION OF BLADDER TUMOR;  Surgeon: Joel Poe  MD Tez;  Location: [facility] OR;  Service: Urology;  Laterality: N/A;    VAGINAL MESH REVISION      2010/2016    WISDOM TOOTH EXTRACTION       Social History:  reports that she quit smoking about 22 years ago. Her smoking use included cigarettes. She has a 10.00 pack-year smoking history. She has never used smokeless tobacco. She reports that she does not currently use alcohol. She reports that she does not use drugs.      Family History: family history includes Cancer in her maternal aunt; Colon cancer (age of onset: 68) in her paternal aunt; Colon cancer (age of onset: 70) in her paternal aunt; Colon cancer (age of onset: 80) in her paternal aunt; Diverticulitis in her mother; Heart defect in her mother; Liver cancer in her paternal aunt; No Known Problems in her father and sister.       Allergies:  No Known Allergies  Medications:  Prior to Admission medications    Medication Sig Start Date End Date Taking? Authorizing Provider   cyclobenzaprine (FLEXERIL) 5 MG tablet Take 1 tablet by mouth 3 (Three) Times a Day As Needed for Muscle Spasms. 4/10/23  Yes Luz Mota APRN   diclofenac (VOLTAREN) 50 MG EC tablet Take 1 tablet by mouth 2 (Two) Times a Day As Needed for Pain. 8/23/23  Yes Luz Mota APRN   estradiol (MINIVELLE, VIVELLE-DOT) 0.05 MG/24HR patch Apply 1 patch to the appropriate area as directed twice weekly **Remove old patch before applying new patch** 3/15/23  Yes Kalen Babin MD   lidocaine (LIDODERM) 5 % Place 1 patch on the skin as directed by provider Daily. Remove & Discard patch within 12 hours or as directed by MD 8/23/23  Yes Luz Mota APRN   ondansetron ODT (Zofran ODT) 4 MG disintegrating tablet Place 1 tablet on the tongue Every 6 (Six) Hours As Needed for Nausea. 8/2/22  Yes Jeannette Justin   Progesterone (Prometrium) 100 MG capsule Take 1 capsule by mouth Daily. 3/15/23  Yes Kalen Babin MD saccharomyces boulardii  "(FLORASTOR) 250 MG capsule Take 1 capsule by mouth 2 (Two) Times a Day.   Yes Provider, MD Rosalba   Semaglutide-Weight Management (Wegovy) 2.4 MG/0.75ML solution auto-injector Inject 2.4 mg (1 pen) under the skin into the appropriate area as directed 1 (One) Time Per Week. 3/15/23  Yes Kalen Bbain MD   trospium (SANCTURA) 20 MG tablet Take 1 tablet by mouth 2 (Two) Times a Day. 12/9/22  Yes    venlafaxine XR (EFFEXOR-XR) 75 MG 24 hr capsule Take 1 capsule by mouth Daily. 8/25/23  Yes Apple Ivan APRN       Objective     Vital Signs: /77 (BP Location: Right arm, Patient Position: Sitting, Cuff Size: Adult)   Pulse 86   Temp 98.6 °F (37 °C) (Skin)   Resp 17   Ht 160 cm (63\")   Wt 80 kg (176 lb 6.4 oz)   LMP 06/30/2019   SpO2 98%   BMI 31.25 kg/m²   Physical Exam  Vitals reviewed.   Constitutional:       Appearance: She is well-developed. She is obese.   HENT:      Head: Normocephalic and atraumatic.   Eyes:      Pupils: Pupils are equal, round, and reactive to light.   Neck:      Vascular: No JVD.   Cardiovascular:      Rate and Rhythm: Normal rate and regular rhythm.   Pulmonary:      Effort: Pulmonary effort is normal.      Breath sounds: Normal breath sounds.   Abdominal:      General: Bowel sounds are normal.      Palpations: Abdomen is soft.   Musculoskeletal:         General: No deformity.      Cervical back: Normal range of motion and neck supple.   Lymphadenopathy:      Cervical: No cervical adenopathy.   Skin:     General: Skin is warm and dry.   Neurological:      Mental Status: She is alert and oriented to person, place, and time.   Psychiatric:         Behavior: Behavior normal.         Thought Content: Thought content normal.         Judgment: Judgment normal.     Results Reviewed:  Glucose   Date Value Ref Range Status   09/07/2023 89 70 - 99 mg/dL Final   07/08/2022 117 (H) 65 - 99 mg/dL Final     BUN   Date Value Ref Range Status   09/07/2023 13 6 - 24 mg/dL Final "   07/08/2022 14 6 - 20 mg/dL Final     Creatinine   Date Value Ref Range Status   09/07/2023 0.88 0.57 - 1.00 mg/dL Final   07/08/2022 0.80 0.57 - 1.00 mg/dL Final   09/17/2019 0.70 0.60 - 1.30 mg/dL Final     Comment:     Serial Number: [id number]:  [id number]     Sodium   Date Value Ref Range Status   09/07/2023 138 134 - 144 mmol/L Final   07/08/2022 141 136 - 145 mmol/L Final     Potassium   Date Value Ref Range Status   09/07/2023 4.4 3.5 - 5.2 mmol/L Final   07/08/2022 4.1 3.5 - 5.2 mmol/L Final     Chloride   Date Value Ref Range Status   09/07/2023 101 96 - 106 mmol/L Final   07/08/2022 106 98 - 107 mmol/L Final     CO2   Date Value Ref Range Status   07/08/2022 26.0 22.0 - 29.0 mmol/L Final     Total CO2   Date Value Ref Range Status   09/07/2023 25 20 - 29 mmol/L Final     Calcium   Date Value Ref Range Status   09/07/2023 9.1 8.7 - 10.2 mg/dL Final   07/08/2022 9.3 8.6 - 10.5 mg/dL Final     ALT (SGPT)   Date Value Ref Range Status   09/07/2023 43 (H) 0 - 32 IU/L Final   06/23/2022 36 (H) 1 - 33 U/L Final     AST (SGOT)   Date Value Ref Range Status   09/07/2023 17 0 - 40 IU/L Final   06/23/2022 27 1 - 32 U/L Final     WBC   Date Value Ref Range Status   09/07/2023 4.1 3.4 - 10.8 x10E3/uL Final     Hematocrit   Date Value Ref Range Status   09/07/2023 39.9 34.0 - 46.6 % Final   07/08/2022 39.2 34.0 - 46.6 % Final     Platelets   Date Value Ref Range Status   09/07/2023 239 150 - 450 x10E3/uL Final   07/08/2022 222 140 - 450 10*3/mm3 Final     Total Cholesterol   Date Value Ref Range Status   02/28/2022 284 (H) 0 - 200 mg/dL Final     Triglycerides   Date Value Ref Range Status   09/07/2023 104 0 - 149 mg/dL Final   02/28/2022 313 (H) 0 - 150 mg/dL Final     HDL Cholesterol   Date Value Ref Range Status   09/07/2023 62 >39 mg/dL Final   02/28/2022 67 (H) 40 - 60 mg/dL Final     LDL Chol Calc (NIH)   Date Value Ref Range Status   09/07/2023 155 (H) 0 - 99 mg/dL Final     LDL/HDL Ratio   Date Value Ref Range  Status   02/28/2022 2.30  Final     Hemoglobin A1C   Date Value Ref Range Status   09/07/2023 5.9 (H) 4.8 - 5.6 % Final     Comment:              Prediabetes: 5.7 - 6.4           Diabetes: >6.4           Glycemic control for adults with diabetes: <7.0     02/28/2022 5.8 % Final         Assessment / Plan     Assessment/Plan:  Diagnoses and all orders for this visit:    1. Routine general medical examination at a health care facility (Primary)  Comments:  Advised to do self breast exams.    2. Seasonal allergic rhinitis due to pollen  Comments:  We will prescribe montelukast and azelastine.  Orders:  -     azelastine (ASTELIN) 0.1 % nasal spray; Instill 2 sprays into each nostril as directed by provider 2 (Two) Times a Day.  Dispense: 30 mL; Refill: 12  -     montelukast (Singulair) 10 MG tablet; Take 1 tablet by mouth Every Night.  Dispense: 90 tablet; Refill: 1    3. Chronic right-sided low back pain with right-sided sciatica  Comments:  We will prescribe lidocaine patches.  Orders:  -     lidocaine (LIDODERM) 5 %; Place 1 patch on the skin as directed by provider Daily. Remove & Discard patch within 12 hours or as directed by MD  Dispense: 30 patch; Refill: 3    4. Family history of colon cancer  Comments:  We will refer to gastroenterology.  Orders:  -     Ambulatory Referral to Gastroenterology    5. OAB (overactive bladder)  Comments:  We will prescribe mirabegron 25 mg.  Orders:  -     Mirabegron ER (Myrbetriq) 25 MG tablet sustained-release 24 hour 24 hr tablet; Take 1 tablet by mouth Daily.  Dispense: 30 tablet; Refill: 1      Return in about 6 months (around 3/7/2024). unless patient needs to be seen sooner or acute issues arise.    Will have lab work here today. Encouraged SBE, pt is aware how to do self breast exam and the importance of same. Discussed weight management and importance of maintaining a healthy weight. Discussed Vitamin D intake and the importance of adequate vitamin D for both Bone Health  and a healthy immune system.  Discussed Daily exercise and the importance of same, in regards to a healthy heart as well as helping to maintain her weight and improving her mental health.  BMI is elevatedColonoscopy is up to date.  Mammogram will be scheduled at [facility], [place], operated by [facility].       Code Status: Full.     I have discussed the patient results/orders and and plan/recommendation with them at today's visit.      Transcribed from ambient dictation for NAVEED Flores by Kush Billings.  09/07/23   15:23 CDT    Patient or patient representative verbalized consent to the visit recording.  I have personally performed the services described in this document as transcribed by the above individual, and it is both accurate and complete.  NAVEED Flores  9/11/2023  09:09 CDT    NAVEED Flores   09/07/2023

## 2023-09-08 LAB
ALBUMIN SERPL-MCNC: 4.5 G/DL (ref 3.8–4.9)
ALBUMIN/CREAT UR: 5 MG/G CREAT (ref 0–29)
ALBUMIN/GLOB SERPL: 1.8 {RATIO} (ref 1.2–2.2)
ALP SERPL-CCNC: 117 IU/L (ref 44–121)
ALT SERPL-CCNC: 43 IU/L (ref 0–32)
AST SERPL-CCNC: 17 IU/L (ref 0–40)
BASOPHILS # BLD AUTO: 0 X10E3/UL (ref 0–0.2)
BASOPHILS NFR BLD AUTO: 1 %
BILIRUB SERPL-MCNC: 0.4 MG/DL (ref 0–1.2)
BUN SERPL-MCNC: 13 MG/DL (ref 6–24)
BUN/CREAT SERPL: 15 (ref 9–23)
CALCIUM SERPL-MCNC: 9.1 MG/DL (ref 8.7–10.2)
CHLORIDE SERPL-SCNC: 101 MMOL/L (ref 96–106)
CHOLEST SERPL-MCNC: 235 MG/DL (ref 100–199)
CO2 SERPL-SCNC: 25 MMOL/L (ref 20–29)
CREAT SERPL-MCNC: 0.88 MG/DL (ref 0.57–1)
CREAT UR-MCNC: 129.3 MG/DL
EGFRCR SERPLBLD CKD-EPI 2021: 77 ML/MIN/1.73
EOSINOPHIL # BLD AUTO: 0.1 X10E3/UL (ref 0–0.4)
EOSINOPHIL NFR BLD AUTO: 2 %
ERYTHROCYTE [DISTWIDTH] IN BLOOD BY AUTOMATED COUNT: 12.7 % (ref 11.7–15.4)
GLOBULIN SER CALC-MCNC: 2.5 G/DL (ref 1.5–4.5)
GLUCOSE SERPL-MCNC: 89 MG/DL (ref 70–99)
HBA1C MFR BLD: 5.9 % (ref 4.8–5.6)
HCT VFR BLD AUTO: 39.9 % (ref 34–46.6)
HDLC SERPL-MCNC: 62 MG/DL
HGB BLD-MCNC: 13.6 G/DL (ref 11.1–15.9)
IMM GRANULOCYTES # BLD AUTO: 0 X10E3/UL (ref 0–0.1)
IMM GRANULOCYTES NFR BLD AUTO: 0 %
LDLC SERPL CALC-MCNC: 155 MG/DL (ref 0–99)
LYMPHOCYTES # BLD AUTO: 1.4 X10E3/UL (ref 0.7–3.1)
LYMPHOCYTES NFR BLD AUTO: 35 %
MCH RBC QN AUTO: 30 PG (ref 26.6–33)
MCHC RBC AUTO-ENTMCNC: 34.1 G/DL (ref 31.5–35.7)
MCV RBC AUTO: 88 FL (ref 79–97)
MICROALBUMIN UR-MCNC: 6.8 UG/ML
MONOCYTES # BLD AUTO: 0.3 X10E3/UL (ref 0.1–0.9)
MONOCYTES NFR BLD AUTO: 7 %
NEUTROPHILS # BLD AUTO: 2.3 X10E3/UL (ref 1.4–7)
NEUTROPHILS NFR BLD AUTO: 55 %
PLATELET # BLD AUTO: 239 X10E3/UL (ref 150–450)
POTASSIUM SERPL-SCNC: 4.4 MMOL/L (ref 3.5–5.2)
PROT SERPL-MCNC: 7 G/DL (ref 6–8.5)
RBC # BLD AUTO: 4.54 X10E6/UL (ref 3.77–5.28)
SODIUM SERPL-SCNC: 138 MMOL/L (ref 134–144)
TRIGL SERPL-MCNC: 104 MG/DL (ref 0–149)
TSH SERPL DL<=0.005 MIU/L-ACNC: 3.09 UIU/ML (ref 0.45–4.5)
VIT B12 SERPL-MCNC: 551 PG/ML (ref 232–1245)
VLDLC SERPL CALC-MCNC: 18 MG/DL (ref 5–40)
WBC # BLD AUTO: 4.1 X10E3/UL (ref 3.4–10.8)

## 2023-09-12 LAB
25(OH)D3+25(OH)D2 SERPL-MCNC: 34.4 NG/ML (ref 30–100)
WRITTEN AUTHORIZATION: NORMAL

## 2023-09-15 ENCOUNTER — OFFICE VISIT (OUTPATIENT)
Dept: ENDOCRINOLOGY | Facility: CLINIC | Age: 58
End: 2023-09-15
Payer: COMMERCIAL

## 2023-09-15 VITALS
WEIGHT: 175 LBS | OXYGEN SATURATION: 98 % | DIASTOLIC BLOOD PRESSURE: 70 MMHG | SYSTOLIC BLOOD PRESSURE: 128 MMHG | HEIGHT: 63 IN | HEART RATE: 102 BPM | BODY MASS INDEX: 31.01 KG/M2

## 2023-09-15 DIAGNOSIS — E03.8 HYPOTHYROIDISM DUE TO HASHIMOTO'S THYROIDITIS: Primary | ICD-10-CM

## 2023-09-15 DIAGNOSIS — E78.5 DYSLIPIDEMIA: ICD-10-CM

## 2023-09-15 DIAGNOSIS — R73.03 PREDIABETES: ICD-10-CM

## 2023-09-15 DIAGNOSIS — E66.09 CLASS 1 OBESITY DUE TO EXCESS CALORIES WITH SERIOUS COMORBIDITY AND BODY MASS INDEX (BMI) OF 34.0 TO 34.9 IN ADULT: ICD-10-CM

## 2023-09-15 DIAGNOSIS — E06.3 HYPOTHYROIDISM DUE TO HASHIMOTO'S THYROIDITIS: Primary | ICD-10-CM

## 2023-09-15 RX ORDER — PROGESTERONE 100 MG/1
100 CAPSULE ORAL DAILY
Qty: 30 CAPSULE | Refills: 11 | Status: SHIPPED | OUTPATIENT
Start: 2023-09-15

## 2023-09-15 RX ORDER — ESTRADIOL 0.05 MG/D
PATCH, EXTENDED RELEASE TRANSDERMAL
Qty: 8 PATCH | Refills: 11 | Status: SHIPPED | OUTPATIENT
Start: 2023-09-15

## 2023-09-15 RX ORDER — SEMAGLUTIDE 2.4 MG/.75ML
2.4 INJECTION, SOLUTION SUBCUTANEOUS WEEKLY
Qty: 3 ML | Refills: 11 | Status: SHIPPED | OUTPATIENT
Start: 2023-09-15

## 2023-09-15 RX ORDER — GREEN TEA/HOODIA GORDONII 315-12.5MG
CAPSULE ORAL
Qty: 15 TABLET | Refills: 11
Start: 2023-09-15

## 2023-09-15 NOTE — PROGRESS NOTES
"CC  Hypothyroidism       History of Present Illness    58 y.o. female   w obesity , now lost 25 lbs on wegovy       ==========================================  Physical Exam  /70   Pulse 102   Ht 160 cm (63\")   Wt 79.4 kg (175 lb)   LMP 06/30/2019   SpO2 98%   BMI 31.00 kg/m²   AOx3  No Goiter , no carotid bruit  RRR  CTA  No Edema     ==========================================    Laboratory Workup    Lab Results   Component Value Date    WBC 4.1 09/07/2023    HGB 13.6 09/07/2023    HCT 39.9 09/07/2023    MCV 88 09/07/2023     09/07/2023       Lab Results   Component Value Date    GLUCOSE 89 09/07/2023    BUN 13 09/07/2023    CREATININE 0.88 09/07/2023    EGFRIFNONA 71 08/11/2021    EGFRIFAFRI 84 09/11/2020    BCR 15 09/07/2023     09/07/2023    K 4.4 09/07/2023    CO2 25 09/07/2023    CALCIUM 9.1 09/07/2023    PROTENTOTREF 7.0 09/07/2023    ALBUMIN 4.5 09/07/2023    LABIL2 1.8 09/07/2023    AST 17 09/07/2023    ALT 43 (H) 09/07/2023           ==========================================      ICD-10-CM ICD-9-CM   1. Hypothyroidism due to Hashimoto's thyroiditis  E03.8 244.8    E06.3 245.2   2. Prediabetes  R73.03 790.29   3. Dyslipidemia  E78.5 272.4   4. Class 1 obesity due to excess calories with serious comorbidity and body mass index (BMI) of 34.0 to 34.9 in adult  E66.09 278.00    Z68.34 V85.34   -    Prediabetes    On wegovy , has lost 25 lbs     ============    Dyslipidemia    Lab Results   Component Value Date    CHOL 284 (H) 02/28/2022    CHLPL 235 (H) 09/07/2023    TRIG 104 09/07/2023    HDL 62 09/07/2023     (H) 09/07/2023     TIA ,questionable    No crestor     The 10-year ASCVD risk score (Iveth HESS, et al., 2019) is: 2.6%    Values used to calculate the score:      Age: 58 years      Sex: Female      Is Non- : No      Diabetic: No      Tobacco smoker: No      Systolic Blood Pressure: 128 mmHg      Is BP treated: No      HDL Cholesterol: 62 mg/dL     "  Total Cholesterol: 235 mg/dL      ------------    Obesity    wegovy successful    2.4     -----------    Hot flashes     Estrogen patches 25 mcg/ 24 h on a patch that is applied twice weekly- increase to 50 mcgs - this is better       Prometrium 100 mg daily    Taking estrogen increases the risk of clots between 2-4 per [place] woman for a 5-year.    Also on venlafaxine     She will contemplate coming off venlafaxine since estrogen working but it helps her sleep better     --------------------      Hypothyroidism, subclinical    tpo ab neg  We stopped and TSH is normal   Thyroid Workup    Lab Results   Component Value Date    TSH 3.090 09/07/2023    TSH 3.570 02/27/2023    TSH 2.980 06/07/2022       Lab Results   Component Value Date    FREET4 1.19 08/11/2021    FREET4 1.52 12/04/2019    FREET4 1.65 03/01/2017       Lab Results   Component Value Date    T3FREE 2.9 09/02/2020    T3FREE 3.1 12/04/2019    T3FREE 3.0 03/01/2017       TPO antibodies    Lab Results   Component Value Date    THYROIDAB 16 02/28/2022    THYROIDAB <9 09/02/2020    THYROIDAB 10 02/18/2020            Lab Results   Component Value Date    THGAB <1.0 09/02/2020    THGAB <1.0 02/18/2020     Add b12 500 mcgs sl      New Medications Ordered This Visit   Medications    Semaglutide-Weight Management (Wegovy) 2.4 MG/0.75ML solution auto-injector     Sig: Inject 2.4 mg (1 pen) under the skin into the appropriate area as directed 1 (One) Time Per Week.     Dispense:  3 mL     Refill:  11    estradiol (MINIVELLE, VIVELLE-DOT) 0.05 MG/24HR patch     Sig: Apply 1 patch to the appropriate area as directed twice weekly **Remove old patch before applying new patch**     Dispense:  8 patch     Refill:  11    Progesterone (Prometrium) 100 MG capsule     Sig: Take 1 capsule by mouth Daily.     Dispense:  30 capsule     Refill:  11       No orders of the defined types were placed in this encounter.            This document has been electronically signed by  Kalen Timmons MD on September 15, 2023 14:33 CDT

## 2023-09-26 ENCOUNTER — PATIENT MESSAGE (OUTPATIENT)
Dept: OBSTETRICS AND GYNECOLOGY | Facility: CLINIC | Age: 58
End: 2023-09-26
Payer: COMMERCIAL

## 2023-10-19 ENCOUNTER — OFFICE VISIT (OUTPATIENT)
Dept: GASTROENTEROLOGY | Facility: CLINIC | Age: 58
End: 2023-10-19
Payer: COMMERCIAL

## 2023-10-19 VITALS
WEIGHT: 173 LBS | TEMPERATURE: 97.7 F | HEART RATE: 89 BPM | DIASTOLIC BLOOD PRESSURE: 72 MMHG | BODY MASS INDEX: 30.65 KG/M2 | OXYGEN SATURATION: 98 % | SYSTOLIC BLOOD PRESSURE: 122 MMHG | HEIGHT: 63 IN

## 2023-10-19 DIAGNOSIS — K59.00 CONSTIPATION, UNSPECIFIED CONSTIPATION TYPE: ICD-10-CM

## 2023-10-19 DIAGNOSIS — Z80.0 FH: COLON CANCER: ICD-10-CM

## 2023-10-19 DIAGNOSIS — K21.9 GASTROESOPHAGEAL REFLUX DISEASE, UNSPECIFIED WHETHER ESOPHAGITIS PRESENT: Primary | ICD-10-CM

## 2023-10-19 PROCEDURE — 99214 OFFICE O/P EST MOD 30 MIN: CPT | Performed by: NURSE PRACTITIONER

## 2023-10-19 RX ORDER — PANTOPRAZOLE SODIUM 40 MG/1
40 TABLET, DELAYED RELEASE ORAL DAILY
Qty: 30 TABLET | Refills: 11 | Status: SHIPPED | OUTPATIENT
Start: 2023-10-19

## 2023-10-19 NOTE — ASSESSMENT & PLAN NOTE
Limit NSAIDs, begin PPI therapy.  ..Pt is instructed to avoid caffeine, chocolate, peppermint and nicotine.  They are to avoid eating within 2-3 hours prior to bedtime.

## 2023-10-19 NOTE — PROGRESS NOTES
Primary Physician: Luz Mota APRN    Chief Complaint   Patient presents with    Colon Cancer Screening     Pt presents today for evaluation for colonoscopy-pt's last colon was 9/5/2019 by Dr. Tabares; Family history of colon cancer    Endoscopy     Pt also presents today to discuss endoscopy-states Dr. 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 female.    HPI  GERD  Pt has a history of acid reflux. She admits she  not take any Protonix regularly. An dis having acid reflux symptoms about every other day.  No dysphagia.  She will use Voltaren prn for back pain about 1-2 times per week.      Last upper endoscopy 8/5/2022 with normal esophagus, stomach, and duodenum.  Patient did have LA grade C reflux esophagitis in June 2022 therefore endoscopy in August 2022 was to assure resolution of that.    Family history of colon cancer  1 paternal aunt had colon cancer and she reports another paternal aunt had stomach cancer.  Patient reports having colonoscopy August 2019 per Dr. Tabares in [facility].  Patient reports internal and external hemorrhoids were seen and was given 10-year recall.  I do not have the records for that review today.    History of lymphoma of the small bowel in 2014.    Constipation  Pt has chronic constipation. She admits to going up to 1-2 weeks without a BM. Having left sided flank pain.  Seems to be worsened during periods of constipation.  No blood in her stools.  Once again last colonoscopy August 2019 unremarkable.    Past Medical History:   Diagnosis Date    Arthritis     Cancer     lymphoma of small bowel    Family history of colon cancer     GERD (gastroesophageal reflux disease)     Glaucoma     Hyperlipidemia     Hypothyroidism     TIA (transient ischemic attack)        Past Surgical History:   Procedure Laterality Date    BREAST EXCISIONAL BIOPSY Left  01/2016    Benign    CHOLECYSTECTOMY      COLON RESECTION SMALL BOWEL  2014    COLONOSCOPY  09/05/2019    Dr. Tabares-Internal and external hemorrhoids; Repeat 10 years    ENDOSCOPY  09/05/2019    Dr. Tabares-Possible Hightower's esophagus-biopsied    ENDOSCOPY N/A 06/24/2022    LA Grade C reflux esophagitis with no bleeding; A large amount of food (residue) in the stomach; Normal examined duodenum; No specimens collected; Repeat 6 weeks    ENDOSCOPY N/A 08/05/2022    Normal esophagus; Normal stomach; Normal examined duodenum; No specimens collected    KNEE ARTHROSCOPY Right 07/14/2020    Procedure: RIGHT KNEE PARTIAL MEDIAL MENISCECTOMY;  Surgeon: Vijay Grewal MD;  Location:  [facility] OR;  Service: Orthopedics;  Laterality: Right;    LAPAROSCOPIC TUBAL LIGATION      TOTAL KNEE ARTHROPLASTY Right 07/28/2021    Procedure: RIGHT TOTAL KNEE REPLACEMENT;  Surgeon: Eliecer Foley MD;  Location:  [facility];  Service: Orthopedics;  Laterality: Right;    TRANSURETHRAL RESECTION OF BLADDER TUMOR N/A 07/12/2022    Procedure: CYSTOSCOPY TRANSURETHRAL RESECTION OF BLADDER TUMOR;  Surgeon: Joel Poe MD;  Location:  [facility];  Service: Urology;  Laterality: N/A;    VAGINAL MESH REVISION      2010/2016    WISDOM TOOTH EXTRACTION          Current Outpatient Medications:     azelastine (ASTELIN) 0.1 % nasal spray, Instill 2 sprays into each nostril as directed by provider 2 (Two) Times a Day., Disp: 30 mL, Rfl: 12    Cyanocobalamin (B-12) 500 MCG sublingual tablet, 500 mcgs under the tongue every other day, Disp: 15 tablet, Rfl: 11    cyclobenzaprine (FLEXERIL) 5 MG tablet, Take 1 tablet by mouth 3 (Three) Times a Day As Needed for Muscle Spasms., Disp: 30 tablet, Rfl: 1    diclofenac (VOLTAREN) 50 MG EC tablet, Take 1 tablet by mouth 2 (Two) Times a Day As Needed for Pain., Disp: 60 tablet, Rfl: 0    estradiol (MINIVELLE, VIVELLE-DOT) 0.05 MG/24HR patch, Apply 1 patch to the appropriate area as directed twice  weekly **Remove old patch before applying new patch**, Disp: 8 patch, Rfl: 11    lidocaine (LIDODERM) 5 %, Place 1 patch on the skin as directed by provider Daily. Remove & Discard patch within 12 hours or as directed by MD, Disp: 30 patch, Rfl: 3    Mirabegron ER (Myrbetriq) 25 MG tablet sustained-release 24 hour 24 hr tablet, Take 1 tablet by mouth Daily., Disp: 30 tablet, Rfl: 1    montelukast (Singulair) 10 MG tablet, Take 1 tablet by mouth Every Night., Disp: 90 tablet, Rfl: 1    ondansetron ODT (Zofran ODT) 4 MG disintegrating tablet, Place 1 tablet on the tongue Every 6 (Six) Hours As Needed for Nausea., Disp: 12 tablet, Rfl: 1    Progesterone (Prometrium) 100 MG capsule, Take 1 capsule by mouth Daily., Disp: 30 capsule, Rfl: 11    saccharomyces boulardii (FLORASTOR) 250 MG capsule, Take 1 capsule by mouth 2 (Two) Times a Day., Disp: , Rfl:     Semaglutide-Weight Management (Wegovy) 2.4 MG/0.75ML solution auto-injector, Inject 2.4 mg (1 pen) under the skin into the appropriate area as directed 1 (One) Time Per Week., Disp: 3 mL, Rfl: 11    venlafaxine XR (EFFEXOR-XR) 75 MG 24 hr capsule, Take 1 capsule by mouth Daily., Disp: 90 capsule, Rfl: 3    pantoprazole (PROTONIX) 40 MG EC tablet, Take 1 tablet by mouth Daily., Disp: 30 tablet, Rfl: 11    No Known Allergies    Social History     Socioeconomic History    Marital status:    Tobacco Use    Smoking status: Former     Packs/day: 0.50     Years: 10.00     Additional pack years: 0.00     Total pack years: 5.00     Types: Cigarettes     Quit date:      Years since quittin.8    Smokeless tobacco: Never    Tobacco comments:     Smoked on & off for approx 10years   Vaping Use    Vaping Use: Never used   Substance and Sexual Activity    Alcohol use: Not Currently    Drug use: Never    Sexual activity: Not Currently     Partners: Male     Birth control/protection: Surgical       Family History   Problem Relation Age of Onset    Heart defect Mother   "   Diverticulitis Mother     No Known Problems Father     No Known Problems Sister     Cancer Maternal Aunt     Colon cancer Paternal Aunt 68    Stomach cancer Paternal Aunt 70    Liver cancer Paternal Aunt     Colon cancer Paternal Aunt 80    Breast cancer Neg Hx     Ovarian cancer Neg Hx     Uterine cancer Neg Hx     Melanoma Neg Hx     Prostate cancer Neg Hx     Colon polyps Neg Hx     Esophageal cancer Neg Hx     Liver disease Neg Hx     Rectal cancer Neg Hx        Review of Systems   Constitutional:  Negative for unexpected weight change.   HENT:  Negative for trouble swallowing.    Respiratory:  Negative for shortness of breath.    Cardiovascular:  Negative for chest pain.   Gastrointestinal:  Positive for constipation. Negative for abdominal pain.       Objective     /72 (BP Location: Left arm, Patient Position: Sitting, Cuff Size: Adult)   Pulse 89   Temp 97.7 °F (36.5 °C) (Infrared)   Ht 160 cm (63\")   Wt 78.5 kg (173 lb)   LMP 06/30/2019   SpO2 98%   Breastfeeding No   BMI 30.65 kg/m²     Physical Exam  Vitals reviewed.   Constitutional:       Appearance: Normal appearance.   Neurological:      Mental Status: She is alert.         Lab Results - Last 18 Months   Lab Units 09/07/23  0720 02/27/23  0859 07/08/22  1355 06/23/22  0848 06/16/22  1331 06/07/22  0833   GLUCOSE mg/dL 89 94 117* 84 93 91   BUN mg/dL 13 14 14 14 9 15   CREATININE mg/dL 0.88 0.83 0.80 0.95 0.70 0.77   SODIUM mmol/L 138 141 141 139 141 140   POTASSIUM mmol/L 4.4 4.7 4.1 4.7 4.1 4.3   CHLORIDE mmol/L 101 104 106 102 103 103   CO2 mmol/L 25 23 26.0 26.7 29.0 25   TOTAL PROTEIN g/dL  --   --   --  6.8 7.6  --    ALBUMIN g/dL 4.5 4.4  --  4.70 4.80 4.6   ALT (SGPT) IU/L 43* 22  --  36* 61* 36*   AST (SGOT) IU/L 17 20  --  27 36* 34   ALK PHOS IU/L 117 98  --  92 95 86   BILIRUBIN mg/dL 0.4 <0.2  --  0.3 0.5 0.3   GLOBULIN gm/dL  --   --   --  2.1 2.8  --        Lab Results - Last 18 Months   Lab Units 09/07/23  7627 " 02/27/23  0859 07/08/22  1354 06/16/22  1331 06/07/22  0833   HEMOGLOBIN g/dL 13.6 13.3 13.0 14.3 13.5   HEMATOCRIT % 39.9 40.2 39.2 44.4 40.9   MCV fL 88 88 87.7 90.2 88   WBC x10E3/uL 4.1 3.9 3.74 5.70 3.8   RDW % 12.7 12.8 12.4 13.0 13.5   MPV fL  --   --  9.5 9.7  --    PLATELETS x10E3/uL 239 241 222 237 233       Lab Results - Last 18 Months   Lab Units 09/07/23  0720 02/27/23  0859 06/07/22  0833   TSH uIU/mL 3.090 3.570 2.980   VIT D 25 HYDROXY ng/mL 34.4 35.1 45.8          IMPRESSION/PLAN:    Assessment & Plan      Problem List Items Addressed This Visit          Family History    FH: colon cancer    Overview     paternal aunt            Gastrointestinal Abdominal     GERD (gastroesophageal reflux disease) - Primary    Overview     Acid reflux from time to time however not taking any PPI therapy or H2 blocker.  Symptoms occurring about once every other day.  No dysphagia.  Will use Voltaren about once a week for back pain.  LA grade C esophagitis seen 6/2022.  Follow-up endoscopy 8/2022 shows complete healing.         Current Assessment & Plan     Limit NSAIDs, begin PPI therapy.  ..Pt is instructed to avoid caffeine, chocolate, peppermint and nicotine.  They are to avoid eating within 2-3 hours prior to bedtime.           Relevant Medications    pantoprazole (PROTONIX) 40 MG EC tablet    Constipation    Overview     Having a bowel movement every 1 to 2 weeks         Current Assessment & Plan     Begin MiraLAX therapy at least twice a day and we can titrate up as needed          Begin Miralax 2 times per day  Begin Protonix given reoccurrence of acid reflux.  Follow up 6 months        ..The risks, benefits, and alternatives of colonoscopy were reviewed with the patient today.  Risks including perforation of the colon possibly requiring surgery or colostomy.  Additional risks include risk of bleeding from biopsies or removal of colon tissue.  There is also the risk of a drug reaction or problems with  anesthesia.  This will be discussed with the further by the anesthesia team on the day of the procedure.  Lastly there is a possibility of missing a colon polyp or cancer.  The benefits include the diagnosis and management of disease of the colon and rectum.  Alternatives to colonoscopy include barium enema, laboratory testing, radiographic evaluation, or no intervention.  The patient verbalizes understanding and agrees.    In accordance with requirements under the Affordable Care Act, [facility] has provided pricing for all hospital services and items on each of its websites. However, a patient's actual cost may differ based on the services the patient receives to meet individual healthcare needs and based on the benefits provided under the patient’s insurance coverage.        Christina Vaughn, APRN  10/19/23  15:53 CDT    Part of this note may be an electronic transcription/translation of spoken language to printed text.

## 2023-10-26 ENCOUNTER — TELEPHONE (OUTPATIENT)
Dept: OBSTETRICS AND GYNECOLOGY | Facility: CLINIC | Age: 58
End: 2023-10-26
Payer: COMMERCIAL

## 2023-11-29 DIAGNOSIS — M51.37 DEGENERATION OF LUMBAR OR LUMBOSACRAL INTERVERTEBRAL DISC: Primary | ICD-10-CM

## 2023-11-29 DIAGNOSIS — M48.061 SPINAL STENOSIS OF LUMBAR REGION, UNSPECIFIED WHETHER NEUROGENIC CLAUDICATION PRESENT: ICD-10-CM

## 2023-11-29 DIAGNOSIS — G60.9 HEREDITARY PERIPHERAL NEUROPATHY: ICD-10-CM

## 2023-11-29 RX ORDER — LIDOCAINE 50 MG/G
1 OINTMENT TOPICAL
Qty: 35.44 G | Refills: 11 | Status: SHIPPED | OUTPATIENT
Start: 2023-11-29

## 2023-12-15 ENCOUNTER — HOSPITAL ENCOUNTER (OUTPATIENT)
Dept: MAMMOGRAPHY | Facility: HOSPITAL | Age: 58
Discharge: HOME | End: 2023-12-15
Payer: COMMERCIAL

## 2024-01-02 ENCOUNTER — TELEPHONE (OUTPATIENT)
Dept: OBSTETRICS AND GYNECOLOGY | Facility: CLINIC | Age: 59
End: 2024-01-02
Payer: COMMERCIAL

## 2024-01-24 DIAGNOSIS — M25.559 HIP PAIN, UNSPECIFIED LATERALITY: ICD-10-CM

## 2024-01-24 DIAGNOSIS — G89.29 CHRONIC BACK PAIN, UNSPECIFIED BACK LOCATION, UNSPECIFIED BACK PAIN LATERALITY: ICD-10-CM

## 2024-01-24 DIAGNOSIS — M54.9 CHRONIC BACK PAIN, UNSPECIFIED BACK LOCATION, UNSPECIFIED BACK PAIN LATERALITY: ICD-10-CM

## 2024-01-24 NOTE — TELEPHONE ENCOUNTER
Rx Refill Note  Requested Prescriptions     Pending Prescriptions Disp Refills    diclofenac (VOLTAREN) 50 MG EC tablet 60 tablet 0     Sig: Take 1 tablet by mouth 2 (Two) Times a Day As Needed for Pain.      Last office visit with prescribing clinician: 9/7/2023   Last telemedicine visit with prescribing clinician: Visit date not found   Next office visit with prescribing clinician: Visit date not found                         Would you like a call back once the refill request has been completed: [] Yes [] No    If the office needs to give you a call back, can they leave a voicemail: [] Yes [] No    Rachel Randolph RN  01/24/24, 10:34 CST

## 2024-02-28 ENCOUNTER — CLINICAL SUPPORT (OUTPATIENT)
Dept: INTERNAL MEDICINE | Facility: CLINIC | Age: 59
End: 2024-02-28
Payer: COMMERCIAL

## 2024-02-28 VITALS — SYSTOLIC BLOOD PRESSURE: 140 MMHG | DIASTOLIC BLOOD PRESSURE: 90 MMHG

## 2024-02-28 DIAGNOSIS — R42 DIZZY: Primary | ICD-10-CM

## 2024-02-28 LAB — GLUCOSE BLDC GLUCOMTR-MCNC: 88 MG/DL (ref 70–130)

## 2024-02-28 NOTE — PROGRESS NOTES
Patient presented to clinic with dizziness. EKG, glocise, and BP were performed per Dr. George. Patient aware of results.

## 2024-03-28 ENCOUNTER — OFFICE VISIT (OUTPATIENT)
Dept: INTERNAL MEDICINE | Facility: CLINIC | Age: 59
End: 2024-03-28
Payer: COMMERCIAL

## 2024-03-28 VITALS
BODY MASS INDEX: 32.6 KG/M2 | SYSTOLIC BLOOD PRESSURE: 129 MMHG | HEART RATE: 86 BPM | OXYGEN SATURATION: 94 % | TEMPERATURE: 97.8 F | DIASTOLIC BLOOD PRESSURE: 82 MMHG | HEIGHT: 63 IN | WEIGHT: 184 LBS

## 2024-03-28 DIAGNOSIS — K52.9 CHRONIC DIARRHEA: ICD-10-CM

## 2024-03-28 DIAGNOSIS — K52.9 CHRONIC DIARRHEA: Primary | ICD-10-CM

## 2024-03-28 PROCEDURE — 99213 OFFICE O/P EST LOW 20 MIN: CPT | Performed by: FAMILY MEDICINE

## 2024-03-28 RX ORDER — CHOLESTYRAMINE 4 G/9G
1 POWDER, FOR SUSPENSION ORAL
Qty: 90 PACKET | Refills: 11 | Status: SHIPPED | OUTPATIENT
Start: 2024-03-28

## 2024-03-28 NOTE — PROGRESS NOTES
Subjective     Chief Complaint   Patient presents with    GI Problem       GI Problem        Desirae Alberto is a 59 y.o. female who presents for a routine visit at this time.  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  Will also check these patient's electrolytes at this time and since we do live in an area with ticks will check for alpha gal.    In the meantime patient is going to try some probiotics and will try some Questran to see if it helps slow down her chronic diarrhea/loose stools.    Patient's PMR from outside medical facility reviewed and noted.      Past Medical History:   Past Medical History:   Diagnosis Date    Arthritis     Cancer     lymphoma of small bowel    Family history of colon cancer     GERD (gastroesophageal reflux disease)     Glaucoma     Hyperlipidemia     Hypothyroidism     TIA (transient ischemic attack)      Past Surgical History:  Past Surgical History:   Procedure Laterality Date    BREAST EXCISIONAL BIOPSY Left 01/2016    Benign    CHOLECYSTECTOMY      COLON RESECTION SMALL BOWEL  2014    COLONOSCOPY  09/05/2019    Dr. Tabares-Internal and external hemorrhoids; Repeat 10 years    ENDOSCOPY  09/05/2019    Dr. Tabares-Possible Hightower's esophagus-biopsied    ENDOSCOPY N/A 06/24/2022    LA Grade C reflux esophagitis with no bleeding; A large amount of food (residue) in the stomach; Normal examined duodenum; No specimens collected; Repeat 6 weeks    ENDOSCOPY N/A 08/05/2022    Normal esophagus;  Normal stomach; Normal examined duodenum; No specimens collected    KNEE ARTHROSCOPY Right 07/14/2020    Procedure: RIGHT KNEE PARTIAL MEDIAL MENISCECTOMY;  Surgeon: Vijay Grewal MD;  Location:  [facility];  Service: Orthopedics;  Laterality: Right;    LAPAROSCOPIC TUBAL LIGATION      TOTAL KNEE ARTHROPLASTY Right 07/28/2021    Procedure: RIGHT TOTAL KNEE REPLACEMENT;  Surgeon: Eliecer Foley MD;  Location:  [facility];  Service: Orthopedics;  Laterality: Right;    TRANSURETHRAL RESECTION OF BLADDER TUMOR N/A 07/12/2022    Procedure: CYSTOSCOPY TRANSURETHRAL RESECTION OF BLADDER TUMOR;  Surgeon: Joel Poe MD;  Location:  [facility];  Service: Urology;  Laterality: N/A;    VAGINAL MESH REVISION      2010/2016    WISDOM TOOTH EXTRACTION       Social History:  reports that she quit smoking about 23 years ago. Her smoking use included cigarettes. She started smoking about 33 years ago. She has a 5 pack-year smoking history. She has never used smokeless tobacco. She reports that she does not currently use alcohol. She reports that she does not use drugs.    Family History: family history includes Cancer in her maternal aunt; Colon cancer (age of onset: 68) in her paternal aunt; Colon cancer (age of onset: 80) in her paternal aunt; Diverticulitis in her mother; Heart defect in her mother; Liver cancer in her paternal aunt; No Known Problems in her father and sister; Stomach cancer (age of onset: 70) in her paternal aunt.      Allergies:  No Known Allergies  Medications:  Prior to Admission medications    Medication Sig Start Date End Date Taking? Authorizing Provider   azelastine (ASTELIN) 0.1 % nasal spray Instill 2 sprays into each nostril as directed by provider 2 (Two) Times a Day. 9/7/23  Yes Luz Mota APRN   Cyanocobalamin (B-12) 500 MCG sublingual tablet 500 mcgs under the tongue every other day 9/15/23  Yes Kalen Babin MD   cyclobenzaprine (FLEXERIL) 5 MG tablet  Take 1 tablet by mouth 3 (Three) Times a Day As Needed for Muscle Spasms. 4/10/23  Yes Luz Mota APRN   diclofenac (VOLTAREN) 50 MG EC tablet Take 1 tablet by mouth 2 (Two) Times a Day As Needed for Pain. 1/24/24  Yes Luz Mota APRN   estradiol (MINIVELLE, VIVELLE-DOT) 0.05 MG/24HR patch Apply 1 patch to the appropriate area as directed twice weekly **Remove old patch before applying new patch** 9/15/23  Yes Kalen Babin MD   lidocaine (LIDODERM) 5 % Place 1 patch on the skin as directed by provider Daily. Remove & Discard patch within 12 hours or as directed by MD 9/7/23  Yes Luz Mota APRN   lidocaine (XYLOCAINE) 5 % ointment Apply 1 application  topically to the appropriate area as directed Every 2 (Two) Hours As Needed for Mild Pain. 11/29/23  Yes Gibson George MD   Mirabegron ER (Myrbetriq) 25 MG tablet sustained-release 24 hour 24 hr tablet Take 1 tablet by mouth Daily. 9/7/23  Yes Luz Mota APRN   montelukast (Singulair) 10 MG tablet Take 1 tablet by mouth Every Night. 9/7/23  Yes Luz Mota APRN   ondansetron ODT (Zofran ODT) 4 MG disintegrating tablet Place 1 tablet on the tongue Every 6 (Six) Hours As Needed for Nausea. 8/2/22  Yes Jeannette Justin DO   pantoprazole (PROTONIX) 40 MG EC tablet Take 1 tablet by mouth Daily. 10/19/23  Yes Christina Vaughn APRN   Progesterone (Prometrium) 100 MG capsule Take 1 capsule by mouth Daily. 9/15/23  Yes Kalen Babin MD   saccharomyces boulardii (FLORASTOR) 250 MG capsule Take 1 capsule by mouth 2 (Two) Times a Day.   Yes Provider, MD Rosalba   venlafaxine XR (EFFEXOR-XR) 75 MG 24 hr capsule Take 1 capsule by mouth Daily. 8/25/23  Yes Ivan, Apple R, APRN   Semaglutide-Weight Management (Wegovy) 2.4 MG/0.75ML solution auto-injector Inject 2.4 mg (1 pen) under the skin into the appropriate area as directed 1 (One) Time Per Week. 9/15/23 3/28/24   "Kalen Babin MD       FLEX: Over the last two weeks, how often have you been bothered by the following problems?  Feeling nervous, anxious or on edge: Not at all  Not being able to stop or control worrying: Not at all  Worrying too much about different things: Not at all  Trouble Relaxing: Not at all  Being so restless that it is hard to sit still: Not at all  Becoming easily annoyed or irritable: Not at all  Feeling afraid as if something awful might happen: Not at all  FLEX 7 Total Score: 0  If you checked any problems, how difficult have these problems made it for you to do your work, take care of things at home, or get along with other people: Not difficult at all      PHQ-9 Depression Screening  Little interest or pleasure in doing things? 0-->not at all   Feeling down, depressed, or hopeless? 0-->not at all   Trouble falling or staying asleep, or sleeping too much?     Feeling tired or having little energy?     Poor appetite or overeating?     Feeling bad about yourself - or that you are a failure or have let yourself or your family down?     Trouble concentrating on things, such as reading the newspaper or watching television?     Moving or speaking so slowly that other people could have noticed? Or the opposite - being so fidgety or restless that you have been moving around a lot more than usual?     Thoughts that you would be better off dead, or of hurting yourself in some way?     PHQ-9 Total Score 0   If you checked off any problems, how difficult have these problems made it for you to do your work, take care of things at home, or get along with other people?         PHQ-9 Total Score: 0   0 (Negative screening for depression)      Review of systems   negative unless otherwise specified above in HPI    Objective     Vital Signs: /82 (BP Location: Left arm, Patient Position: Sitting, Cuff Size: Adult)   Pulse 86   Temp 97.8 °F (36.6 °C) (Infrared)   Ht 160 cm (63\")   Wt 83.5 kg (184 " lb)   LMP 06/30/2019   SpO2 94%   BMI 32.59 kg/m²     Physical Exam  Vitals and nursing note reviewed.   Constitutional:       General: She is not in acute distress.     Appearance: Normal appearance.   HENT:      Head: Normocephalic.   Eyes:      Extraocular Movements: Extraocular movements intact.      Pupils: Pupils are equal, round, and reactive to light.   Cardiovascular:      Rate and Rhythm: Normal rate and regular rhythm.      Heart sounds: Normal heart sounds. No murmur heard.  Pulmonary:      Effort: Pulmonary effort is normal. No respiratory distress.      Breath sounds: Normal breath sounds. No rhonchi or rales.   Abdominal:      General: Abdomen is flat. Bowel sounds are normal.      Palpations: Abdomen is soft.   Neurological:      General: No focal deficit present.      Mental Status: She is alert.                  Results Reviewed:  Glucose   Date Value Ref Range Status   09/07/2023 89 70 - 99 mg/dL Final   07/08/2022 117 (H) 65 - 99 mg/dL Final     BUN   Date Value Ref Range Status   09/07/2023 13 6 - 24 mg/dL Final   07/08/2022 14 6 - 20 mg/dL Final     Creatinine   Date Value Ref Range Status   09/07/2023 0.88 0.57 - 1.00 mg/dL Final   07/08/2022 0.80 0.57 - 1.00 mg/dL Final   09/17/2019 0.70 0.60 - 1.30 mg/dL Final     Comment:     Serial Number: [id number]:  [id number]     Sodium   Date Value Ref Range Status   09/07/2023 138 134 - 144 mmol/L Final   07/08/2022 141 136 - 145 mmol/L Final     Potassium   Date Value Ref Range Status   09/07/2023 4.4 3.5 - 5.2 mmol/L Final   07/08/2022 4.1 3.5 - 5.2 mmol/L Final     Chloride   Date Value Ref Range Status   09/07/2023 101 96 - 106 mmol/L Final   07/08/2022 106 98 - 107 mmol/L Final     CO2   Date Value Ref Range Status   07/08/2022 26.0 22.0 - 29.0 mmol/L Final     Total CO2   Date Value Ref Range Status   09/07/2023 25 20 - 29 mmol/L Final     Calcium   Date Value Ref Range Status   09/07/2023 9.1 8.7 - 10.2 mg/dL Final   07/08/2022 9.3 8.6 -  10.5 mg/dL Final     ALT (SGPT)   Date Value Ref Range Status   09/07/2023 43 (H) 0 - 32 IU/L Final   06/23/2022 36 (H) 1 - 33 U/L Final     AST (SGOT)   Date Value Ref Range Status   09/07/2023 17 0 - 40 IU/L Final   06/23/2022 27 1 - 32 U/L Final     WBC   Date Value Ref Range Status   09/07/2023 4.1 3.4 - 10.8 x10E3/uL Final     Hematocrit   Date Value Ref Range Status   09/07/2023 39.9 34.0 - 46.6 % Final   07/08/2022 39.2 34.0 - 46.6 % Final     Platelets   Date Value Ref Range Status   09/07/2023 239 150 - 450 x10E3/uL Final   07/08/2022 222 140 - 450 10*3/mm3 Final     Total Cholesterol   Date Value Ref Range Status   02/28/2022 284 (H) 0 - 200 mg/dL Final     Triglycerides   Date Value Ref Range Status   09/07/2023 104 0 - 149 mg/dL Final   02/28/2022 313 (H) 0 - 150 mg/dL Final     HDL Cholesterol   Date Value Ref Range Status   09/07/2023 62 >39 mg/dL Final   02/28/2022 67 (H) 40 - 60 mg/dL Final     LDL Chol Calc (NIH)   Date Value Ref Range Status   09/07/2023 155 (H) 0 - 99 mg/dL Final     LDL/HDL Ratio   Date Value Ref Range Status   02/28/2022 2.30  Final     Hemoglobin A1C   Date Value Ref Range Status   09/07/2023 5.9 (H) 4.8 - 5.6 % Final     Comment:              Prediabetes: 5.7 - 6.4           Diabetes: >6.4           Glycemic control for adults with diabetes: <7.0     02/28/2022 5.8 % Final                 Assessment / Plan     Assessment/Plan:   Diagnosis Plan   1. Chronic diarrhea  CBC w AUTO Differential    Comprehensive metabolic panel    Alpha-Gal IgE Panel    cholestyramine (Questran) 4 g packet            Return if symptoms worsen or fail to improve. unless patient needs to be seen sooner or acute issues arise.      I have discussed the patient results/orders and and plan/recommendation with them at today's visit.      Signed by:    Gibson George MD Date: 03/28/24

## 2024-03-29 ENCOUNTER — HOSPITAL ENCOUNTER (OUTPATIENT)
Dept: MAMMOGRAPHY | Facility: HOSPITAL | Age: 59
Discharge: HOME | End: 2024-03-29
Payer: COMMERCIAL

## 2024-03-29 ENCOUNTER — HOSPITAL ENCOUNTER (OUTPATIENT)
Dept: BONE DENSITY | Facility: HOSPITAL | Age: 59
Discharge: HOME | End: 2024-03-29
Payer: COMMERCIAL

## 2024-03-29 DIAGNOSIS — Z12.31 ENCOUNTER FOR SCREENING MAMMOGRAM FOR BREAST CANCER: ICD-10-CM

## 2024-03-29 DIAGNOSIS — Z13.820 ENCOUNTER FOR SCREENING FOR OSTEOPOROSIS: ICD-10-CM

## 2024-03-29 PROCEDURE — 77067 SCR MAMMO BI INCL CAD: CPT

## 2024-03-29 PROCEDURE — 77063 BREAST TOMOSYNTHESIS BI: CPT

## 2024-03-29 PROCEDURE — 77080 DXA BONE DENSITY AXIAL: CPT

## 2024-04-02 ENCOUNTER — TELEPHONE (OUTPATIENT)
Dept: INTERNAL MEDICINE | Facility: CLINIC | Age: 59
End: 2024-04-02
Payer: COMMERCIAL

## 2024-04-02 DIAGNOSIS — E66.09 CLASS 1 OBESITY DUE TO EXCESS CALORIES WITH SERIOUS COMORBIDITY IN ADULT, UNSPECIFIED BMI: Primary | ICD-10-CM

## 2024-04-02 DIAGNOSIS — K58.1 IRRITABLE BOWEL SYNDROME WITH CONSTIPATION: Primary | ICD-10-CM

## 2024-04-02 NOTE — TELEPHONE ENCOUNTER
can I have a Linzess sample?     Also, can I have Phentermine or something to give me energy and lose weight?  I have started back exercising and walking, I need to lose some weight by end of May.  I am off of Wegovy.

## 2024-04-04 LAB
ALBUMIN SERPL-MCNC: 4.4 G/DL (ref 3.8–4.9)
ALBUMIN/GLOB SERPL: 1.6 {RATIO} (ref 1.2–2.2)
ALP SERPL-CCNC: 110 IU/L (ref 44–121)
ALPHA-GAL IGE QN: <0.1 KU/L
ALT SERPL-CCNC: 67 IU/L (ref 0–32)
AST SERPL-CCNC: 25 IU/L (ref 0–40)
BASOPHILS # BLD AUTO: 0 X10E3/UL (ref 0–0.2)
BASOPHILS NFR BLD AUTO: 1 %
BEEF IGE QN: <0.1 KU/L
BILIRUB SERPL-MCNC: 0.4 MG/DL (ref 0–1.2)
BUN SERPL-MCNC: 10 MG/DL (ref 6–24)
BUN/CREAT SERPL: 13 (ref 9–23)
CALCIUM SERPL-MCNC: 9.6 MG/DL (ref 8.7–10.2)
CHLORIDE SERPL-SCNC: 103 MMOL/L (ref 96–106)
CO2 SERPL-SCNC: 25 MMOL/L (ref 20–29)
CONV CLASS DESCRIPTION: NORMAL
CREAT SERPL-MCNC: 0.78 MG/DL (ref 0.57–1)
EGFRCR SERPLBLD CKD-EPI 2021: 88 ML/MIN/1.73
EOSINOPHIL # BLD AUTO: 0.1 X10E3/UL (ref 0–0.4)
EOSINOPHIL NFR BLD AUTO: 2 %
ERYTHROCYTE [DISTWIDTH] IN BLOOD BY AUTOMATED COUNT: 13 % (ref 11.7–15.4)
GLOBULIN SER CALC-MCNC: 2.7 G/DL (ref 1.5–4.5)
GLUCOSE SERPL-MCNC: 95 MG/DL (ref 70–99)
HCT VFR BLD AUTO: 40.2 % (ref 34–46.6)
HGB BLD-MCNC: 13.4 G/DL (ref 11.1–15.9)
IGE SERPL-ACNC: 17 IU/ML (ref 6–495)
IMM GRANULOCYTES # BLD AUTO: 0 X10E3/UL (ref 0–0.1)
IMM GRANULOCYTES NFR BLD AUTO: 0 %
LAMB IGE QN: <0.1 KU/L
LYMPHOCYTES # BLD AUTO: 1.8 X10E3/UL (ref 0.7–3.1)
LYMPHOCYTES NFR BLD AUTO: 39 %
MCH RBC QN AUTO: 29.6 PG (ref 26.6–33)
MCHC RBC AUTO-ENTMCNC: 33.3 G/DL (ref 31.5–35.7)
MCV RBC AUTO: 89 FL (ref 79–97)
MONOCYTES # BLD AUTO: 0.4 X10E3/UL (ref 0.1–0.9)
MONOCYTES NFR BLD AUTO: 8 %
NEUTROPHILS # BLD AUTO: 2.3 X10E3/UL (ref 1.4–7)
NEUTROPHILS NFR BLD AUTO: 50 %
PLATELET # BLD AUTO: 218 X10E3/UL (ref 150–450)
PORK IGE QN: <0.1 KU/L
POTASSIUM SERPL-SCNC: 4.4 MMOL/L (ref 3.5–5.2)
PROT SERPL-MCNC: 7.1 G/DL (ref 6–8.5)
RBC # BLD AUTO: 4.52 X10E6/UL (ref 3.77–5.28)
SODIUM SERPL-SCNC: 143 MMOL/L (ref 134–144)
WBC # BLD AUTO: 4.7 X10E3/UL (ref 3.4–10.8)

## 2024-04-18 DIAGNOSIS — E66.09 CLASS 1 OBESITY DUE TO EXCESS CALORIES WITH SERIOUS COMORBIDITY IN ADULT, UNSPECIFIED BMI: ICD-10-CM

## 2024-04-18 RX ORDER — PHENTERMINE AND TOPIRAMATE 3.75; 23 MG/1; MG/1
1 CAPSULE, EXTENDED RELEASE ORAL DAILY
Qty: 14 CAPSULE | Refills: 0 | Status: CANCELLED | OUTPATIENT
Start: 2024-04-18

## 2024-04-19 ENCOUNTER — OFFICE VISIT (OUTPATIENT)
Dept: GASTROENTEROLOGY | Facility: CLINIC | Age: 59
End: 2024-04-19
Payer: COMMERCIAL

## 2024-04-19 VITALS
DIASTOLIC BLOOD PRESSURE: 80 MMHG | HEART RATE: 105 BPM | BODY MASS INDEX: 33.13 KG/M2 | HEIGHT: 63 IN | WEIGHT: 187 LBS | OXYGEN SATURATION: 97 % | SYSTOLIC BLOOD PRESSURE: 130 MMHG | TEMPERATURE: 97.8 F

## 2024-04-19 DIAGNOSIS — K21.9 GASTROESOPHAGEAL REFLUX DISEASE, UNSPECIFIED WHETHER ESOPHAGITIS PRESENT: Primary | ICD-10-CM

## 2024-04-19 DIAGNOSIS — K59.00 CONSTIPATION, UNSPECIFIED CONSTIPATION TYPE: ICD-10-CM

## 2024-04-19 DIAGNOSIS — E66.09 CLASS 1 OBESITY DUE TO EXCESS CALORIES WITH SERIOUS COMORBIDITY IN ADULT, UNSPECIFIED BMI: Primary | ICD-10-CM

## 2024-04-19 PROCEDURE — 99213 OFFICE O/P EST LOW 20 MIN: CPT | Performed by: NURSE PRACTITIONER

## 2024-04-19 NOTE — TELEPHONE ENCOUNTER
This patient is due for a dosage increase I believe. Would you care to review and adjust if needed please.

## 2024-04-19 NOTE — PROGRESS NOTES
Obtained PA for [employer] . Also contacted Gracie Trammell gave [employer] PA . Primary Physician: Luz Mota APRN    Chief Complaint   Patient presents with    Follow-up     Pt presents today for GERD/constipation follow up-states she is still having issues with constipation and was started Linzess 145mcg prn by her PCP 6 months ago-states the Miralax didn't help and that's why her PCP put her on Linzess; Pt does state she hasn't been having any issues with reflux; Pt does state she is having issues with leakage and has to wear a pad-states even the slightest amount of gas will cause leakage-was given Questran by Dr. George but she hasn't tried it yet       Subjective     Desirae Alberto is a 59 y.o. female.    HPI  GERD follow-up  Patient seen 10/19/2023 having acid reflux symptoms about every other day.  No dysphagia.  Has been using some Voltaren couple times a week.    Last upper endoscopy 8/5/2022 with normal esophagus, stomach, and duodenum.  Patient did have LA grade C reflux esophagitis in June 2022 therefore endoscopy in August 2022 was to assure resolution of that.    After her October 2023 office visit patient was educated on the importance of limiting her NSAIDs.  We again pantoprazole 40 mg once daily and educated on the importance of taking this regularly.  Other dietary and lifestyle modifications were discussed as well.    She is back today in follow-up. She is having no acid reflux to mention.  No dysphagia.  Taking Pantoprazole 40mg once daily.  No upper GI complaints to speak of.      Constipation  During her October 2023 office visit she was admitting to constipation even going up to 1 to 2 weeks without a bowel movement.  Her constipation was chronic in nature.  Last colonoscopy August 2019 in [facility].    9/5/2019 Colonoscopy: internal and external hemorrhoids otherwise colonoscopy normal throughout. 10 year recall given.  I recommend she begin taking MiraLAX.  Initially I wanted her to take this twice daily and we can titrate up or down as  needed.    Today on return she states the Miralax did not help.  PCP started her on Linzess but only prn.  She was started on the 145 dose.  It did lead to some rectal incontinence.  Feels like when she does use that she has a major blow out within in between she will go days without a bowel movement.      Past Medical History:   Diagnosis Date    Arthritis     Cancer     lymphoma of small bowel    Family history of colon cancer     GERD (gastroesophageal reflux disease)     Glaucoma     Hyperlipidemia     Hypothyroidism     TIA (transient ischemic attack)        Past Surgical History:   Procedure Laterality Date    BREAST EXCISIONAL BIOPSY Left 01/2016    Benign    CHOLECYSTECTOMY      COLON RESECTION SMALL BOWEL  2014    COLONOSCOPY  09/05/2019    Dr. Tabares-Internal and external hemorrhoids; Repeat 10 years    ENDOSCOPY  09/05/2019    Dr. Tabares-Possible Hightower's esophagus-biopsied    ENDOSCOPY N/A 06/24/2022    LA Grade C reflux esophagitis with no bleeding; A large amount of food (residue) in the stomach; Normal examined duodenum; No specimens collected; Repeat 6 weeks    ENDOSCOPY N/A 08/05/2022    Normal esophagus; Normal stomach; Normal examined duodenum; No specimens collected    KNEE ARTHROSCOPY Right 07/14/2020    Procedure: RIGHT KNEE PARTIAL MEDIAL MENISCECTOMY;  Surgeon: Vijay Grewal MD;  Location:  [facility];  Service: Orthopedics;  Laterality: Right;    LAPAROSCOPIC TUBAL LIGATION      TOTAL KNEE ARTHROPLASTY Right 07/28/2021    Procedure: RIGHT TOTAL KNEE REPLACEMENT;  Surgeon: Eliecer Foley MD;  Location:  [facility];  Service: Orthopedics;  Laterality: Right;    TRANSURETHRAL RESECTION OF BLADDER TUMOR N/A 07/12/2022    Procedure: CYSTOSCOPY TRANSURETHRAL RESECTION OF BLADDER TUMOR;  Surgeon: Joel Poe MD;  Location:  [facility];  Service: Urology;  Laterality: N/A;    VAGINAL MESH REVISION      2010/2016    WISDOM TOOTH EXTRACTION          Current Outpatient Medications:      azelastine (ASTELIN) 0.1 % nasal spray, Instill 2 sprays into each nostril as directed by provider 2 (Two) Times a Day., Disp: 30 mL, Rfl: 12    Cyanocobalamin (B-12) 500 MCG sublingual tablet, 500 mcgs under the tongue every other day, Disp: 15 tablet, Rfl: 11    cyclobenzaprine (FLEXERIL) 5 MG tablet, Take 1 tablet by mouth 3 (Three) Times a Day As Needed for Muscle Spasms., Disp: 30 tablet, Rfl: 1    diclofenac (VOLTAREN) 50 MG EC tablet, Take 1 tablet by mouth 2 (Two) Times a Day As Needed for Pain., Disp: 60 tablet, Rfl: 5    estradiol (MINIVELLE, VIVELLE-DOT) 0.05 MG/24HR patch, Apply 1 patch to the appropriate area as directed twice weekly **Remove old patch before applying new patch**, Disp: 8 patch, Rfl: 11    lidocaine (XYLOCAINE) 5 % ointment, Apply 1 application  topically to the appropriate area as directed Every 2 (Two) Hours As Needed for Mild Pain., Disp: 35.44 g, Rfl: 11    Mirabegron ER (Myrbetriq) 25 MG tablet sustained-release 24 hour 24 hr tablet, Take 1 tablet by mouth Daily., Disp: 30 tablet, Rfl: 1    montelukast (Singulair) 10 MG tablet, Take 1 tablet by mouth Every Night., Disp: 90 tablet, Rfl: 1    ondansetron ODT (Zofran ODT) 4 MG disintegrating tablet, Place 1 tablet on the tongue Every 6 (Six) Hours As Needed for Nausea., Disp: 12 tablet, Rfl: 1    pantoprazole (PROTONIX) 40 MG EC tablet, Take 1 tablet by mouth Daily., Disp: 30 tablet, Rfl: 11    Phentermine-Topiramate 7.5-46 MG capsule sustained-release 24 hr, Take 7.5 mg by mouth Daily., Disp: 14 capsule, Rfl: 0    Progesterone (Prometrium) 100 MG capsule, Take 1 capsule by mouth Daily., Disp: 30 capsule, Rfl: 11    saccharomyces boulardii (FLORASTOR) 250 MG capsule, Take 1 capsule by mouth 2 (Two) Times a Day., Disp: , Rfl:     venlafaxine XR (EFFEXOR-XR) 75 MG 24 hr capsule, Take 1 capsule by mouth Daily., Disp: 90 capsule, Rfl: 3    cholestyramine (Questran) 4 g packet, Take 1 packet by mouth 3 (Three) Times a Day With Meals.  "(Patient not taking: Reported on 2024), Disp: 90 packet, Rfl: 11    linaclotide (Linzess) 72 MCG capsule capsule, Take 1 capsule by mouth Every Morning Before Breakfast., Disp: 30 capsule, Rfl: 6    No Known Allergies    Social History     Socioeconomic History    Marital status:    Tobacco Use    Smoking status: Former     Current packs/day: 0.00     Average packs/day: 0.5 packs/day for 10.0 years (5.0 ttl pk-yrs)     Types: Cigarettes     Start date:      Quit date:      Years since quittin.3    Smokeless tobacco: Never    Tobacco comments:     Smoked on & off for approx 10years   Vaping Use    Vaping status: Never Used   Substance and Sexual Activity    Alcohol use: Not Currently    Drug use: Never    Sexual activity: Not Currently     Partners: Male     Birth control/protection: Surgical       Family History   Problem Relation Age of Onset    Heart defect Mother     Diverticulitis Mother     No Known Problems Father     No Known Problems Sister     Cancer Maternal Aunt     Colon cancer Paternal Aunt 68    Stomach cancer Paternal Aunt 70    Liver cancer Paternal Aunt     Colon cancer Paternal Aunt 80    Breast cancer Neg Hx     Ovarian cancer Neg Hx     Uterine cancer Neg Hx     Melanoma Neg Hx     Prostate cancer Neg Hx     Colon polyps Neg Hx     Esophageal cancer Neg Hx     Liver disease Neg Hx     Rectal cancer Neg Hx        Review of Systems   Constitutional:  Negative for unexpected weight change.   Gastrointestinal:  Positive for constipation.       Objective     /80 (BP Location: Left arm, Patient Position: Sitting, Cuff Size: Adult)   Pulse 105   Temp 97.8 °F (36.6 °C) (Infrared)   Ht 160 cm (63\")   Wt 84.8 kg (187 lb)   LMP 2019   SpO2 97%   Breastfeeding No   BMI 33.13 kg/m²     Physical Exam  Vitals reviewed.   Constitutional:       Appearance: Normal appearance.   Neurological:      Mental Status: She is alert.         Lab Results - Last 18 Months   Lab " Units 03/28/24  0826 09/07/23  0720 02/27/23  0859   GLUCOSE mg/dL 95 89 94   BUN mg/dL 10 13 14   CREATININE mg/dL 0.78 0.88 0.83   SODIUM mmol/L 143 138 141   POTASSIUM mmol/L 4.4 4.4 4.7   CHLORIDE mmol/L 103 101 104   CO2 mmol/L 25 25 23   ALBUMIN g/dL 4.4 4.5 4.4   ALT (SGPT) IU/L 67* 43* 22   AST (SGOT) IU/L 25 17 20   ALK PHOS IU/L 110 117 98   BILIRUBIN mg/dL 0.4 0.4 <0.2       Lab Results - Last 18 Months   Lab Units 03/28/24  0826 09/07/23  0720 02/27/23  0859   HEMOGLOBIN g/dL 13.4 13.6 13.3   HEMATOCRIT % 40.2 39.9 40.2   MCV fL 89 88 88   WBC x10E3/uL 4.7 4.1 3.9   RDW % 13.0 12.7 12.8   PLATELETS x10E3/uL 218 239 241       Lab Results - Last 18 Months   Lab Units 09/07/23  0720 02/27/23  0859   TSH uIU/mL 3.090 3.570   VIT D 25 HYDROXY ng/mL 34.4 35.1              IMPRESSION/PLAN:    Assessment & Plan      Problem List Items Addressed This Visit       GERD (gastroesophageal reflux disease) - Primary    Overview     Acid reflux better after initiating pantoprazole 40 mg once daily in October 2023.    LA grade C esophagitis seen 6/2022.  Follow-up endoscopy 8/2022 shows complete healing.         Current Assessment & Plan     Continue pantoprazole.         Constipation    Overview     Having a bowel movement every 1 to 2 weeks, started MiraLAX October 2023 with no benefit.  PCP started Linzess 145 mcg which helped but caused some urgency and rectal leakage.  She is only using this as needed.  Definitely not taking it regularly maybe once per week.  9/5/2019 colonoscopy normal except hemorrhoids.         Current Assessment & Plan     We are going to continue with the Linzess but try lower dose.  I have refill the new dose of 72 mcg daily.  He is going to message me in about 4 to 6 weeks with an update.         Relevant Medications    linaclotide (Linzess) 72 MCG capsule capsule     Lower dose of linzess 72mcg/day  Trying to find some chronic consistent pattern we can use for the patient to have more  regular bowel movements.            Christina Vaughn, APRN  04/19/24  10:21 CDT    Part of this note may be an electronic transcription/translation of spoken language to printed text.

## 2024-04-19 NOTE — ASSESSMENT & PLAN NOTE
We are going to continue with the Linzess but try lower dose.  I have refill the new dose of 72 mcg daily.  He is going to message me in about 4 to 6 weeks with an update.

## 2024-04-23 ENCOUNTER — OFFICE VISIT (OUTPATIENT)
Dept: INTERNAL MEDICINE | Facility: CLINIC | Age: 59
End: 2024-04-23
Payer: COMMERCIAL

## 2024-04-23 VITALS
OXYGEN SATURATION: 97 % | SYSTOLIC BLOOD PRESSURE: 123 MMHG | BODY MASS INDEX: 33.13 KG/M2 | HEIGHT: 63 IN | DIASTOLIC BLOOD PRESSURE: 79 MMHG | TEMPERATURE: 97.8 F | WEIGHT: 187 LBS | HEART RATE: 90 BPM

## 2024-04-23 DIAGNOSIS — B07.0 PLANTAR WART: Primary | ICD-10-CM

## 2024-04-23 PROCEDURE — 99212 OFFICE O/P EST SF 10 MIN: CPT | Performed by: FAMILY MEDICINE

## 2024-04-23 NOTE — PROGRESS NOTES
Subjective     Chief Complaint   Patient presents with    Toe Pain     LEFT FOOT         Toe Pain         Desirae Alberto is a 59 y.o. female who presents for a routine visit at this time.  Patient comes in for a area on the very forefront of the bottom of her foot directly underneath her toes.  This area appears to be a beginning of a plantar wart versus a foreign body.  Advised patient that at this time we would like this to evolve a little further before doing any intervention.  Patient notes that it is moderately tender but not overly bothersome.  She otherwise states no other new problems complaints or concerns    Patient's PMR from outside medical facility reviewed and noted.      Past Medical History:   Past Medical History:   Diagnosis Date    Arthritis     Cancer     lymphoma of small bowel    Family history of colon cancer     GERD (gastroesophageal reflux disease)     Glaucoma     Hyperlipidemia     Hypothyroidism     TIA (transient ischemic attack)      Past Surgical History:  Past Surgical History:   Procedure Laterality Date    BREAST EXCISIONAL BIOPSY Left 01/2016    Benign    CHOLECYSTECTOMY      COLON RESECTION SMALL BOWEL  2014    COLONOSCOPY  09/05/2019    Dr. Tabares-Internal and external hemorrhoids; Repeat 10 years    ENDOSCOPY  09/05/2019    Dr. Tabares-Possible Hightower's esophagus-biopsied    ENDOSCOPY N/A 06/24/2022    LA Grade C reflux esophagitis with no bleeding; A large amount of food (residue) in the stomach; Normal examined duodenum; No specimens collected; Repeat 6 weeks    ENDOSCOPY N/A 08/05/2022    Normal esophagus; Normal stomach; Normal examined duodenum; No specimens collected    KNEE ARTHROSCOPY Right 07/14/2020    Procedure: RIGHT KNEE PARTIAL MEDIAL MENISCECTOMY;  Surgeon: Vijay Grewal MD;  Location: [facility];  Service: Orthopedics;  Laterality: Right;    LAPAROSCOPIC TUBAL LIGATION      TOTAL KNEE ARTHROPLASTY Right 07/28/2021    Procedure: RIGHT TOTAL KNEE  REPLACEMENT;  Surgeon: Eliecer Foley MD;  Location:  [facility];  Service: Orthopedics;  Laterality: Right;    TRANSURETHRAL RESECTION OF BLADDER TUMOR N/A 07/12/2022    Procedure: CYSTOSCOPY TRANSURETHRAL RESECTION OF BLADDER TUMOR;  Surgeon: Joel Poe MD;  Location:  [facility];  Service: Urology;  Laterality: N/A;    VAGINAL MESH REVISION      2010/2016    WISDOM TOOTH EXTRACTION       Social History:  reports that she quit smoking about 23 years ago. Her smoking use included cigarettes. She started smoking about 33 years ago. She has a 5 pack-year smoking history. She has never used smokeless tobacco. She reports that she does not currently use alcohol. She reports that she does not use drugs.    Family History: family history includes Cancer in her maternal aunt; Colon cancer (age of onset: 68) in her paternal aunt; Colon cancer (age of onset: 80) in her paternal aunt; Diverticulitis in her mother; Heart defect in her mother; Liver cancer in her paternal aunt; No Known Problems in her father and sister; Stomach cancer (age of onset: 70) in her paternal aunt.      Allergies:  No Known Allergies  Medications:  Prior to Admission medications    Medication Sig Start Date End Date Taking? Authorizing Provider   azelastine (ASTELIN) 0.1 % nasal spray Instill 2 sprays into each nostril as directed by provider 2 (Two) Times a Day. 9/7/23  Yes Luz Mota APRN   cholestyramine (Questran) 4 g packet Take 1 packet by mouth 3 (Three) Times a Day With Meals. 3/28/24  Yes Gibson George MD   Cyanocobalamin (B-12) 500 MCG sublingual tablet 500 mcgs under the tongue every other day 9/15/23  Yes Kalen Babin MD   cyclobenzaprine (FLEXERIL) 5 MG tablet Take 1 tablet by mouth 3 (Three) Times a Day As Needed for Muscle Spasms. 4/10/23  Yes Luz Mota APRN   diclofenac (VOLTAREN) 50 MG EC tablet Take 1 tablet by mouth 2 (Two) Times a Day As Needed for Pain. 1/24/24   "Yes Luz Mota APRN   estradiol (MINIVELLE, VIVELLE-DOT) 0.05 MG/24HR patch Apply 1 patch to the appropriate area as directed twice weekly **Remove old patch before applying new patch** 9/15/23  Yes Kalen Babin MD   lidocaine (XYLOCAINE) 5 % ointment Apply 1 application  topically to the appropriate area as directed Every 2 (Two) Hours As Needed for Mild Pain. 11/29/23  Yes Gibson George MD   linaclotide (Linzess) 72 MCG capsule capsule Take 1 capsule by mouth Every Morning Before Breakfast. 4/19/24  Yes Christina Vaughn APRN   Mirabegron ER (Myrbetriq) 25 MG tablet sustained-release 24 hour 24 hr tablet Take 1 tablet by mouth Daily. 9/7/23  Yes Luz Mota APRN   montelukast (Singulair) 10 MG tablet Take 1 tablet by mouth Every Night. 9/7/23  Yes Luz Mota APRN   ondansetron ODT (Zofran ODT) 4 MG disintegrating tablet Place 1 tablet on the tongue Every 6 (Six) Hours As Needed for Nausea. 8/2/22  Yes Jeannette Justin DO   pantoprazole (PROTONIX) 40 MG EC tablet Take 1 tablet by mouth Daily. 10/19/23  Yes Christina Vaughn APRN   Phentermine-Topiramate 7.5-46 MG capsule sustained-release 24 hr Take 7.5 mg by mouth Daily. 4/19/24  Yes Carmen Cuevas APRN   Progesterone (Prometrium) 100 MG capsule Take 1 capsule by mouth Daily. 9/15/23  Yes Kalen Babin MD   saccharomyces boulardii (FLORASTOR) 250 MG capsule Take 1 capsule by mouth 2 (Two) Times a Day.   Yes Rosalba Zarate MD   venlafaxine XR (EFFEXOR-XR) 75 MG 24 hr capsule Take 1 capsule by mouth Daily. 8/25/23  Yes Apple Ivan APRN           Review of systems   negative unless otherwise specified above in HPI    Objective     Vital Signs: /79 (BP Location: Left arm, Patient Position: Sitting, Cuff Size: Adult)   Pulse 90   Temp 97.8 °F (36.6 °C) (Infrared)   Ht 160 cm (63\")   Wt 84.8 kg (187 lb)   LMP 06/30/2019   SpO2 97%   BMI 33.13 kg/m²     Physical " Exam  Vitals and nursing note reviewed.   Constitutional:       General: She is not in acute distress.     Appearance: Normal appearance.   HENT:      Head: Normocephalic.   Eyes:      Extraocular Movements: Extraocular movements intact.      Pupils: Pupils are equal, round, and reactive to light.   Cardiovascular:      Rate and Rhythm: Normal rate and regular rhythm.      Heart sounds: Normal heart sounds. No murmur heard.  Pulmonary:      Effort: Pulmonary effort is normal. No respiratory distress.      Breath sounds: Normal breath sounds. No rhonchi or rales.   Abdominal:      General: Abdomen is flat. Bowel sounds are normal.      Palpations: Abdomen is soft.   Neurological:      General: No focal deficit present.      Mental Status: She is alert.                  Results Reviewed:  Glucose   Date Value Ref Range Status   03/28/2024 95 70 - 99 mg/dL Final   07/08/2022 117 (H) 65 - 99 mg/dL Final     BUN   Date Value Ref Range Status   03/28/2024 10 6 - 24 mg/dL Final   07/08/2022 14 6 - 20 mg/dL Final     Creatinine   Date Value Ref Range Status   03/28/2024 0.78 0.57 - 1.00 mg/dL Final   07/08/2022 0.80 0.57 - 1.00 mg/dL Final   09/17/2019 0.70 0.60 - 1.30 mg/dL Final     Comment:     Serial Number: [id number]:  [id number]     Sodium   Date Value Ref Range Status   03/28/2024 143 134 - 144 mmol/L Final   07/08/2022 141 136 - 145 mmol/L Final     Potassium   Date Value Ref Range Status   03/28/2024 4.4 3.5 - 5.2 mmol/L Final   07/08/2022 4.1 3.5 - 5.2 mmol/L Final     Chloride   Date Value Ref Range Status   03/28/2024 103 96 - 106 mmol/L Final   07/08/2022 106 98 - 107 mmol/L Final     CO2   Date Value Ref Range Status   07/08/2022 26.0 22.0 - 29.0 mmol/L Final     Total CO2   Date Value Ref Range Status   03/28/2024 25 20 - 29 mmol/L Final     Calcium   Date Value Ref Range Status   03/28/2024 9.6 8.7 - 10.2 mg/dL Final   07/08/2022 9.3 8.6 - 10.5 mg/dL Final     ALT (SGPT)   Date Value Ref Range Status    03/28/2024 67 (H) 0 - 32 IU/L Final   06/23/2022 36 (H) 1 - 33 U/L Final     AST (SGOT)   Date Value Ref Range Status   03/28/2024 25 0 - 40 IU/L Final   06/23/2022 27 1 - 32 U/L Final     WBC   Date Value Ref Range Status   03/28/2024 4.7 3.4 - 10.8 x10E3/uL Final     Hematocrit   Date Value Ref Range Status   03/28/2024 40.2 34.0 - 46.6 % Final   07/08/2022 39.2 34.0 - 46.6 % Final     Platelets   Date Value Ref Range Status   03/28/2024 218 150 - 450 x10E3/uL Final   07/08/2022 222 140 - 450 10*3/mm3 Final     Total Cholesterol   Date Value Ref Range Status   02/28/2022 284 (H) 0 - 200 mg/dL Final     Triglycerides   Date Value Ref Range Status   09/07/2023 104 0 - 149 mg/dL Final   02/28/2022 313 (H) 0 - 150 mg/dL Final     HDL Cholesterol   Date Value Ref Range Status   09/07/2023 62 >39 mg/dL Final   02/28/2022 67 (H) 40 - 60 mg/dL Final     LDL Chol Calc (NIH)   Date Value Ref Range Status   09/07/2023 155 (H) 0 - 99 mg/dL Final     LDL/HDL Ratio   Date Value Ref Range Status   02/28/2022 2.30  Final     Hemoglobin A1C   Date Value Ref Range Status   09/07/2023 5.9 (H) 4.8 - 5.6 % Final     Comment:              Prediabetes: 5.7 - 6.4           Diabetes: >6.4           Glycemic control for adults with diabetes: <7.0     02/28/2022 5.8 % Final                 Assessment / Plan     Assessment/Plan:   Diagnosis Plan   1. Plantar wart              No follow-ups on file. unless patient needs to be seen sooner or acute issues arise.      I have discussed the patient results/orders and and plan/recommendation with them at today's visit.      Signed by:    Gibson George MD Date: 04/23/24

## 2024-05-23 ENCOUNTER — OFFICE VISIT (OUTPATIENT)
Dept: INTERNAL MEDICINE | Facility: CLINIC | Age: 59
End: 2024-05-23
Payer: COMMERCIAL

## 2024-05-23 DIAGNOSIS — M48.061 SPINAL STENOSIS OF LUMBAR REGION, UNSPECIFIED WHETHER NEUROGENIC CLAUDICATION PRESENT: Primary | ICD-10-CM

## 2024-05-23 PROCEDURE — 99214 OFFICE O/P EST MOD 30 MIN: CPT | Performed by: FAMILY MEDICINE

## 2024-05-23 RX ORDER — OXYCODONE HYDROCHLORIDE AND ACETAMINOPHEN 5; 325 MG/1; MG/1
1 TABLET ORAL EVERY 6 HOURS PRN
Qty: 12 TABLET | Refills: 0 | Status: SHIPPED | OUTPATIENT
Start: 2024-05-23

## 2024-05-23 RX ORDER — TRIAMCINOLONE ACETONIDE 40 MG/ML
40 INJECTION, SUSPENSION INTRA-ARTICULAR; INTRAMUSCULAR ONCE
Status: COMPLETED | OUTPATIENT
Start: 2024-05-23 | End: 2024-05-23

## 2024-05-23 RX ADMIN — TRIAMCINOLONE ACETONIDE 40 MG: 40 INJECTION, SUSPENSION INTRA-ARTICULAR; INTRAMUSCULAR at 17:00

## 2024-05-23 NOTE — PROGRESS NOTES
"        Subjective     No chief complaint on file.      History of Present Illness    The Patient presents for evaluation of low back pain. The patient has had recurrent self limited episodes of low back pain in the past, previous osteoarthritis of lumbar spine, and lumbar stenosis . Symptoms have been present for 10 eas and are gradually worsening.  Onset was related to / precipitated by a remote injury and lifting a heavy object. The pain is located in the across the lower back and radiates to the right foot, left foot. The pain is described as aching, burning, sharp, and stiffness and occurs all day. She rates her pain as a 4 on a scale of 0-10. Symptoms are exacerbated by lifting and standing. Symptoms are improved by change in body position, heat, muscle relaxants, and NSAIDs. she has also tried rest which provided no symptom relief. she has no other symptoms associated with the back pain. The patient has no \"red flag\" history indicative of complicated back pain.     Current pain level reported by patient is 4/10.  Patient requested pain medicine for her acute pain.  [place]arsh was run and documented in the PDMP section of the electronic medical record.  Patient is advised on the affects pain medicine including addiction, tolerance and reduced ability to drive or operate heavy machinery.  A 3 day prescription was prescribed for the patient.  Patient advised that PRN or as needed pain medication allows the patient to skip doses if not needed, but not to be taken more often or at higher doses than prescribed.     Patient's PMR from outside medical facility reviewed and noted.      Past Medical History:   Past Medical History:   Diagnosis Date    Arthritis     Cancer     lymphoma of small bowel    Family history of colon cancer     GERD (gastroesophageal reflux disease)     Glaucoma     Hyperlipidemia     Hypothyroidism     TIA (transient ischemic attack)      Past Surgical History:  Past Surgical History:   Procedure " Laterality Date    BREAST EXCISIONAL BIOPSY Left 01/2016    Benign    CHOLECYSTECTOMY      COLON RESECTION SMALL BOWEL  2014    COLONOSCOPY  09/05/2019    Dr. Tabares-Internal and external hemorrhoids; Repeat 10 years    ENDOSCOPY  09/05/2019    Dr. Tabares-Possible Hightower's esophagus-biopsied    ENDOSCOPY N/A 06/24/2022    LA Grade C reflux esophagitis with no bleeding; A large amount of food (residue) in the stomach; Normal examined duodenum; No specimens collected; Repeat 6 weeks    ENDOSCOPY N/A 08/05/2022    Normal esophagus; Normal stomach; Normal examined duodenum; No specimens collected    KNEE ARTHROSCOPY Right 07/14/2020    Procedure: RIGHT KNEE PARTIAL MEDIAL MENISCECTOMY;  Surgeon: Vijay Grewal MD;  Location:  [facility];  Service: Orthopedics;  Laterality: Right;    LAPAROSCOPIC TUBAL LIGATION      TOTAL KNEE ARTHROPLASTY Right 07/28/2021    Procedure: RIGHT TOTAL KNEE REPLACEMENT;  Surgeon: Eliecer Foley MD;  Location:  [facility];  Service: Orthopedics;  Laterality: Right;    TRANSURETHRAL RESECTION OF BLADDER TUMOR N/A 07/12/2022    Procedure: CYSTOSCOPY TRANSURETHRAL RESECTION OF BLADDER TUMOR;  Surgeon: Joel Poe MD;  Location:  [facility];  Service: Urology;  Laterality: N/A;    VAGINAL MESH REVISION      2010/2016    WISDOM TOOTH EXTRACTION       Social History:  reports that she quit smoking about 23 years ago. Her smoking use included cigarettes. She started smoking about 33 years ago. She has a 5 pack-year smoking history. She has never used smokeless tobacco. She reports that she does not currently use alcohol. She reports that she does not use drugs.    Family History: family history includes Cancer in her maternal aunt; Colon cancer (age of onset: 68) in her paternal aunt; Colon cancer (age of onset: 80) in her paternal aunt; Diverticulitis in her mother; Heart defect in her mother; Liver cancer in her paternal aunt; No Known Problems in her father and sister; Stomach  cancer (age of onset: 70) in her paternal aunt.      Allergies:  No Known Allergies  Medications:  Prior to Admission medications    Medication Sig Start Date End Date Taking? Authorizing Provider   azelastine (ASTELIN) 0.1 % nasal spray Instill 2 sprays into each nostril as directed by provider 2 (Two) Times a Day. 9/7/23   Luz Mota APRN   cholestyramine (Questran) 4 g packet Take 1 packet by mouth 3 (Three) Times a Day With Meals. 3/28/24   Gibson George MD   Cyanocobalamin (B-12) 500 MCG sublingual tablet 500 mcgs under the tongue every other day 9/15/23   Kalen Babin MD   cyclobenzaprine (FLEXERIL) 5 MG tablet Take 1 tablet by mouth 3 (Three) Times a Day As Needed for Muscle Spasms. 4/10/23   Luz Mota APRN   diclofenac (VOLTAREN) 50 MG EC tablet Take 1 tablet by mouth 2 (Two) Times a Day As Needed for Pain. 1/24/24   Luz Mota APRN   estradiol (MINIVELLE, VIVELLE-DOT) 0.05 MG/24HR patch Apply 1 patch to the appropriate area as directed twice weekly **Remove old patch before applying new patch** 9/15/23   Kalen Babin MD   lidocaine (XYLOCAINE) 5 % ointment Apply 1 application  topically to the appropriate area as directed Every 2 (Two) Hours As Needed for Mild Pain. 11/29/23   Gibson George MD   linaclotide (Linzess) 72 MCG capsule capsule Take 1 capsule by mouth Every Morning Before Breakfast. 4/19/24   Christina Vaughn APRN   Mirabegron ER (Myrbetriq) 25 MG tablet sustained-release 24 hour 24 hr tablet Take 1 tablet by mouth Daily. 9/7/23   Luz Mota APRN   montelukast (Singulair) 10 MG tablet Take 1 tablet by mouth Every Night. 9/7/23   Luz Mota APRN   ondansetron ODT (Zofran ODT) 4 MG disintegrating tablet Place 1 tablet on the tongue Every 6 (Six) Hours As Needed for Nausea. 8/2/22   Jeannette Justin DO   pantoprazole (PROTONIX) 40 MG EC tablet Take 1 tablet by mouth Daily.  10/19/23   Christina Vaughn APRN   Phentermine-Topiramate 7.5-46 MG capsule sustained-release 24 hr Take 7.5 mg by mouth Daily. 4/30/24   Luz Mota APRN   Progesterone (Prometrium) 100 MG capsule Take 1 capsule by mouth Daily. 9/15/23   Kalen Babin MD   saccharomyces boulardii (FLORASTOR) 250 MG capsule Take 1 capsule by mouth 2 (Two) Times a Day.    Provider, MD Rosalba   venlafaxine XR (EFFEXOR-XR) 75 MG 24 hr capsule Take 1 capsule by mouth Daily. 8/25/23   Apple Ivan APRN           Review of systems   negative unless otherwise specified above in HPI    Objective     Vital Signs: [facility] 06/30/2019     Physical Exam  Vitals and nursing note reviewed.   Constitutional:       General: She is not in acute distress.     Appearance: Normal appearance.   HENT:      Head: Normocephalic.   Eyes:      Extraocular Movements: Extraocular movements intact.      Pupils: Pupils are equal, round, and reactive to light.   Cardiovascular:      Rate and Rhythm: Normal rate and regular rhythm.      Heart sounds: Normal heart sounds. No murmur heard.  Pulmonary:      Effort: Pulmonary effort is normal. No respiratory distress.      Breath sounds: Normal breath sounds. No rhonchi or rales.   Abdominal:      General: Abdomen is flat. Bowel sounds are normal.      Palpations: Abdomen is soft.   Neurological:      General: No focal deficit present.      Mental Status: She is alert.                Results Reviewed:  Glucose   Date Value Ref Range Status   03/28/2024 95 70 - 99 mg/dL Final   07/08/2022 117 (H) 65 - 99 mg/dL Final     BUN   Date Value Ref Range Status   03/28/2024 10 6 - 24 mg/dL Final   07/08/2022 14 6 - 20 mg/dL Final     Creatinine   Date Value Ref Range Status   03/28/2024 0.78 0.57 - 1.00 mg/dL Final   07/08/2022 0.80 0.57 - 1.00 mg/dL Final   09/17/2019 0.70 0.60 - 1.30 mg/dL Final     Comment:     Serial Number: [id number]:  [id number]     Sodium   Date Value Ref Range Status    03/28/2024 143 134 - 144 mmol/L Final   07/08/2022 141 136 - 145 mmol/L Final     Potassium   Date Value Ref Range Status   03/28/2024 4.4 3.5 - 5.2 mmol/L Final   07/08/2022 4.1 3.5 - 5.2 mmol/L Final     Chloride   Date Value Ref Range Status   03/28/2024 103 96 - 106 mmol/L Final   07/08/2022 106 98 - 107 mmol/L Final     CO2   Date Value Ref Range Status   07/08/2022 26.0 22.0 - 29.0 mmol/L Final     Total CO2   Date Value Ref Range Status   03/28/2024 25 20 - 29 mmol/L Final     Calcium   Date Value Ref Range Status   03/28/2024 9.6 8.7 - 10.2 mg/dL Final   07/08/2022 9.3 8.6 - 10.5 mg/dL Final     ALT (SGPT)   Date Value Ref Range Status   03/28/2024 67 (H) 0 - 32 IU/L Final   06/23/2022 36 (H) 1 - 33 U/L Final     AST (SGOT)   Date Value Ref Range Status   03/28/2024 25 0 - 40 IU/L Final   06/23/2022 27 1 - 32 U/L Final     WBC   Date Value Ref Range Status   03/28/2024 4.7 3.4 - 10.8 x10E3/uL Final     Hematocrit   Date Value Ref Range Status   03/28/2024 40.2 34.0 - 46.6 % Final   07/08/2022 39.2 34.0 - 46.6 % Final     Platelets   Date Value Ref Range Status   03/28/2024 218 150 - 450 x10E3/uL Final   07/08/2022 222 140 - 450 10*3/mm3 Final     Total Cholesterol   Date Value Ref Range Status   02/28/2022 284 (H) 0 - 200 mg/dL Final     Triglycerides   Date Value Ref Range Status   09/07/2023 104 0 - 149 mg/dL Final   02/28/2022 313 (H) 0 - 150 mg/dL Final     HDL Cholesterol   Date Value Ref Range Status   09/07/2023 62 >39 mg/dL Final   02/28/2022 67 (H) 40 - 60 mg/dL Final     LDL Chol Calc (NIH)   Date Value Ref Range Status   09/07/2023 155 (H) 0 - 99 mg/dL Final     LDL/HDL Ratio   Date Value Ref Range Status   02/28/2022 2.30  Final     Hemoglobin A1C   Date Value Ref Range Status   09/07/2023 5.9 (H) 4.8 - 5.6 % Final     Comment:              Prediabetes: 5.7 - 6.4           Diabetes: >6.4           Glycemic control for adults with diabetes: <7.0     02/28/2022 5.8 % Final                  Assessment / Plan     Assessment/Plan:   Diagnosis Plan   1. Spinal stenosis of lumbar region, unspecified whether neurogenic claudication present  triamcinolone acetonide (KENALOG-40) injection 40 mg    oxyCODONE-acetaminophen (Percocet) 5-325 MG per tablet            Return in about 4 weeks (around 6/20/2024), or if symptoms worsen or fail to improve. unless patient needs to be seen sooner or acute issues arise.      I have discussed the patient results/orders and and plan/recommendation with them at today's visit.      Signed by:    Gibson George MD Date: 05/23/24

## 2024-06-04 DIAGNOSIS — E66.09 CLASS 1 OBESITY DUE TO EXCESS CALORIES WITH SERIOUS COMORBIDITY IN ADULT, UNSPECIFIED BMI: Primary | ICD-10-CM

## 2024-06-04 DIAGNOSIS — E66.09 CLASS 1 OBESITY DUE TO EXCESS CALORIES WITH SERIOUS COMORBIDITY IN ADULT, UNSPECIFIED BMI: ICD-10-CM

## 2024-06-04 RX ORDER — PHENTERMINE AND TOPIRAMATE 11.25; 69 MG/1; MG/1
1 CAPSULE, EXTENDED RELEASE ORAL DAILY
Qty: 30 CAPSULE | Refills: 0 | Status: SHIPPED | OUTPATIENT
Start: 2024-06-04

## 2024-06-04 RX ORDER — PHENTERMINE AND TOPIRAMATE 7.5; 46 MG/1; MG/1
7.5 CAPSULE, EXTENDED RELEASE ORAL DAILY
Qty: 30 CAPSULE | Refills: 0 | OUTPATIENT
Start: 2024-06-04

## 2024-06-04 NOTE — TELEPHONE ENCOUNTER
Rx Refill Note  Requested Prescriptions     Pending Prescriptions Disp Refills    Phentermine-Topiramate (Qsymia) 7.5-46 MG capsule sustained-release 24 hr 30 capsule 0     Sig: Take 7.5 mg by mouth Daily.      Last office visit with prescribing clinician: 9/7/2023   Last telemedicine visit with prescribing clinician: Visit date not found   Next office visit with prescribing clinician: Visit date not found                         Would you like a call back once the refill request has been completed: [] Yes [] No    If the office needs to give you a call back, can they leave a voicemail: [] Yes [] No    Rachel Randolph RN  06/04/24, 07:08 CDT

## 2024-06-11 ENCOUNTER — OFFICE VISIT (OUTPATIENT)
Dept: INTERNAL MEDICINE | Facility: CLINIC | Age: 59
End: 2024-06-11
Payer: COMMERCIAL

## 2024-06-11 VITALS
TEMPERATURE: 98.2 F | HEART RATE: 74 BPM | SYSTOLIC BLOOD PRESSURE: 124 MMHG | HEIGHT: 63 IN | RESPIRATION RATE: 17 BRPM | BODY MASS INDEX: 32.6 KG/M2 | DIASTOLIC BLOOD PRESSURE: 78 MMHG | WEIGHT: 184 LBS | OXYGEN SATURATION: 97 %

## 2024-06-11 DIAGNOSIS — M54.17 RADICULAR PAIN OF LUMBOSACRAL REGION: ICD-10-CM

## 2024-06-11 DIAGNOSIS — R25.1 TREMOR: ICD-10-CM

## 2024-06-11 DIAGNOSIS — M51.36 DDD (DEGENERATIVE DISC DISEASE), LUMBAR: Primary | ICD-10-CM

## 2024-06-11 PROCEDURE — 99213 OFFICE O/P EST LOW 20 MIN: CPT | Performed by: NURSE PRACTITIONER

## 2024-06-11 NOTE — PROGRESS NOTES
Subjective     Chief Complaint   Patient presents with    Foot Pain       History of Present Illness  The patient presents for evaluation of multiple medical concerns.    The patient reports persistent numbness in her feet, which she first noticed approximately 1.5 months ago. Despite extensive physical activity, including prolonged standing at [employer], the numbness did not significantly impact her gait. The numbness extends from the ball of her foot down to the lateral aspect of her calf, reminiscent of a muscle strain. She has a history of bilateral knee replacements. Additionally, she suffers from chronic back pain, which is exacerbated by her physical activities, such as lifting heavy objects. An x-ray was conducted in 2021. She also suffers from bladder and bowel control issues, which have recently worsened due to her back pain. The pain has intensified over the past weekend. She has been managing her pain with lidocaine patches and has Zynex at home, but she has not been using it. Gabapentin was previously effective during her knee surgery. She has also received steroid injections, which provided some relief. She also takes diclofenac, which provides relief. The pain intensifies at night when she is in a supine position.    Otherwise complete ROS reviewed and negative except as mentioned in the HPI.    Past Medical History:   Past Medical History:   Diagnosis Date    Arthritis     Cancer     lymphoma of small bowel    Family history of colon cancer     GERD (gastroesophageal reflux disease)     Glaucoma     Hyperlipidemia     Hypothyroidism     TIA (transient ischemic attack)      Past Surgical History:  Past Surgical History:   Procedure Laterality Date    BREAST EXCISIONAL BIOPSY Left 01/2016    Benign    CHOLECYSTECTOMY      COLON RESECTION SMALL BOWEL  2014    COLONOSCOPY  09/05/2019    Dr. Tabares-Internal and external hemorrhoids; Repeat 10 years    ENDOSCOPY  09/05/2019    Dr. Tabares-Possible  Hightower's esophagus-biopsied    ENDOSCOPY N/A 06/24/2022    LA Grade C reflux esophagitis with no bleeding; A large amount of food (residue) in the stomach; Normal examined duodenum; No specimens collected; Repeat 6 weeks    ENDOSCOPY N/A 08/05/2022    Normal esophagus; Normal stomach; Normal examined duodenum; No specimens collected    KNEE ARTHROSCOPY Right 07/14/2020    Procedure: RIGHT KNEE PARTIAL MEDIAL MENISCECTOMY;  Surgeon: Vijay Grewal MD;  Location:  [facility];  Service: Orthopedics;  Laterality: Right;    LAPAROSCOPIC TUBAL LIGATION      TOTAL KNEE ARTHROPLASTY Right 07/28/2021    Procedure: RIGHT TOTAL KNEE REPLACEMENT;  Surgeon: Eliecer Foley MD;  Location:  [facility];  Service: Orthopedics;  Laterality: Right;    TRANSURETHRAL RESECTION OF BLADDER TUMOR N/A 07/12/2022    Procedure: CYSTOSCOPY TRANSURETHRAL RESECTION OF BLADDER TUMOR;  Surgeon: Joel Poe MD;  Location:  [facility];  Service: Urology;  Laterality: N/A;    VAGINAL MESH REVISION      2010/2016    WISDOM TOOTH EXTRACTION       Social History:  reports that she quit smoking about 23 years ago. Her smoking use included cigarettes. She started smoking about 33 years ago. She has a 5 pack-year smoking history. She has never used smokeless tobacco. She reports that she does not currently use alcohol. She reports that she does not use drugs.    Family History: family history includes Cancer in her maternal aunt; Colon cancer (age of onset: 68) in her paternal aunt; Colon cancer (age of onset: 80) in her paternal aunt; Diverticulitis in her mother; Heart defect in her mother; Liver cancer in her paternal aunt; No Known Problems in her father and sister; Stomach cancer (age of onset: 70) in her paternal aunt.       Allergies:  No Known Allergies  Medications:  Prior to Admission medications    Medication Sig Start Date End Date Taking? Authorizing Provider   azelastine (ASTELIN) 0.1 % nasal spray Instill 2 sprays into each  nostril as directed by provider 2 (Two) Times a Day. 9/7/23   Luz Mota APRN   cholestyramine (Questran) 4 g packet Take 1 packet by mouth 3 (Three) Times a Day With Meals. 3/28/24   Gibson George MD   Cyanocobalamin (B-12) 500 MCG sublingual tablet 500 mcgs under the tongue every other day 9/15/23   Kalen Babin MD   cyclobenzaprine (FLEXERIL) 5 MG tablet Take 1 tablet by mouth 3 (Three) Times a Day As Needed for Muscle Spasms. 4/10/23   Luz Mota APRN   diclofenac (VOLTAREN) 50 MG EC tablet Take 1 tablet by mouth 2 (Two) Times a Day As Needed for Pain. 1/24/24   Luz Mota APRN   estradiol (MINIVELLE, VIVELLE-DOT) 0.05 MG/24HR patch Apply 1 patch to the appropriate area as directed twice weekly **Remove old patch before applying new patch** 9/15/23   Kalen Babin MD   lidocaine (XYLOCAINE) 5 % ointment Apply 1 application  topically to the appropriate area as directed Every 2 (Two) Hours As Needed for Mild Pain. 11/29/23   Gibson George MD   linaclotide (Linzess) 72 MCG capsule capsule Take 1 capsule by mouth Every Morning Before Breakfast. 4/19/24   Christina Vaughn APRN   Mirabegron ER (Myrbetriq) 25 MG tablet sustained-release 24 hour 24 hr tablet Take 1 tablet by mouth Daily. 9/7/23   Luz Mota APRN   montelukast (Singulair) 10 MG tablet Take 1 tablet by mouth Every Night. 9/7/23   Luz Mota APRN   ondansetron ODT (Zofran ODT) 4 MG disintegrating tablet Place 1 tablet on the tongue Every 6 (Six) Hours As Needed for Nausea. 8/2/22   Jeannette Justin DO   oxyCODONE-acetaminophen (Percocet) 5-325 MG per tablet Take 1 tablet by mouth Every 6 (Six) Hours As Needed for Moderate Pain. 5/23/24   Gibson George MD   pantoprazole (PROTONIX) 40 MG EC tablet Take 1 tablet by mouth Daily. 10/19/23   Christina Vaughn APRN   Phentermine-Topiramate (Qsymia) 11.25-69 MG capsule  "sustained-release 24 hr Take 1 each by mouth Daily. 6/4/24   Luz Mota APRN   Phentermine-Topiramate 7.5-46 MG capsule sustained-release 24 hr Take 7.5 mg by mouth Daily. 4/30/24   Luz Mota APRN   Progesterone (Prometrium) 100 MG capsule Take 1 capsule by mouth Daily. 9/15/23   Kalen Babin MD   saccharomyces boulardii (FLORASTOR) 250 MG capsule Take 1 capsule by mouth 2 (Two) Times a Day.    Provider, MD Rosalba   venlafaxine XR (EFFEXOR-XR) 75 MG 24 hr capsule Take 1 capsule by mouth Daily. 8/25/23   Apple Ivan APRN       Objective     Vital Signs: /78   Pulse 74   Temp 98.2 °F (36.8 °C)   Resp 17   Ht 160 cm (63\")   Wt 83.5 kg (184 lb)   LMP 06/30/2019   SpO2 97%   BMI 32.59 kg/m²     Physical Exam    Physical Exam  Vitals reviewed.   Constitutional:       Appearance: She is well-developed. She is obese.   HENT:      Head: Normocephalic and atraumatic.   Eyes:      Pupils: Pupils are equal, round, and reactive to light.   Neck:      Vascular: No JVD.   Cardiovascular:      Rate and Rhythm: Normal rate and regular rhythm.   Pulmonary:      Effort: Pulmonary effort is normal.   Abdominal:      General: Bowel sounds are normal.      Palpations: Abdomen is soft.   Musculoskeletal:         General: No deformity.      Cervical back: Normal range of motion and neck supple.   Lymphadenopathy:      Cervical: No cervical adenopathy.   Skin:     General: Skin is warm and dry.   Neurological:      General: No focal deficit present.      Mental Status: She is alert and oriented to person, place, and time.      Comments: Intention tremor right hand worse than left.    Psychiatric:         Behavior: Behavior normal.         Thought Content: Thought content normal.         Judgment: Judgment normal.        Results Reviewed:  Glucose   Date Value Ref Range Status   03/28/2024 95 70 - 99 mg/dL Final   07/08/2022 117 (H) 65 - 99 mg/dL Final     BUN   Date Value Ref " Range Status   03/28/2024 10 6 - 24 mg/dL Final   07/08/2022 14 6 - 20 mg/dL Final     Creatinine   Date Value Ref Range Status   03/28/2024 0.78 0.57 - 1.00 mg/dL Final   07/08/2022 0.80 0.57 - 1.00 mg/dL Final   09/17/2019 0.70 0.60 - 1.30 mg/dL Final     Comment:     Serial Number: [id number]:  [id number]     Sodium   Date Value Ref Range Status   03/28/2024 143 134 - 144 mmol/L Final   07/08/2022 141 136 - 145 mmol/L Final     Potassium   Date Value Ref Range Status   03/28/2024 4.4 3.5 - 5.2 mmol/L Final   07/08/2022 4.1 3.5 - 5.2 mmol/L Final     Chloride   Date Value Ref Range Status   03/28/2024 103 96 - 106 mmol/L Final   07/08/2022 106 98 - 107 mmol/L Final     CO2   Date Value Ref Range Status   07/08/2022 26.0 22.0 - 29.0 mmol/L Final     Total CO2   Date Value Ref Range Status   03/28/2024 25 20 - 29 mmol/L Final     Calcium   Date Value Ref Range Status   03/28/2024 9.6 8.7 - 10.2 mg/dL Final   07/08/2022 9.3 8.6 - 10.5 mg/dL Final     ALT (SGPT)   Date Value Ref Range Status   03/28/2024 67 (H) 0 - 32 IU/L Final   06/23/2022 36 (H) 1 - 33 U/L Final     AST (SGOT)   Date Value Ref Range Status   03/28/2024 25 0 - 40 IU/L Final   06/23/2022 27 1 - 32 U/L Final     WBC   Date Value Ref Range Status   03/28/2024 4.7 3.4 - 10.8 x10E3/uL Final     Hematocrit   Date Value Ref Range Status   03/28/2024 40.2 34.0 - 46.6 % Final   07/08/2022 39.2 34.0 - 46.6 % Final     Platelets   Date Value Ref Range Status   03/28/2024 218 150 - 450 x10E3/uL Final   07/08/2022 222 140 - 450 10*3/mm3 Final     Total Cholesterol   Date Value Ref Range Status   02/28/2022 284 (H) 0 - 200 mg/dL Final     Triglycerides   Date Value Ref Range Status   09/07/2023 104 0 - 149 mg/dL Final   02/28/2022 313 (H) 0 - 150 mg/dL Final     HDL Cholesterol   Date Value Ref Range Status   09/07/2023 62 >39 mg/dL Final   02/28/2022 67 (H) 40 - 60 mg/dL Final     LDL Chol Calc (NIH)   Date Value Ref Range Status   09/07/2023 155 (H) 0 - 99  mg/dL Final     LDL/HDL Ratio   Date Value Ref Range Status   02/28/2022 2.30  Final     Hemoglobin A1C   Date Value Ref Range Status   09/07/2023 5.9 (H) 4.8 - 5.6 % Final     Comment:              Prediabetes: 5.7 - 6.4           Diabetes: >6.4           Glycemic control for adults with diabetes: <7.0     02/28/2022 5.8 % Final       Results  Imaging  Advanced degenerative disease at L5-S1 and L4-L5 in 2021.      Assessment / Plan     Assessment/Plan:  Diagnoses and all orders for this visit:    1. DDD (degenerative disc disease), lumbar (Primary)  -     Ambulatory Referral to Neurosurgery    2. Radicular pain of lumbosacral region  -     Ambulatory Referral to Neurosurgery    3. Tremor  -     primidone (MYSOLINE) 50 MG tablet; Take 1 tablet by mouth Every Night.  Dispense: 30 tablet; Refill: 0        Assessment & Plan  1. Degenerative disc disease.  The patient's last MRI was conducted in 2021, revealing advanced degenerative disease at L5-S1 and at L4-L5. A consultation with Dr. Medeiros's NP will be sought to determine if an MRI is preferred prior to the consultation. Physical therapy may be necessary prior to the MRI.      No follow-ups on file. unless patient needs to be seen sooner or acute issues arise.    Code Status: Full.   Patient or patient representative verbalized consent for the use of Ambient Listening during the visit with  NAVEED Flores for chart documentation. 6/14/2024  17:44 CDT  I have discussed the patient results/orders and and plan/recommendation with them at today's visit.      Signed by:    NAVEED Flores Date: 06/14/24

## 2024-06-14 RX ORDER — PRIMIDONE 50 MG/1
50 TABLET ORAL NIGHTLY
Qty: 30 TABLET | Refills: 0 | Status: SHIPPED | OUTPATIENT
Start: 2024-06-14

## 2024-06-27 DIAGNOSIS — E66.09 CLASS 1 OBESITY DUE TO EXCESS CALORIES WITH SERIOUS COMORBIDITY IN ADULT, UNSPECIFIED BMI: ICD-10-CM

## 2024-06-28 RX ORDER — PHENTERMINE AND TOPIRAMATE 11.25; 69 MG/1; MG/1
1 CAPSULE, EXTENDED RELEASE ORAL DAILY
Qty: 30 CAPSULE | Refills: 0 | Status: SHIPPED | OUTPATIENT
Start: 2024-06-28

## 2024-06-28 NOTE — TELEPHONE ENCOUNTER
Rx Refill Note  Requested Prescriptions     Pending Prescriptions Disp Refills    Phentermine-Topiramate (Qsymia) 11.25-69 MG capsule sustained-release 24 hr 30 capsule 0     Sig: Take 1 each by mouth Daily.      Last office visit with prescribing clinician: 6/11/2024   Last telemedicine visit with prescribing clinician: Visit date not found   Next office visit with prescribing clinician: Visit date not found                         Would you like a call back once the refill request has been completed: [] Yes [] No    If the office needs to give you a call back, can they leave a voicemail: [] Yes [] No    Rachel Randolph RN  06/28/24, 07:15 CDT

## 2024-07-08 ENCOUNTER — OFFICE VISIT (OUTPATIENT)
Dept: NEUROSURGERY | Facility: CLINIC | Age: 59
End: 2024-07-08
Payer: COMMERCIAL

## 2024-07-08 VITALS — BODY MASS INDEX: 33.52 KG/M2 | WEIGHT: 189.2 LBS | HEIGHT: 63 IN

## 2024-07-08 DIAGNOSIS — R20.2 NUMBNESS AND TINGLING OF BOTH LOWER EXTREMITIES: ICD-10-CM

## 2024-07-08 DIAGNOSIS — M54.50 LUMBAR BACK PAIN: Primary | ICD-10-CM

## 2024-07-08 DIAGNOSIS — M79.604 PAIN IN BOTH LOWER EXTREMITIES: ICD-10-CM

## 2024-07-08 DIAGNOSIS — E66.09 CLASS 1 OBESITY DUE TO EXCESS CALORIES WITH SERIOUS COMORBIDITY AND BODY MASS INDEX (BMI) OF 33.0 TO 33.9 IN ADULT: ICD-10-CM

## 2024-07-08 DIAGNOSIS — R20.0 NUMBNESS AND TINGLING OF BOTH LOWER EXTREMITIES: ICD-10-CM

## 2024-07-08 DIAGNOSIS — M79.605 PAIN IN BOTH LOWER EXTREMITIES: ICD-10-CM

## 2024-07-08 PROCEDURE — 99214 OFFICE O/P EST MOD 30 MIN: CPT | Performed by: NURSE PRACTITIONER

## 2024-07-08 NOTE — PROGRESS NOTES
Primary Care Provider: Luz Mota APRN    Chief Complaint:   Chief Complaint   Patient presents with    Back Pain     Pt is here with complaints of back pain, bilateral leg pain, numbness and tingling.       History of Present Illness    HPI:  Desirae Alberto is a 59 y.o. female who presents today with with a complaint of lumbar back and bilateral leg pain and dysesthesias, 70% back, 30% legs.  No previous spine surgeries.    Intermittent flareups of lumbar back pain over the past 25+ years that began after lifting a basket full of wet clothing.  Ms. Alberto lumbar back pain is currently constant in nature.  She additionally reports intermittent pain that radiates down the lateral thigh and leg to the foot, right = left, as well as intermittent nondermatomal lower extremity numbness and tingling.  Her back and leg pain worsens at night while lying down and with physical activity that requires lifting and tugging.  Minimal alleviation of her discomfort with chiropractic care, use of diclofenac and Flexeril, as well as Percocet which she take sparingly.  She denies gait or balance abnormalities or falls.  Ms. Alberto additionally denies fevers, chills, night sweats, unexplained weight loss, saddle anesthesia, or bowel or bladder dysfunction. She currently rates the severity of her symptoms 4/10.      Ms. Alberto has not recently completed a dedicated course of physician directed physical therapy, massage care, nor been evaluated by pain management.     ROS  Review of Systems   Constitutional: Negative.    HENT: Negative.     Eyes: Negative.    Respiratory: Negative.     Cardiovascular: Negative.    Gastrointestinal: Negative.    Endocrine: Negative.    Genitourinary: Negative.    Musculoskeletal:  Positive for back pain.   Skin: Negative.    Allergic/Immunologic: Negative.    Neurological:  Positive for numbness.   Hematological: Negative.    Psychiatric/Behavioral: Negative.     All other systems  reviewed and are negative.    Past Medical History:   Diagnosis Date    Arthritis     Cancer     lymphoma of small bowel    Family history of colon cancer     GERD (gastroesophageal reflux disease)     Glaucoma     Hyperlipidemia     Hypothyroidism     TIA (transient ischemic attack)      Past Surgical History:   Procedure Laterality Date    BREAST EXCISIONAL BIOPSY Left 01/2016    Benign    CHOLECYSTECTOMY      COLON RESECTION SMALL BOWEL  2014    COLONOSCOPY  09/05/2019    Dr. Tabares-Internal and external hemorrhoids; Repeat 10 years    ENDOSCOPY  09/05/2019    Dr. Tabares-Possible Hightower's esophagus-biopsied    ENDOSCOPY N/A 06/24/2022    LA Grade C reflux esophagitis with no bleeding; A large amount of food (residue) in the stomach; Normal examined duodenum; No specimens collected; Repeat 6 weeks    ENDOSCOPY N/A 08/05/2022    Normal esophagus; Normal stomach; Normal examined duodenum; No specimens collected    KNEE ARTHROSCOPY Right 07/14/2020    Procedure: RIGHT KNEE PARTIAL MEDIAL MENISCECTOMY;  Surgeon: Vijay Grewal MD;  Location: [facility] OR;  Service: Orthopedics;  Laterality: Right;    LAPAROSCOPIC TUBAL LIGATION      TOTAL KNEE ARTHROPLASTY Right 07/28/2021    Procedure: RIGHT TOTAL KNEE REPLACEMENT;  Surgeon: Eliecer Foley MD;  Location: [facility] OR;  Service: Orthopedics;  Laterality: Right;    TRANSURETHRAL RESECTION OF BLADDER TUMOR N/A 07/12/2022    Procedure: CYSTOSCOPY TRANSURETHRAL RESECTION OF BLADDER TUMOR;  Surgeon: Joel Poe MD;  Location: [facility] OR;  Service: Urology;  Laterality: N/A;    VAGINAL MESH REVISION      2010/2016    WISDOM TOOTH EXTRACTION       Family History: family history includes Cancer in her maternal aunt; Colon cancer (age of onset: 68) in her paternal aunt; Colon cancer (age of onset: 80) in her paternal aunt; Diverticulitis in her mother; Heart defect in her mother; Liver cancer in her paternal aunt; No Known Problems in her father and sister;  Stomach cancer (age of onset: 70) in her paternal aunt.    Social History:  reports that she quit smoking about 23 years ago. Her smoking use included cigarettes. She started smoking about 33 years ago. She has a 5 pack-year smoking history. She has never used smokeless tobacco. She reports that she does not currently use alcohol. She reports that she does not use drugs.    Medications:    Current Outpatient Medications:     azelastine (ASTELIN) 0.1 % nasal spray, Instill 2 sprays into each nostril as directed by provider 2 (Two) Times a Day., Disp: 30 mL, Rfl: 12    cholestyramine (Questran) 4 g packet, Take 1 packet by mouth 3 (Three) Times a Day With Meals., Disp: 90 packet, Rfl: 11    Cyanocobalamin (B-12) 500 MCG sublingual tablet, 500 mcgs under the tongue every other day, Disp: 15 tablet, Rfl: 11    cyclobenzaprine (FLEXERIL) 5 MG tablet, Take 1 tablet by mouth 3 (Three) Times a Day As Needed for Muscle Spasms., Disp: 30 tablet, Rfl: 1    diclofenac (VOLTAREN) 50 MG EC tablet, Take 1 tablet by mouth 2 (Two) Times a Day As Needed for Pain., Disp: 60 tablet, Rfl: 5    estradiol (MINIVELLE, VIVELLE-DOT) 0.05 MG/24HR patch, Apply 1 patch to the appropriate area as directed twice weekly **Remove old patch before applying new patch**, Disp: 8 patch, Rfl: 11    lidocaine (XYLOCAINE) 5 % ointment, Apply 1 application  topically to the appropriate area as directed Every 2 (Two) Hours As Needed for Mild Pain., Disp: 35.44 g, Rfl: 11    linaclotide (Linzess) 72 MCG capsule capsule, Take 1 capsule by mouth Every Morning Before Breakfast., Disp: 30 capsule, Rfl: 6    Mirabegron ER (Myrbetriq) 25 MG tablet sustained-release 24 hour 24 hr tablet, Take 1 tablet by mouth Daily., Disp: 30 tablet, Rfl: 1    montelukast (Singulair) 10 MG tablet, Take 1 tablet by mouth Every Night., Disp: 90 tablet, Rfl: 1    ondansetron ODT (Zofran ODT) 4 MG disintegrating tablet, Place 1 tablet on the tongue Every 6 (Six) Hours As Needed for  "Nausea., Disp: 12 tablet, Rfl: 1    oxyCODONE-acetaminophen (Percocet) 5-325 MG per tablet, Take 1 tablet by mouth Every 6 (Six) Hours As Needed for Moderate Pain., Disp: 12 tablet, Rfl: 0    pantoprazole (PROTONIX) 40 MG EC tablet, Take 1 tablet by mouth Daily., Disp: 30 tablet, Rfl: 11    Phentermine-Topiramate (Qsymia) 11.25-69 MG capsule sustained-release 24 hr, Take 1 capsule by mouth Daily., Disp: 30 capsule, Rfl: 0    Phentermine-Topiramate 7.5-46 MG capsule sustained-release 24 hr, Take 7.5 mg by mouth Daily., Disp: 30 capsule, Rfl: 0    Progesterone (Prometrium) 100 MG capsule, Take 1 capsule by mouth Daily., Disp: 30 capsule, Rfl: 11    saccharomyces boulardii (FLORASTOR) 250 MG capsule, Take 1 capsule by mouth 2 (Two) Times a Day., Disp: , Rfl:     primidone (MYSOLINE) 50 MG tablet, Take 1 tablet by mouth Every Night., Disp: 30 tablet, Rfl: 0    venlafaxine XR (EFFEXOR-XR) 75 MG 24 hr capsule, Take 1 capsule by mouth Daily., Disp: 90 capsule, Rfl: 3    Allergies:  Patient has no known allergies.    Objective   Ht 160 cm (63\")   Wt 85.8 kg (189 lb 3.2 oz)   LMP 06/30/2019   BMI 33.52 kg/m²   Physical Exam  Vitals and nursing note reviewed.   Constitutional:       General: She is not in acute distress.     Appearance: Normal appearance. She is well-developed and well-groomed. She is obese. She is not ill-appearing, toxic-appearing or diaphoretic.      Comments: BMI 33.52   HENT:      Head: Normocephalic and atraumatic.      Right Ear: Hearing normal.      Left Ear: Hearing normal.   Eyes:      Extraocular Movements: EOM normal.      Conjunctiva/sclera: Conjunctivae normal.      Pupils: Pupils are equal, round, and reactive to light.   Neck:      Trachea: Trachea normal.   Cardiovascular:      Rate and Rhythm: Normal rate and regular rhythm.   Pulmonary:      Effort: Pulmonary effort is normal. No tachypnea, bradypnea, accessory muscle usage or respiratory distress.   Abdominal:      Palpations: Abdomen " is soft.   Musculoskeletal:      Cervical back: Full passive range of motion without pain and neck supple.   Skin:     General: Skin is warm and dry.   Neurological:      Mental Status: She is alert and oriented to person, place, and time.      GCS: GCS eye subscore is 4. GCS verbal subscore is 5. GCS motor subscore is 6.      Gait: Gait is intact.      Deep Tendon Reflexes:      Reflex Scores:       Tricep reflexes are 1+ on the right side and 1+ on the left side.       Bicep reflexes are 1+ on the right side and 1+ on the left side.       Brachioradialis reflexes are 1+ on the right side and 1+ on the left side.       Patellar reflexes are 1+ on the right side and 1+ on the left side.       Achilles reflexes are 0 on the right side and 0 on the left side.  Psychiatric:         Speech: Speech normal.         Behavior: Behavior normal. Behavior is cooperative.       Neurologic Exam     Mental Status   Oriented to person, place, and time.   Attention: normal. Concentration: normal.   Speech: speech is normal   Level of consciousness: alert    Cranial Nerves     CN II   Visual fields full to confrontation.     CN III, IV, VI   Pupils are equal, round, and reactive to light.  Extraocular motions are normal.     CN V   Facial sensation intact.     CN VII   Facial expression full, symmetric.     CN VIII   CN VIII normal.     CN IX, X   CN IX normal.     CN XI   CN XI normal.     Motor Exam   Right arm tone: normal  Left arm tone: normal  Right leg tone: normal  Left leg tone: normal    Strength   Right deltoid: 5/5  Left deltoid: 5/5  Right biceps: 5/5  Left biceps: 5/5  Right triceps: 5/5  Left triceps: 5/5  Right wrist extension: 5/5  Left wrist extension: 5/5  Right iliopsoas: 5/5  Left iliopsoas: 5/5  Right quadriceps: 5/5  Left quadriceps: 5/5  Right anterior tibial: 5/5  Left anterior tibial: 5/5  Right gastroc: 5/5  Left gastroc: 5/5  Right EHL 5/5  Left EHL 5/5       Sensory Exam   Right arm light touch:  normal  Left arm light touch: normal    Gait, Coordination, and Reflexes     Gait  Gait: normal    Tremor   Resting tremor: absent  Intention tremor: absent  Action tremor: absent    Reflexes   Right brachioradialis: 1+  Left brachioradialis: 1+  Right biceps: 1+  Left biceps: 1+  Right triceps: 1+  Left triceps: 1+  Right patellar: 1+  Left patellar: 1+  Right achilles: 0  Left achilles: 0  Right plantar: equivocal  Left plantar: equivocal  Right Ross: absent  Left Ross: absent  Right ankle clonus: absent  Left ankle clonus: absent  Right pendular knee jerk: absent  Left pendular knee jerk: absent    Imaging: (independent review and interpretation)  No radiology results for the last 30 days.    ASSESSMENT and PLAN  Desirae Alberto is a 59 y.o. female with significant medical comorbidities to include TIA, GI related cancer, hyperlipidemia, hypothyroidism, GERD, former smoker, and obesity.  She presents with a new problem of lumbar back and bilateral leg pain and dysesthesias, 70% back, 30% legs.  Physical exam findings of gross hyporeflexia.  No radiology results for the last 30 days.    RECOMMENDATIONS ...  Lumbar back pain  Bilateral leg pain  Bilateral nondermatomal lower extremity numbness and tingling  For further evaluation we will proceed today by obtaining x-rays of the lumbar spine complete with flexion and extension to assess for areas of instability.  As a means of first-line conservative management for lumbar back pain, we will send her for a dedicated course of physician directed physical therapy; Rx provided.    We will have her return for reassessment with me after completion of physical therapy.  I advised the patient to call to return sooner for new or worsening complaints of weakness, paresthesias, gait disturbances, or any additional concerns.  Treatment options discussed in detail with Desirae and they voiced understanding and agree with this plan of care.    Class 1 obesity (BMI 30.0-34.9)  due to excessive calories with serious comorbidities BMI of 33.0-33.9 in adult  Body mass index is 33.52 kg/m². Information on the DASH diet provided in the AVS.  We will continue to provided diet and exercise information with the goal of weight loss at each scheduled appointment.     Diagnoses and all orders for this visit:    1. Lumbar back pain (Primary)  -     XR Spine Lumbar Complete With Flex & Ext; Future  -     Ambulatory Referral to Physical Therapy for Evaluation & Treatment    2. Pain in both lower extremities  -     Ambulatory Referral to Physical Therapy for Evaluation & Treatment    3. Numbness and tingling of both lower extremities    4. Class 1 obesity due to excess calories with serious comorbidity and body mass index (BMI) of 33.0 to 33.9 in adult      Return for FOLLOW WITH TOYA AFTER COMPLETION OF PT.    Thank you for this Consultation and the opportunity to participate in [facility]'s care.    Sincerely,  Toya Thompson, APRN

## 2024-07-08 NOTE — PATIENT INSTRUCTIONS
"DASH Eating Plan  DASH stands for Dietary Approaches to Stop Hypertension. The DASH eating plan is a healthy eating plan that has been shown to:  Lower high blood pressure (hypertension).  Reduce your risk for type 2 diabetes, heart disease, and stroke.  Help with weight loss.  What are tips for following this plan?  Reading food labels  Check food labels for the amount of salt (sodium) per serving. Choose foods with less than 5 percent of the Daily Value (DV) of sodium. In general, foods with less than 300 milligrams (mg) of sodium per serving fit into this eating plan.  To find whole grains, look for the word \"whole\" as the first word in the ingredient list.  Shopping  Buy products labeled as \"low-sodium\" or \"no salt added.\"  Buy fresh foods. Avoid canned foods and pre-made or frozen meals.  Cooking  Try not to add salt when you cook. Use salt-free seasonings or herbs instead of table salt or sea salt. Check with your health care provider or pharmacist before using salt substitutes.  Do not crook foods. Cook foods in healthy ways, such as baking, boiling, grilling, roasting, or broiling.  Cook using oils that are good for your heart. These include olive, canola, avocado, soybean, and sunflower oil.  Meal planning    Eat a balanced diet. This should include:  4 or more servings of fruits and 4 or more servings of vegetables each day. Try to fill half of your plate with fruits and vegetables.  6-8 servings of whole grains each day.  6 or less servings of lean meat, poultry, or fish each day. 1 oz is 1 serving. A 3 oz (85 g) serving of meat is about the same size as the palm of your hand. One egg is 1 oz (28 g).  2-3 servings of low-fat dairy each day. One serving is 1 cup (237 mL).  1 serving of nuts, seeds, or beans 5 times each week.  2-3 servings of heart-healthy fats. Healthy fats called omega-3 fatty acids are found in foods such as walnuts, flaxseeds, fortified milks, and eggs. These fats are also found in " cold-water fish, such as sardines, salmon, and mackerel.  Limit how much you eat of:  Canned or prepackaged foods.  Food that is high in trans fat, such as fried foods.  Food that is high in saturated fat, such as fatty meat.  Desserts and other sweets, sugary drinks, and other foods with added sugar.  Full-fat dairy products.  Do not salt foods before eating.  Do not eat more than 4 egg yolks a week.  Try to eat at least 2 vegetarian meals a week.  Eat more home-cooked food and less restaurant, buffet, and fast food.  Lifestyle  When eating at a restaurant, ask if your food can be made with less salt or no salt.  If you drink alcohol:  Limit how much you have to:  0-1 drink a day if you are female.  0-2 drinks a day if you are male.  Know how much alcohol is in your drink. In the [place] one drink is one 12 oz bottle of beer (355 mL), one 5 oz glass of wine (148 mL), or one 1½ oz glass of hard liquor (44 mL).  General information  Avoid eating more than 2,300 mg of salt a day. If you have hypertension, you may need to reduce your sodium intake to 1,500 mg a day.  Work with your provider to stay at a healthy body weight or lose weight. Ask what the best weight range is for you.  On most days of the week, get at least 30 minutes of exercise that causes your heart to beat faster. This may include walking, swimming, or biking.  Work with your provider or dietitian to adjust your eating plan to meet your specific calorie needs.  What foods should I eat?  Fruits  All fresh, dried, or frozen fruit. Canned fruits that are in their natural juice and do not have sugar added to them.  Vegetables  Fresh or frozen vegetables that are raw, steamed, roasted, or grilled. Low-sodium or reduced-sodium tomato and vegetable juice. Low-sodium or reduced-sodium tomato sauce and tomato paste. Low-sodium or reduced-sodium canned vegetables.  Grains  Whole-grain or whole-wheat bread. Whole-grain or whole-wheat pasta. Brown rice. Oatmeal.  Quinoa. Bulgur. Whole-grain and low-sodium cereals. Yuki bread. Low-fat, low-sodium crackers. Whole-wheat flour tortillas.  Meats and other proteins  Skinless chicken or turkey. Ground chicken or turkey. Pork with fat trimmed off. Fish and seafood. Egg whites. Dried beans, peas, or lentils. Unsalted nuts, nut butters, and seeds. Unsalted canned beans. Lean cuts of beef with fat trimmed off. Low-sodium, lean precooked or cured meat, such as sausages or meat loaves.  Dairy  Low-fat (1%) or fat-free (skim) milk. Reduced-fat, low-fat, or fat-free cheeses. Nonfat, low-sodium ricotta or cottage cheese. Low-fat or nonfat yogurt. Low-fat, low-sodium cheese.  Fats and oils  Soft margarine without trans fats. Vegetable oil. Reduced-fat, low-fat, or light mayonnaise and salad dressings (reduced-sodium). Canola, safflower, olive, avocado, soybean, and sunflower oils. Avocado.  Seasonings and condiments  Herbs. Spices. Seasoning mixes without salt.  Other foods  Unsalted popcorn and pretzels. Fat-free sweets.  The items listed above may not be all the foods and drinks you can have. Talk to a dietitian to learn more.  What foods should I avoid?  Fruits  Canned fruit in a light or heavy syrup. Fried fruit. Fruit in cream or butter sauce.  Vegetables  Creamed or fried vegetables. Vegetables in a cheese sauce. Regular canned vegetables that are not marked as low-sodium or reduced-sodium. Regular canned tomato sauce and paste that are not marked as low-sodium or reduced-sodium. Regular tomato and vegetable juices that are not marked as low-sodium or reduced-sodium. Pickles. Olives.  Grains  Baked goods made with fat, such as croissants, muffins, or some breads. Dry pasta or rice meal packs.  Meats and other proteins  Fatty cuts of meat. Ribs. Fried meat. Koehler. Bologna, salami, and other precooked or cured meats, such as sausages or meat loaves, that are not lean and low in sodium. Fat from the back of a pig (fatback). Neeraj.  Salted nuts and seeds. Canned beans with added salt. Canned or smoked fish. Whole eggs or egg yolks. Chicken or turkey with skin.  Dairy  Whole or 2% milk, cream, and half-and-half. Whole or full-fat cream cheese. Whole-fat or sweetened yogurt. Full-fat cheese. Nondairy creamers. Whipped toppings. Processed cheese and cheese spreads.  Fats and oils  Butter. Stick margarine. Lard. Shortening. Ghee. Koehler fat. Tropical oils, such as coconut, palm kernel, or palm oil.  Seasonings and condiments  Onion salt, garlic salt, seasoned salt, table salt, and sea salt. Worcestershire sauce. Tartar sauce. Barbecue sauce. Teriyaki sauce. Soy sauce, including reduced-sodium soy sauce. Steak sauce. Canned and packaged gravies. Fish sauce. Oyster sauce. Cocktail sauce. Store-bought horseradish. Ketchup. Mustard. Meat flavorings and tenderizers. Bouillon cubes. Hot sauces. Pre-made or packaged marinades. Pre-made or packaged taco seasonings. Relishes. Regular salad dressings.  Other foods  Salted popcorn and pretzels.  The items listed above may not be all the foods and drinks you should avoid. Talk to a dietitian to learn more.  Where to find more information  National Heart, Lung, and [employer] (NHLBI): nhlbi.nih.gov  American Heart Association (AHA): heart.org  Academy of Nutrition and Dietetics: eatright.org  National Kidney Foundation (NKF): kidney.org  This information is not intended to replace advice given to you by your health care provider. Make sure you discuss any questions you have with your health care provider.  Document Revised: 01/04/2024 Document Reviewed: 01/04/2024  Elsevier Patient Education © 2024 [employer] Inc.

## 2024-07-10 DIAGNOSIS — E66.09 CLASS 1 OBESITY DUE TO EXCESS CALORIES WITH SERIOUS COMORBIDITY IN ADULT, UNSPECIFIED BMI: ICD-10-CM

## 2024-07-10 DIAGNOSIS — G60.9 HEREDITARY PERIPHERAL NEUROPATHY: ICD-10-CM

## 2024-07-10 DIAGNOSIS — M48.061 SPINAL STENOSIS OF LUMBAR REGION, UNSPECIFIED WHETHER NEUROGENIC CLAUDICATION PRESENT: ICD-10-CM

## 2024-07-10 DIAGNOSIS — M51.37 DEGENERATION OF LUMBAR OR LUMBOSACRAL INTERVERTEBRAL DISC: ICD-10-CM

## 2024-07-10 RX ORDER — PHENTERMINE AND TOPIRAMATE 15; 92 MG/1; MG/1
1 CAPSULE, EXTENDED RELEASE ORAL DAILY
Qty: 30 CAPSULE | Refills: 1 | Status: SHIPPED | OUTPATIENT
Start: 2024-07-10

## 2024-08-26 DIAGNOSIS — N95.1 MENOPAUSAL SYMPTOMS: ICD-10-CM

## 2024-08-26 RX ORDER — VENLAFAXINE HYDROCHLORIDE 75 MG/1
75 CAPSULE, EXTENDED RELEASE ORAL DAILY
Qty: 90 CAPSULE | Refills: 3 | Status: SHIPPED | OUTPATIENT
Start: 2024-08-26

## 2024-09-03 ENCOUNTER — TELEPHONE (OUTPATIENT)
Dept: NEUROSURGERY | Facility: CLINIC | Age: 59
End: 2024-09-03
Payer: COMMERCIAL

## 2024-09-03 NOTE — TELEPHONE ENCOUNTER
----- Message from [facility] Keegy sent at 9/2/2024  8:16 PM CDT -----  Regarding: Physical Therapy   Contact: [phone]  Jatin Menchaca,   I wanted you to know status on the physical therapy you wanted me to have. My  went back to his [employer] [facility] & we don’t have insurance until Oct 1st.   I plan on following through w/therapy, once in effect.     Desirae

## 2024-09-11 ENCOUNTER — TELEPHONE (OUTPATIENT)
Dept: INTERNAL MEDICINE | Facility: CLINIC | Age: 59
End: 2024-09-11
Payer: COMMERCIAL

## 2024-09-11 DIAGNOSIS — K21.9 GASTROESOPHAGEAL REFLUX DISEASE, UNSPECIFIED WHETHER ESOPHAGITIS PRESENT: ICD-10-CM

## 2024-09-11 RX ORDER — PROGESTERONE 100 MG/1
100 CAPSULE ORAL DAILY
Qty: 30 CAPSULE | Refills: 11 | Status: SHIPPED | OUTPATIENT
Start: 2024-09-11

## 2024-09-11 RX ORDER — PANTOPRAZOLE SODIUM 40 MG/1
40 TABLET, DELAYED RELEASE ORAL DAILY
Qty: 30 TABLET | Refills: 11 | Status: SHIPPED | OUTPATIENT
Start: 2024-09-11

## 2024-09-11 NOTE — TELEPHONE ENCOUNTER
Rx Refill Note  Requested Prescriptions     Pending Prescriptions Disp Refills    pantoprazole (PROTONIX) 40 MG EC tablet 30 tablet 11     Sig: Take 1 tablet by mouth Daily.    Progesterone (Prometrium) 100 MG capsule 30 capsule 11     Sig: Take 1 capsule by mouth Daily.      Last office visit with prescribing clinician: 6/11/2024   Last telemedicine visit with prescribing clinician: Visit date not found   Next office visit with prescribing clinician: Visit date not found                         Would you like a call back once the refill request has been completed: [] Yes [] No    If the office needs to give you a call back, can they leave a voicemail: [] Yes [] No    Maggie Neil MA  09/11/24, 08:57 CDT

## 2024-09-11 NOTE — TELEPHONE ENCOUNTER
Patient needs refills:    pantoprazole (PROTONIX) 40 MG EC tablet   Progesterone (Prometrium) 100 MG capsule     Send to [facility]   Attending Attestation (For Attendings USE Only)...

## 2024-09-17 DIAGNOSIS — R21 RASH: Primary | ICD-10-CM

## 2024-09-17 RX ORDER — CLINDAMYCIN HCL 300 MG
300 CAPSULE ORAL 3 TIMES DAILY
Qty: 21 CAPSULE | Refills: 0 | Status: SHIPPED | OUTPATIENT
Start: 2024-09-17 | End: 2024-09-24

## 2024-09-17 RX ORDER — TRIAMCINOLONE ACETONIDE 5 MG/G
1 OINTMENT TOPICAL 2 TIMES DAILY
Qty: 15 G | Refills: 0 | Status: SHIPPED | OUTPATIENT
Start: 2024-09-17

## 2024-09-30 RX ORDER — ESTRADIOL 0.05 MG/D
PATCH, EXTENDED RELEASE TRANSDERMAL
Qty: 8 PATCH | Refills: 11 | Status: SHIPPED | OUTPATIENT
Start: 2024-09-30

## 2024-10-01 RX ORDER — ESTRADIOL 0.05 MG/D
PATCH, EXTENDED RELEASE TRANSDERMAL
Qty: 8 PATCH | Refills: 11 | Status: SHIPPED | OUTPATIENT
Start: 2024-10-01

## 2024-10-08 ENCOUNTER — OFFICE VISIT (OUTPATIENT)
Dept: INTERNAL MEDICINE | Facility: CLINIC | Age: 59
End: 2024-10-08
Payer: COMMERCIAL

## 2024-10-08 VITALS
DIASTOLIC BLOOD PRESSURE: 60 MMHG | OXYGEN SATURATION: 96 % | HEIGHT: 63 IN | BODY MASS INDEX: 33.49 KG/M2 | WEIGHT: 189 LBS | SYSTOLIC BLOOD PRESSURE: 123 MMHG | HEART RATE: 86 BPM | TEMPERATURE: 98 F

## 2024-10-08 DIAGNOSIS — J30.1 SEASONAL ALLERGIC RHINITIS DUE TO POLLEN: ICD-10-CM

## 2024-10-08 DIAGNOSIS — E66.09 CLASS 1 OBESITY DUE TO EXCESS CALORIES WITH SERIOUS COMORBIDITY AND BODY MASS INDEX (BMI) OF 33.0 TO 33.9 IN ADULT: Primary | ICD-10-CM

## 2024-10-08 DIAGNOSIS — Z00.00 ANNUAL PHYSICAL EXAM: ICD-10-CM

## 2024-10-08 DIAGNOSIS — E66.811 CLASS 1 OBESITY DUE TO EXCESS CALORIES WITH SERIOUS COMORBIDITY AND BODY MASS INDEX (BMI) OF 33.0 TO 33.9 IN ADULT: Primary | ICD-10-CM

## 2024-10-08 DIAGNOSIS — M51.370 DEGENERATION OF INTERVERTEBRAL DISC OF LUMBOSACRAL REGION WITH DISCOGENIC BACK PAIN: ICD-10-CM

## 2024-10-08 PROBLEM — K21.9 GERD (GASTROESOPHAGEAL REFLUX DISEASE): Status: RESOLVED | Noted: 2022-07-01 | Resolved: 2024-10-08

## 2024-10-08 PROBLEM — Z78.9 DECREASED ACTIVITIES OF DAILY LIVING (ADL): Status: RESOLVED | Noted: 2021-07-29 | Resolved: 2024-10-08

## 2024-10-08 PROBLEM — Z80.0 FH: COLON CANCER: Status: RESOLVED | Noted: 2023-10-19 | Resolved: 2024-10-08

## 2024-10-08 PROBLEM — L03.316 CELLULITIS OF UMBILICUS: Status: RESOLVED | Noted: 2020-04-10 | Resolved: 2024-10-08

## 2024-10-08 PROBLEM — R10.9 ABDOMINAL PAIN: Status: RESOLVED | Noted: 2023-09-07 | Resolved: 2024-10-08
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
## 2024-10-08 PROBLEM — R32 URINARY INCONTINENCE: Status: RESOLVED | Noted: 2023-09-07 | Resolved: 2024-10-08
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
## 2024-10-08 PROBLEM — S20.469A: Status: RESOLVED | Noted: 2021-07-02 | Resolved: 2024-10-08

## 2024-10-08 PROBLEM — R51.9 HEADACHE: Status: RESOLVED | Noted: 2023-09-07 | Resolved: 2024-10-08

## 2024-10-08 PROBLEM — M23.203 OLD COMPLEX TEAR OF MEDIAL MENISCUS OF RIGHT KNEE: Status: RESOLVED | Noted: 2020-07-13 | Resolved: 2024-10-08

## 2024-10-08 PROBLEM — R61 UNEXPLAINED NIGHT SWEATS: Status: RESOLVED | Noted: 2019-09-06 | Resolved: 2024-10-08

## 2024-10-08 PROCEDURE — 99396 PREV VISIT EST AGE 40-64: CPT | Performed by: FAMILY MEDICINE

## 2024-10-08 RX ORDER — MONTELUKAST SODIUM 10 MG/1
10 TABLET ORAL NIGHTLY
Qty: 90 TABLET | Refills: 3 | Status: SHIPPED | OUTPATIENT
Start: 2024-10-08

## 2024-10-08 RX ORDER — CYCLOBENZAPRINE HCL 5 MG
5 TABLET ORAL 3 TIMES DAILY PRN
Qty: 90 TABLET | Refills: 11 | Status: SHIPPED | OUTPATIENT
Start: 2024-10-08

## 2024-10-08 NOTE — PROGRESS NOTES
"        Subjective     Chief Complaint   Patient presents with    Annual Exam       History of Present Illness    Patient's PMR from outside medical facility reviewed and noted.    Desirae Alberto is a 59 y.o. female who presents to establish a new Primary care physician and do her annual physical. I discussed at preventative health care and cancer screening with her and its role in reducing types of cancer and other health problems appropriate for the patient's age.  Also discussed vaccines and current status with the patient.  Patient understands the risks and benefits of screening and is currently up-to-date with current recommendations that she chosses.    At this patient up for routine labs including a CBC comprehensive metabolic panel lipid profile and TSH.    Patient had expressed interest in getting more information and seeking perhaps a pharmacological therapy to assist in weight management strategies.  she states that up until this point diet and exercise have been insufficient to achieve sustained weight loss alone.  she has tried the following medications in the past Wegovy, Phenteramine, and Topomax.    We discussed in lengthy detail today their PMH, current medications. Also we discussed all FDA approved medication options to assist in weight management. Further we discussed the general treatment principles, the pros/cons, risks/benefits, and limitations of our current knowledge. As such we navigated the question of \"is a medication needed at all\" as a general principle along with some of the still uncertainties about longer term questions of treatment (aka \"what will be the medication exit strategy\" or \"will I be on this medication long term?\"). Patient is instructed in methods for reducing total caloric intake including reducing portion sizes, reducing empty calories, and healthier choices.  she is advised that exercise is also a component of weight loss, and to slowly increase exercise to increase " tolerance.    Patient has no history of pancreatitis, no history of severe GI disease, is not pregnant at this time, and no personal history of thyroid cancers or gastroparesis.  she is not diabetic at this time.  Patient has no contraindications for any medications for obesity or than prior failure    Ordered labs today to look for other metabolic causes of weight gain.  Patient also has not been on any medications that could cause weight gain.  Current Body mass index is 33.48 kg/m². placing her in the obese (BMI >30) category with health related conditions that include osteoarthritis, urinary stress incontinence, and hepatic steatosis.  Taking into account the patients risk tolerances, personal perspectives and philosophies, we discussed how the patient wanted to proceed.    she would like to try Zepbound at this time in addition to diet and exercise.      The patient has received education on healthful physical activity and nutrition by qualified health professional for the purpose of behavioral modification and will continue while being treated with the requested antiobesity medication.      Patient has a history of some chronic lower back pain and states that Flexeril helped significantly with this would like to go ahead and get refills of her Flexeril at this time.    She states that her seasonal allergies remain very well-controlled with her Singulair and needs refills of this at this time as well.           Past Medical History:   Past Medical History:   Diagnosis Date    Arthritis     Cancer     lymphoma of small bowel    Family history of colon cancer     GERD (gastroesophageal reflux disease)     Glaucoma     Hyperlipidemia     Hypothyroidism     TIA (transient ischemic attack)      Past Surgical History:  Past Surgical History:   Procedure Laterality Date    BREAST EXCISIONAL BIOPSY Left 01/2016    Benign    CHOLECYSTECTOMY      COLON RESECTION SMALL BOWEL  2014    COLONOSCOPY  09/05/2019      Raysa-Internal and external hemorrhoids; Repeat 10 years    ENDOSCOPY  09/05/2019    Dr. Tabares-Possible Hightower's esophagus-biopsied    ENDOSCOPY N/A 06/24/2022    LA Grade C reflux esophagitis with no bleeding; A large amount of food (residue) in the stomach; Normal examined duodenum; No specimens collected; Repeat 6 weeks    ENDOSCOPY N/A 08/05/2022    Normal esophagus; Normal stomach; Normal examined duodenum; No specimens collected    KNEE ARTHROSCOPY Right 07/14/2020    Procedure: RIGHT KNEE PARTIAL MEDIAL MENISCECTOMY;  Surgeon: Vijay Grewal MD;  Location: [facility] OR;  Service: Orthopedics;  Laterality: Right;    LAPAROSCOPIC TUBAL LIGATION      TOTAL KNEE ARTHROPLASTY Right 07/28/2021    Procedure: RIGHT TOTAL KNEE REPLACEMENT;  Surgeon: Eliecer Foley MD;  Location: [facility] OR;  Service: Orthopedics;  Laterality: Right;    TRANSURETHRAL RESECTION OF BLADDER TUMOR N/A 07/12/2022    Procedure: CYSTOSCOPY TRANSURETHRAL RESECTION OF BLADDER TUMOR;  Surgeon: Joel Poe MD;  Location: [facility] OR;  Service: Urology;  Laterality: N/A;    VAGINAL MESH REVISION      2010/2016    WISDOM TOOTH EXTRACTION       Social History:  reports that she quit smoking about 23 years ago. Her smoking use included cigarettes. She started smoking about 33 years ago. She has a 5 pack-year smoking history. She has never used smokeless tobacco. She reports that she does not currently use alcohol. She reports that she does not use drugs.    Family History: family history includes Cancer in her maternal aunt; Colon cancer (age of onset: 68) in her paternal aunt; Colon cancer (age of onset: 80) in her paternal aunt; Diverticulitis in her mother; Heart defect in her mother; Liver cancer in her paternal aunt; No Known Problems in her father and sister; Stomach cancer (age of onset: 70) in her paternal aunt.      Allergies:  No Known Allergies  Medications:  Prior to Admission medications    Medication Sig Start Date  End Date Taking? Authorizing Provider   azelastine (ASTELIN) 0.1 % nasal spray Instill 2 sprays into each nostril as directed by provider 2 (Two) Times a Day. 9/7/23  Yes Luz Mota APRN   cyclobenzaprine (FLEXERIL) 5 MG tablet Take 1 tablet by mouth 3 (Three) Times a Day As Needed for Muscle Spasms. 4/10/23  Yes Luz Mota APRN   diclofenac (VOLTAREN) 50 MG EC tablet Take 1 tablet by mouth 2 (Two) Times a Day As Needed for Pain. 1/24/24  Yes Luz Mota APRN   estradiol (MINIVELLE, VIVELLE-DOT) 0.05 MG/24HR patch Apply 1 patch to the appropriate area as directed twice weekly **Remove old patch before applying new patch** 9/30/24  Yes Gibson George MD   estradiol (MINIVELLE, VIVELLE-DOT) 0.05 MG/24HR patch Apply 1 patch to the appropriate area as directed twice weekly **Remove old patch before applying new patch** 10/1/24  Yes Gibson George MD   lidocaine (XYLOCAINE) 5 % ointment Apply 1 application  topically to the appropriate area as directed Every 2 (Two) Hours As Needed for Mild Pain. 11/29/23  Yes Gibson George MD   linaclotide (Linzess) 72 MCG capsule capsule Take 1 capsule by mouth Every Morning Before Breakfast. 4/19/24  Yes Christina Vaughn APRN   montelukast (Singulair) 10 MG tablet Take 1 tablet by mouth Every Night. 9/7/23  Yes Luz Mota APRN   pantoprazole (PROTONIX) 40 MG EC tablet Take 1 tablet by mouth Daily. 9/11/24  Yes Luz Mota APRN   Progesterone (Prometrium) 100 MG capsule Take 1 capsule by mouth Daily. 9/11/24  Yes Luz Mota APRN   Mirabegron ER (Myrbetriq) 25 MG tablet sustained-release 24 hour 24 hr tablet Take 1 tablet by mouth Daily.  Patient not taking: Reported on 10/8/2024 9/7/23   uLz Mota APRN   Phentermine-Topiramate (Qsymia) 11.25-69 MG capsule sustained-release 24 hr Take 1 capsule by mouth Daily.  Patient not taking: Reported on 10/8/2024  6/28/24   Luz Mota APRN   Phentermine-Topiramate (Qsymia) 15-92 MG capsule sustained-release 24 hr Take 1 capsule by mouth Daily.  Patient not taking: Reported on 10/8/2024 7/10/24   Luz Mota APRN   triamcinolone (KENALOG) 0.5 % ointment Apply 1 Application topically to the appropriate area as directed 2 (Two) Times a Day.  Patient not taking: Reported on 10/8/2024 9/17/24   Gibson George MD   Cyanocobalamin (B-12) 500 MCG sublingual tablet 500 mcgs under the tongue every other day  Patient not taking: Reported on 10/8/2024 9/15/23 10/8/24  Kalen Babin MD       FLEX: Over the last two weeks, how often have you been bothered by the following problems?  Feeling nervous, anxious or on edge: Not at all  Not being able to stop or control worrying: Not at all  Worrying too much about different things: Not at all  Trouble Relaxing: Not at all  Being so restless that it is hard to sit still: Not at all  Becoming easily annoyed or irritable: Not at all  Feeling afraid as if something awful might happen: Not at all  FLEX 7 Total Score: 0  If you checked any problems, how difficult have these problems made it for you to do your work, take care of things at home, or get along with other people: Not difficult at all      PHQ-9 Depression Screening  Little interest or pleasure in doing things? 0-->not at all   Feeling down, depressed, or hopeless? 0-->not at all   Trouble falling or staying asleep, or sleeping too much?     Feeling tired or having little energy?     Poor appetite or overeating?     Feeling bad about yourself - or that you are a failure or have let yourself or your family down?     Trouble concentrating on things, such as reading the newspaper or watching television?     Moving or speaking so slowly that other people could have noticed? Or the opposite - being so fidgety or restless that you have been moving around a lot more than usual?     Thoughts that you  "would be better off dead, or of hurting yourself in some way?     PHQ-9 Total Score 0   If you checked off any problems, how difficult have these problems made it for you to do your work, take care of things at home, or get along with other people?         PHQ-9 Total Score: 0   0 (Negative screening for depression)  Support given, observe for worsening symptoms    Review of systems   negative unless otherwise specified above in HPI    Objective     Vital Signs: /60 (BP Location: Left arm, Patient Position: Sitting, Cuff Size: Adult)   Pulse 86   Temp 98 °F (36.7 °C) (Infrared)   Ht 160 cm (63\")   Wt 85.7 kg (189 lb)   LMP 06/30/2019   SpO2 96%   BMI 33.48 kg/m²     Physical Exam  Vitals and nursing note reviewed.   Constitutional:       General: She is not in acute distress.     Appearance: Normal appearance.   HENT:      Head: Normocephalic.   Eyes:      Extraocular Movements: Extraocular movements intact.      Pupils: Pupils are equal, round, and reactive to light.   Cardiovascular:      Rate and Rhythm: Normal rate and regular rhythm.      Heart sounds: Normal heart sounds. No murmur heard.  Pulmonary:      Effort: Pulmonary effort is normal. No respiratory distress.      Breath sounds: Normal breath sounds. No rhonchi or rales.   Abdominal:      General: Abdomen is flat. Bowel sounds are normal.      Palpations: Abdomen is soft.   Neurological:      General: No focal deficit present.      Mental Status: She is alert.                Results Reviewed:  Glucose   Date Value Ref Range Status   03/28/2024 95 70 - 99 mg/dL Final   07/08/2022 117 (H) 65 - 99 mg/dL Final     BUN   Date Value Ref Range Status   03/28/2024 10 6 - 24 mg/dL Final   07/08/2022 14 6 - 20 mg/dL Final     Creatinine   Date Value Ref Range Status   03/28/2024 0.78 0.57 - 1.00 mg/dL Final   07/08/2022 0.80 0.57 - 1.00 mg/dL Final   09/17/2019 0.70 0.60 - 1.30 mg/dL Final     Comment:     Serial Number: [id number]:  [id number] "     Sodium   Date Value Ref Range Status   03/28/2024 143 134 - 144 mmol/L Final   07/08/2022 141 136 - 145 mmol/L Final     Potassium   Date Value Ref Range Status   03/28/2024 4.4 3.5 - 5.2 mmol/L Final   07/08/2022 4.1 3.5 - 5.2 mmol/L Final     Chloride   Date Value Ref Range Status   03/28/2024 103 96 - 106 mmol/L Final   07/08/2022 106 98 - 107 mmol/L Final     CO2   Date Value Ref Range Status   07/08/2022 26.0 22.0 - 29.0 mmol/L Final     Total CO2   Date Value Ref Range Status   03/28/2024 25 20 - 29 mmol/L Final     Calcium   Date Value Ref Range Status   03/28/2024 9.6 8.7 - 10.2 mg/dL Final   07/08/2022 9.3 8.6 - 10.5 mg/dL Final     ALT (SGPT)   Date Value Ref Range Status   03/28/2024 67 (H) 0 - 32 IU/L Final   06/23/2022 36 (H) 1 - 33 U/L Final     AST (SGOT)   Date Value Ref Range Status   03/28/2024 25 0 - 40 IU/L Final   06/23/2022 27 1 - 32 U/L Final     WBC   Date Value Ref Range Status   03/28/2024 4.7 3.4 - 10.8 x10E3/uL Final     Hematocrit   Date Value Ref Range Status   03/28/2024 40.2 34.0 - 46.6 % Final   07/08/2022 39.2 34.0 - 46.6 % Final     Platelets   Date Value Ref Range Status   03/28/2024 218 150 - 450 x10E3/uL Final   07/08/2022 222 140 - 450 10*3/mm3 Final     Total Cholesterol   Date Value Ref Range Status   02/28/2022 284 (H) 0 - 200 mg/dL Final     Triglycerides   Date Value Ref Range Status   09/07/2023 104 0 - 149 mg/dL Final   02/28/2022 313 (H) 0 - 150 mg/dL Final     HDL Cholesterol   Date Value Ref Range Status   09/07/2023 62 >39 mg/dL Final   02/28/2022 67 (H) 40 - 60 mg/dL Final     LDL Chol Calc (NIH)   Date Value Ref Range Status   09/07/2023 155 (H) 0 - 99 mg/dL Final     LDL/HDL Ratio   Date Value Ref Range Status   02/28/2022 2.30  Final     Hemoglobin A1C   Date Value Ref Range Status   09/07/2023 5.9 (H) 4.8 - 5.6 % Final     Comment:              Prediabetes: 5.7 - 6.4           Diabetes: >6.4           Glycemic control for adults with diabetes: <7.0      02/28/2022 5.8 % Final             Procedure   Procedures       Assessment / Plan     Assessment/Plan:   Diagnosis Plan   1. Class 1 obesity due to excess calories with serious comorbidity and body mass index (BMI) of 33.0 to 33.9 in adult  Comprehensive Metabolic Panel    Lipid Panel    CBC & Differential    TSH    Hemoglobin A1c    Tirzepatide-Weight Management (ZEPBOUND) 2.5 MG/0.5ML solution auto-injector      2. Degeneration of intervertebral disc of lumbosacral region with discogenic back pain  cyclobenzaprine (FLEXERIL) 5 MG tablet      3. Seasonal allergic rhinitis due to pollen  montelukast (Singulair) 10 MG tablet    We will prescribe montelukast and azelastine.      4. Annual physical exam  Comprehensive Metabolic Panel    Lipid Panel    CBC & Differential    TSH    Hemoglobin A1c            Return in about 6 months (around 4/8/2025) for Recheck. unless patient needs to be seen sooner or acute issues arise.      I have discussed the patient results/orders and and plan/recommendation with them at today's visit.      Signed by:    Gibson George MD Date: 10/08/24

## 2024-10-09 ENCOUNTER — PRIOR AUTHORIZATION (OUTPATIENT)
Dept: INTERNAL MEDICINE | Facility: CLINIC | Age: 59
End: 2024-10-09
Payer: COMMERCIAL

## 2024-10-09 LAB
ALBUMIN SERPL-MCNC: 4.4 G/DL (ref 3.8–4.9)
ALP SERPL-CCNC: 78 IU/L (ref 44–121)
ALT SERPL-CCNC: 21 IU/L (ref 0–32)
AST SERPL-CCNC: 21 IU/L (ref 0–40)
BASOPHILS # BLD AUTO: 0 X10E3/UL (ref 0–0.2)
BASOPHILS NFR BLD AUTO: 1 %
BILIRUB SERPL-MCNC: 0.4 MG/DL (ref 0–1.2)
BUN SERPL-MCNC: 8 MG/DL (ref 6–24)
BUN/CREAT SERPL: 10 (ref 9–23)
CALCIUM SERPL-MCNC: 9.4 MG/DL (ref 8.7–10.2)
CHLORIDE SERPL-SCNC: 104 MMOL/L (ref 96–106)
CHOLEST SERPL-MCNC: 210 MG/DL (ref 100–199)
CO2 SERPL-SCNC: 24 MMOL/L (ref 20–29)
CREAT SERPL-MCNC: 0.82 MG/DL (ref 0.57–1)
EGFRCR SERPLBLD CKD-EPI 2021: 83 ML/MIN/1.73
EOSINOPHIL # BLD AUTO: 0 X10E3/UL (ref 0–0.4)
EOSINOPHIL NFR BLD AUTO: 1 %
ERYTHROCYTE [DISTWIDTH] IN BLOOD BY AUTOMATED COUNT: 12.5 % (ref 11.7–15.4)
GLOBULIN SER CALC-MCNC: 2.5 G/DL (ref 1.5–4.5)
GLUCOSE SERPL-MCNC: 89 MG/DL (ref 70–99)
HBA1C MFR BLD: 6 % (ref 4.8–5.6)
HCT VFR BLD AUTO: 42.1 % (ref 34–46.6)
HDLC SERPL-MCNC: 53 MG/DL
HGB BLD-MCNC: 13.6 G/DL (ref 11.1–15.9)
IMM GRANULOCYTES # BLD AUTO: 0 X10E3/UL (ref 0–0.1)
IMM GRANULOCYTES NFR BLD AUTO: 0 %
LDLC SERPL CALC-MCNC: 120 MG/DL (ref 0–99)
LYMPHOCYTES # BLD AUTO: 1.4 X10E3/UL (ref 0.7–3.1)
LYMPHOCYTES NFR BLD AUTO: 40 %
MCH RBC QN AUTO: 28.9 PG (ref 26.6–33)
MCHC RBC AUTO-ENTMCNC: 32.3 G/DL (ref 31.5–35.7)
MCV RBC AUTO: 89 FL (ref 79–97)
MONOCYTES # BLD AUTO: 0.3 X10E3/UL (ref 0.1–0.9)
MONOCYTES NFR BLD AUTO: 8 %
NEUTROPHILS # BLD AUTO: 1.8 X10E3/UL (ref 1.4–7)
NEUTROPHILS NFR BLD AUTO: 50 %
PLATELET # BLD AUTO: 243 X10E3/UL (ref 150–450)
POTASSIUM SERPL-SCNC: 4.4 MMOL/L (ref 3.5–5.2)
PROT SERPL-MCNC: 6.9 G/DL (ref 6–8.5)
RBC # BLD AUTO: 4.71 X10E6/UL (ref 3.77–5.28)
SODIUM SERPL-SCNC: 140 MMOL/L (ref 134–144)
TRIGL SERPL-MCNC: 214 MG/DL (ref 0–149)
TSH SERPL DL<=0.005 MIU/L-ACNC: 3.29 UIU/ML (ref 0.45–4.5)
VLDLC SERPL CALC-MCNC: 37 MG/DL (ref 5–40)
WBC # BLD AUTO: 3.5 X10E3/UL (ref 3.4–10.8)

## 2024-10-09 NOTE — TELEPHONE ENCOUNTER
Zepbound 2.5MG/0.5ML pen-injectors    Approved today by [facility] 2017  Your PA request has been approved. Additional information will be provided in the approval communication. (Message 0524)    (Key: [id number])  PA Case ID #: [id number]  Rx #: [id number]

## 2024-11-05 DIAGNOSIS — E66.09 CLASS 1 OBESITY DUE TO EXCESS CALORIES WITH SERIOUS COMORBIDITY AND BODY MASS INDEX (BMI) OF 33.0 TO 33.9 IN ADULT: ICD-10-CM

## 2024-11-05 DIAGNOSIS — E66.811 CLASS 1 OBESITY DUE TO EXCESS CALORIES WITH SERIOUS COMORBIDITY AND BODY MASS INDEX (BMI) OF 33.0 TO 33.9 IN ADULT: ICD-10-CM

## 2024-11-07 ENCOUNTER — CLINICAL SUPPORT (OUTPATIENT)
Dept: INTERNAL MEDICINE | Facility: CLINIC | Age: 59
End: 2024-11-07
Payer: COMMERCIAL

## 2024-11-07 DIAGNOSIS — M54.50 LOW BACK PAIN, UNSPECIFIED BACK PAIN LATERALITY, UNSPECIFIED CHRONICITY, UNSPECIFIED WHETHER SCIATICA PRESENT: Primary | ICD-10-CM

## 2024-11-07 PROCEDURE — 96372 THER/PROPH/DIAG INJ SC/IM: CPT | Performed by: FAMILY MEDICINE

## 2024-11-07 RX ORDER — METHYLPREDNISOLONE SODIUM SUCCINATE 40 MG/ML
80 INJECTION, POWDER, LYOPHILIZED, FOR SOLUTION INTRAMUSCULAR; INTRAVENOUS ONCE
Status: COMPLETED | OUTPATIENT
Start: 2024-11-07 | End: 2024-11-07

## 2024-11-07 RX ADMIN — METHYLPREDNISOLONE SODIUM SUCCINATE 80 MG: 40 INJECTION, POWDER, LYOPHILIZED, FOR SOLUTION INTRAMUSCULAR; INTRAVENOUS at 15:11

## 2024-11-07 NOTE — PROGRESS NOTES
After obtaining consent, and per orders of Dr. George, injection of Solu Medrol given by Deisy Wynn CMA. Patient instructed to remain in clinic for 20 minutes afterwards, and to report any adverse reaction to me immediately.

## 2024-11-26 ENCOUNTER — LAB (OUTPATIENT)
Dept: LAB | Facility: HOSPITAL | Age: 59
End: 2024-11-26
Payer: COMMERCIAL

## 2024-11-26 ENCOUNTER — OFFICE VISIT (OUTPATIENT)
Dept: OBSTETRICS AND GYNECOLOGY | Age: 59
End: 2024-11-26
Payer: COMMERCIAL

## 2024-11-26 ENCOUNTER — TELEPHONE (OUTPATIENT)
Dept: OBSTETRICS AND GYNECOLOGY | Age: 59
End: 2024-11-26
Payer: COMMERCIAL

## 2024-11-26 VITALS
BODY MASS INDEX: 32.6 KG/M2 | DIASTOLIC BLOOD PRESSURE: 92 MMHG | HEIGHT: 63 IN | SYSTOLIC BLOOD PRESSURE: 132 MMHG | WEIGHT: 184 LBS

## 2024-11-26 DIAGNOSIS — Z01.419 WELL WOMAN EXAM WITH ROUTINE GYNECOLOGICAL EXAM: Primary | ICD-10-CM

## 2024-11-26 DIAGNOSIS — Z80.0 FAMILY HISTORY OF COLON CANCER: Primary | ICD-10-CM

## 2024-11-26 DIAGNOSIS — N95.1 MENOPAUSAL SYMPTOMS: ICD-10-CM

## 2024-11-26 DIAGNOSIS — Z80.0 FAMILY HISTORY OF COLON CANCER: ICD-10-CM

## 2024-11-26 DIAGNOSIS — N39.3 SUI (STRESS URINARY INCONTINENCE, FEMALE): ICD-10-CM

## 2024-11-26 DIAGNOSIS — C85.80 OTHER SPECIFIED TYPE OF NON-HODGKIN LYMPHOMA, UNSPECIFIED BODY REGION: ICD-10-CM

## 2024-11-26 DIAGNOSIS — Z12.31 ENCOUNTER FOR SCREENING MAMMOGRAM FOR BREAST CANCER: ICD-10-CM

## 2024-11-26 PROCEDURE — G0123 SCREEN CERV/VAG THIN LAYER: HCPCS | Performed by: NURSE PRACTITIONER

## 2024-11-26 PROCEDURE — 87624 HPV HI-RISK TYP POOLED RSLT: CPT | Performed by: NURSE PRACTITIONER

## 2024-11-26 RX ORDER — PROGESTERONE 100 MG/1
100 CAPSULE ORAL DAILY
Qty: 90 CAPSULE | Refills: 3 | Status: SHIPPED | OUTPATIENT
Start: 2024-11-26

## 2024-11-26 RX ORDER — ESTRADIOL 0.05 MG/D
1 PATCH, EXTENDED RELEASE TRANSDERMAL 2 TIMES WEEKLY
Qty: 24 PATCH | Refills: 3 | Status: SHIPPED | OUTPATIENT
Start: 2024-11-28

## 2024-11-26 RX ORDER — VIBEGRON 75 MG/1
1 TABLET, FILM COATED ORAL DAILY
Qty: 90 TABLET | Refills: 3 | Status: SHIPPED | OUTPATIENT
Start: 2024-11-26

## 2024-11-26 NOTE — TELEPHONE ENCOUNTER
"The cancer [facility] registration called and can not complete the ambry genetic testing because the current order says \"collected\".  Please resend an order  "

## 2024-11-26 NOTE — PROGRESS NOTES
"Subjective     Desirae Alberto is a 59 y.o. female    History of Present Illness  Patient is here today for yearly checkup.  She has complaint of significant BERNICE.  She has had previous TVT at [facility] and then had that removed due to discomfort.  She has done pelvic floor physical therapy in the past with good results.  Gynecologic Exam  The patient's pertinent negatives include no pelvic pain, vaginal bleeding or vaginal discharge. The patient is experiencing no pain. Associated symptoms include frequency. Pertinent negatives include no abdominal pain, anorexia, back pain, chills, constipation, diarrhea, discolored urine, dysuria, fever, flank pain, headaches, hematuria, joint pain, joint swelling, nausea, painful intercourse, rash, sore throat, urgency or vomiting. She is sexually active. She is postmenopausal.         /92   Ht 160 cm (62.99\")   Wt 83.5 kg (184 lb)   LMP 06/30/2019   BMI 32.60 kg/m²     Outpatient Encounter Medications as of 11/26/2024   Medication Sig Dispense Refill    azelastine (ASTELIN) 0.1 % nasal spray Instill 2 sprays into each nostril as directed by provider 2 (Two) Times a Day. 30 mL 12    cyclobenzaprine (FLEXERIL) 5 MG tablet Take 1 tablet by mouth 3 (Three) Times a Day As Needed for Muscle Spasms. 90 tablet 11    cyclobenzaprine (FLEXERIL) 5 MG tablet Take 1 tablet by mouth 3 (Three) Times a Day As Needed for muscle spasms 90 tablet 11    diclofenac (VOLTAREN) 50 MG EC tablet Take 1 tablet by mouth 2 (Two) Times a Day As Needed for Pain. 60 tablet 5    [START ON 11/28/2024] estradiol (Soledad) 0.05 MG/24HR patch Place 1 patch on the skin as directed by provider 2 (Two) Times a Week. Remove old patch before applying new. 24 patch 3    estradiol (MINIVELLE, VIVELLE-DOT) 0.05 MG/24HR patch Apply 1 patch to the appropriate area as directed twice weekly **Remove old patch before applying new patch** 8 patch 11    estradiol (MINIVELLE, VIVELLE-DOT) 0.05 MG/24HR patch Apply 1 patch " to the appropriate area as directed twice weekly **Remove old patch before applying new patch** 8 patch 11    estradiol (MINIVELLE, VIVELLE-DOT) 0.05 MG/24HR patch Place 1 patch on the skin as directed by provider 2 (Two) Times a Week. remove old patch before applying new patch. 8 patch 11    lidocaine (XYLOCAINE) 5 % ointment Apply 1 application  topically to the appropriate area as directed Every 2 (Two) Hours As Needed for Mild Pain. 35.44 g 11    linaclotide (Linzess) 72 MCG capsule capsule Take 1 capsule by mouth Every Morning Before Breakfast. 30 capsule 6    montelukast (Singulair) 10 MG tablet Take 1 tablet by mouth Every Night. 90 tablet 3    montelukast (SINGULAIR) 10 MG tablet Take 1 tablet by mouth Every Night. 90 tablet 3    pantoprazole (PROTONIX) 40 MG EC tablet Take 1 tablet by mouth Daily. 30 tablet 11    pantoprazole (PROTONIX) 40 MG EC tablet Take 1 tablet by mouth Daily. 30 tablet 11    Progesterone (Prometrium) 100 MG capsule Take 1 capsule by mouth Daily. 30 capsule 11    Progesterone (PROMETRIUM) 100 MG capsule Take 1 capsule by mouth Daily. 90 capsule 3    Tirzepatide-Weight Management (ZEPBOUND) 2.5 MG/0.5ML solution auto-injector Inject 0.5 mL under the skin into the appropriate area as directed 1 (One) Time Per Week. 2 mL 1    Tirzepatide-Weight Management (Zepbound) 2.5 MG/0.5ML solution auto-injector Inject 0.5 mL under the skin into the appropriate area as directed Every 7 (Seven) Days. 2 mL 1    Tirzepatide-Weight Management (Zepbound) 2.5 MG/0.5ML solution auto-injector Inject 0.5 mL under the skin into the appropriate area as directed Every 7 (Seven) Days. 2 mL 1    triamcinolone (KENALOG) 0.5 % ointment Apply topically to the appropriate area as directed 2 (Two) Times a Day. 15 g 0    [DISCONTINUED] estradiol (Soledad) 0.05 MG/24HR patch Place 1 patch on the skin as directed by provider 2 (Two) Times a Week. Remove old patch before applying new. 8 patch 11    [DISCONTINUED]  Progesterone (PROMETRIUM) 100 MG capsule Take 1 capsule by mouth Daily. 30 capsule 11    Vibegron (Gemtesa) 75 MG tablet Take 1 tablet by mouth Daily. 90 tablet 3     No facility-administered encounter medications on file as of 11/26/2024.       Past Medical History  Past Medical History:   Diagnosis Date    Arthritis     Cancer     Family history of colon cancer     GERD (gastroesophageal reflux disease)     Glaucoma     Hyperlipidemia     Hypothyroidism     TIA (transient ischemic attack)        Surgical History  Past Surgical History:   Procedure Laterality Date    BREAST EXCISIONAL BIOPSY Left 01/2016    Benign    CHOLECYSTECTOMY      COLON RESECTION SMALL BOWEL  2014    COLONOSCOPY  09/05/2019    Dr. Tabares-Internal and external hemorrhoids; Repeat 10 years    ENDOSCOPY  09/05/2019    Dr. Tabares-Possible Hightower's esophagus-biopsied    ENDOSCOPY N/A 06/24/2022    LA Grade C reflux esophagitis with no bleeding; A large amount of food (residue) in the stomach; Normal examined duodenum; No specimens collected; Repeat 6 weeks    ENDOSCOPY N/A 08/05/2022    Normal esophagus; Normal stomach; Normal examined duodenum; No specimens collected    KNEE ARTHROSCOPY Right 07/14/2020    Procedure: RIGHT KNEE PARTIAL MEDIAL MENISCECTOMY;  Surgeon: Vijay Grewal MD;  Location: [facility] OR;  Service: Orthopedics;  Laterality: Right;    LAPAROSCOPIC TUBAL LIGATION      TOTAL KNEE ARTHROPLASTY Right 07/28/2021    Procedure: RIGHT TOTAL KNEE REPLACEMENT;  Surgeon: Eliecer Foley MD;  Location: [facility] OR;  Service: Orthopedics;  Laterality: Right;    TRANSURETHRAL RESECTION OF BLADDER TUMOR N/A 07/12/2022    Procedure: CYSTOSCOPY TRANSURETHRAL RESECTION OF BLADDER TUMOR;  Surgeon: Joel Poe MD;  Location: [facility] OR;  Service: Urology;  Laterality: N/A;    VAGINAL MESH REVISION      2010/2016    WISDOM TOOTH EXTRACTION         Family History  Family History   Problem Relation Age of Onset    Heart defect  Mother     Diverticulitis Mother     No Known Problems Father     No Known Problems Sister     Cancer Maternal Aunt     Colon cancer Paternal Aunt 68    Stomach cancer Paternal Aunt 70    Liver cancer Paternal Aunt     Colon cancer Paternal Aunt 80    Breast cancer Neg Hx     Ovarian cancer Neg Hx     Uterine cancer Neg Hx     Melanoma Neg Hx     Prostate cancer Neg Hx     Colon polyps Neg Hx     Esophageal cancer Neg Hx     Liver disease Neg Hx     Rectal cancer Neg Hx        The following portions of the patient's history were reviewed and updated as appropriate: allergies, current medications, past family history, past medical history, past social history, past surgical history, and problem list.    Review of Systems   Constitutional:  Negative for activity change, appetite change, chills, diaphoresis, fatigue, fever, unexpected weight gain and unexpected weight loss.   HENT:  Negative for congestion, dental problem, drooling, ear discharge, ear pain, facial swelling, hearing loss, mouth sores, nosebleeds, postnasal drip, rhinorrhea, sinus pressure, sneezing, sore throat, swollen glands, tinnitus, trouble swallowing and voice change.    Eyes:  Negative for blurred vision, double vision, photophobia, pain, discharge, redness, itching and visual disturbance.   Respiratory:  Negative for apnea, cough, choking, chest tightness, shortness of breath, wheezing and stridor.    Cardiovascular:  Negative for chest pain, palpitations and leg swelling.   Gastrointestinal:  Negative for abdominal distention, abdominal pain, anal bleeding, anorexia, blood in stool, constipation, diarrhea, nausea, rectal pain, vomiting, GERD and indigestion.   Endocrine: Negative for cold intolerance, heat intolerance, polydipsia, polyphagia and polyuria.   Genitourinary:  Positive for frequency and urinary incontinence. Negative for amenorrhea, breast discharge, breast lump, breast pain, decreased libido, decreased urine volume, difficulty  urinating, dyspareunia, dysuria, flank pain, genital sores, hematuria, menstrual problem, pelvic pain, pelvic pressure, urgency, vaginal bleeding, vaginal discharge and vaginal pain.   Musculoskeletal:  Negative for arthralgias, back pain, gait problem, joint pain, joint swelling, myalgias, neck pain, neck stiffness and bursitis.   Skin:  Negative for color change, dry skin and rash.   Allergic/Immunologic: Negative for environmental allergies, food allergies and immunocompromised state.   Neurological:  Negative for dizziness, tremors, seizures, syncope, facial asymmetry, speech difficulty, weakness, light-headedness, numbness, headache, memory problem and confusion.   Hematological:  Negative for adenopathy. Does not bruise/bleed easily.   Psychiatric/Behavioral:  Negative for agitation, behavioral problems, decreased concentration, dysphoric mood, hallucinations, self-injury, sleep disturbance, suicidal ideas, negative for hyperactivity, depressed mood and stress. The patient is not nervous/anxious.        Objective   Physical Exam  Vitals and nursing note reviewed. Exam conducted with a chaperone present.   Constitutional:       General: She is not in acute distress.     Appearance: She is well-developed. She is not diaphoretic.   HENT:      Head: Normocephalic.      Right Ear: External ear normal.      Left Ear: External ear normal.      Nose: Nose normal.   Eyes:      General: No scleral icterus.        Right eye: No discharge.         Left eye: No discharge.      Conjunctiva/sclera: Conjunctivae normal.      Pupils: Pupils are equal, round, and reactive to light.   Neck:      Thyroid: No thyromegaly.      Vascular: No carotid bruit.      Trachea: No tracheal deviation.   Cardiovascular:      Rate and Rhythm: Normal rate and regular rhythm.      Heart sounds: Normal heart sounds. No murmur heard.  Pulmonary:      Effort: Pulmonary effort is normal. No respiratory distress.      Breath sounds: Normal breath  sounds. No wheezing.   Chest:   Breasts:     Breasts are symmetrical.      Right: Normal. No swelling, bleeding, inverted nipple, mass, nipple discharge, skin change or tenderness.      Left: Normal. No swelling, bleeding, inverted nipple, mass, nipple discharge, skin change or tenderness.   Abdominal:      General: There is no distension.      Palpations: Abdomen is soft. There is no mass.      Tenderness: There is no abdominal tenderness. There is no right CVA tenderness, left CVA tenderness or guarding.      Hernia: No hernia is present. There is no hernia in the left inguinal area or right inguinal area.   Genitourinary:     General: Normal vulva.      Exam position: Lithotomy position.      Labia:         Right: No rash, tenderness, lesion or injury.         Left: No rash, tenderness, lesion or injury.       Vagina: Normal. No signs of injury and foreign body. No vaginal discharge, erythema, tenderness or bleeding.      Cervix: Normal.      Uterus: Normal. Not enlarged, not fixed and not tender.       Adnexa: Right adnexa normal and left adnexa normal.        Right: No mass, tenderness or fullness.          Left: No mass, tenderness or fullness.        Rectum: Normal. No mass.      Comments: Patient has a mild cystocele.  BSU normal  Urethral meatus  Normal  Perineum  Normal  Musculoskeletal:         General: No tenderness. Normal range of motion.      Cervical back: Normal range of motion and neck supple.   Lymphadenopathy:      Head:      Right side of head: No submental, submandibular, tonsillar, preauricular, posterior auricular or occipital adenopathy.      Left side of head: No submental, submandibular, tonsillar, preauricular, posterior auricular or occipital adenopathy.      Cervical: No cervical adenopathy.      Right cervical: No superficial, deep or posterior cervical adenopathy.     Left cervical: No superficial, deep or posterior cervical adenopathy.      Upper Body:      Right upper body: No  supraclavicular, axillary or pectoral adenopathy.      Left upper body: No supraclavicular, axillary or pectoral adenopathy.      Lower Body: No right inguinal adenopathy. No left inguinal adenopathy.   Skin:     General: Skin is warm and dry.      Findings: No bruising, erythema or rash.   Neurological:      Mental Status: She is alert and oriented to person, place, and time.      Coordination: Coordination normal.   Psychiatric:         Mood and Affect: Mood normal.         Behavior: Behavior normal.         Thought Content: Thought content normal.         Judgment: Judgment normal.         PHQ-9 Depression Screening  Little interest or pleasure in doing things? Not at all   Feeling down, depressed, or hopeless? Not at all   PHQ-2 Total Score 0   Trouble falling or staying asleep, or sleeping too much?     Feeling tired or having little energy?     Poor appetite or overeating?     Feeling bad about yourself - or that you are a failure or have let yourself or your family down?     Trouble concentrating on things, such as reading the newspaper or watching television?     Moving or speaking so slowly that other people could have noticed? Or the opposite - being so fidgety or restless that you have been moving around a lot more than usual?     Thoughts that you would be better off dead, or of hurting yourself in some way?     PHQ-9 Total Score     If you checked off any problems, how difficult have these problems made it for you to do your work, take care of things at home, or get along with other people?         Assessment & Plan   Diagnoses and all orders for this visit:    1. Well woman exam with routine gynecological exam (Primary)  Normal GYN exam. Will have lab work. Encouraged SBE, pt is aware how to do self breast exam and the importance of same. Discussed weight management and importance of maintaining a healthy weight. Discussed Vitamin D intake and the importance of adequate vitamin D for both Bone Health  and a healthy immune system.  Discussed Daily exercise and the importance of same, in regards to a healthy heart as well as helping to maintain her weight and improving her mental health.  BMI 32.6.  Colonoscopy will be scheduled..  Mammogram will be scheduled at [facility].  Pap smear is done after  we discussed ASCCP guidelines.  After informed decision patient wishes to proceed with the Pap smear.        -     Liquid-based Pap Smear, Screening    2. Encounter for screening mammogram for breast cancer  Comments:  Patient will have a mammogram at [facility].  Orders:  -     Mammo Screening Digital Tomosynthesis Bilateral With CAD; Future    3. BERNICE (stress urinary incontinence, female)  Comments:  Patient is having significant BERNICE.  She has had previous TVT and the removal of mesh.  She did go to pelvic floor physical therapy and that did help.  She will start doing those exercises again.  She would like to try something else medication wise.  She has tried several in the past.  She will try Gemtesa.  If this does not work she will call back for urodynamics and appointment with Dr. Funes.  Orders:  -     Vibegron (Gemtesa) 75 MG tablet; Take 1 tablet by mouth Daily.  Dispense: 90 tablet; Refill: 3    4. Other specified type of non-Hodgkin lymphoma, unspecified body region  Comments:  Patient is followed by her PCP.    5. Menopausal symptoms  Comments:  Patient is doing well with her hormones.  Refills are given.  She is instructed to call with any bleeding or spotting.  Orders:  -     estradiol (Soledad) 0.05 MG/24HR patch; Place 1 patch on the skin as directed by provider 2 (Two) Times a Week. Remove old patch before applying new.  Dispense: 24 patch; Refill: 3  -     Progesterone (PROMETRIUM) 100 MG capsule; Take 1 capsule by mouth Daily.  Dispense: 90 capsule; Refill: 3    6. Family history of colon cancer  Comments:  Patient has family history of colon cancer.  She would like to have  genetic screening.  Orders placed.  Referral is sent for colonoscopy.  Orders:  -     [facility] Expanded - Blood,  -     Ambulatory Referral For Screening Colonoscopy                Apple Ivan, APRN  11/26/2024

## 2024-12-09 ENCOUNTER — OFFICE VISIT (OUTPATIENT)
Dept: INTERNAL MEDICINE | Facility: CLINIC | Age: 59
End: 2024-12-09
Payer: COMMERCIAL

## 2024-12-09 VITALS
OXYGEN SATURATION: 98 % | BODY MASS INDEX: 32.6 KG/M2 | HEART RATE: 86 BPM | TEMPERATURE: 98.2 F | DIASTOLIC BLOOD PRESSURE: 76 MMHG | HEIGHT: 63 IN | WEIGHT: 184 LBS | SYSTOLIC BLOOD PRESSURE: 116 MMHG

## 2024-12-09 DIAGNOSIS — M51.370 DEGENERATION OF INTERVERTEBRAL DISC OF LUMBOSACRAL REGION WITH DISCOGENIC BACK PAIN: ICD-10-CM

## 2024-12-09 DIAGNOSIS — G89.29 CHRONIC RIGHT SHOULDER PAIN: Primary | ICD-10-CM

## 2024-12-09 DIAGNOSIS — M25.511 CHRONIC RIGHT SHOULDER PAIN: Primary | ICD-10-CM

## 2024-12-09 DIAGNOSIS — M54.2 NECK PAIN: ICD-10-CM

## 2024-12-09 PROCEDURE — 99214 OFFICE O/P EST MOD 30 MIN: CPT | Performed by: FAMILY MEDICINE

## 2024-12-09 RX ORDER — METHYLPREDNISOLONE SODIUM SUCCINATE 125 MG/2ML
125 INJECTION INTRAMUSCULAR; INTRAVENOUS ONCE
Status: COMPLETED | OUTPATIENT
Start: 2024-12-09 | End: 2024-12-09

## 2024-12-09 RX ORDER — KETOROLAC TROMETHAMINE 30 MG/ML
60 INJECTION, SOLUTION INTRAMUSCULAR; INTRAVENOUS ONCE
Status: COMPLETED | OUTPATIENT
Start: 2024-12-09 | End: 2024-12-09

## 2024-12-09 RX ADMIN — METHYLPREDNISOLONE SODIUM SUCCINATE 125 MG: 125 INJECTION INTRAMUSCULAR; INTRAVENOUS at 16:57

## 2024-12-09 RX ADMIN — KETOROLAC TROMETHAMINE 60 MG: 30 INJECTION, SOLUTION INTRAMUSCULAR; INTRAVENOUS at 16:57

## 2024-12-09 NOTE — PROGRESS NOTES
Subjective     Chief Complaint   Patient presents with    Shoulder Pain     Pt started about a month ago. Limited range of motion without hanging pain. She is a side sleeper so it is irritating her she said she has also lifts firewood and thinks she has irritated it. Pt states that it hurts all the way to her fingers and it will go numb and tingle all through her arm.        History of Present Illness    Patient's PMR from outside medical facility reviewed and noted.    Desirae Alberto is a 59 y.o. female who presents for a routine office visit.  Comes in primarily secondary to shoulder pain she states that when she moves her shoulder in certain directions that it causes pain both in the joint and that radiates down the arm.  Patient is able to achieve full range of motion however does have increased radicular pain whenever she moves her neck into certain positions.  Does have a positive empty can test so may have a partial rotator cuff tear.  Obtain x-rays of both the shoulder and the neck to evaluate this further    Due to discogenic disease in both the lumbar spine as well as the neck.  Will go ahead and move on to get an MRI of both lower back and cervical spine at this time.  Patient has been participating in physical therapy and noticed no significant improvement.        Past Medical History:   Past Medical History:   Diagnosis Date    Arthritis     Cancer     lymphoma of small bowel    Family history of colon cancer     GERD (gastroesophageal reflux disease)     Glaucoma     Hyperlipidemia     Hypothyroidism     TIA (transient ischemic attack)      Past Surgical History:  Past Surgical History:   Procedure Laterality Date    BREAST EXCISIONAL BIOPSY Left 01/2016    Benign    CHOLECYSTECTOMY      COLON RESECTION SMALL BOWEL  2014    COLONOSCOPY  09/05/2019    Dr. Tabares-Internal and external hemorrhoids; Repeat 10 years    ENDOSCOPY  09/05/2019    Dr. Tabares-Possible Hightower's esophagus-biopsied     ENDOSCOPY N/A 06/24/2022    LA Grade C reflux esophagitis with no bleeding; A large amount of food (residue) in the stomach; Normal examined duodenum; No specimens collected; Repeat 6 weeks    ENDOSCOPY N/A 08/05/2022    Normal esophagus; Normal stomach; Normal examined duodenum; No specimens collected    KNEE ARTHROSCOPY Right 07/14/2020    Procedure: RIGHT KNEE PARTIAL MEDIAL MENISCECTOMY;  Surgeon: Vijay Grewal MD;  Location: [facility] OR;  Service: Orthopedics;  Laterality: Right;    LAPAROSCOPIC TUBAL LIGATION      TOTAL KNEE ARTHROPLASTY Right 07/28/2021    Procedure: RIGHT TOTAL KNEE REPLACEMENT;  Surgeon: Eliecer Foley MD;  Location:  [facility] OR;  Service: Orthopedics;  Laterality: Right;    TRANSURETHRAL RESECTION OF BLADDER TUMOR N/A 07/12/2022    Procedure: CYSTOSCOPY TRANSURETHRAL RESECTION OF BLADDER TUMOR;  Surgeon: Joel Poe MD;  Location:  [facility] OR;  Service: Urology;  Laterality: N/A;    VAGINAL MESH REVISION      2010/2016    WISDOM TOOTH EXTRACTION       Social History:  reports that she quit smoking about 23 years ago. Her smoking use included cigarettes. She started smoking about 33 years ago. She has a 5 pack-year smoking history. She has never used smokeless tobacco. She reports that she does not currently use alcohol. She reports that she does not use drugs.    Family History: family history includes Cancer in her maternal aunt; Colon cancer (age of onset: 68) in her paternal aunt; Colon cancer (age of onset: 80) in her paternal aunt; Diverticulitis in her mother; Heart defect in her mother; Liver cancer in her paternal aunt; No Known Problems in her father and sister; Stomach cancer (age of onset: 70) in her paternal aunt.      Allergies:  No Known Allergies  Medications:  Prior to Admission medications    Medication Sig Start Date End Date Taking? Authorizing Provider   azelastine (ASTELIN) 0.1 % nasal spray Instill 2 sprays into each nostril as directed by provider  2 (Two) Times a Day. 9/7/23  Yes Luz Mota APRN   cyclobenzaprine (FLEXERIL) 5 MG tablet Take 1 tablet by mouth 3 (Three) Times a Day As Needed for Muscle Spasms. 10/8/24  Yes Gibson George MD   cyclobenzaprine (FLEXERIL) 5 MG tablet Take 1 tablet by mouth 3 (Three) Times a Day As Needed for muscle spasms 10/8/24  Yes Gibson George MD   diclofenac (VOLTAREN) 50 MG EC tablet Take 1 tablet by mouth 2 (Two) Times a Day As Needed for Pain. 1/24/24  Yes Luz Mota APRN   estradiol (Soledad) 0.05 MG/24HR patch Place 1 patch on the skin as directed by provider 2 (Two) Times a Week. Remove old patch before applying new. 11/28/24  Yes Apple Ivan APRN   estradiol (MINIVELLE, VIVELLE-DOT) 0.05 MG/24HR patch Apply 1 patch to the appropriate area as directed twice weekly **Remove old patch before applying new patch** 9/30/24  Yes Gibson George MD   estradiol (MINIVELLE, VIVELLE-DOT) 0.05 MG/24HR patch Apply 1 patch to the appropriate area as directed twice weekly **Remove old patch before applying new patch** 10/1/24  Yes Gibson George MD   estradiol (MINIVELLE, VIVELLE-DOT) 0.05 MG/24HR patch Place 1 patch on the skin as directed by provider 2 (Two) Times a Week. remove old patch before applying new patch. 10/3/24  Yes Gibson George MD   lidocaine (XYLOCAINE) 5 % ointment Apply 1 application  topically to the appropriate area as directed Every 2 (Two) Hours As Needed for Mild Pain. 11/29/23  Yes Gibson George MD   linaclotide (Linzess) 72 MCG capsule capsule Take 1 capsule by mouth Every Morning Before Breakfast. 4/19/24  Yes Christina Vaughn APRN   montelukast (Singulair) 10 MG tablet Take 1 tablet by mouth Every Night. 10/8/24  Yes Gibson George MD   montelukast (SINGULAIR) 10 MG tablet Take 1 tablet by mouth Every Night. 10/8/24  Yes Gibson George MD   pantoprazole (PROTONIX) 40 MG EC tablet Take 1  "tablet by mouth Daily. 9/11/24  Yes Luz Mota APRN   pantoprazole (PROTONIX) 40 MG EC tablet Take 1 tablet by mouth Daily. 9/11/24  Yes Luz Mota APRN   Progesterone (Prometrium) 100 MG capsule Take 1 capsule by mouth Daily. 9/11/24  Yes Luz Mota APRN   Progesterone (PROMETRIUM) 100 MG capsule Take 1 capsule by mouth Daily. 11/26/24  Yes Apple Ivan APRN   Tirzepatide-Weight Management (ZEPBOUND) 2.5 MG/0.5ML solution auto-injector Inject 0.5 mL under the skin into the appropriate area as directed 1 (One) Time Per Week. 11/5/24  Yes Gibson George MD   Tirzepatide-Weight Management (Zepbound) 2.5 MG/0.5ML solution auto-injector Inject 0.5 mL under the skin into the appropriate area as directed Every 7 (Seven) Days. 11/5/24  Yes Gibson George MD   Tirzepatide-Weight Management (Zepbound) 2.5 MG/0.5ML solution auto-injector Inject 0.5 mL under the skin into the appropriate area as directed Every 7 (Seven) Days. 10/8/24  Yes Gibson George MD   triamcinolone (KENALOG) 0.5 % ointment Apply topically to the appropriate area as directed 2 (Two) Times a Day. 9/17/24  Yes Gibson George MD   Vibegron (Gemtesa) 75 MG tablet Take 1 tablet by mouth Daily. 11/26/24  Yes Apple Ivan APRN       FLEX:      PHQ:  The PHQ has not been completed during this encounter.              Review of systems   negative unless otherwise specified above in HPI    Objective     Vital Signs: /76 (BP Location: Left arm, Patient Position: Sitting, Cuff Size: Adult)   Pulse 86   Temp 98.2 °F (36.8 °C) (Infrared)   Ht 160 cm (62.99\")   Wt 83.5 kg (184 lb)   LMP 06/30/2019   SpO2 98%   BMI 32.60 kg/m²     Physical Exam  Vitals and nursing note reviewed.   Constitutional:       General: She is not in acute distress.     Appearance: Normal appearance.   HENT:      Head: Normocephalic.   Eyes:      Extraocular Movements: Extraocular movements " intact.      Pupils: Pupils are equal, round, and reactive to light.   Cardiovascular:      Rate and Rhythm: Normal rate and regular rhythm.      Heart sounds: Normal heart sounds. No murmur heard.  Pulmonary:      Effort: Pulmonary effort is normal. No respiratory distress.      Breath sounds: Normal breath sounds. No rhonchi or rales.   Abdominal:      General: Abdomen is flat. Bowel sounds are normal.      Palpations: Abdomen is soft.   Neurological:      General: No focal deficit present.      Mental Status: She is alert.                Results Reviewed:  Glucose   Date Value Ref Range Status   10/08/2024 89 70 - 99 mg/dL Final   07/08/2022 117 (H) 65 - 99 mg/dL Final     BUN   Date Value Ref Range Status   10/08/2024 8 6 - 24 mg/dL Final   07/08/2022 14 6 - 20 mg/dL Final     Creatinine   Date Value Ref Range Status   10/08/2024 0.82 0.57 - 1.00 mg/dL Final   07/08/2022 0.80 0.57 - 1.00 mg/dL Final   09/17/2019 0.70 0.60 - 1.30 mg/dL Final     Comment:     Serial Number: [id number]:  [id number]     Sodium   Date Value Ref Range Status   10/08/2024 140 134 - 144 mmol/L Final   07/08/2022 141 136 - 145 mmol/L Final     Potassium   Date Value Ref Range Status   10/08/2024 4.4 3.5 - 5.2 mmol/L Final   07/08/2022 4.1 3.5 - 5.2 mmol/L Final     Chloride   Date Value Ref Range Status   10/08/2024 104 96 - 106 mmol/L Final   07/08/2022 106 98 - 107 mmol/L Final     CO2   Date Value Ref Range Status   07/08/2022 26.0 22.0 - 29.0 mmol/L Final     Total CO2   Date Value Ref Range Status   10/08/2024 24 20 - 29 mmol/L Final     Calcium   Date Value Ref Range Status   10/08/2024 9.4 8.7 - 10.2 mg/dL Final   07/08/2022 9.3 8.6 - 10.5 mg/dL Final     ALT (SGPT)   Date Value Ref Range Status   10/08/2024 21 0 - 32 IU/L Final   06/23/2022 36 (H) 1 - 33 U/L Final     AST (SGOT)   Date Value Ref Range Status   10/08/2024 21 0 - 40 IU/L Final   06/23/2022 27 1 - 32 U/L Final     WBC   Date Value Ref Range Status   10/08/2024 3.5  3.4 - 10.8 x10E3/uL Final     Hematocrit   Date Value Ref Range Status   10/08/2024 42.1 34.0 - 46.6 % Final   07/08/2022 39.2 34.0 - 46.6 % Final     Platelets   Date Value Ref Range Status   10/08/2024 243 150 - 450 x10E3/uL Final   07/08/2022 222 140 - 450 10*3/mm3 Final     Total Cholesterol   Date Value Ref Range Status   02/28/2022 284 (H) 0 - 200 mg/dL Final     Triglycerides   Date Value Ref Range Status   10/08/2024 214 (H) 0 - 149 mg/dL Final   02/28/2022 313 (H) 0 - 150 mg/dL Final     HDL Cholesterol   Date Value Ref Range Status   10/08/2024 53 >39 mg/dL Final   02/28/2022 67 (H) 40 - 60 mg/dL Final     LDL Chol Calc (NIH)   Date Value Ref Range Status   10/08/2024 120 (H) 0 - 99 mg/dL Final     LDL/HDL Ratio   Date Value Ref Range Status   02/28/2022 2.30  Final     Hemoglobin A1C   Date Value Ref Range Status   10/08/2024 6.0 (H) 4.8 - 5.6 % Final     Comment:              Prediabetes: 5.7 - 6.4           Diabetes: >6.4           Glycemic control for adults with diabetes: <7.0     02/28/2022 5.8 % Final     The following data was reviewed by: Gibson George MD on 12/09/2024:    Data reviewed : Radiologic studies right shoulder within normal limits x-ray cervical spine shows significant degenerative Speirs disease    Procedure   Procedures       Assessment / Plan     Assessment/Plan:   Diagnosis Plan   1. Chronic right shoulder pain  XR Shoulder 2+ View Right (In Office)    ketorolac (TORADOL) injection 60 mg    methylPREDNISolone sodium succinate (SOLU-Medrol) injection 125 mg      2. Neck pain  XR Spine Cervical Complete 4 or 5 View (In Office)    ketorolac (TORADOL) injection 60 mg    methylPREDNISolone sodium succinate (SOLU-Medrol) injection 125 mg    MRI Cervical Spine Without Contrast      3. Degeneration of intervertebral disc of lumbosacral region with discogenic back pain  MRI Lumbar Spine Without Contrast    ketorolac (TORADOL) injection 60 mg    methylPREDNISolone sodium succinate  (SOLU-Medrol) injection 125 mg            Return if symptoms worsen or fail to improve. unless patient needs to be seen sooner or acute issues arise.      I have discussed the patient results/orders and and plan/recommendation with them at today's visit.      Signed by:    Gibson George MD Date: 12/09/24

## 2024-12-09 NOTE — PROGRESS NOTES
Subjective     Chief Complaint   Patient presents with    Shoulder Pain     Pt started about a month ago. Limited range of motion without hanging pain. She is a side sleeper so it is irritating her she said she has also lifts firewood and thinks she has irritated it. Pt states that it hurts all the way to her fingers and it will go numb and tingle all through her arm.        History of Present Illness    Patient's PMR from outside medical facility reviewed and noted.    Desirae Alberto is a 59 y.o. female who presents for a routine office visit.    Past Medical History:   Past Medical History:   Diagnosis Date    Arthritis     Cancer     lymphoma of small bowel    Family history of colon cancer     GERD (gastroesophageal reflux disease)     Glaucoma     Hyperlipidemia     Hypothyroidism     TIA (transient ischemic attack)      Past Surgical History:  Past Surgical History:   Procedure Laterality Date    BREAST EXCISIONAL BIOPSY Left 01/2016    Benign    CHOLECYSTECTOMY      COLON RESECTION SMALL BOWEL  2014    COLONOSCOPY  09/05/2019    Dr. Tabares-Internal and external hemorrhoids; Repeat 10 years    ENDOSCOPY  09/05/2019    Dr. Tabares-Possible Hightower's esophagus-biopsied    ENDOSCOPY N/A 06/24/2022    LA Grade C reflux esophagitis with no bleeding; A large amount of food (residue) in the stomach; Normal examined duodenum; No specimens collected; Repeat 6 weeks    ENDOSCOPY N/A 08/05/2022    Normal esophagus; Normal stomach; Normal examined duodenum; No specimens collected    KNEE ARTHROSCOPY Right 07/14/2020    Procedure: RIGHT KNEE PARTIAL MEDIAL MENISCECTOMY;  Surgeon: Vijay Grewal MD;  Location:  [facility] OR;  Service: Orthopedics;  Laterality: Right;    LAPAROSCOPIC TUBAL LIGATION      TOTAL KNEE ARTHROPLASTY Right 07/28/2021    Procedure: RIGHT TOTAL KNEE REPLACEMENT;  Surgeon: Eliecer Foley MD;  Location:  [facility] OR;  Service: Orthopedics;  Laterality: Right;    TRANSURETHRAL RESECTION  OF BLADDER TUMOR N/A 07/12/2022    Procedure: CYSTOSCOPY TRANSURETHRAL RESECTION OF BLADDER TUMOR;  Surgeon: Joel Poe MD;  Location: [facility] OR;  Service: Urology;  Laterality: N/A;    VAGINAL MESH REVISION      2010/2016    WISDOM TOOTH EXTRACTION       Social History:  reports that she quit smoking about 23 years ago. Her smoking use included cigarettes. She started smoking about 33 years ago. She has a 5 pack-year smoking history. She has never used smokeless tobacco. She reports that she does not currently use alcohol. She reports that she does not use drugs.    Family History: family history includes Cancer in her maternal aunt; Colon cancer (age of onset: 68) in her paternal aunt; Colon cancer (age of onset: 80) in her paternal aunt; Diverticulitis in her mother; Heart defect in her mother; Liver cancer in her paternal aunt; No Known Problems in her father and sister; Stomach cancer (age of onset: 70) in her paternal aunt.      Allergies:  No Known Allergies  Medications:  Prior to Admission medications    Medication Sig Start Date End Date Taking? Authorizing Provider   azelastine (ASTELIN) 0.1 % nasal spray Instill 2 sprays into each nostril as directed by provider 2 (Two) Times a Day. 9/7/23  Yes Luz Mota APRN   cyclobenzaprine (FLEXERIL) 5 MG tablet Take 1 tablet by mouth 3 (Three) Times a Day As Needed for Muscle Spasms. 10/8/24  Yes Gibson eGorge MD   cyclobenzaprine (FLEXERIL) 5 MG tablet Take 1 tablet by mouth 3 (Three) Times a Day As Needed for muscle spasms 10/8/24  Yes Gibson George MD   diclofenac (VOLTAREN) 50 MG EC tablet Take 1 tablet by mouth 2 (Two) Times a Day As Needed for Pain. 1/24/24  Yes Luz Mota APRN   estradiol (Soledad) 0.05 MG/24HR patch Place 1 patch on the skin as directed by provider 2 (Two) Times a Week. Remove old patch before applying new. 11/28/24  Yes Apple Ivan APRN   estradiol (MINIVELLE, VIVELLE-DOT) 0.05  MG/24HR patch Apply 1 patch to the appropriate area as directed twice weekly **Remove old patch before applying new patch** 9/30/24  Yes Gibson George MD   estradiol (MINIVELLE, VIVELLE-DOT) 0.05 MG/24HR patch Apply 1 patch to the appropriate area as directed twice weekly **Remove old patch before applying new patch** 10/1/24  Yes Gibson George MD   estradiol (MINIVELLE, VIVELLE-DOT) 0.05 MG/24HR patch Place 1 patch on the skin as directed by provider 2 (Two) Times a Week. remove old patch before applying new patch. 10/3/24  Yes Gibson George MD   lidocaine (XYLOCAINE) 5 % ointment Apply 1 application  topically to the appropriate area as directed Every 2 (Two) Hours As Needed for Mild Pain. 11/29/23  Yes Gibson George MD   linaclotide (Linzess) 72 MCG capsule capsule Take 1 capsule by mouth Every Morning Before Breakfast. 4/19/24  Yes Christina Vaughn APRN   montelukast (Singulair) 10 MG tablet Take 1 tablet by mouth Every Night. 10/8/24  Yes Gibson George MD   montelukast (SINGULAIR) 10 MG tablet Take 1 tablet by mouth Every Night. 10/8/24  Yes Gibson George MD   pantoprazole (PROTONIX) 40 MG EC tablet Take 1 tablet by mouth Daily. 9/11/24  Yes Luz Mota APRN   pantoprazole (PROTONIX) 40 MG EC tablet Take 1 tablet by mouth Daily. 9/11/24  Yes Luz Mtoa APRN   Progesterone (Prometrium) 100 MG capsule Take 1 capsule by mouth Daily. 9/11/24  Yes Luz Mota APRN   Progesterone (PROMETRIUM) 100 MG capsule Take 1 capsule by mouth Daily. 11/26/24  Yes Apple Ivan APRN   Tirzepatide-Weight Management (ZEPBOUND) 2.5 MG/0.5ML solution auto-injector Inject 0.5 mL under the skin into the appropriate area as directed 1 (One) Time Per Week. 11/5/24  Yes Gibson George MD   Tirzepatide-Weight Management (Zepbound) 2.5 MG/0.5ML solution auto-injector Inject 0.5 mL under the skin into the appropriate  "area as directed Every 7 (Seven) Days. 11/5/24  Yes Gibson George MD   Tirzepatide-Weight Management (Zepbound) 2.5 MG/0.5ML solution auto-injector Inject 0.5 mL under the skin into the appropriate area as directed Every 7 (Seven) Days. 10/8/24  Yes Gibson George MD   triamcinolone (KENALOG) 0.5 % ointment Apply topically to the appropriate area as directed 2 (Two) Times a Day. 9/17/24  Yes Gibson George MD   Vibegron (Gemtesa) 75 MG tablet Take 1 tablet by mouth Daily. 11/26/24  Yes Apple Ivan APRN       FLEX:      PHQ:  The PHQ has not been completed during this encounter.        {phq-9 test range:46608}  {JK SL PHQ9 F/U:40897}        Review of systems   negative unless otherwise specified above in HPI    Objective     Vital Signs: /76 (BP Location: Left arm, Patient Position: Sitting, Cuff Size: Adult)   Pulse 86   Temp 98.2 °F (36.8 °C) (Infrared)   Ht 160 cm (62.99\")   Wt 83.5 kg (184 lb)   LMP 06/30/2019   SpO2 98%   BMI 32.60 kg/m²     Physical Exam  Vitals and nursing note reviewed.   Constitutional:       General: She is not in acute distress.     Appearance: Normal appearance.   HENT:      Head: Normocephalic.   Eyes:      Extraocular Movements: Extraocular movements intact.      Pupils: Pupils are equal, round, and reactive to light.   Cardiovascular:      Rate and Rhythm: Normal rate and regular rhythm.      Heart sounds: Normal heart sounds. No murmur heard.  Pulmonary:      Effort: Pulmonary effort is normal. No respiratory distress.      Breath sounds: Normal breath sounds. No rhonchi or rales.   Abdominal:      General: Abdomen is flat. Bowel sounds are normal.      Palpations: Abdomen is soft.   Neurological:      General: No focal deficit present.      Mental Status: She is alert.                Results Reviewed:  Glucose   Date Value Ref Range Status   10/08/2024 89 70 - 99 mg/dL Final   07/08/2022 117 (H) 65 - 99 mg/dL Final     BUN   Date Value " Ref Range Status   10/08/2024 8 6 - 24 mg/dL Final   07/08/2022 14 6 - 20 mg/dL Final     Creatinine   Date Value Ref Range Status   10/08/2024 0.82 0.57 - 1.00 mg/dL Final   07/08/2022 0.80 0.57 - 1.00 mg/dL Final   09/17/2019 0.70 0.60 - 1.30 mg/dL Final     Comment:     Serial Number: [id number]:  [id number]     Sodium   Date Value Ref Range Status   10/08/2024 140 134 - 144 mmol/L Final   07/08/2022 141 136 - 145 mmol/L Final     Potassium   Date Value Ref Range Status   10/08/2024 4.4 3.5 - 5.2 mmol/L Final   07/08/2022 4.1 3.5 - 5.2 mmol/L Final     Chloride   Date Value Ref Range Status   10/08/2024 104 96 - 106 mmol/L Final   07/08/2022 106 98 - 107 mmol/L Final     CO2   Date Value Ref Range Status   07/08/2022 26.0 22.0 - 29.0 mmol/L Final     Total CO2   Date Value Ref Range Status   10/08/2024 24 20 - 29 mmol/L Final     Calcium   Date Value Ref Range Status   10/08/2024 9.4 8.7 - 10.2 mg/dL Final   07/08/2022 9.3 8.6 - 10.5 mg/dL Final     ALT (SGPT)   Date Value Ref Range Status   10/08/2024 21 0 - 32 IU/L Final   06/23/2022 36 (H) 1 - 33 U/L Final     AST (SGOT)   Date Value Ref Range Status   10/08/2024 21 0 - 40 IU/L Final   06/23/2022 27 1 - 32 U/L Final     WBC   Date Value Ref Range Status   10/08/2024 3.5 3.4 - 10.8 x10E3/uL Final     Hematocrit   Date Value Ref Range Status   10/08/2024 42.1 34.0 - 46.6 % Final   07/08/2022 39.2 34.0 - 46.6 % Final     Platelets   Date Value Ref Range Status   10/08/2024 243 150 - 450 x10E3/uL Final   07/08/2022 222 140 - 450 10*3/mm3 Final     Total Cholesterol   Date Value Ref Range Status   02/28/2022 284 (H) 0 - 200 mg/dL Final     Triglycerides   Date Value Ref Range Status   10/08/2024 214 (H) 0 - 149 mg/dL Final   02/28/2022 313 (H) 0 - 150 mg/dL Final     HDL Cholesterol   Date Value Ref Range Status   10/08/2024 53 >39 mg/dL Final   02/28/2022 67 (H) 40 - 60 mg/dL Final     LDL Chol Calc (NIH)   Date Value Ref Range Status   10/08/2024 120 (H) 0 - 99  mg/dL Final     LDL/HDL Ratio   Date Value Ref Range Status   02/28/2022 2.30  Final     Hemoglobin A1C   Date Value Ref Range Status   10/08/2024 6.0 (H) 4.8 - 5.6 % Final     Comment:              Prediabetes: 5.7 - 6.4           Diabetes: >6.4           Glycemic control for adults with diabetes: <7.0     02/28/2022 5.8 % Final     {The following data was reviewed by (Optional):28374}  {Ambulatory Labs (Optional):23838}  {Data reviewed (Optional):30503:::1}    Procedure   Procedures       Assessment / Plan     Assessment/Plan:  No diagnosis found.      No follow-ups on file. unless patient needs to be seen sooner or acute issues arise.      I have discussed the patient results/orders and and plan/recommendation with them at today's visit.      Signed by:    Gibson George MD Date: 12/09/24

## 2025-01-02 ENCOUNTER — TELEPHONE (OUTPATIENT)
Dept: INTERNAL MEDICINE | Facility: CLINIC | Age: 60
End: 2025-01-02
Payer: COMMERCIAL

## 2025-01-02 DIAGNOSIS — E66.811 CLASS 1 OBESITY DUE TO EXCESS CALORIES WITH SERIOUS COMORBIDITY AND BODY MASS INDEX (BMI) OF 33.0 TO 33.9 IN ADULT: Primary | ICD-10-CM

## 2025-01-02 DIAGNOSIS — E66.09 CLASS 1 OBESITY DUE TO EXCESS CALORIES WITH SERIOUS COMORBIDITY AND BODY MASS INDEX (BMI) OF 33.0 TO 33.9 IN ADULT: Primary | ICD-10-CM

## 2025-01-08 LAB
ALLELIC STATE: ABNORMAL
AMBRY INTERPRETATION REPORT: ABNORMAL
CHROMOSOME BLD/T: 5
DNA CHANGE: ABNORMAL
GEN ALLELE LOC ID: ABNORMAL
GENE DIS ANL INTERP-IMP: ABNORMAL
GENE DIS SEQ VAR INTERP-IMP: ABNORMAL
GENE STUDIED ID: ABNORMAL
GENETIC VAR ASSESS: PRESENT
GENOMIC SOURCE CLASS: ABNORMAL
HUMAN REF SEQ ASSEMBLY+BUILD: ABNORMAL
SIMPLE VAR ID: ABNORMAL
SIMPLE VAR ID: ABNORMAL
TRANSCRIPT REF SEQUENCE ID: ABNORMAL
VARIANT CATEGORY: ABNORMAL

## 2025-01-13 ENCOUNTER — HOSPITAL ENCOUNTER (OUTPATIENT)
Dept: MRI IMAGING | Facility: HOSPITAL | Age: 60
Discharge: HOME | End: 2025-01-13
Payer: COMMERCIAL

## 2025-01-13 DIAGNOSIS — M54.2 NECK PAIN: ICD-10-CM

## 2025-01-13 DIAGNOSIS — M51.370 DEGENERATION OF INTERVERTEBRAL DISC OF LUMBOSACRAL REGION WITH DISCOGENIC BACK PAIN: ICD-10-CM

## 2025-01-13 PROCEDURE — 72148 MRI LUMBAR SPINE W/O DYE: CPT

## 2025-01-13 PROCEDURE — 72141 MRI NECK SPINE W/O DYE: CPT

## 2025-01-17 ENCOUNTER — OFFICE VISIT (OUTPATIENT)
Dept: GASTROENTEROLOGY | Facility: CLINIC | Age: 60
End: 2025-01-17
Payer: COMMERCIAL

## 2025-01-17 VITALS
HEART RATE: 93 BPM | SYSTOLIC BLOOD PRESSURE: 124 MMHG | DIASTOLIC BLOOD PRESSURE: 82 MMHG | HEIGHT: 63 IN | TEMPERATURE: 97.7 F | OXYGEN SATURATION: 96 % | WEIGHT: 182 LBS | BODY MASS INDEX: 32.25 KG/M2

## 2025-01-17 DIAGNOSIS — K21.9 GASTROESOPHAGEAL REFLUX DISEASE, UNSPECIFIED WHETHER ESOPHAGITIS PRESENT: ICD-10-CM

## 2025-01-17 DIAGNOSIS — Z13.79 GENETIC TESTING OF FEMALE: ICD-10-CM

## 2025-01-17 DIAGNOSIS — Z80.0 FH: COLON CANCER: Primary | ICD-10-CM

## 2025-01-17 DIAGNOSIS — K59.00 CONSTIPATION, UNSPECIFIED CONSTIPATION TYPE: ICD-10-CM

## 2025-01-17 RX ORDER — PANTOPRAZOLE SODIUM 40 MG/1
40 TABLET, DELAYED RELEASE ORAL DAILY
Qty: 90 TABLET | Refills: 3 | Status: SHIPPED | OUTPATIENT
Start: 2025-01-17

## 2025-01-17 NOTE — ASSESSMENT & PLAN NOTE
Patient doing well with Linzess we will renew this today.  She is to call if this becomes not effective.

## 2025-01-17 NOTE — PROGRESS NOTES
Primary Physician: Gibson George MD    Chief Complaint   Patient presents with    Colon Cancer Screening     Pt presents today for evaluation for colonoscopy-pt's last colon was 9/5/2019 by Dr. Tabares; Family history of colon cancer; Pt had genetic testing done 11/26/2024       Subjective     Desirae Alberto is a 60 y.o. female.    HPI  Constipation  Patient with a history of chronic constipation.  Previously tried MiraLAX which did not help.  PCP gave her a trial of Linzess 145 mcg/day.  That did lead to some rectal incontinence.  4/19/2024 office follow-up with me placed her on Linzess 72 mcg/day.  She usually takes Linzess about every other day and this helps her have regular BM's most days.  She is very satisfied with her bowel regimen at this point.    9/5/2019 Colonoscopy: internal and external hemorrhoids otherwise colonoscopy normal throughout.     Family Hx Colon Cancer  Patient referred back to our office for consideration of colonoscopy given multiple secondary family members with colon cancer.  2 aunts had colon cancer    +SDHA genetic testing      GERD  Patient with a history of acid reflux disease.  On pantoprazole 40 mg once daily.  She is asymptomatic at this time.  No acid reflux as long as she takes her medicine.  She denies any dysphagia.  No unexplainable weight loss.    10/8/2024 GFR normal 83  3/29/2024 bone density study normal    6/2022 endoscopy with LA grade C reflux esophagitis.  8/2022 endoscopy showed healing    Past Medical History:   Diagnosis Date    Arthritis     Cancer     lymphoma of small bowel    Family history of colon cancer     GERD (gastroesophageal reflux disease)     Glaucoma     Hyperlipidemia     Hypothyroidism     TIA (transient ischemic attack)        Past Surgical History:   Procedure Laterality Date    BREAST EXCISIONAL BIOPSY Left 01/2016    Benign    CHOLECYSTECTOMY      COLON RESECTION SMALL BOWEL  2014    COLONOSCOPY  09/05/2019    Dr. Tabares-Internal  and external hemorrhoids; Repeat 10 years    ENDOSCOPY  09/05/2019    Dr. Tabares-Possible Hightower's esophagus-biopsied    ENDOSCOPY N/A 06/24/2022    LA Grade C reflux esophagitis with no bleeding; A large amount of food (residue) in the stomach; Normal examined duodenum; No specimens collected; Repeat 6 weeks    ENDOSCOPY N/A 08/05/2022    Normal esophagus; Normal stomach; Normal examined duodenum; No specimens collected    KNEE ARTHROSCOPY Right 07/14/2020    Procedure: RIGHT KNEE PARTIAL MEDIAL MENISCECTOMY;  Surgeon: Vijay Grewal MD;  Location: [facility] OR;  Service: Orthopedics;  Laterality: Right;    LAPAROSCOPIC TUBAL LIGATION      TOTAL KNEE ARTHROPLASTY Right 07/28/2021    Procedure: RIGHT TOTAL KNEE REPLACEMENT;  Surgeon: Eliecer Foley MD;  Location:  [facility] OR;  Service: Orthopedics;  Laterality: Right;    TRANSURETHRAL RESECTION OF BLADDER TUMOR N/A 07/12/2022    Procedure: CYSTOSCOPY TRANSURETHRAL RESECTION OF BLADDER TUMOR;  Surgeon: Joel Poe MD;  Location:  [facility] OR;  Service: Urology;  Laterality: N/A;    VAGINAL MESH REVISION      2010/2016    WISDOM TOOTH EXTRACTION          Current Outpatient Medications:     azelastine (ASTELIN) 0.1 % nasal spray, Instill 2 sprays into each nostril as directed by provider 2 (Two) Times a Day., Disp: 30 mL, Rfl: 12    cyclobenzaprine (FLEXERIL) 5 MG tablet, Take 1 tablet by mouth 3 (Three) Times a Day As Needed for muscle spasms, Disp: 90 tablet, Rfl: 11    diclofenac (VOLTAREN) 50 MG EC tablet, Take 1 tablet by mouth 2 (Two) Times a Day As Needed for Pain., Disp: 60 tablet, Rfl: 5    estradiol (Soledad) 0.05 MG/24HR patch, Place 1 patch on the skin as directed by provider 2 (Two) Times a Week. Remove old patch before applying new., Disp: 24 patch, Rfl: 3    linaclotide (Linzess) 72 MCG capsule capsule, Take 1 capsule by mouth Every Morning Before Breakfast., Disp: 90 capsule, Rfl: 3    pantoprazole (PROTONIX) 40 MG EC tablet, Take 1 tablet  by mouth Daily., Disp: 90 tablet, Rfl: 3    Progesterone (PROMETRIUM) 100 MG capsule, Take 1 capsule by mouth Daily., Disp: 90 capsule, Rfl: 3    Tirzepatide-Weight Management (ZEPBOUND) 5 MG/0.5ML solution auto-injector, Inject 0.5 mL under the skin into the appropriate area as directed 1 (One) Time Per Week., Disp: 2 mL, Rfl: 2    triamcinolone (KENALOG) 0.5 % ointment, Apply topically to the appropriate area as directed 2 (Two) Times a Day., Disp: 15 g, Rfl: 0    Vibegron (Gemtesa) 75 MG tablet, Take 1 tablet by mouth Daily., Disp: 90 tablet, Rfl: 3    cyclobenzaprine (FLEXERIL) 5 MG tablet, Take 1 tablet by mouth 3 (Three) Times a Day As Needed for Muscle Spasms. (Patient not taking: Reported on 1/17/2025), Disp: 90 tablet, Rfl: 11    estradiol (MINIVELLE, VIVELLE-DOT) 0.05 MG/24HR patch, Apply 1 patch to the appropriate area as directed twice weekly **Remove old patch before applying new patch** (Patient not taking: Reported on 1/17/2025), Disp: 8 patch, Rfl: 11    estradiol (MINIVELLE, VIVELLE-DOT) 0.05 MG/24HR patch, Apply 1 patch to the appropriate area as directed twice weekly **Remove old patch before applying new patch** (Patient not taking: Reported on 1/17/2025), Disp: 8 patch, Rfl: 11    estradiol (MINIVELLE, VIVELLE-DOT) 0.05 MG/24HR patch, Place 1 patch on the skin as directed by provider 2 (Two) Times a Week. remove old patch before applying new patch. (Patient not taking: Reported on 1/17/2025), Disp: 8 patch, Rfl: 11    lidocaine (XYLOCAINE) 5 % ointment, Apply 1 application  topically to the appropriate area as directed Every 2 (Two) Hours As Needed for Mild Pain. (Patient not taking: Reported on 1/17/2025), Disp: 35.44 g, Rfl: 11    Progesterone (Prometrium) 100 MG capsule, Take 1 capsule by mouth Daily. (Patient not taking: Reported on 1/17/2025), Disp: 30 capsule, Rfl: 11    No Known Allergies    Social History     Socioeconomic History    Marital status:    Tobacco Use    Smoking  "status: Former     Current packs/day: 0.00     Average packs/day: 0.5 packs/day for 10.0 years (5.0 ttl pk-yrs)     Types: Cigarettes     Start date:      Quit date:      Years since quittin.0    Smokeless tobacco: Never    Tobacco comments:     Smoked on & off for approx 10years   Vaping Use    Vaping status: Never Used   Substance and Sexual Activity    Alcohol use: Not Currently    Drug use: Never    Sexual activity: Not Currently     Partners: Male     Birth control/protection: Surgical       Family History   Problem Relation Age of Onset    Heart defect Mother     Diverticulitis Mother     No Known Problems Father     No Known Problems Sister     Cancer Maternal Aunt     Colon cancer Paternal Aunt 68    Stomach cancer Paternal Aunt 70    Liver cancer Paternal Aunt     Colon cancer Paternal Aunt 80    Breast cancer Neg Hx     Ovarian cancer Neg Hx     Uterine cancer Neg Hx     Melanoma Neg Hx     Prostate cancer Neg Hx     Colon polyps Neg Hx     Esophageal cancer Neg Hx     Liver disease Neg Hx     Rectal cancer Neg Hx        Review of Systems   Constitutional:  Negative for unexpected weight change.   Respiratory:  Negative for shortness of breath.    Cardiovascular:  Negative for chest pain.       Objective     /82 (BP Location: Left arm, Patient Position: Sitting, Cuff Size: Adult)   Pulse 93   Temp 97.7 °F (36.5 °C) (Infrared)   Ht 160 cm (63\")   Wt 82.6 kg (182 lb)   LMP 2019   SpO2 96%   Breastfeeding No   BMI 32.24 kg/m²     Physical Exam  Vitals reviewed.   Constitutional:       Appearance: Normal appearance.   Cardiovascular:      Rate and Rhythm: Normal rate and regular rhythm.      Heart sounds: Normal heart sounds.   Pulmonary:      Effort: Pulmonary effort is normal.      Breath sounds: Normal breath sounds.   Neurological:      Mental Status: She is alert.         Lab Results - Last 18 Months   Lab Units 10/08/24  0803 24  0826 23  0720   GLUCOSE mg/dL " 89 95 89   BUN mg/dL 8 10 13   CREATININE mg/dL 0.82 0.78 0.88   SODIUM mmol/L 140 143 138   POTASSIUM mmol/L 4.4 4.4 4.4   CHLORIDE mmol/L 104 103 101   CO2 mmol/L 24 25 25   ALBUMIN g/dL 4.4 4.4 4.5   ALT (SGPT) IU/L 21 67* 43*   AST (SGOT) IU/L 21 25 17   ALK PHOS IU/L 78 110 117   BILIRUBIN mg/dL 0.4 0.4 0.4       Lab Results - Last 18 Months   Lab Units 10/08/24  0803 03/28/24  0826 09/07/23  0720   HEMOGLOBIN g/dL 13.6 13.4 13.6   HEMATOCRIT % 42.1 40.2 39.9   MCV fL 89 89 88   WBC x10E3/uL 3.5 4.7 4.1   RDW % 12.5 13.0 12.7   PLATELETS x10E3/uL 243 218 239       Lab Results - Last 18 Months   Lab Units 10/08/24  0803 09/07/23  0720   TSH uIU/mL 3.290 3.090   VIT D 25 HYDROXY ng/mL  --  34.4              IMPRESSION/PLAN:    Assessment & Plan      Problem List Items Addressed This Visit       GERD (gastroesophageal reflux disease)    Overview     Acid reflux better after initiating pantoprazole 40 mg once daily in October 2023.  No dysphagia.  No weight loss.  LA grade C esophagitis seen 6/2022.  Follow-up endoscopy 8/2022 shows complete healing.         Current Assessment & Plan     Continue pantoprazole refill has been given today.  Patient instructed to call with any dysphagia or unexplainable weight loss.         Relevant Medications    pantoprazole (PROTONIX) 40 MG EC tablet    FH: colon cancer - Primary    Overview     Multiple 2nd degree family members colon cancer, 2 aunts         Relevant Orders    Case Request (Completed)    Follow Anesthesia Guidelines / Protocol    Constipation    Overview     10/2023 started MiraLAX October 2023 with no benefit.    PCP started Linzess 145 mcg which helped but caused some urgency and rectal leakage.    4/2024 Linzess 72 mcg/day started   9/5/2019 colonoscopy normal except hemorrhoids.         Current Assessment & Plan     Patient doing well with Linzess we will renew this today.  She is to call if this becomes not effective.         Relevant Medications     linaclotide (Linzess) 72 MCG capsule capsule    Genetic testing of female    Overview     Pt with recent positive SDHA genetic testing.  She is meeting with genetic counselor today to discuss recommendations on future surveillance needs.          Colonoscopy per Dr Elder Eason  Pt instructed to hold Zepbound 7 days prior            ..The risks, benefits, and alternatives of colonoscopy were reviewed with the patient today.  Risks including perforation of the colon possibly requiring surgery or colostomy.  Additional risks include risk of bleeding from biopsies or removal of colon tissue.  There is also the risk of a drug reaction or problems with anesthesia.  This will be discussed with the further by the anesthesia team on the day of the procedure.  Lastly there is a possibility of missing a colon polyp or cancer.  The benefits include the diagnosis and management of disease of the colon and rectum.  Alternatives to colonoscopy include barium enema, laboratory testing, radiographic evaluation, or no intervention.  The patient verbalizes understanding and agrees.    In accordance with requirements under the Affordable Care Act, [facility] has provided pricing for all hospital services and items on each of its websites. However, a patient's actual cost may differ based on the services the patient receives to meet individual healthcare needs and based on the benefits provided under the patient’s insurance coverage.        Christina Vaughn, APRN  01/17/25  10:21 CST    Part of this note may be an electronic transcription/translation of spoken language to printed text.

## 2025-01-17 NOTE — ASSESSMENT & PLAN NOTE
Continue pantoprazole refill has been given today.  Patient instructed to call with any dysphagia or unexplainable weight loss.

## 2025-01-20 ENCOUNTER — TELEPHONE (OUTPATIENT)
Dept: GENETICS | Facility: HOSPITAL | Age: 60
End: 2025-01-20
Payer: COMMERCIAL

## 2025-01-20 NOTE — TELEPHONE ENCOUNTER
I received a notification from [employer] that the patient would not did not keep her genetic counseling appointment.  I left a voicemail with the patient encouraging her to reschedule this appointment.  I sent a secure chat message to her provider, Apple Ivan, to update her on the status of this.

## 2025-01-21 ENCOUNTER — TELEPHONE (OUTPATIENT)
Dept: INTERNAL MEDICINE | Facility: CLINIC | Age: 60
End: 2025-01-21
Payer: COMMERCIAL

## 2025-01-21 DIAGNOSIS — M54.50 LOW BACK PAIN, UNSPECIFIED BACK PAIN LATERALITY, UNSPECIFIED CHRONICITY, UNSPECIFIED WHETHER SCIATICA PRESENT: Primary | ICD-10-CM

## 2025-01-21 RX ORDER — GABAPENTIN 400 MG/1
400 CAPSULE ORAL 3 TIMES DAILY
Qty: 90 CAPSULE | Refills: 0 | Status: SHIPPED | OUTPATIENT
Start: 2025-01-21

## 2025-01-21 NOTE — TELEPHONE ENCOUNTER
FOR DR. GONSALES:  Pt states not been sleeping much in the last two weeks, due to extreme nerve pain moving throughout body, tingling, numbness in arms, shoulders, legs, hands, feet & shoulder.  Daily Pain in right buttock & hip/leg/knee/feet/right shoulder (sometimes left as well.  Lying down, sleeping at night makes it worse.  Muscle relaxers and diclofenac are not helping, as they have been until recently (a month).  Back and Neck pain is still consistent w/pain level 4, although at night it's a level 8.  I try to sleep on the sides, back and feel nerves pulsating throughout my body.  Physical Therapy did help going, although it is still consistent, always there.  Lower back pain is worse when working at home with chores.  Pt see's Dr Og Zurita 1/23 for 2-year Knee FU and Consult w/Neurosurgeon 1/29, Nikolai Thompson, Dr Waldemar MARROQUIN.    What does Dr Gonsales suggest, pt can take for nerve pain, until she sees Neurosurgeon next week?

## 2025-01-23 ENCOUNTER — OFFICE VISIT (OUTPATIENT)
Age: 60
End: 2025-01-23

## 2025-01-23 VITALS — WEIGHT: 174 LBS | BODY MASS INDEX: 30.83 KG/M2 | HEIGHT: 63 IN

## 2025-01-23 DIAGNOSIS — Z96.651 PRESENCE OF RIGHT ARTIFICIAL KNEE JOINT: Primary | ICD-10-CM

## 2025-01-23 DIAGNOSIS — M17.0 PRIMARY OSTEOARTHRITIS OF BOTH KNEES: ICD-10-CM

## 2025-01-23 NOTE — PROGRESS NOTES
Evelin Liz (:  1965) is a 60 y.o. female, Established patient, here for evaluation of the following chief complaint(s):  Follow-up (Follow up Right total knee replacement 2021)         Assessment & Plan  1. Post-operative status following right total knee arthroplasty.  The right knee demonstrates satisfactory healing post-surgery, with an average range of motion exceeding the standard at 122 degrees. There is no knee effusion, and it is very stable with 3 mm medial and 2 mm lateral to varus and valgus stress. The neurovascular status is intact.    2. Moderate arthritis in the left knee.  The left knee shows some moderate arthritis but is not currently a cause for concern.    3. Chronic lower back pain.  The patient reports constant lower back pain since her early 30s, described as a horrible toothache. She has used essential oils and diclofenac for relief. The pain has now progressed to her neck and shoulders, causing numbness in her hands and disrupting her sleep. An MRI of the neck has been performed. She is advised to get her back evaluated, as 90% or more of back issues can be treated with injections or other non-surgical methods.    Follow-up  The patient will follow up in 5 years.    PROCEDURE  The patient underwent a right total knee replacement on 2021.    Results  Imaging  Right knee x-ray shows bone ingrowth into the tray and back, on the top piece of the femur, with a small cementful plastic button on the back of the patella. Left knee x-ray shows some moderate arthritis.  1. Presence of right artificial knee joint  -     XR KNEE RIGHT (3 VIEWS); Future  2. Primary osteoarthritis of both knees    Return in about 5 years (around 2030) for Bilateral knee x-ra.       Subjective   History of Present Illness  The patient is a 60-year-old female who presents for evaluation of right total knee replacement.    She is status post right total knee replacement on 2021. She

## 2025-01-29 ENCOUNTER — OFFICE VISIT (OUTPATIENT)
Dept: NEUROSURGERY | Facility: CLINIC | Age: 60
End: 2025-01-29
Payer: COMMERCIAL

## 2025-01-29 VITALS — BODY MASS INDEX: 31.86 KG/M2 | HEIGHT: 63 IN | WEIGHT: 179.8 LBS

## 2025-01-29 DIAGNOSIS — R20.0 NUMBNESS AND TINGLING OF BOTH UPPER EXTREMITIES: ICD-10-CM

## 2025-01-29 DIAGNOSIS — M54.50 LUMBAR BACK PAIN: Primary | ICD-10-CM

## 2025-01-29 DIAGNOSIS — M79.605 PAIN IN BOTH LOWER EXTREMITIES: ICD-10-CM

## 2025-01-29 DIAGNOSIS — M54.2 NECK PAIN: ICD-10-CM

## 2025-01-29 DIAGNOSIS — R20.2 NUMBNESS AND TINGLING OF BOTH LOWER EXTREMITIES: ICD-10-CM

## 2025-01-29 DIAGNOSIS — E66.811 CLASS 1 OBESITY DUE TO EXCESS CALORIES WITH SERIOUS COMORBIDITY AND BODY MASS INDEX (BMI) OF 31.0 TO 31.9 IN ADULT: ICD-10-CM

## 2025-01-29 DIAGNOSIS — M79.604 PAIN IN BOTH LOWER EXTREMITIES: ICD-10-CM

## 2025-01-29 DIAGNOSIS — E66.09 CLASS 1 OBESITY DUE TO EXCESS CALORIES WITH SERIOUS COMORBIDITY AND BODY MASS INDEX (BMI) OF 31.0 TO 31.9 IN ADULT: ICD-10-CM

## 2025-01-29 DIAGNOSIS — R20.2 NUMBNESS AND TINGLING OF BOTH UPPER EXTREMITIES: ICD-10-CM

## 2025-01-29 DIAGNOSIS — R20.0 NUMBNESS AND TINGLING OF BOTH LOWER EXTREMITIES: ICD-10-CM

## 2025-01-29 PROCEDURE — 99214 OFFICE O/P EST MOD 30 MIN: CPT | Performed by: NURSE PRACTITIONER

## 2025-01-29 NOTE — PATIENT INSTRUCTIONS
"DASH Eating Plan  DASH stands for Dietary Approaches to Stop Hypertension. The DASH eating plan is a healthy eating plan that has been shown to:  Lower high blood pressure (hypertension).  Reduce your risk for type 2 diabetes, heart disease, and stroke.  Help with weight loss.  What are tips for following this plan?  Reading food labels  Check food labels for the amount of salt (sodium) per serving. Choose foods with less than 5 percent of the Daily Value (DV) of sodium. In general, foods with less than 300 milligrams (mg) of sodium per serving fit into this eating plan.  To find whole grains, look for the word \"whole\" as the first word in the ingredient list.  Shopping  Buy products labeled as \"low-sodium\" or \"no salt added.\"  Buy fresh foods. Avoid canned foods and pre-made or frozen meals.  Cooking  Try not to add salt when you cook. Use salt-free seasonings or herbs instead of table salt or sea salt. Check with your health care provider or pharmacist before using salt substitutes.  Do not crook foods. Cook foods in healthy ways, such as baking, boiling, grilling, roasting, or broiling.  Cook using oils that are good for your heart. These include olive, canola, avocado, soybean, and sunflower oil.  Meal planning    Eat a balanced diet. This should include:  4 or more servings of fruits and 4 or more servings of vegetables each day. Try to fill half of your plate with fruits and vegetables.  6-8 servings of whole grains each day.  6 or less servings of lean meat, poultry, or fish each day. 1 oz is 1 serving. A 3 oz (85 g) serving of meat is about the same size as the palm of your hand. One egg is 1 oz (28 g).  2-3 servings of low-fat dairy each day. One serving is 1 cup (237 mL).  1 serving of nuts, seeds, or beans 5 times each week.  2-3 servings of heart-healthy fats. Healthy fats called omega-3 fatty acids are found in foods such as walnuts, flaxseeds, fortified milks, and eggs. These fats are also found in " cold-water fish, such as sardines, salmon, and mackerel.  Limit how much you eat of:  Canned or prepackaged foods.  Food that is high in trans fat, such as fried foods.  Food that is high in saturated fat, such as fatty meat.  Desserts and other sweets, sugary drinks, and other foods with added sugar.  Full-fat dairy products.  Do not salt foods before eating.  Do not eat more than 4 egg yolks a week.  Try to eat at least 2 vegetarian meals a week.  Eat more home-cooked food and less restaurant, buffet, and fast food.  Lifestyle  When eating at a restaurant, ask if your food can be made with less salt or no salt.  If you drink alcohol:  Limit how much you have to:  0-1 drink a day if you are female.  0-2 drinks a day if you are male.  Know how much alcohol is in your drink. In the [place] one drink is one 12 oz bottle of beer (355 mL), one 5 oz glass of wine (148 mL), or one 1½ oz glass of hard liquor (44 mL).  General information  Avoid eating more than 2,300 mg of salt a day. If you have hypertension, you may need to reduce your sodium intake to 1,500 mg a day.  Work with your provider to stay at a healthy body weight or lose weight. Ask what the best weight range is for you.  On most days of the week, get at least 30 minutes of exercise that causes your heart to beat faster. This may include walking, swimming, or biking.  Work with your provider or dietitian to adjust your eating plan to meet your specific calorie needs.  What foods should I eat?  Fruits  All fresh, dried, or frozen fruit. Canned fruits that are in their natural juice and do not have sugar added to them.  Vegetables  Fresh or frozen vegetables that are raw, steamed, roasted, or grilled. Low-sodium or reduced-sodium tomato and vegetable juice. Low-sodium or reduced-sodium tomato sauce and tomato paste. Low-sodium or reduced-sodium canned vegetables.  Grains  Whole-grain or whole-wheat bread. Whole-grain or whole-wheat pasta. Brown rice. Oatmeal.  Quinoa. Bulgur. Whole-grain and low-sodium cereals. Yuki bread. Low-fat, low-sodium crackers. Whole-wheat flour tortillas.  Meats and other proteins  Skinless chicken or turkey. Ground chicken or turkey. Pork with fat trimmed off. Fish and seafood. Egg whites. Dried beans, peas, or lentils. Unsalted nuts, nut butters, and seeds. Unsalted canned beans. Lean cuts of beef with fat trimmed off. Low-sodium, lean precooked or cured meat, such as sausages or meat loaves.  Dairy  Low-fat (1%) or fat-free (skim) milk. Reduced-fat, low-fat, or fat-free cheeses. Nonfat, low-sodium ricotta or cottage cheese. Low-fat or nonfat yogurt. Low-fat, low-sodium cheese.  Fats and oils  Soft margarine without trans fats. Vegetable oil. Reduced-fat, low-fat, or light mayonnaise and salad dressings (reduced-sodium). Canola, safflower, olive, avocado, soybean, and sunflower oils. Avocado.  Seasonings and condiments  Herbs. Spices. Seasoning mixes without salt.  Other foods  Unsalted popcorn and pretzels. Fat-free sweets.  The items listed above may not be all the foods and drinks you can have. Talk to a dietitian to learn more.  What foods should I avoid?  Fruits  Canned fruit in a light or heavy syrup. Fried fruit. Fruit in cream or butter sauce.  Vegetables  Creamed or fried vegetables. Vegetables in a cheese sauce. Regular canned vegetables that are not marked as low-sodium or reduced-sodium. Regular canned tomato sauce and paste that are not marked as low-sodium or reduced-sodium. Regular tomato and vegetable juices that are not marked as low-sodium or reduced-sodium. Pickles. Olives.  Grains  Baked goods made with fat, such as croissants, muffins, or some breads. Dry pasta or rice meal packs.  Meats and other proteins  Fatty cuts of meat. Ribs. Fried meat. Koehler. Bologna, salami, and other precooked or cured meats, such as sausages or meat loaves, that are not lean and low in sodium. Fat from the back of a pig (fatback). Neeraj.  Salted nuts and seeds. Canned beans with added salt. Canned or smoked fish. Whole eggs or egg yolks. Chicken or turkey with skin.  Dairy  Whole or 2% milk, cream, and half-and-half. Whole or full-fat cream cheese. Whole-fat or sweetened yogurt. Full-fat cheese. Nondairy creamers. Whipped toppings. Processed cheese and cheese spreads.  Fats and oils  Butter. Stick margarine. Lard. Shortening. Ghee. Koehler fat. Tropical oils, such as coconut, palm kernel, or palm oil.  Seasonings and condiments  Onion salt, garlic salt, seasoned salt, table salt, and sea salt. Worcestershire sauce. Tartar sauce. Barbecue sauce. Teriyaki sauce. Soy sauce, including reduced-sodium soy sauce. Steak sauce. Canned and packaged gravies. Fish sauce. Oyster sauce. Cocktail sauce. Store-bought horseradish. Ketchup. Mustard. Meat flavorings and tenderizers. Bouillon cubes. Hot sauces. Pre-made or packaged marinades. Pre-made or packaged taco seasonings. Relishes. Regular salad dressings.  Other foods  Salted popcorn and pretzels.  The items listed above may not be all the foods and drinks you should avoid. Talk to a dietitian to learn more.  Where to find more information  National Heart, Lung, and Blood [place] (NHLBI): nhlbi.nih.gov  American Heart Association (AHA): heart.org  Academy of Nutrition and Dietetics: eatright.org  National Kidney Foundation (NKF): kidney.org  This information is not intended to replace advice given to you by your health care provider. Make sure you discuss any questions you have with your health care provider.  Document Revised: 01/04/2024 Document Reviewed: 01/04/2024  Elsevier Patient Education © 2024 [employer].

## 2025-01-29 NOTE — PROGRESS NOTES
Chief complaint:   Chief Complaint   Patient presents with    Back Pain    Neck Pain     Pt is here with back and neck pain, arm and leg pain, numbness and tingling bilateral arms and legs.         Subjective     HPI:   History of Present Illness  The patient is a 60-year-old female who presents today for follow-up of lower back and left leg pain and dysesthesias.    She recently completed 5 sessions of physical therapy at [facility] with some short-term relief.    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 Her lower back pain intensifies during household chores or periods of high activity, to the point where it becomes unbearable. Her leg pain and dysesthesias has also worsened, with the right side being more affected than the left, predominantly nondermatomal bilaterally.   As a side sleeper, she often switches sides due to discomfort, but this only results in pain on the opposite side. Sleeping on her back provides some relief, but she still struggles with sleep due to the pain. She has been taking gabapentin, Flexeril, and diclofenac which has improved her sleep quality. She works at a standing desk, which she finds more comfortable than sitting, but the pain persists, radiating to her right cheek.  She denies saddle anesthesia or bowel dysfunction, however reports chronic bladder incontinence.  She denies fevers, chills, night sweats, or unexplained weight loss.  Subsequently, she additionally endorses persistent neck pain without upper extremity radicular pain or weakness, however reports intermittent bilateral hand numbness and tingling in a stocking glove distribution from the wrist.  She currently rates the severity of her symptoms 8/10.      Oswestry Disability Index = 20%   Score   Pain Intensity Fairly severe pain-3   Personal Care Look after myself without  "pain-0   Lifting Can lift heavy weights with extra pain-1   Walking Walk any distance-0   Sitting Sit in \"favorite\" chair as long as I like-1   Standing Stand as long as I like but with extra pain-1   Sleeping Can only sleep < 6 hrs-2   Sex Life (if applicable) Not applicable-0   Social Life Social life normal, but increases pain-1   Traveling Travel gives me extra pain-1   (German et al, 1980)    SCORE INTERPRETATION OF THE OSWESTRY LBP DISABILITY QUESTIONNAIRE     0-20% Minimal disability.  Can cope with most ADLs. Usually no treatment is needed, apart from advice on lifting, sitting, posture, physical fitness, and diet.  In this group, some patients have particular difficulty with sitting and this may be important if their occupation is sedentary (, , etc.).    ROS  Review of Systems   Constitutional: Negative.    HENT: Negative.     Eyes: Negative.    Respiratory: Negative.     Cardiovascular: Negative.    Gastrointestinal: Negative.    Endocrine: Negative.    Genitourinary: Negative.    Musculoskeletal:  Positive for back pain, neck pain and neck stiffness.   Skin: Negative.    Allergic/Immunologic: Negative.    Neurological:  Positive for numbness.   Hematological: Negative.    Psychiatric/Behavioral: Negative.     All other systems reviewed and are negative.    PFSH:  Past Medical History:   Diagnosis Date    Arthritis     Cancer     lymphoma of small bowel    Family history of colon cancer     GERD (gastroesophageal reflux disease)     Glaucoma     Hyperlipidemia     Hypothyroidism     Low back pain     TIA (transient ischemic attack)      Past Surgical History:   Procedure Laterality Date    BREAST EXCISIONAL BIOPSY Left 01/2016    Benign    CHOLECYSTECTOMY      COLON RESECTION SMALL BOWEL  2014    COLONOSCOPY  09/05/2019    Dr. Tabares-Internal and external hemorrhoids; Repeat 10 years    ENDOSCOPY  09/05/2019    Dr. Tabares-Possible Hightower's esophagus-biopsied    ENDOSCOPY N/A 06/24/2022    LA " Grade C reflux esophagitis with no bleeding; A large amount of food (residue) in the stomach; Normal examined duodenum; No specimens collected; Repeat 6 weeks    ENDOSCOPY N/A 08/05/2022    Normal esophagus; Normal stomach; Normal examined duodenum; No specimens collected    KNEE ARTHROSCOPY Right 07/14/2020    Procedure: RIGHT KNEE PARTIAL MEDIAL MENISCECTOMY;  Surgeon: Vijay Grewal MD;  Location:  [facility] OR;  Service: Orthopedics;  Laterality: Right;    LAPAROSCOPIC TUBAL LIGATION      TOTAL KNEE ARTHROPLASTY Right 07/28/2021    Procedure: RIGHT TOTAL KNEE REPLACEMENT;  Surgeon: Eliecer Foley MD;  Location:  [facility];  Service: Orthopedics;  Laterality: Right;    TRANSURETHRAL RESECTION OF BLADDER TUMOR N/A 07/12/2022    Procedure: CYSTOSCOPY TRANSURETHRAL RESECTION OF BLADDER TUMOR;  Surgeon: Joel Poe MD;  Location:  [facility];  Service: Urology;  Laterality: N/A;    VAGINAL MESH REVISION      2010/2016    WISDOM TOOTH EXTRACTION       Objective      Current Outpatient Medications   Medication Sig Dispense Refill    azelastine (ASTELIN) 0.1 % nasal spray Instill 2 sprays into each nostril as directed by provider 2 (Two) Times a Day. 30 mL 12    cyclobenzaprine (FLEXERIL) 5 MG tablet Take 1 tablet by mouth 3 (Three) Times a Day As Needed for Muscle Spasms. 90 tablet 11    cyclobenzaprine (FLEXERIL) 5 MG tablet Take 1 tablet by mouth 3 (Three) Times a Day As Needed for muscle spasms 90 tablet 11    diclofenac (VOLTAREN) 50 MG EC tablet Take 1 tablet by mouth 2 (Two) Times a Day As Needed for Pain. 60 tablet 5    estradiol (Soledad) 0.05 MG/24HR patch Place 1 patch on the skin as directed by provider 2 (Two) Times a Week. Remove old patch before applying new. 24 patch 3    estradiol (MINIVELLE, VIVELLE-DOT) 0.05 MG/24HR patch Apply 1 patch to the appropriate area as directed twice weekly **Remove old patch before applying new patch** 8 patch 11    estradiol (MINIVELLE, VIVELLE-DOT) 0.05  "MG/24HR patch Apply 1 patch to the appropriate area as directed twice weekly **Remove old patch before applying new patch** 8 patch 11    estradiol (MINIVELLE, VIVELLE-DOT) 0.05 MG/24HR patch Place 1 patch on the skin as directed by provider 2 (Two) Times a Week. remove old patch before applying new patch. 8 patch 11    gabapentin (NEURONTIN) 400 MG capsule Take 1 capsule by mouth 3 (Three) Times a Day. 90 capsule 0    lidocaine (XYLOCAINE) 5 % ointment Apply 1 application  topically to the appropriate area as directed Every 2 (Two) Hours As Needed for Mild Pain. 35.44 g 11    linaclotide (Linzess) 72 MCG capsule capsule Take 1 capsule by mouth Every Morning Before Breakfast. 90 capsule 3    pantoprazole (PROTONIX) 40 MG EC tablet Take 1 tablet by mouth Daily. 90 tablet 3    Progesterone (Prometrium) 100 MG capsule Take 1 capsule by mouth Daily. 30 capsule 11    Progesterone (PROMETRIUM) 100 MG capsule Take 1 capsule by mouth Daily. 90 capsule 3    Tirzepatide-Weight Management (ZEPBOUND) 5 MG/0.5ML solution auto-injector Inject 0.5 mL under the skin into the appropriate area as directed 1 (One) Time Per Week. 2 mL 2    triamcinolone (KENALOG) 0.5 % ointment Apply topically to the appropriate area as directed 2 (Two) Times a Day. 15 g 0    Vibegron (Gemtesa) 75 MG tablet Take 1 tablet by mouth Daily. 90 tablet 3     No current facility-administered medications for this visit.       Vital Signs  Ht 160 cm (63\")   Wt 81.6 kg (179 lb 12.8 oz)   LMP 06/30/2019   BMI 31.85 kg/m²   Physical Exam  Vitals and nursing note reviewed.   Constitutional:       General: She is not in acute distress.     Appearance: Normal appearance. She is well-developed and well-groomed. She is obese. She is not ill-appearing, toxic-appearing or diaphoretic.      Comments: BMI 31.85   HENT:      Head: Normocephalic and atraumatic.      Right Ear: Hearing normal.      Left Ear: Hearing normal.   Eyes:      General: Lids are normal.      " Extraocular Movements: Extraocular movements intact.      Conjunctiva/sclera: Conjunctivae normal.      Pupils: Pupils are equal, round, and reactive to light.   Neck:      Trachea: Trachea normal.   Cardiovascular:      Rate and Rhythm: Normal rate and regular rhythm.   Pulmonary:      Effort: Pulmonary effort is normal. No tachypnea, bradypnea, accessory muscle usage or respiratory distress.   Abdominal:      Palpations: Abdomen is soft.   Musculoskeletal:      Cervical back: Full passive range of motion without pain and neck supple.   Skin:     General: Skin is warm and dry.   Neurological:      Mental Status: She is alert.      GCS: GCS eye subscore is 4. GCS verbal subscore is 5. GCS motor subscore is 6.      Coordination: Coordination is intact.      Deep Tendon Reflexes:      Reflex Scores:       Tricep reflexes are 2+ on the right side and 2+ on the left side.       Bicep reflexes are 2+ on the right side and 2+ on the left side.       Brachioradialis reflexes are 2+ on the right side and 2+ on the left side.       Patellar reflexes are 2+ on the right side and 2+ on the left side.       Achilles reflexes are 0 on the right side and 0 on the left side.  Psychiatric:         Speech: Speech normal.         Behavior: Behavior normal. Behavior is cooperative.       Neurological Exam  Mental Status  Alert. Speech is normal. Language is fluent with no aphasia. Attention and concentration are normal.    Cranial Nerves  CN II: Visual acuity is normal.  CN III, IV, VI: Extraocular movements intact bilaterally. Normal lids and orbits bilaterally. Pupils equal round and reactive to light bilaterally.  CN V: Facial sensation is normal.  CN VII: Full and symmetric facial movement.  CN IX, X: Palate elevates symmetrically  CN XI: Shoulder shrug strength is normal.    Motor  Normal muscle bulk throughout. Normal muscle tone.                                               Right                     Left  Deltoid                                    5                          5   Biceps                                   5                          5   Triceps                                  5                          5   Wrist extensor                       5                          5   Finger flexor                          5                          5   Iliopsoas                               5                          5   Quadriceps                           5                          5   Gastrocnemius                     5                           5   Anterior tibialis                      5                          5  Extensor hallucis longus      5                           5    Sensory  Light touch is normal in upper and lower extremities.     Reflexes                                            Right                      Left  Brachioradialis                    2+                         2+  Biceps                                 2+                         2+  Triceps                                2+                         2+  Patellar                                2+                         2+  Achilles                                0                         0  Right Plantar: equivocal  Left Plantar: equivocal    Right pathological reflexes: Anselmo's absent. Ankle clonus absent.  Left pathological reflexes: Anselmo's absent. Ankle clonus absent.    Coordination    Finger-to-nose, rapid alternating movements and heel-to-shin normal bilaterally without dysmetria.    Gait  Casual gait is normal including stance, stride, and arm swing.  (12 bullet pts)    Female  strength (pounds)  AGE Right Hand RH Norms Left Hand LH Norms   20-24  70±14.5  61±13.1   25-29  75±13.9  63.5±12   30-34  79±19.2  68±17.7   35-39  74±10.8  66±11.7   40-44  70±13.5  62±13.8   45-49  62±15.1  56±12.7   50-54  66±11.6  57±10.7   55-59 *85 57±12.5 78 47±11.9   60-64  55±10.1  46±10.1   65-69  50±9.7  41±8.2   70-74  50±11.7  42±10.2   75+  43±11.0   38±8.9   (CATIE Ceron et al; Hand Dynometer: Effects of trials and sessions.  Perpetual and Motor Skills 61:195-8, 1985)  * = Dominant hand  > = Intervention    Results Review:   12/9/2024.  X-rays of the cervical spine show evidence of acute fractures, severe spondylosis at C3-4 and C4-5, and anteriolisthesis of C7 over T1.      1/13/2025.  MRI of the cervical spine shows no STIR signal change suggest acute fractures, no cord signal change, multilevel degenerative changes to include spondylosis at C3-4 and C4-5 and anteriolisthesis of C7 over T1 resulting in multiple areas of right foraminal narrowing without significant central canal or foraminal stenosis.           1/13/2025.  MRI of the lumbar spine shows no STIR signal changes to suggest acute fractures, no malalignment, the conus terminates at a normal level, spondylosis with Modic endplate changes at L4-5 resulting in mild thecal sac compression and bilateral foraminal narrowing and a small lateral disc protrusion at L5-S1 resulting in moderate left foraminal stenosis.          Lumbar Stenosis         Assessment/Plan:   Desirae Alberto is a 60 y.o. female with significant medical comorbidities to include TIA, GI related cancer, hyperlipidemia, hypothyroidism, GERD, former smoker, and obesity.  She presents with a known problem of lumbar back and bilateral nondermatomal leg pain and dysesthesias, as well as a new complaint of persistent neck pain with bilateral hand numbness and tingling.  Physical exam findings of positive Tinel's over the bilateral cubital fossa and right median nerve, absent bilateral Achilles, and equivocal plantar reflexes.  Her imaging: MRI of the cervical spine shows no cord signal change, multilevel degenerative changes to include spondylosis at C3-4 and C4-5 and anteriolisthesis of C7 over T1 resulting in multiple areas of right foraminal narrowing without significant central canal or foraminal stenosis.  MRI of the lumbar spine shows  spondylosis with Modic endplate changes at L4-5 resulting in mild thecal sac compression and bilateral foraminal narrowing and a small lateral disc protrusion at L5-S1 resulting in moderate left foraminal stenosis.     Recommendations:  Lumbar back pain  Bilateral nondermatomal leg pain and dysesthesias  Ms. Alberto recently completed 5 sessions of physical therapy with mild short-term relief in her discomfort.  Despite her improvements she endorses worsening lumbar back pain, as well as worsening bilateral nondermatomal leg pain and dysesthesias.  For further evaluation we will send her for EMG and nerve conduction studies of both lower extremities to confirm or refute radiculopathy from the lumbar spine and/or a peripheral neuropathy as a causative factor for her lower extremity dysesthesias.    Cervicalgia  Bilateral hand numbness and tingling  In addition to her lower back pain, she additionally endorses persistent neck pain, as well as bilateral hand numbness and tingling in a stocking glove distribution from the wrist.  For further evaluation we will send her for an EMG and nerve conduction study of both upper extremities to confirm or refute radiculopathy from the cervical spine and/or a peripheral neuropathy as a causative factor for upper extremity dysesthesias.  We will have her return for reevaluation with Dr. Medeiros after all studies been completed to discuss need for surgical intervention.  Both patient and family know they may contact the neurosurgical clinic to return sooner for any new or additional concerns and agrees with this plan of care.     Class 1 obesity (BMI 30.0-34.9) due to excessive calories with serious comorbidities BMI of 33.0-33.9 in adult  Body mass index is 31.85 kg/m². Information on the DASH diet provided in the AVS.  We will continue to provided diet and exercise information with the goal of weight loss at each scheduled appointment.     Diagnoses and all orders for this  visit:    1. Lumbar back pain (Primary)    2. Pain in both lower extremities  -     EMG & Nerve Conduction Test; Future    3. Numbness and tingling of both lower extremities  -     EMG & Nerve Conduction Test; Future    4. Neck pain    5. Numbness and tingling of both upper extremities  -     EMG & Nerve Conduction Test; Future    6. Class 1 obesity due to excess calories with serious comorbidity and body mass index (BMI) of 31.0 to 31.9 in adult      Return for FOLLOW UP WITH DR. DHALIWAL NEXT AVAILABLE WITH EMG UPPER AND LOWER EXTREMITIES..    Thank you, for allowing me to continue to participate in the care of this patient.    Sincerely,    NAVEED Hammond    Patient or patient representative verbalized consent for the use of Ambient Listening during the visit with  NAVEED Hammond for chart documentation. 1/29/2025  10:20 CST     I spent 51 minutes caring for Desirae on this date of service. This time includes time spent by me in the following activities: preparing for the visit, performing a medically appropriate examination and/or evaluation, counseling and educating the patient/family/caregiver, ordering medications, tests, or procedures, documenting information in the medical record, and independently interpreting results and communicating that information with the patient/family/caregiver.

## 2025-02-07 ENCOUNTER — OFFICE VISIT (OUTPATIENT)
Age: 60
End: 2025-02-07
Payer: COMMERCIAL

## 2025-02-07 ENCOUNTER — TELEPHONE (OUTPATIENT)
Dept: NEUROLOGY | Facility: HOSPITAL | Age: 60
End: 2025-02-07
Payer: COMMERCIAL

## 2025-02-07 VITALS — BODY MASS INDEX: 31.71 KG/M2 | WEIGHT: 179 LBS | HEIGHT: 63 IN

## 2025-02-07 DIAGNOSIS — R20.0 BILATERAL NUMBNESS AND TINGLING OF ARMS AND LEGS: ICD-10-CM

## 2025-02-07 DIAGNOSIS — M47.812 CERVICAL SPONDYLOSIS: ICD-10-CM

## 2025-02-07 DIAGNOSIS — R20.2 BILATERAL NUMBNESS AND TINGLING OF ARMS AND LEGS: ICD-10-CM

## 2025-02-07 DIAGNOSIS — M75.51 SUBACROMIAL BURSITIS OF RIGHT SHOULDER JOINT: Primary | ICD-10-CM

## 2025-02-07 DIAGNOSIS — M51.362 DEGENERATION OF INTERVERTEBRAL DISC OF LUMBAR REGION WITH DISCOGENIC BACK PAIN AND LOWER EXTREMITY PAIN: ICD-10-CM

## 2025-02-07 DIAGNOSIS — M25.511 ACUTE PAIN OF RIGHT SHOULDER: ICD-10-CM

## 2025-02-07 RX ORDER — TRIAMCINOLONE ACETONIDE 40 MG/ML
40 INJECTION, SUSPENSION INTRA-ARTICULAR; INTRAMUSCULAR ONCE
Status: COMPLETED | OUTPATIENT
Start: 2025-02-07 | End: 2025-02-07

## 2025-02-07 RX ORDER — LIDOCAINE HYDROCHLORIDE 10 MG/ML
2 INJECTION, SOLUTION INFILTRATION; PERINEURAL ONCE
Status: COMPLETED | OUTPATIENT
Start: 2025-02-07 | End: 2025-02-07

## 2025-02-07 RX ADMIN — LIDOCAINE HYDROCHLORIDE 2 ML: 10 INJECTION, SOLUTION INFILTRATION; PERINEURAL at 10:47

## 2025-02-07 RX ADMIN — TRIAMCINOLONE ACETONIDE 40 MG: 40 INJECTION, SUSPENSION INTRA-ARTICULAR; INTRAMUSCULAR at 10:47

## 2025-02-07 NOTE — PROGRESS NOTES
[facility]  Jarrod Shea MD, PhD  Kevin Shea PA-C    CHIEF COMPLAINT  Initial Evaluation of the Right Shoulder (Patient presents today for right shoulder pain. X-ray performed on 12/9/24 at [place]. Patient states pain started last summer, no injury. Patient complains of pain, numbness, and weakness down right arm to hand.)       HISTORY OF PRESENT ILLNESS  New patient here for right shoulder pain.  History of Present Illness  The patient presents for evaluation of right shoulder pain.     Sometime last summer when she was watching her grandkids and slept in a different bed than usual she woke up with fairly severe right shoulder pain which has more or less persisted since then.  She had an x-ray with her PCP which was normal.  Shoulder pain is worse with use of her right arm.  With a normal shoulder x-ray her PCP referred her to neurosurgery to evaluate her cervical spine.  She has also been experiencing numbness in bilateral arms that is worse during sleep.  She can occasionally move her head/neck in a certain way to recreate pain shooting down her arm.  She has limited range of motion of her right shoulder.  She was started on gabapentin which has provided significant improvement.  She had an MRI done of her cervical spine and has done physical therapy and chiropractic treatments.  This winter she has had to be more physically active lifting would further fireplace while her  has been recovering from an ankle fracture.    Patient also has been dealing with bilateral lower extremity nerve pain for a number of years.  She has had an MRI of her lumbar spine as well and seen neurosurgery for this as well.  She also had an EMG/NCS of her bilateral lower extremities a few years ago.       HISTORY    Current Outpatient Medications   Medication Instructions    azelastine (ASTELIN) 0.1 % nasal spray Instill 2 sprays into each nostril as directed by provider 2 (Two)  Times a Day.    cyclobenzaprine (FLEXERIL) 5 MG tablet Take 1 tablet by mouth 3 (Three) Times a Day As Needed for muscle spasms    cyclobenzaprine (FLEXERIL) 5 mg, Oral, 3 Times Daily PRN    diclofenac (VOLTAREN) 50 MG EC tablet Take 1 tablet by mouth 2 (Two) Times a Day As Needed for Pain.    estradiol (Soledad) 0.05 MG/24HR patch 1 patch, Transdermal, 2 Times Weekly, Remove old patch before applying new.    estradiol (MINIVELLE, VIVELLE-DOT) 0.05 MG/24HR patch Apply 1 patch to the appropriate area as directed twice weekly **Remove old patch before applying new patch**    estradiol (MINIVELLE, VIVELLE-DOT) 0.05 MG/24HR patch Apply 1 patch to the appropriate area as directed twice weekly **Remove old patch before applying new patch**    estradiol (MINIVELLE, VIVELLE-DOT) 0.05 MG/24HR patch 1 patch, Transdermal, 2 Times Weekly, remove old patch before applying new patch.    gabapentin (NEURONTIN) 400 mg, Oral, 3 Times Daily    Gemtesa 75 mg, Oral, Daily    lidocaine (XYLOCAINE) 5 % ointment Apply 1 application  topically to the appropriate area as directed Every 2 (Two) Hours As Needed for Mild Pain.    Linzess 72 mcg, Oral, Every Morning Before Breakfast    pantoprazole (PROTONIX) 40 mg, Oral, Daily    Progesterone (PROMETRIUM) 100 mg, Oral, Daily    Progesterone (PROMETRIUM) 100 mg, Oral, Daily    triamcinolone (KENALOG) 0.5 % ointment Apply topically to the appropriate area as directed 2 (Two) Times a Day.    Zepbound 5 mg, Subcutaneous, Weekly         reports that she quit smoking about 24 years ago. Her smoking use included cigarettes. She started smoking about 34 years ago. She has a 5 pack-year smoking history. She has never used smokeless tobacco. She reports that she does not currently use alcohol. She reports that she does not use drugs.    Past Medical History:   Diagnosis Date    Arthritis     Cancer     lymphoma of small bowel    Family history of colon cancer     GERD (gastroesophageal reflux disease)      Glaucoma     Hyperlipidemia     Hypothyroidism     Low back pain     TIA (transient ischemic attack)         Past Surgical History:   Procedure Laterality Date    BREAST EXCISIONAL BIOPSY Left 01/2016    Benign    CHOLECYSTECTOMY      COLON RESECTION SMALL BOWEL  2014    COLONOSCOPY  09/05/2019    Dr. Tabares-Internal and external hemorrhoids; Repeat 10 years    ENDOSCOPY  09/05/2019    Dr. Tabares-Possible Hightower's esophagus-biopsied    ENDOSCOPY N/A 06/24/2022    LA Grade C reflux esophagitis with no bleeding; A large amount of food (residue) in the stomach; Normal examined duodenum; No specimens collected; Repeat 6 weeks    ENDOSCOPY N/A 08/05/2022    Normal esophagus; Normal stomach; Normal examined duodenum; No specimens collected    KNEE ARTHROSCOPY Right 07/14/2020    Procedure: RIGHT KNEE PARTIAL MEDIAL MENISCECTOMY;  Surgeon: Vijay Grewal MD;  Location: [facility] OR;  Service: Orthopedics;  Laterality: Right;    LAPAROSCOPIC TUBAL LIGATION      TOTAL KNEE ARTHROPLASTY Right 07/28/2021    Procedure: RIGHT TOTAL KNEE REPLACEMENT;  Surgeon: Eliecer Foley MD;  Location:  [facility] OR;  Service: Orthopedics;  Laterality: Right;    TRANSURETHRAL RESECTION OF BLADDER TUMOR N/A 07/12/2022    Procedure: CYSTOSCOPY TRANSURETHRAL RESECTION OF BLADDER TUMOR;  Surgeon: Joel Poe MD;  Location:  [facility] OR;  Service: Urology;  Laterality: N/A;    VAGINAL MESH REVISION      2010/2016    WISDOM TOOTH EXTRACTION          PHYSICAL EXAM  Constitutional: The patient is in no apparent distress and generally well-appearing. The patient hears me clearly and answers questions appropriately.   Musculoskeletal:  Physical Exam  The right shoulder shows abduction to 90 degrees, pain begins but can passively abduct beyond that, forward flexion to about 110 degrees actively. Positive Lei, positive Neer, positive empty can, positive [place], negative Speed's test. Weakness with external rotation but normal strength  with internal rotation and normal strength with shoulder abduction with the arm at about 60 degrees.      IMAGING     Previous right shoulder x-rays personally reviewed.  No acute osseous abnormalities.  Well-maintained joint spaces.  Narrative & Impression   EXAM: XR SHOULDER 2+ VW RIGHT-      DATE: 12/9/2024 12:56 PM     HISTORY: SHOULDER PAIN; M25.511-Pain in right shoulder; G89.29-Other  chronic pain       COMPARISON: None available.     TECHNIQUE:   Internal and external rotation views and scapular y view  obtained.     FINDINGS:    Mineralization is normal. Glenohumeral and AC joints are intact. No  acute fracture or dislocation or cortical irregularity is seen. Soft  tissues are unremarkable.        IMPRESSION:  1. Unremarkable exam.     This report was signed and finalized on 12/9/2024 4:00 PM by Deejay Menjivar.       Narrative & Impression   EXAMINATION: MRI CERVICAL SPINE WO CONTRAST- 1/13/2025 1:41 PM     HISTORY: chronic neck pain; M54.2-Cervicalgia     COMPARISON: None      TECHNIQUE: Multiplanar, multiphasic images of the cervical spine was  obtained without contrast.     FINDINGS:  Imaged portions of the cerebellum and brainstem are unremarkable.     ALIGNMENT: There is mild grade 1 anterolisthesis of C7 on T1.     OSSEOUS: Modic 2 endplate changes at C5-C6.     CORD/CANAL: The spinal cord is normal in signal and morphology.     SOFT TISSUES: The surrounding soft tissues are unremarkable.     LEVELS:  C2-C3: There is disc desiccation and a shallow posterior protrusion.  There is mild LEFT and moderate RIGHT facet arthropathy. Mild thecal sac  narrowing anteriorly. Facet arthropathy on the RIGHT contributes to very  mild RIGHT foraminal narrowing. No definite LEFT foraminal narrowing.     C3-C4: Disc desiccation and moderate disc space height loss. Mild  diffuse disc bulge indents the ventral CSF space. Bilateral facet  arthropathy and uncovertebral hypertrophy. Mild thecal sac stenosis.  Moderate  bilateral foraminal stenosis.     C4-C5: Disc desiccation and a shallow posterior protrusion indents the  ventral aspect of the CSF space. Very mild thecal sac narrowing. There  is bilateral facet arthropathy, RIGHT greater than LEFT. Very mild RIGHT  uncovertebral hypertrophy. Mild RIGHT foraminal narrowing and no LEFT  foraminal narrowing.     C5-C6: Disc desiccation and moderate disc space height loss with a  diffuse disc bulge indents the ventral aspect of the CSF space. There is  moderate RIGHT and mild LEFT uncovertebral hypertrophy. Mild bilateral  facet arthropathy. The disc contacts the ventral contour of the cord  contributing to moderate canal narrowing. Also at this level, there is  moderate to severe RIGHT and no LEFT foraminal narrowing.     C6-C7: Disc desiccation. Mild diffuse disc bulge but no thecal sac  stenosis or foraminal stenosis.     C7-T1: Grade 1 anterolisthesis of C7 on T1. There is facet arthropathy  more pronounced on the RIGHT which causes mass effect on the foramen,  contributing to moderate RIGHT foraminal narrowing. No thecal sac  narrowing.        IMPRESSION:     1.  Multilevel degenerative disc disease, uncovertebral hypertrophy, and  facet arthropathy as discussed above.     2.  At C5-C6 there is moderate canal narrowing and moderate to severe  RIGHT foraminal narrowing.     3.  At C7-T1 there is moderate RIGHT foraminal narrowing. There is also  grade 1 anterolisthesis of C7.     4.  At C4-C5, there is mild RIGHT foraminal narrowing and mild thecal  sac narrowing.     5.  At C3-C4 there is moderate bilateral foraminal narrowing and mild  thecal sac narrowing.     6.  Otherwise, please see the levels section above for details regarding  individual levels and the degree of thecal sac and foraminal narrowing  at each level.            This report was signed and finalized on 1/14/2025 8:17 AM by Dr. Stuart Cotton MD.     Narrative & Impression   EXAM: MRI LUMBAR SPINE WO CONTRAST-  - 1/13/2025 2:26 PM     HISTORY: chronic back pain; M51.370-Other intervertebral disc  degeneration, lumbosacral region with discogenic back pain only       COMPARISON: 4/9/2021.     TECHNIQUE: Routine MR of the lumbar spine was performed without  intravenous contrast.     FINDINGS:  Five nonrib-bearing, lumbar-type vertebral bodies.  Normal vertebral  body alignment.  Normal vertebral body height.      No evidence of a diffuse infiltrative bone marrow process. Mixed edema  and fatty type degenerative endplate changes at L4-5 (Modic type I and  II), which can be pain generator.     Severe disc height loss at L4-5 and moderate disc height loss at L5-S1  L5-S1.     Conus terminates at L1-2, within normal limits.. Normal appearance of  the distal thoracic cord and cauda equina nerve roots.      Small disc bulge at 3 presumed T11-12. No severe spinal canal or  foraminal stenosis.        L1-2: No disc bulge, spinal canal or foraminal stenosis.     L2-3: No disc bulge, spinal canal or foraminal stenosis.     L3-4: Minimal disc bulge. No spinal canal or foraminal stenosis. Mild  bilateral facet hypertrophy.     L4-5: Diffuse disc bulge. Mild spinal canal stenosis. No RIGHT and mild  LEFT foraminal stenosis. LEFT greater than RIGHT facet hypertrophy.     L5-S1: Disc bulge. No spinal canal stenosis. Mild to moderate RIGHT and  moderate LEFT foraminal stenosis. Severe bilateral facet hypertrophy  with small surrounding soft tissue edema, which can be a pain  generator..     Common bile duct measures 7-8 mm, similar compared to 9/17/2019 likely  reservoir effect after cholecystectomy.        IMPRESSION:  1. Greatest spinal canal stenosis appears mild at L4-5.  2. Greatest foraminal stenosis at L4-5 and L5-S1 as above.  3. Severe disc height loss with mixed edema and fatty type degenerative  endplate changes at L4-5 (Modic type I and II), which can be a pain  generator.  4. Facet hypertrophy at L5-S1 with small surrounding soft  tissue edema,  which can be a pain generator.        This report was signed and finalized on 1/13/2025 3:59 PM by Dr Jody Lambert MD.     NERVE CONDUCTION STUDY        HISTORY OF PRESENT ILLNESS: Patient reports nerve pain which is sharp from her waist down in different parts of her legs, even on the tops of her feet question left greater than right and reports knee pain right greater than left.  Patient not on any blood thinners has not had back surgery.  Patient has had no chemo or radiation therapy and is not diabetic.  Patient is also mention to have history of elevated liver enzymes, glaucoma, hot flashes, hypercalcemia, hyperlipidemia, MTHFR mutation, non-Hodgkin's lymphoma, medial meniscus tear of right knee, hypothyroidism, TIA, tick bite, unexplained night sweats.     RESULTS: The nerve conduction study for the distal sural sensory latencies show low amplitude throughout.  The peroneal motor nerves bilaterally show low amplitudes throughout.  Motor nerve conduction of the peroneal nerves are normal and distal latencies normal.  Posterior tibial motor nerve amplitudes drop somewhat at the knee bilaterally but have normal distal motor latencies and normal motor nerve conductions.  F responses are somewhat prolonged for the posterior tibial versus peroneal nerves bilaterally     IMPRESSION: The study suggest motor/ sensory neuropathy possibly axonal more than demyelinative.  I cannot in this situation rule out peripheral nerve entrapments or proximal nerve/nerve root or plexus abnormalities or spinal stenosis as contributory.  This must be correlated with patient's presentation.           EMG REPORT     RESULTS: The EMG of the bilateral lower extremities and bilateral lumbar paraspinals shows diffuse increase in polyphasia and duration with variable amplitudes (some increased) but no insertional irritability and normal interference patterns in the lower extremity muscles.     IMPRESSION: Study suggests  diffuse neuropathy/neuromyopathy but in the setting I cannot rule out peripheral nerve entrapments or proximal nerve/nerve root or plexus abnormalities or spinal stenosis as contributory.  This must be correlated with patient's presentation.     Magdi Chavez MD  03/16/21  12:23 CDT          ASSESSMENT & PLAN  Diagnoses and all orders for this visit:    1. Subacromial bursitis of right shoulder joint (Primary)  -     lidocaine (XYLOCAINE) 1 % injection 2 mL  -     triamcinolone acetonide (KENALOG-40) injection 40 mg    2. Acute pain of right shoulder    3. Bilateral numbness and tingling of arms and legs    4. Cervical spondylosis    5. Degeneration of intervertebral disc of lumbar region with discogenic back pain and lower extremity pain       Patient's shoulder pain and dysfunction that started last summer certainly sounds like subacromial bursitis and/or rotator cuff tendinopathy.  Given the persistence of the symptoms and the positive physical exam findings we elected to proceed with a subacromial corticosteroid injection today which I hope will be helpful therapeutically but I think would also be helpful diagnostically and teasing apart how much of her right upper extremity symptoms are due to her shoulder versus elsewhere.  I will be on the look out for her EMG/NCS as well.    Additionally, I reviewed the patient's prior EMG and nerve conduction test from 2021, as well as her more recent cervical spine and lumbar spine MRIs.  Her EMG/NCS from 2021 suggested diffuse neuropathy/neuromyopathy but cannot rule out a peripheral nerve entrapment or proximal nerve root abnormality or stenosis as contributory.  On her lumbar MRI she has canal and foraminal stenosis severe disc height loss at L4-L5 including degenerative endplate changes.  She has seen neurosurgery and have ordered an updated EMG/NCS of her upper and lower extremities, but she has not yet seen one of the surgeons.  She has never had injections of  "her spine.  She has done physical therapy.  I am concerned that the intermittent weakness and chronic pain she is having in her bilateral lower extremities is coming from the changes seen on her lumbar MRI.  Assessment & Plan      Right shoulder injection  EMG/NCS as planned  F/u with Neurosurgery as planned  Follow up: pending further work-up; patient will update me via [employer] in next couple of weeks     Shoulder Injection Procedure Note    Right shoulder injection was discussed with the patient in detail, including indication, risks, benefits, and alternatives. Risks include but are not limited to: incomplete symptom resolution, injection site pain, local irritation, bleeding, infection, allergic reaction, elevated blood pressure and blood sugar. Verbal consent was given for the procedure.  Injection site was identified by physical examination and cleaned with Chloraprep and alcohol swabs. Prior to needle insertion, ethyl chloride spray was used for surface anesthesia.  A 22-gauge, 1.5\" needle was directed to the joint from a(n) posterior subacromial approach. Injectate was passed into the shoulder without difficulty. The needle was removed and a simple bandage was applied. The procedure was tolerated well without difficulty.    Injection mixture:  1% plain lidocaine: 2 mL  40 mg/mL triamcinolone acetonide: 1 mL     Patient or patient representative verbalized consent for the use of Ambient Listening during the visit with  Jarrod Shea MD for chart documentation. 2/7/2025  11:47 CST    Jarrod Shea MD, PhD  "

## 2025-02-12 ENCOUNTER — HOSPITAL ENCOUNTER (OUTPATIENT)
Dept: NEUROLOGY | Facility: HOSPITAL | Age: 60
Discharge: HOME | End: 2025-02-12
Admitting: NURSE PRACTITIONER
Payer: COMMERCIAL

## 2025-02-12 DIAGNOSIS — R20.0 NUMBNESS AND TINGLING OF BOTH LOWER EXTREMITIES: ICD-10-CM

## 2025-02-12 DIAGNOSIS — R20.2 NUMBNESS AND TINGLING OF BOTH LOWER EXTREMITIES: ICD-10-CM

## 2025-02-12 DIAGNOSIS — M79.604 PAIN IN BOTH LOWER EXTREMITIES: ICD-10-CM

## 2025-02-12 DIAGNOSIS — M79.605 PAIN IN BOTH LOWER EXTREMITIES: ICD-10-CM

## 2025-02-12 PROCEDURE — 95885 MUSC TST DONE W/NERV TST LIM: CPT

## 2025-02-12 PROCEDURE — 95910 NRV CNDJ TEST 7-8 STUDIES: CPT

## 2025-02-13 DIAGNOSIS — E66.811 CLASS 1 OBESITY DUE TO EXCESS CALORIES WITH SERIOUS COMORBIDITY AND BODY MASS INDEX (BMI) OF 33.0 TO 33.9 IN ADULT: ICD-10-CM

## 2025-02-13 DIAGNOSIS — E66.09 CLASS 1 OBESITY DUE TO EXCESS CALORIES WITH SERIOUS COMORBIDITY AND BODY MASS INDEX (BMI) OF 33.0 TO 33.9 IN ADULT: ICD-10-CM

## 2025-02-14 DIAGNOSIS — M54.9 CHRONIC BACK PAIN, UNSPECIFIED BACK LOCATION, UNSPECIFIED BACK PAIN LATERALITY: ICD-10-CM

## 2025-02-14 DIAGNOSIS — M25.559 HIP PAIN, UNSPECIFIED LATERALITY: ICD-10-CM

## 2025-02-14 DIAGNOSIS — G89.29 CHRONIC BACK PAIN, UNSPECIFIED BACK LOCATION, UNSPECIFIED BACK PAIN LATERALITY: ICD-10-CM

## 2025-02-17 DIAGNOSIS — M54.50 LOW BACK PAIN, UNSPECIFIED BACK PAIN LATERALITY, UNSPECIFIED CHRONICITY, UNSPECIFIED WHETHER SCIATICA PRESENT: ICD-10-CM

## 2025-02-17 RX ORDER — GABAPENTIN 400 MG/1
400 CAPSULE ORAL 3 TIMES DAILY
Qty: 90 CAPSULE | Refills: 1 | Status: SHIPPED | OUTPATIENT
Start: 2025-02-17

## 2025-02-20 ENCOUNTER — OFFICE VISIT (OUTPATIENT)
Dept: INTERNAL MEDICINE | Facility: CLINIC | Age: 60
End: 2025-02-20
Payer: COMMERCIAL

## 2025-02-20 VITALS
SYSTOLIC BLOOD PRESSURE: 155 MMHG | HEIGHT: 63 IN | OXYGEN SATURATION: 98 % | TEMPERATURE: 98.6 F | DIASTOLIC BLOOD PRESSURE: 84 MMHG | BODY MASS INDEX: 31.71 KG/M2 | HEART RATE: 93 BPM

## 2025-02-20 DIAGNOSIS — N93.9 VAGINAL BLEEDING: Primary | ICD-10-CM

## 2025-02-20 PROCEDURE — 99213 OFFICE O/P EST LOW 20 MIN: CPT | Performed by: FAMILY MEDICINE

## 2025-02-20 NOTE — PROGRESS NOTES
Subjective     Chief Complaint   Patient presents with    Menstrual Problem       History of Present Illness    Patient's PMR from outside medical facility reviewed and noted.    Desirae Alberto is a 60 y.o. female who presents for a routine office visit.  Patient started having some vaginal bleeding this am.  She wears a pad due to some urinary incontinence.  She states that there is a lot more this am.  States she is also having blood with wiping.  Does have a history of fibroids and had been placed on HRT so I believe this is a possible cause reactivation of these underlying fibroids under influence of estrogen.  Transvaginal ultrasound today to evaluate this further.  Discussed her genetic testing and reviewed older CTs for any evidence of malignancy based on this information.        Past Medical History:   Past Medical History:   Diagnosis Date    Arthritis     Cancer     lymphoma of small bowel    Family history of colon cancer     GERD (gastroesophageal reflux disease)     Glaucoma     Hyperlipidemia     Hypothyroidism     Low back pain     TIA (transient ischemic attack)      Past Surgical History:  Past Surgical History:   Procedure Laterality Date    BREAST EXCISIONAL BIOPSY Left 01/2016    Benign    CHOLECYSTECTOMY      COLON RESECTION SMALL BOWEL  2014    COLONOSCOPY  09/05/2019    Dr. Tabares-Internal and external hemorrhoids; Repeat 10 years    ENDOSCOPY  09/05/2019    Dr. Tabares-Possible Hightower's esophagus-biopsied    ENDOSCOPY N/A 06/24/2022    LA Grade C reflux esophagitis with no bleeding; A large amount of food (residue) in the stomach; Normal examined duodenum; No specimens collected; Repeat 6 weeks    ENDOSCOPY N/A 08/05/2022    Normal esophagus; Normal stomach; Normal examined duodenum; No specimens collected    KNEE ARTHROSCOPY Right 07/14/2020    Procedure: RIGHT KNEE PARTIAL MEDIAL MENISCECTOMY;  Surgeon: Vijay Grewal MD;  Location: [facility];  Service: Orthopedics;   Laterality: Right;    LAPAROSCOPIC TUBAL LIGATION      TOTAL KNEE ARTHROPLASTY Right 07/28/2021    Procedure: RIGHT TOTAL KNEE REPLACEMENT;  Surgeon: Eliecer Foley MD;  Location:  [facility] OR;  Service: Orthopedics;  Laterality: Right;    TRANSURETHRAL RESECTION OF BLADDER TUMOR N/A 07/12/2022    Procedure: CYSTOSCOPY TRANSURETHRAL RESECTION OF BLADDER TUMOR;  Surgeon: Joel Poe MD;  Location:  [facility] OR;  Service: Urology;  Laterality: N/A;    VAGINAL MESH REVISION      2010/2016    WISDOM TOOTH EXTRACTION       Social History:  reports that she quit smoking about 24 years ago. Her smoking use included cigarettes. She started smoking about 34 years ago. She has a 5 pack-year smoking history. She has never used smokeless tobacco. She reports that she does not currently use alcohol. She reports that she does not use drugs.    Family History: family history includes Cancer in her maternal aunt; Colon cancer (age of onset: 68) in her paternal aunt; Colon cancer (age of onset: 80) in her paternal aunt; Diverticulitis in her mother; Heart defect in her mother; Liver cancer in her paternal aunt; No Known Problems in her father and sister; Stomach cancer (age of onset: 70) in her paternal aunt.      Allergies:  No Known Allergies  Medications:  Prior to Admission medications    Medication Sig Start Date End Date Taking? Authorizing Provider   azelastine (ASTELIN) 0.1 % nasal spray Instill 2 sprays into each nostril as directed by provider 2 (Two) Times a Day. 9/7/23  Yes Luz Mota APRN   cyclobenzaprine (FLEXERIL) 5 MG tablet Take 1 tablet by mouth 3 (Three) Times a Day As Needed for Muscle Spasms. 10/8/24  Yes Gibson George MD   diclofenac (VOLTAREN) 50 MG EC tablet Take 1 tablet by mouth 2 (Two) Times a Day As Needed for Pain. 2/14/25  Yes Gibson George MD   estradiol (Soledad) 0.05 MG/24HR patch Place 1 patch on the skin as directed by provider 2 (Two) Times a Week.  Remove old patch before applying new. 11/28/24  Yes Apple Ivan APRN   estradiol (MINIVELLE, VIVELLE-DOT) 0.05 MG/24HR patch Apply 1 patch to the appropriate area as directed twice weekly **Remove old patch before applying new patch** 9/30/24  Yes Gibson George MD   estradiol (MINIVELLE, VIVELLE-DOT) 0.05 MG/24HR patch Apply 1 patch to the appropriate area as directed twice weekly **Remove old patch before applying new patch** 10/1/24  Yes Gibson George MD   estradiol (MINIVELLE, VIVELLE-DOT) 0.05 MG/24HR patch Place 1 patch on the skin as directed by provider 2 (Two) Times a Week. remove old patch before applying new patch. 10/3/24  Yes Gibson George MD   gabapentin (NEURONTIN) 400 MG capsule Take 1 capsule by mouth 3 (Three) Times a Day. 2/17/25  Yes Gibson George MD   lidocaine (XYLOCAINE) 5 % ointment Apply 1 application  topically to the appropriate area as directed Every 2 (Two) Hours As Needed for Mild Pain. 11/29/23  Yes Gibson George MD   linaclotide (Linzess) 72 MCG capsule capsule Take 1 capsule by mouth Every Morning Before Breakfast. 1/17/25  Yes Christina Vaughn APRN   pantoprazole (PROTONIX) 40 MG EC tablet Take 1 tablet by mouth Daily. 1/17/25  Yes Christina Vaughn APRN   Progesterone (Prometrium) 100 MG capsule Take 1 capsule by mouth Daily. 9/11/24  Yes Luz Mota APRN   Tirzepatide-Weight Management (ZEPBOUND) 5 MG/0.5ML solution auto-injector Inject 0.5 mL under the skin into the appropriate area as directed 1 (One) Time Per Week. 1/2/25  Yes Gibson George MD   triamcinolone (KENALOG) 0.5 % ointment Apply topically to the appropriate area as directed 2 (Two) Times a Day. 9/17/24  Yes Gibson George MD   Vibegron (Gemtesa) 75 MG tablet Take 1 tablet by mouth Daily. 11/26/24  Yes Apple Ivan, APRN             Review of systems   negative unless otherwise specified above in HPI    Objective  "    Vital Signs: /84 (BP Location: Left arm, Patient Position: Sitting, Cuff Size: Adult)   Pulse 93   Temp 98.6 °F (37 °C) (Infrared)   Ht 160 cm (63\")   LMP 06/30/2019   SpO2 98%   BMI 31.71 kg/m²     Physical Exam  Vitals and nursing note reviewed.   Constitutional:       General: She is not in acute distress.     Appearance: Normal appearance.   HENT:      Head: Normocephalic.   Eyes:      Extraocular Movements: Extraocular movements intact.      Pupils: Pupils are equal, round, and reactive to light.   Cardiovascular:      Rate and Rhythm: Normal rate and regular rhythm.      Heart sounds: Normal heart sounds. No murmur heard.  Pulmonary:      Effort: Pulmonary effort is normal. No respiratory distress.      Breath sounds: Normal breath sounds. No rhonchi or rales.   Abdominal:      General: Abdomen is flat. Bowel sounds are normal.      Palpations: Abdomen is soft.   Neurological:      General: No focal deficit present.      Mental Status: She is alert.                Results Reviewed:  Glucose   Date Value Ref Range Status   10/08/2024 89 70 - 99 mg/dL Final   07/08/2022 117 (H) 65 - 99 mg/dL Final     BUN   Date Value Ref Range Status   10/08/2024 8 6 - 24 mg/dL Final   07/08/2022 14 6 - 20 mg/dL Final     Creatinine   Date Value Ref Range Status   10/08/2024 0.82 0.57 - 1.00 mg/dL Final   07/08/2022 0.80 0.57 - 1.00 mg/dL Final   09/17/2019 0.70 0.60 - 1.30 mg/dL Final     Comment:     Serial Number: [id number]:  [id number]     Sodium   Date Value Ref Range Status   10/08/2024 140 134 - 144 mmol/L Final   07/08/2022 141 136 - 145 mmol/L Final     Potassium   Date Value Ref Range Status   10/08/2024 4.4 3.5 - 5.2 mmol/L Final   07/08/2022 4.1 3.5 - 5.2 mmol/L Final     Chloride   Date Value Ref Range Status   10/08/2024 104 96 - 106 mmol/L Final   07/08/2022 106 98 - 107 mmol/L Final     CO2   Date Value Ref Range Status   07/08/2022 26.0 22.0 - 29.0 mmol/L Final     Total CO2   Date Value Ref " Range Status   10/08/2024 24 20 - 29 mmol/L Final     Calcium   Date Value Ref Range Status   10/08/2024 9.4 8.7 - 10.2 mg/dL Final   07/08/2022 9.3 8.6 - 10.5 mg/dL Final     ALT (SGPT)   Date Value Ref Range Status   10/08/2024 21 0 - 32 IU/L Final   06/23/2022 36 (H) 1 - 33 U/L Final     AST (SGOT)   Date Value Ref Range Status   10/08/2024 21 0 - 40 IU/L Final   06/23/2022 27 1 - 32 U/L Final     WBC   Date Value Ref Range Status   10/08/2024 3.5 3.4 - 10.8 x10E3/uL Final     Hematocrit   Date Value Ref Range Status   10/08/2024 42.1 34.0 - 46.6 % Final   07/08/2022 39.2 34.0 - 46.6 % Final     Platelets   Date Value Ref Range Status   10/08/2024 243 150 - 450 x10E3/uL Final   07/08/2022 222 140 - 450 10*3/mm3 Final     Total Cholesterol   Date Value Ref Range Status   02/28/2022 284 (H) 0 - 200 mg/dL Final     Triglycerides   Date Value Ref Range Status   10/08/2024 214 (H) 0 - 149 mg/dL Final   02/28/2022 313 (H) 0 - 150 mg/dL Final     HDL Cholesterol   Date Value Ref Range Status   10/08/2024 53 >39 mg/dL Final   02/28/2022 67 (H) 40 - 60 mg/dL Final     LDL Chol Calc (NIH)   Date Value Ref Range Status   10/08/2024 120 (H) 0 - 99 mg/dL Final     LDL/HDL Ratio   Date Value Ref Range Status   02/28/2022 2.30  Final     Hemoglobin A1C   Date Value Ref Range Status   10/08/2024 6.0 (H) 4.8 - 5.6 % Final     Comment:              Prediabetes: 5.7 - 6.4           Diabetes: >6.4           Glycemic control for adults with diabetes: <7.0     02/28/2022 5.8 % Final             Procedure   Procedures       Assessment / Plan     Assessment/Plan:   Diagnosis Plan   1. Vaginal bleeding  US Non-ob Transvaginal            Return in about 4 weeks (around 3/20/2025) for Recheck. unless patient needs to be seen sooner or acute issues arise.      I have discussed the patient results/orders and and plan/recommendation with them at today's visit.      Signed by:    Gibson George MD Date: 02/20/25

## 2025-02-26 ENCOUNTER — TELEMEDICINE (OUTPATIENT)
Dept: OBSTETRICS AND GYNECOLOGY | Age: 60
End: 2025-02-26
Payer: COMMERCIAL

## 2025-02-26 ENCOUNTER — HOSPITAL ENCOUNTER (OUTPATIENT)
Dept: NEUROLOGY | Facility: HOSPITAL | Age: 60
Discharge: HOME | End: 2025-02-26
Admitting: NURSE PRACTITIONER
Payer: COMMERCIAL

## 2025-02-26 VITALS — BODY MASS INDEX: 30.65 KG/M2 | WEIGHT: 173 LBS | HEIGHT: 63 IN

## 2025-02-26 DIAGNOSIS — N95.0 POSTMENOPAUSAL BLEEDING: Primary | ICD-10-CM

## 2025-02-26 DIAGNOSIS — R20.0 NUMBNESS AND TINGLING OF BOTH UPPER EXTREMITIES: ICD-10-CM

## 2025-02-26 DIAGNOSIS — R20.2 NUMBNESS AND TINGLING OF BOTH UPPER EXTREMITIES: ICD-10-CM

## 2025-02-26 DIAGNOSIS — Z15.89 BIALLELIC MUTATION OF SDHA GENE: ICD-10-CM

## 2025-02-26 PROCEDURE — 95886 MUSC TEST DONE W/N TEST COMP: CPT

## 2025-02-26 PROCEDURE — 95911 NRV CNDJ TEST 9-10 STUDIES: CPT

## 2025-02-26 PROCEDURE — 99214 OFFICE O/P EST MOD 30 MIN: CPT | Performed by: NURSE PRACTITIONER

## 2025-02-27 ENCOUNTER — TELEPHONE (OUTPATIENT)
Dept: NEUROSURGERY | Facility: CLINIC | Age: 60
End: 2025-02-27
Payer: COMMERCIAL

## 2025-02-27 DIAGNOSIS — G56.01 CARPAL TUNNEL SYNDROME OF RIGHT WRIST: Primary | ICD-10-CM

## 2025-02-27 NOTE — TELEPHONE ENCOUNTER
----- Message from Nikolai Thompson sent at 2/27/2025  8:35 AM CST -----  EMG/NCS: Mild right tunnel syndrome.    Ms. Alberto needs a prescription mailed to her residence for a right cock up wrist splint.  Please advise she wear the splint nightly as a means of conservative management for right carpal tunnel syndrome.    Nikolai Thompson, APRN; 2/27/2025; 08:34 CST

## 2025-02-27 NOTE — TELEPHONE ENCOUNTER
----- Message from Nikolai Thompson sent at 2/27/2025  8:35 AM CST -----  EMG/NCS: Mild right tunnel syndrome.    Ms. lAberto needs a prescription mailed to her residence for a right cock up wrist splint.  Please advise she wear the splint nightly as a means of conservative management for right carpal tunnel syndrome.    Nikolai Thompson, APRN; 2/27/2025; 08:34 CST

## 2025-02-27 NOTE — TELEPHONE ENCOUNTER
Nikolai ramirez is wanting what EMG of lower extremities show.  She is also wanting to know if she needs to keep appt with Dr. Medeiros.  I told her yes.  She was told by Dr. Shea that her back is bad.  Don't see where these have been sent to Dr. Medeiros.

## 2025-02-27 NOTE — PROGRESS NOTES
"Subjective     Desirae Alberto is a 60 y.o. female    History of Present Illness  You have chosen to receive care through a telehealth visit.  Do you consent to use a video/audio connection for your medical care today? Yes  Patient calls today for telehealth visit from her place of employment.  I am located at my home in [place].  She calls to discuss postmenopausal bleeding and also has had genetic screening and has an increased risk of kidney and bowel cancer due to the SDH a gene being positive.  She has a colonoscopy scheduled for next week.            Ht 160 cm (63\")   Wt 78.5 kg (173 lb)   LMP 06/30/2019   BMI 30.65 kg/m²     Outpatient Encounter Medications as of 2/26/2025   Medication Sig Dispense Refill    azelastine (ASTELIN) 0.1 % nasal spray Instill 2 sprays into each nostril as directed by provider 2 (Two) Times a Day. 30 mL 12    cyclobenzaprine (FLEXERIL) 5 MG tablet Take 1 tablet by mouth 3 (Three) Times a Day As Needed for Muscle Spasms. 90 tablet 11    diclofenac (VOLTAREN) 50 MG EC tablet Take 1 tablet by mouth 2 (Two) Times a Day As Needed for Pain. 60 tablet 5    estradiol (Soledad) 0.05 MG/24HR patch Place 1 patch on the skin as directed by provider 2 (Two) Times a Week. Remove old patch before applying new. 24 patch 3    estradiol (MINIVELLE, VIVELLE-DOT) 0.05 MG/24HR patch Apply 1 patch to the appropriate area as directed twice weekly **Remove old patch before applying new patch** 8 patch 11    estradiol (MINIVELLE, VIVELLE-DOT) 0.05 MG/24HR patch Apply 1 patch to the appropriate area as directed twice weekly **Remove old patch before applying new patch** 8 patch 11    estradiol (MINIVELLE, VIVELLE-DOT) 0.05 MG/24HR patch Place 1 patch on the skin as directed by provider 2 (Two) Times a Week. remove old patch before applying new patch. 8 patch 11    gabapentin (NEURONTIN) 400 MG capsule Take 1 capsule by mouth 3 (Three) Times a Day. 90 capsule 1    lidocaine (XYLOCAINE) 5 % ointment Apply 1 " application  topically to the appropriate area as directed Every 2 (Two) Hours As Needed for Mild Pain. 35.44 g 11    linaclotide (Linzess) 72 MCG capsule capsule Take 1 capsule by mouth Every Morning Before Breakfast. 90 capsule 3    pantoprazole (PROTONIX) 40 MG EC tablet Take 1 tablet by mouth Daily. 90 tablet 3    Progesterone (Prometrium) 100 MG capsule Take 1 capsule by mouth Daily. 30 capsule 11    Tirzepatide-Weight Management (ZEPBOUND) 5 MG/0.5ML solution auto-injector Inject 0.5 mL under the skin into the appropriate area as directed 1 (One) Time Per Week. 2 mL 2    triamcinolone (KENALOG) 0.5 % ointment Apply topically to the appropriate area as directed 2 (Two) Times a Day. 15 g 0    Vibegron (Gemtesa) 75 MG tablet Take 1 tablet by mouth Daily. 90 tablet 3     No facility-administered encounter medications on file as of 2/26/2025.       Past Medical History  Past Medical History:   Diagnosis Date    Arthritis     Cancer     Family history of colon cancer     GERD (gastroesophageal reflux disease)     Glaucoma     Hyperlipidemia     Hypothyroidism     Low back pain     TIA (transient ischemic attack)        Surgical History  Past Surgical History:   Procedure Laterality Date    BREAST EXCISIONAL BIOPSY Left 01/2016    Benign    CHOLECYSTECTOMY      COLON RESECTION SMALL BOWEL  2014    COLONOSCOPY  09/05/2019    Dr. Tabares-Internal and external hemorrhoids; Repeat 10 years    ENDOSCOPY  09/05/2019    Dr. Tabares-Possible Hightower's esophagus-biopsied    ENDOSCOPY N/A 06/24/2022    LA Grade C reflux esophagitis with no bleeding; A large amount of food (residue) in the stomach; Normal examined duodenum; No specimens collected; Repeat 6 weeks    ENDOSCOPY N/A 08/05/2022    Normal esophagus; Normal stomach; Normal examined duodenum; No specimens collected    KNEE ARTHROSCOPY Right 07/14/2020    Procedure: RIGHT KNEE PARTIAL MEDIAL MENISCECTOMY;  Surgeon: Vijay Grewal MD;  Location: [facility] OR;  Service:  Orthopedics;  Laterality: Right;    LAPAROSCOPIC TUBAL LIGATION      TOTAL KNEE ARTHROPLASTY Right 07/28/2021    Procedure: RIGHT TOTAL KNEE REPLACEMENT;  Surgeon: Eliecer Foley MD;  Location:  [facility] OR;  Service: Orthopedics;  Laterality: Right;    TRANSURETHRAL RESECTION OF BLADDER TUMOR N/A 07/12/2022    Procedure: CYSTOSCOPY TRANSURETHRAL RESECTION OF BLADDER TUMOR;  Surgeon: Joel Poe MD;  Location:  [facility] OR;  Service: Urology;  Laterality: N/A;    VAGINAL MESH REVISION      2010/2016    WISDOM TOOTH EXTRACTION         Family History  Family History   Problem Relation Age of Onset    Heart defect Mother     Diverticulitis Mother     No Known Problems Father     No Known Problems Sister     Cancer Maternal Aunt     Colon cancer Paternal Aunt 68    Stomach cancer Paternal Aunt 70    Liver cancer Paternal Aunt     Colon cancer Paternal Aunt 80    Breast cancer Neg Hx     Ovarian cancer Neg Hx     Uterine cancer Neg Hx     Melanoma Neg Hx     Prostate cancer Neg Hx     Colon polyps Neg Hx     Esophageal cancer Neg Hx     Liver disease Neg Hx     Rectal cancer Neg Hx        The following portions of the patient's history were reviewed and updated as appropriate: allergies, current medications, past family history, past medical history, past social history, past surgical history, and problem list.    Review of Systems   Genitourinary:  Positive for vaginal bleeding.       Objective   Physical Exam  Constitutional:       General: She is not in acute distress.     Appearance: Normal appearance. She is not ill-appearing or diaphoretic.   HENT:      Head: Normocephalic and atraumatic.   Pulmonary:      Effort: Pulmonary effort is normal. No respiratory distress.   Musculoskeletal:         General: No deformity.      Cervical back: Normal range of motion.   Skin:     Coloration: Skin is not pale.   Neurological:      General: No focal deficit present.      Mental Status: She is alert.   Psychiatric:          Mood and Affect: Mood normal.         Behavior: Behavior normal.         Thought Content: Thought content normal.         Judgment: Judgment normal.         PHQ-9 Depression Screening  Little interest or pleasure in doing things? Not at all   Feeling down, depressed, or hopeless? Not at all   PHQ-2 Total Score 0   Trouble falling or staying asleep, or sleeping too much?     Feeling tired or having little energy?     Poor appetite or overeating?     Feeling bad about yourself - or that you are a failure or have let yourself or your family down?     Trouble concentrating on things, such as reading the newspaper or watching television?     Moving or speaking so slowly that other people could have noticed? Or the opposite - being so fidgety or restless that you have been moving around a lot more than usual?     Thoughts that you would be better off dead, or of hurting yourself in some way?     PHQ-9 Total Score     If you checked off any problems, how difficult have these problems made it for you to do your work, take care of things at home, or get along with other people? Not difficult at all       Assessment & Plan   Diagnoses and all orders for this visit:    1. Postmenopausal bleeding (Primary)  Comments:  Patient is having postmenopausal bleeding.  She will come in the office for pelvic ultrasound and possible endometrial biopsy.    2. Biallelic mutation of SDHA gene  Comments:  Patient has recently had genetic screening done.  She has a positive gene for as DHEA.  Increased risk of kidney and bowel cancer.  They have recommended several test from having the genetic screening done for her to have.  1 is a colonoscopy that has already been scheduled.  They also want her to have a CAT scan of abdomen and 24 hour urine. Orders placed.            This was an audio and video enabled telemedicine encounter.      Apple Ivan, APRN  2/26/2025   Imiquimod Counseling:  I discussed with the patient the risks of imiquimod including but not limited to erythema, scaling, itching, weeping, crusting, and pain.  Patient understands that the inflammatory response to imiquimod is variable from person to person and was educated regarded proper titration schedule.  If flu-like symptoms develop, patient knows to discontinue the medication and contact us.

## 2025-03-07 ENCOUNTER — ANESTHESIA EVENT (OUTPATIENT)
Dept: GASTROENTEROLOGY | Facility: HOSPITAL | Age: 60
End: 2025-03-07
Payer: COMMERCIAL

## 2025-03-07 ENCOUNTER — HOSPITAL ENCOUNTER (OUTPATIENT)
Facility: HOSPITAL | Age: 60
Setting detail: HOSPITAL OUTPATIENT SURGERY
Discharge: HOME | End: 2025-03-07
Attending: INTERNAL MEDICINE | Admitting: INTERNAL MEDICINE
Payer: COMMERCIAL

## 2025-03-07 ENCOUNTER — ANESTHESIA (OUTPATIENT)
Dept: GASTROENTEROLOGY | Facility: HOSPITAL | Age: 60
End: 2025-03-07
Payer: COMMERCIAL

## 2025-03-07 VITALS
OXYGEN SATURATION: 96 % | HEIGHT: 63 IN | HEART RATE: 75 BPM | BODY MASS INDEX: 30.3 KG/M2 | DIASTOLIC BLOOD PRESSURE: 75 MMHG | TEMPERATURE: 96.9 F | RESPIRATION RATE: 16 BRPM | WEIGHT: 171 LBS | SYSTOLIC BLOOD PRESSURE: 114 MMHG

## 2025-03-07 DIAGNOSIS — Z80.0 FH: COLON CANCER: ICD-10-CM

## 2025-03-07 PROCEDURE — 45385 COLONOSCOPY W/LESION REMOVAL: CPT | Performed by: INTERNAL MEDICINE

## 2025-03-07 PROCEDURE — 88305 TISSUE EXAM BY PATHOLOGIST: CPT | Performed by: INTERNAL MEDICINE

## 2025-03-07 PROCEDURE — 25810000003 SODIUM CHLORIDE 0.9 % SOLUTION: Performed by: ANESTHESIOLOGY

## 2025-03-07 PROCEDURE — 25010000002 LIDOCAINE PF 2% 2 % SOLUTION: Performed by: NURSE ANESTHETIST, CERTIFIED REGISTERED

## 2025-03-07 PROCEDURE — 25010000002 PROPOFOL 10 MG/ML EMULSION: Performed by: NURSE ANESTHETIST, CERTIFIED REGISTERED

## 2025-03-07 RX ORDER — SODIUM CHLORIDE 0.9 % (FLUSH) 0.9 %
10 SYRINGE (ML) INJECTION AS NEEDED
Status: DISCONTINUED | OUTPATIENT
Start: 2025-03-07 | End: 2025-03-07 | Stop reason: HOSPADM

## 2025-03-07 RX ORDER — PROPOFOL 10 MG/ML
VIAL (ML) INTRAVENOUS AS NEEDED
Status: DISCONTINUED | OUTPATIENT
Start: 2025-03-07 | End: 2025-03-07 | Stop reason: SURG

## 2025-03-07 RX ORDER — LIDOCAINE HYDROCHLORIDE 20 MG/ML
INJECTION, SOLUTION EPIDURAL; INFILTRATION; INTRACAUDAL; PERINEURAL AS NEEDED
Status: DISCONTINUED | OUTPATIENT
Start: 2025-03-07 | End: 2025-03-07 | Stop reason: SURG

## 2025-03-07 RX ORDER — SODIUM CHLORIDE 9 MG/ML
1000 INJECTION, SOLUTION INTRAVENOUS CONTINUOUS
Status: DISCONTINUED | OUTPATIENT
Start: 2025-03-07 | End: 2025-03-07 | Stop reason: HOSPADM

## 2025-03-07 RX ORDER — LIDOCAINE HYDROCHLORIDE 10 MG/ML
0.5 INJECTION, SOLUTION EPIDURAL; INFILTRATION; INTRACAUDAL; PERINEURAL ONCE AS NEEDED
Status: DISCONTINUED | OUTPATIENT
Start: 2025-03-07 | End: 2025-03-07 | Stop reason: HOSPADM

## 2025-03-07 RX ADMIN — PROPOFOL 30 MG: 10 INJECTION, EMULSION INTRAVENOUS at 10:51

## 2025-03-07 RX ADMIN — PROPOFOL 30 MG: 10 INJECTION, EMULSION INTRAVENOUS at 10:57

## 2025-03-07 RX ADMIN — PROPOFOL 30 MG: 10 INJECTION, EMULSION INTRAVENOUS at 10:47

## 2025-03-07 RX ADMIN — PROPOFOL 30 MG: 10 INJECTION, EMULSION INTRAVENOUS at 10:48

## 2025-03-07 RX ADMIN — PROPOFOL 30 MG: 10 INJECTION, EMULSION INTRAVENOUS at 10:49

## 2025-03-07 RX ADMIN — PROPOFOL 70 MG: 10 INJECTION, EMULSION INTRAVENOUS at 10:45

## 2025-03-07 RX ADMIN — PROPOFOL 30 MG: 10 INJECTION, EMULSION INTRAVENOUS at 11:05

## 2025-03-07 RX ADMIN — SODIUM CHLORIDE 500 ML: 9 INJECTION, SOLUTION INTRAVENOUS at 09:49

## 2025-03-07 RX ADMIN — PROPOFOL 30 MG: 10 INJECTION, EMULSION INTRAVENOUS at 10:55

## 2025-03-07 RX ADMIN — PROPOFOL 30 MG: 10 INJECTION, EMULSION INTRAVENOUS at 11:03

## 2025-03-07 RX ADMIN — LIDOCAINE HYDROCHLORIDE 80 MG: 20 INJECTION, SOLUTION EPIDURAL; INFILTRATION; INTRACAUDAL; PERINEURAL at 10:45

## 2025-03-07 RX ADMIN — PROPOFOL 40 MG: 10 INJECTION, EMULSION INTRAVENOUS at 10:46

## 2025-03-07 RX ADMIN — PROPOFOL 30 MG: 10 INJECTION, EMULSION INTRAVENOUS at 11:01

## 2025-03-07 RX ADMIN — PROPOFOL 30 MG: 10 INJECTION, EMULSION INTRAVENOUS at 10:53

## 2025-03-07 RX ADMIN — PROPOFOL 30 MG: 10 INJECTION, EMULSION INTRAVENOUS at 10:59

## 2025-03-07 RX ADMIN — PROPOFOL 30 MG: 10 INJECTION, EMULSION INTRAVENOUS at 10:50

## 2025-03-07 NOTE — H&P
Chief Complaint:   Colon cancer screening    Subjective     HPI:   Patient has 2 second-degree relatives with colon cancer.  Last colonoscopy 9/2019.    Past Medical History:   Past Medical History:   Diagnosis Date    Arthritis     Cancer     lymphoma of small bowel    Family history of colon cancer     GERD (gastroesophageal reflux disease)     Glaucoma     Hyperlipidemia     Hypothyroidism     Low back pain     TIA (transient ischemic attack)        Past Surgical History:  Past Surgical History:   Procedure Laterality Date    BREAST EXCISIONAL BIOPSY Left 01/2016    Benign    CHOLECYSTECTOMY      COLON RESECTION SMALL BOWEL  2014    COLONOSCOPY  09/05/2019    Dr. Tabares-Internal and external hemorrhoids; Repeat 10 years    ENDOSCOPY  09/05/2019    Dr. Tabares-Possible Hightower's esophagus-biopsied    ENDOSCOPY N/A 06/24/2022    LA Grade C reflux esophagitis with no bleeding; A large amount of food (residue) in the stomach; Normal examined duodenum; No specimens collected; Repeat 6 weeks    ENDOSCOPY N/A 08/05/2022    Normal esophagus; Normal stomach; Normal examined duodenum; No specimens collected    KNEE ARTHROSCOPY Right 07/14/2020    Procedure: RIGHT KNEE PARTIAL MEDIAL MENISCECTOMY;  Surgeon: Vijay Grewal MD;  Location:  [facility];  Service: Orthopedics;  Laterality: Right;    LAPAROSCOPIC TUBAL LIGATION      TOTAL KNEE ARTHROPLASTY Right 07/28/2021    Procedure: RIGHT TOTAL KNEE REPLACEMENT;  Surgeon: Eliecer Foley MD;  Location:  [facility];  Service: Orthopedics;  Laterality: Right;    TRANSURETHRAL RESECTION OF BLADDER TUMOR N/A 07/12/2022    Procedure: CYSTOSCOPY TRANSURETHRAL RESECTION OF BLADDER TUMOR;  Surgeon: Joel Poe MD;  Location:  [facility];  Service: Urology;  Laterality: N/A;    VAGINAL MESH REVISION      2010/2016    WISDOM TOOTH EXTRACTION          Family History:  Family History   Problem Relation Age of Onset    Heart defect Mother     Diverticulitis Mother     No  Known Problems Father     No Known Problems Sister     Cancer Maternal Aunt     Colon cancer Paternal Aunt 68    Stomach cancer Paternal Aunt 70    Liver cancer Paternal Aunt     Colon cancer Paternal Aunt 80    Breast cancer Neg Hx     Ovarian cancer Neg Hx     Uterine cancer Neg Hx     Melanoma Neg Hx     Prostate cancer Neg Hx     Colon polyps Neg Hx     Esophageal cancer Neg Hx     Liver disease Neg Hx     Rectal cancer Neg Hx        Social History:   reports that she quit smoking about 24 years ago. Her smoking use included cigarettes. She started smoking about 34 years ago. She has a 5 pack-year smoking history. She has never used smokeless tobacco. She reports that she does not currently use alcohol. She reports that she does not use drugs.    Medications:   Medications Prior to Admission   Medication Sig Dispense Refill Last Dose/Taking    cyclobenzaprine (FLEXERIL) 5 MG tablet Take 1 tablet by mouth 3 (Three) Times a Day As Needed for Muscle Spasms. 90 tablet 11 Past Week    diclofenac (VOLTAREN) 50 MG EC tablet Take 1 tablet by mouth 2 (Two) Times a Day As Needed for Pain. 60 tablet 5 Past Week    estradiol (Soledad) 0.05 MG/24HR patch Place 1 patch on the skin as directed by provider 2 (Two) Times a Week. Remove old patch before applying new. 24 patch 3 Past Week    estradiol (MINIVELLE, VIVELLE-DOT) 0.05 MG/24HR patch Apply 1 patch to the appropriate area as directed twice weekly **Remove old patch before applying new patch** 8 patch 11 Past Week    estradiol (MINIVELLE, VIVELLE-DOT) 0.05 MG/24HR patch Apply 1 patch to the appropriate area as directed twice weekly **Remove old patch before applying new patch** 8 patch 11 Past Week    estradiol (MINIVELLE, VIVELLE-DOT) 0.05 MG/24HR patch Place 1 patch on the skin as directed by provider 2 (Two) Times a Week. remove old patch before applying new patch. 8 patch 11 Past Week    gabapentin (NEURONTIN) 400 MG capsule Take 1 capsule by mouth 3 (Three) Times a  "Day. 90 capsule 1 Past Week    linaclotide (Linzess) 72 MCG capsule capsule Take 1 capsule by mouth Every Morning Before Breakfast. 90 capsule 3 3/6/2025    pantoprazole (PROTONIX) 40 MG EC tablet Take 1 tablet by mouth Daily. 90 tablet 3 3/6/2025    Progesterone (Prometrium) 100 MG capsule Take 1 capsule by mouth Daily. 30 capsule 11 3/6/2025    Tirzepatide-Weight Management (ZEPBOUND) 5 MG/0.5ML solution auto-injector Inject 0.5 mL under the skin into the appropriate area as directed 1 (One) Time Per Week. 2 mL 2 2/27/2025    Vibegron (Gemtesa) 75 MG tablet Take 1 tablet by mouth Daily. 90 tablet 3 Past Month    azelastine (ASTELIN) 0.1 % nasal spray Instill 2 sprays into each nostril as directed by provider 2 (Two) Times a Day. 30 mL 12 More than a month    lidocaine (XYLOCAINE) 5 % ointment Apply 1 application  topically to the appropriate area as directed Every 2 (Two) Hours As Needed for Mild Pain. 35.44 g 11 More than a month    triamcinolone (KENALOG) 0.5 % ointment Apply topically to the appropriate area as directed 2 (Two) Times a Day. 15 g 0 More than a month       Allergies:  Patient has no known allergies.    ROS:    Resp: No SOA  Cardiovascular: No CP      Objective     /75   Pulse 108   Temp 96.9 °F (36.1 °C)   Resp 20   Ht 160 cm (63\")   Wt 77.6 kg (171 lb)   LMP 06/30/2019   SpO2 96%   BMI 30.29 kg/m²     Physical Exam   Constitutional: Patient is oriented to person, place, and in no distress.  Pulmonary/Chest: No distress.  No audible wheezes  Psychiatric: Mood, memory, affect and judgment appear normal.     Assessment & Plan     Diagnosis:  Colon cancer screening  Family history colon cancer in 2 second-degree relatives    Anticipated Surgical Procedure:  Colonoscopy    The risks, benefits, and alternatives of colonoscopy were reviewed with the patient today.  Risks including perforation of the colon possibly requiring surgery or colostomy.  Additional risks include risk of bleeding " from biopsies or removal of colon tissue.  There is also the risk of a drug reaction or problems with anesthesia.  This will be discussed with the patient further by the anesthesia team on the day of the procedure.  Lastly there is a possibility of missing a colon polyp or cancer.  The benefits include the diagnosis and management of disease of the colon and rectum.  Alternatives to colonoscopy include barium enema, laboratory testing, radiographic evaluation, or no intervention.  The patient verbalizes understanding and agrees.        Please note that portions of this note were completed with a voice recognition program.

## 2025-03-07 NOTE — ANESTHESIA PREPROCEDURE EVALUATION
Anesthesia Evaluation     Patient summary reviewed   no history of anesthetic complications:   NPO Solid Status: > 8 hours             Airway   Mallampati: II  Dental      Pulmonary - negative pulmonary ROS   Cardiovascular - negative cardio ROS  Exercise tolerance: excellent (>7 METS)        Neuro/Psych- negative ROS  GI/Hepatic/Renal/Endo - negative ROS   (+) obesity, GERD    Musculoskeletal     Abdominal    Substance History      OB/GYN          Other                    Anesthesia Plan    ASA 2     MAC       Anesthetic plan, risks, benefits, and alternatives have been provided, discussed and informed consent has been obtained with: patient.    CODE STATUS:

## 2025-03-07 NOTE — ANESTHESIA POSTPROCEDURE EVALUATION
Patient: Desirae Alberto    Procedure Summary       Date: 03/07/25 Room / Location: [facility] ENDOSCOPY 5 / [facility]    Anesthesia Start: 1041 Anesthesia Stop: 1113    Procedure: COLONOSCOPY WITH ANESTHESIA Diagnosis:       FH: colon cancer      (FH: colon cancer [Z80.0])    Surgeons: Clotilde Friedman MD Provider: Gerry Pantoja CRNA    Anesthesia Type: MAC ASA Status: 2            Anesthesia Type: MAC    Vitals  Vitals Value Taken Time   /73 03/07/25 1141   Temp     Pulse 90 03/07/25 1141   Resp 17 03/07/25 1130   SpO2 90 % 03/07/25 1141   Vitals shown include unfiled device data.        Post Anesthesia Care and Evaluation    Patient location during evaluation: PHASE II  Patient participation: complete - patient participated  Level of consciousness: awake, awake and alert and responsive to verbal stimuli  Pain management: adequate    Airway patency: patent  Anesthetic complications: No anesthetic complications    Cardiovascular status: acceptable and hemodynamically stable  Respiratory status: acceptable and spontaneous ventilation  Hydration status: acceptable

## 2025-03-12 PROBLEM — Z86.0101 PERSONAL HISTORY OF ADENOMATOUS AND SERRATED COLON POLYPS: Status: ACTIVE | Noted: 2025-03-12

## 2025-03-18 ENCOUNTER — OFFICE VISIT (OUTPATIENT)
Dept: OBSTETRICS AND GYNECOLOGY | Age: 60
End: 2025-03-18
Payer: COMMERCIAL

## 2025-03-18 VITALS
HEIGHT: 63 IN | RESPIRATION RATE: 18 BRPM | SYSTOLIC BLOOD PRESSURE: 110 MMHG | DIASTOLIC BLOOD PRESSURE: 64 MMHG | WEIGHT: 171 LBS | BODY MASS INDEX: 30.3 KG/M2

## 2025-03-18 DIAGNOSIS — N95.0 POSTMENOPAUSAL BLEEDING: Primary | ICD-10-CM

## 2025-03-18 PROCEDURE — 88305 TISSUE EXAM BY PATHOLOGIST: CPT | Performed by: NURSE PRACTITIONER

## 2025-03-18 NOTE — PROGRESS NOTES
Procedure   Procedures   An endometrial biopsy was performed in the office today.  The cervix was visualized with a speculum and prepped with Betadine.  The anterior lip was grasped with a tenaculum and a standard endometrial sampling Pipelle was passed into the uterine cavity.  The patient experienced mild cramping and the uterus sounded to 7 cm.  A small amount of tissue was obtained with 2 passes.  The tenaculum was removed and the site was hemostatic.  She tolerated the procedure well.

## 2025-03-18 NOTE — PROGRESS NOTES
"Subjective     Desirae Alberto is a 60 y.o. female    History of Present Illness  Patient comes in today for endometrial biopsy for postmenopausal bleeding.  She is currently on Prometrium but it has not stopped the bleeding.  She has not had an endometrial biopsy.  Ultrasound was done in the office today.  Endometrium was 5.3 mm.        /64   Resp 18   Ht 160 cm (63\")   Wt 77.6 kg (171 lb)   LMP 06/30/2019   BMI 30.29 kg/m²     Outpatient Encounter Medications as of 3/18/2025   Medication Sig Dispense Refill    azelastine (ASTELIN) 0.1 % nasal spray Instill 2 sprays into each nostril as directed by provider 2 (Two) Times a Day. 30 mL 12    cyclobenzaprine (FLEXERIL) 5 MG tablet Take 1 tablet by mouth 3 (Three) Times a Day As Needed for Muscle Spasms. 90 tablet 11    diclofenac (VOLTAREN) 50 MG EC tablet Take 1 tablet by mouth 2 (Two) Times a Day As Needed for Pain. 60 tablet 5    estradiol (Soledad) 0.05 MG/24HR patch Place 1 patch on the skin as directed by provider 2 (Two) Times a Week. Remove old patch before applying new. 24 patch 3    estradiol (MINIVELLE, VIVELLE-DOT) 0.05 MG/24HR patch Apply 1 patch to the appropriate area as directed twice weekly **Remove old patch before applying new patch** 8 patch 11    estradiol (MINIVELLE, VIVELLE-DOT) 0.05 MG/24HR patch Apply 1 patch to the appropriate area as directed twice weekly **Remove old patch before applying new patch** 8 patch 11    estradiol (MINIVELLE, VIVELLE-DOT) 0.05 MG/24HR patch Place 1 patch on the skin as directed by provider 2 (Two) Times a Week. remove old patch before applying new patch. 8 patch 11    gabapentin (NEURONTIN) 400 MG capsule Take 1 capsule by mouth 3 (Three) Times a Day. 90 capsule 1    lidocaine (XYLOCAINE) 5 % ointment Apply 1 application  topically to the appropriate area as directed Every 2 (Two) Hours As Needed for Mild Pain. 35.44 g 11    linaclotide (Linzess) 72 MCG capsule capsule Take 1 capsule by mouth Every " Morning Before Breakfast. 90 capsule 3    pantoprazole (PROTONIX) 40 MG EC tablet Take 1 tablet by mouth Daily. 90 tablet 3    Progesterone (Prometrium) 100 MG capsule Take 1 capsule by mouth Daily. 30 capsule 11    Tirzepatide-Weight Management (ZEPBOUND) 5 MG/0.5ML solution auto-injector Inject 0.5 mL under the skin into the appropriate area as directed 1 (One) Time Per Week. 2 mL 2    triamcinolone (KENALOG) 0.5 % ointment Apply topically to the appropriate area as directed 2 (Two) Times a Day. 15 g 0    Vibegron (Gemtesa) 75 MG tablet Take 1 tablet by mouth Daily. 90 tablet 3     No facility-administered encounter medications on file as of 3/18/2025.       Past Medical History  Past Medical History:   Diagnosis Date    Arthritis     Cancer     Family history of colon cancer     GERD (gastroesophageal reflux disease)     Glaucoma     Hyperlipidemia     Hypothyroidism     Low back pain     TIA (transient ischemic attack)        Surgical History  Past Surgical History:   Procedure Laterality Date    BREAST EXCISIONAL BIOPSY Left 01/2016    Benign    CHOLECYSTECTOMY      COLON RESECTION SMALL BOWEL  2014    COLONOSCOPY  09/05/2019    Dr. Tabares-Internal and external hemorrhoids; Repeat 10 years    COLONOSCOPY N/A 3/7/2025    Procedure: COLONOSCOPY WITH ANESTHESIA;  Surgeon: Clotilde Friedman MD;  Location: [facility] ENDOSCOPY;  Service: Gastroenterology;  Laterality: N/A;  pre op:  FH: colon cancer  post op: polyp  pcp: Gibson George MD    ENDOSCOPY  09/05/2019    Dr. Tabares-Possible Hightower's esophagus-biopsied    ENDOSCOPY N/A 06/24/2022    LA Grade C reflux esophagitis with no bleeding; A large amount of food (residue) in the stomach; Normal examined duodenum; No specimens collected; Repeat 6 weeks    ENDOSCOPY N/A 08/05/2022    Normal esophagus; Normal stomach; Normal examined duodenum; No specimens collected    KNEE ARTHROSCOPY Right 07/14/2020    Procedure: RIGHT KNEE PARTIAL MEDIAL MENISCECTOMY;   Surgeon: Vijay Grewal MD;  Location:  [facility];  Service: Orthopedics;  Laterality: Right;    LAPAROSCOPIC TUBAL LIGATION      TOTAL KNEE ARTHROPLASTY Right 07/28/2021    Procedure: RIGHT TOTAL KNEE REPLACEMENT;  Surgeon: Eliecer Foley MD;  Location:  [facility];  Service: Orthopedics;  Laterality: Right;    TRANSURETHRAL RESECTION OF BLADDER TUMOR N/A 07/12/2022    Procedure: CYSTOSCOPY TRANSURETHRAL RESECTION OF BLADDER TUMOR;  Surgeon: Joel Poe MD;  Location:  [facility];  Service: Urology;  Laterality: N/A;    VAGINAL MESH REVISION      2010/2016    WISDOM TOOTH EXTRACTION         Family History  Family History   Problem Relation Age of Onset    Heart defect Mother     Diverticulitis Mother     No Known Problems Father     No Known Problems Sister     Cancer Maternal Aunt     Colon cancer Paternal Aunt 68    Stomach cancer Paternal Aunt 70    Liver cancer Paternal Aunt     Colon cancer Paternal Aunt 80    Breast cancer Neg Hx     Ovarian cancer Neg Hx     Uterine cancer Neg Hx     Melanoma Neg Hx     Prostate cancer Neg Hx     Colon polyps Neg Hx     Esophageal cancer Neg Hx     Liver disease Neg Hx     Rectal cancer Neg Hx        The following portions of the patient's history were reviewed and updated as appropriate: allergies, current medications, past family history, past medical history, past social history, past surgical history, and problem list.    Review of Systems   Genitourinary:  Positive for vaginal bleeding.       Objective   Physical Exam  Vitals and nursing note reviewed. Exam conducted with a chaperone present.   Constitutional:       Appearance: She is well-developed.   HENT:      Head: Normocephalic and atraumatic.   Abdominal:      General: There is no distension.      Palpations: Abdomen is soft.      Tenderness: There is no abdominal tenderness.      Hernia: There is no hernia in the left inguinal area or right inguinal area.   Genitourinary:     General: Normal  vulva.      Exam position: Lithotomy position.      Labia:         Right: No rash, tenderness, lesion or injury.         Left: No rash, tenderness, lesion or injury.       Vagina: Normal. Bleeding present. No vaginal discharge, erythema or tenderness.      Cervix: Normal. Cervical bleeding present.      Uterus: Normal. Not enlarged and not tender.       Adnexa: Right adnexa normal and left adnexa normal.        Right: No mass, tenderness or fullness.          Left: No mass, tenderness or fullness.     Lymphadenopathy:      Lower Body: No right inguinal adenopathy. No left inguinal adenopathy.   Skin:     General: Skin is warm and dry.   Neurological:      Mental Status: She is alert and oriented to person, place, and time.   Psychiatric:         Mood and Affect: Mood normal.         Behavior: Behavior normal.         Thought Content: Thought content normal.         Judgment: Judgment normal.         PHQ-9 Depression Screening  Little interest or pleasure in doing things? Not at all   Feeling down, depressed, or hopeless? Not at all   PHQ-2 Total Score 0   Trouble falling or staying asleep, or sleeping too much?     Feeling tired or having little energy?     Poor appetite or overeating?     Feeling bad about yourself - or that you are a failure or have let yourself or your family down?     Trouble concentrating on things, such as reading the newspaper or watching television?     Moving or speaking so slowly that other people could have noticed? Or the opposite - being so fidgety or restless that you have been moving around a lot more than usual?     Thoughts that you would be better off dead, or of hurting yourself in some way?     PHQ-9 Total Score     If you checked off any problems, how difficult have these problems made it for you to do your work, take care of things at home, or get along with other people? Not difficult at all       Assessment & Plan   Diagnoses and all orders for this visit:    1.  Postmenopausal bleeding (Primary)  Comments:  She continues to have postmenopausal bleeding.  Ultrasound done in the office today shows an endometrium at 5.3 mm.  Endometrial biopsy was done  Orders:  -     Tissue Pathology Exam                NAVEED Menon  3/18/2025

## 2025-03-20 LAB
CYTO UR: NORMAL
LAB AP CASE REPORT: NORMAL
LAB AP CLINICAL INFORMATION: NORMAL
Lab: NORMAL
PATH REPORT.FINAL DX SPEC: NORMAL
PATH REPORT.GROSS SPEC: NORMAL

## 2025-03-24 ENCOUNTER — HOSPITAL ENCOUNTER (OUTPATIENT)
Dept: CT IMAGING | Facility: HOSPITAL | Age: 60
Discharge: HOME | End: 2025-03-24
Admitting: NURSE PRACTITIONER
Payer: COMMERCIAL

## 2025-03-24 DIAGNOSIS — Z15.89 BIALLELIC MUTATION OF SDHA GENE: ICD-10-CM

## 2025-03-24 PROCEDURE — 25510000001 IOPAMIDOL 61 % SOLUTION: Performed by: NURSE PRACTITIONER

## 2025-03-24 PROCEDURE — 74177 CT ABD & PELVIS W/CONTRAST: CPT

## 2025-03-24 RX ORDER — IOPAMIDOL 612 MG/ML
100 INJECTION, SOLUTION INTRAVASCULAR
Status: COMPLETED | OUTPATIENT
Start: 2025-03-24 | End: 2025-03-24

## 2025-03-24 RX ADMIN — IOPAMIDOL 100 ML: 612 INJECTION, SOLUTION INTRAVENOUS at 09:27

## 2025-03-28 RX ORDER — PROGESTERONE 200 MG/1
200 CAPSULE ORAL DAILY
Qty: 90 CAPSULE | Refills: 3 | Status: SHIPPED | OUTPATIENT
Start: 2025-03-28

## 2025-04-04 ENCOUNTER — HOSPITAL ENCOUNTER (OUTPATIENT)
Dept: MAMMOGRAPHY | Facility: HOSPITAL | Age: 60
Discharge: HOME | End: 2025-04-04
Admitting: NURSE PRACTITIONER
Payer: COMMERCIAL

## 2025-04-04 DIAGNOSIS — Z12.31 ENCOUNTER FOR SCREENING MAMMOGRAM FOR BREAST CANCER: ICD-10-CM

## 2025-04-04 PROCEDURE — 77063 BREAST TOMOSYNTHESIS BI: CPT

## 2025-04-04 PROCEDURE — 77067 SCR MAMMO BI INCL CAD: CPT

## 2025-04-07 DIAGNOSIS — E66.09 CLASS 1 OBESITY DUE TO EXCESS CALORIES WITH SERIOUS COMORBIDITY AND BODY MASS INDEX (BMI) OF 33.0 TO 33.9 IN ADULT: ICD-10-CM

## 2025-04-07 DIAGNOSIS — E66.811 CLASS 1 OBESITY DUE TO EXCESS CALORIES WITH SERIOUS COMORBIDITY AND BODY MASS INDEX (BMI) OF 33.0 TO 33.9 IN ADULT: ICD-10-CM

## 2025-04-07 RX ORDER — TIRZEPATIDE 5 MG/.5ML
5 INJECTION, SOLUTION SUBCUTANEOUS WEEKLY
Qty: 2 ML | Refills: 2 | Status: SHIPPED | OUTPATIENT
Start: 2025-04-07 | End: 2025-04-08

## 2025-04-07 NOTE — TELEPHONE ENCOUNTER
Rx Refill Note  Requested Prescriptions     Pending Prescriptions Disp Refills    Tirzepatide-Weight Management (Zepbound) 5 MG/0.5ML solution auto-injector 2 mL 2     Sig: Inject 0.5 mL under the skin into the appropriate area as directed 1 (One) Time Per Week.      Last office visit with prescribing clinician: 2/20/2025   Last telemedicine visit with prescribing clinician: Visit date not found   Next office visit with prescribing clinician: 4/8/2025                         Frieda Baker MA  04/07/25, 09:32 CDT

## 2025-04-08 ENCOUNTER — OFFICE VISIT (OUTPATIENT)
Dept: INTERNAL MEDICINE | Facility: CLINIC | Age: 60
End: 2025-04-08
Payer: COMMERCIAL

## 2025-04-08 VITALS
HEART RATE: 84 BPM | SYSTOLIC BLOOD PRESSURE: 117 MMHG | TEMPERATURE: 97 F | DIASTOLIC BLOOD PRESSURE: 77 MMHG | BODY MASS INDEX: 31.36 KG/M2 | WEIGHT: 177 LBS | OXYGEN SATURATION: 98 % | HEIGHT: 63 IN

## 2025-04-08 DIAGNOSIS — Z15.89 BIALLELIC MUTATION OF SDHA GENE: ICD-10-CM

## 2025-04-08 DIAGNOSIS — Z79.899 LONG-TERM USE OF HIGH-RISK MEDICATION: ICD-10-CM

## 2025-04-08 DIAGNOSIS — Z15.89 DEFICIENCY OF DNA REPAIR: Primary | ICD-10-CM

## 2025-04-08 DIAGNOSIS — M54.50 LOW BACK PAIN, UNSPECIFIED BACK PAIN LATERALITY, UNSPECIFIED CHRONICITY, UNSPECIFIED WHETHER SCIATICA PRESENT: ICD-10-CM

## 2025-04-08 DIAGNOSIS — M51.370 DEGENERATION OF INTERVERTEBRAL DISC OF LUMBOSACRAL REGION WITH DISCOGENIC BACK PAIN: ICD-10-CM

## 2025-04-08 DIAGNOSIS — E66.09 CLASS 1 OBESITY DUE TO EXCESS CALORIES WITH SERIOUS COMORBIDITY IN ADULT, UNSPECIFIED BMI: Primary | ICD-10-CM

## 2025-04-08 DIAGNOSIS — N95.1 MENOPAUSAL SYMPTOMS: ICD-10-CM

## 2025-04-08 DIAGNOSIS — Z15.89 DEFICIENCY OF DNA REPAIR: ICD-10-CM

## 2025-04-08 DIAGNOSIS — E66.811 CLASS 1 OBESITY DUE TO EXCESS CALORIES WITH SERIOUS COMORBIDITY IN ADULT, UNSPECIFIED BMI: Primary | ICD-10-CM

## 2025-04-08 RX ORDER — PREGABALIN 100 MG/1
100 CAPSULE ORAL 2 TIMES DAILY
Qty: 180 CAPSULE | Refills: 0 | Status: SHIPPED | OUTPATIENT
Start: 2025-04-08

## 2025-04-08 RX ORDER — CYCLOBENZAPRINE HCL 10 MG
10 TABLET ORAL 3 TIMES DAILY PRN
Qty: 270 TABLET | Refills: 3 | Status: SHIPPED | OUTPATIENT
Start: 2025-04-08

## 2025-04-08 RX ORDER — ESTRADIOL 0.05 MG/D
1 PATCH, EXTENDED RELEASE TRANSDERMAL 2 TIMES WEEKLY
Qty: 24 PATCH | Refills: 3 | Status: SHIPPED | OUTPATIENT
Start: 2025-04-10

## 2025-04-08 NOTE — PROGRESS NOTES
Subjective     Chief Complaint   Patient presents with    Wellness Check       History of Present Illness    Patient's PMR from outside medical facility reviewed and noted.    Desirae Alberto is a 60 y.o. female who presents for a routine office visit.  Desirae Alberto presents for follow up on Gabapantin for radiculopathy and Chronic lower back pain. She has the following symptoms: numbness, tingling, burning, and pain. Patient states that she is doing well with current meds and would like to change medication. Current pain level is a 3/10 . Symptoms have been stable since the last visit. She complains of the following medication side effects: Sleepiness, dizziness and fatigue.  Switch to Lyrica at this time to see if she tolerates this better    Patient is a 60 y.o. female that comes in for weight management follow up.  Patient's current Body mass index is 31.35 kg/m²..  Patient is currently on Zepbound to assist with weight loss.  Patient reports no side effects at this time.  Since we last saw her, she has gained weight. Today we discusssed - daily activity and exercise encouraged.    Today we reinforced and expanded upon behavioral modification for continued weight loss with the patient.   she is once again advised that this is to include physical activity and caloric restriction as a complete weight management plan to be augmented with medication.     Patient would also like to increase her Flexeril at this time as it does help with her back pain.  She continues to follow-up with neurosurgery        Past Medical History:   Past Medical History:   Diagnosis Date    Arthritis     Cancer     lymphoma of small bowel    Family history of colon cancer     GERD (gastroesophageal reflux disease)     Glaucoma     Hyperlipidemia     Hypothyroidism     Low back pain     TIA (transient ischemic attack)      Past Surgical History:  Past Surgical History:   Procedure Laterality Date    BREAST EXCISIONAL BIOPSY Left  01/2016    Benign    CHOLECYSTECTOMY      COLON RESECTION SMALL BOWEL  2014    COLONOSCOPY  09/05/2019    Dr. Tabares-Internal and external hemorrhoids; Repeat 10 years    COLONOSCOPY N/A 3/7/2025    Procedure: COLONOSCOPY WITH ANESTHESIA;  Surgeon: Clotilde Friedman MD;  Location:  [facility];  Service: Gastroenterology;  Laterality: N/A;  pre op:  FH: colon cancer  post op: polyp  pcp: Gibson George MD    ENDOSCOPY  09/05/2019    Dr. Tabares-Possible Hightower's esophagus-biopsied    ENDOSCOPY N/A 06/24/2022    LA Grade C reflux esophagitis with no bleeding; A large amount of food (residue) in the stomach; Normal examined duodenum; No specimens collected; Repeat 6 weeks    ENDOSCOPY N/A 08/05/2022    Normal esophagus; Normal stomach; Normal examined duodenum; No specimens collected    KNEE ARTHROSCOPY Right 07/14/2020    Procedure: RIGHT KNEE PARTIAL MEDIAL MENISCECTOMY;  Surgeon: Vijay Grewal MD;  Location:  [facility] OR;  Service: Orthopedics;  Laterality: Right;    LAPAROSCOPIC TUBAL LIGATION      TOTAL KNEE ARTHROPLASTY Right 07/28/2021    Procedure: RIGHT TOTAL KNEE REPLACEMENT;  Surgeon: Eliecer Foley MD;  Location:  [facility];  Service: Orthopedics;  Laterality: Right;    TRANSURETHRAL RESECTION OF BLADDER TUMOR N/A 07/12/2022    Procedure: CYSTOSCOPY TRANSURETHRAL RESECTION OF BLADDER TUMOR;  Surgeon: Joel Poe MD;  Location:  [facility];  Service: Urology;  Laterality: N/A;    VAGINAL MESH REVISION      2010/2016    WISDOM TOOTH EXTRACTION       Social History:  reports that she quit smoking about 24 years ago. Her smoking use included cigarettes. She started smoking about 34 years ago. She has a 5 pack-year smoking history. She has never used smokeless tobacco. She reports that she does not currently use alcohol. She reports that she does not use drugs.    Family History: family history includes Cancer in her maternal aunt; Colon cancer (age of onset: 68) in her paternal  aunt; Colon cancer (age of onset: 80) in her paternal aunt; Diverticulitis in her mother; Heart defect in her mother; Liver cancer in her paternal aunt; No Known Problems in her father and sister; Stomach cancer (age of onset: 70) in her paternal aunt.      Allergies:  No Known Allergies  Medications:  Prior to Admission medications    Medication Sig Start Date End Date Taking? Authorizing Provider   azelastine (ASTELIN) 0.1 % nasal spray Instill 2 sprays into each nostril as directed by provider 2 (Two) Times a Day. 9/7/23  Yes Luz Mota APRN   cyclobenzaprine (FLEXERIL) 5 MG tablet Take 1 tablet by mouth 3 (Three) Times a Day As Needed for Muscle Spasms. 10/8/24  Yes Gibson George MD   diclofenac (VOLTAREN) 50 MG EC tablet Take 1 tablet by mouth 2 (Two) Times a Day As Needed for Pain. 2/14/25  Yes Gibson George MD   estradiol (Soledad) 0.05 MG/24HR patch Place 1 patch on the skin as directed by provider 2 (Two) Times a Week. Remove old patch before applying new. 11/28/24  Yes Apple Ivan APRN   estradiol (MINIVELLE, VIVELLE-DOT) 0.05 MG/24HR patch Apply 1 patch to the appropriate area as directed twice weekly **Remove old patch before applying new patch** 9/30/24  Yes Gibson George MD   estradiol (MINIVELLE, VIVELLE-DOT) 0.05 MG/24HR patch Apply 1 patch to the appropriate area as directed twice weekly **Remove old patch before applying new patch** 10/1/24  Yes Gibson George MD   estradiol (MINIVELLE, VIVELLE-DOT) 0.05 MG/24HR patch Place 1 patch on the skin as directed by provider 2 (Two) Times a Week. remove old patch before applying new patch. 10/3/24  Yes Gibson George MD   gabapentin (NEURONTIN) 400 MG capsule Take 1 capsule by mouth 3 (Three) Times a Day. 2/17/25  Yes Gibson George MD   lidocaine (XYLOCAINE) 5 % ointment Apply 1 application  topically to the appropriate area as directed Every 2 (Two) Hours As Needed for  "Mild Pain. 11/29/23  Yes Gibson George MD   linaclotide (Linzess) 72 MCG capsule capsule Take 1 capsule by mouth Every Morning Before Breakfast. 1/17/25  Yes Christina Vaughn APRN   pantoprazole (PROTONIX) 40 MG EC tablet Take 1 tablet by mouth Daily. 1/17/25  Yes Christina Vaughn APRN   Progesterone (Prometrium) 200 MG capsule Take 1 capsule by mouth Daily. 3/28/25  Yes Apple Ivan APRN   Progesterone (PROMETRIUM) 200 MG capsule Take 1 capsule by mouth Daily. 3/28/25  Yes Apple Ivan APRN   Tirzepatide-Weight Management (Zepbound) 5 MG/0.5ML solution auto-injector Inject 0.5 mL under the skin into the appropriate area as directed 1 (One) Time Per Week. 4/7/25  Yes Gibson George MD   triamcinolone (KENALOG) 0.5 % ointment Apply topically to the appropriate area as directed 2 (Two) Times a Day. 9/17/24  Yes Gibson George MD   Vibegron (Gemtesa) 75 MG tablet Take 1 tablet by mouth Daily. 11/26/24  Yes Apple Ivan APRN           Review of systems   negative unless otherwise specified above in HPI    Objective     Vital Signs: /77 (BP Location: Left arm, Patient Position: Sitting, Cuff Size: Adult)   Pulse 84   Temp 97 °F (36.1 °C) (Infrared)   Ht 160 cm (63\")   Wt 80.3 kg (177 lb)   LMP 06/30/2019   SpO2 98%   BMI 31.35 kg/m²     Physical Exam  Vitals and nursing note reviewed.   Constitutional:       General: She is not in acute distress.     Appearance: Normal appearance.   HENT:      Head: Normocephalic.   Eyes:      Extraocular Movements: Extraocular movements intact.      Pupils: Pupils are equal, round, and reactive to light.   Cardiovascular:      Rate and Rhythm: Normal rate and regular rhythm.      Heart sounds: Normal heart sounds. No murmur heard.  Pulmonary:      Effort: Pulmonary effort is normal. No respiratory distress.      Breath sounds: Normal breath sounds. No rhonchi or rales.   Abdominal:      General: Abdomen is flat. Bowel sounds are " normal.      Palpations: Abdomen is soft.   Neurological:      General: No focal deficit present.      Mental Status: She is alert.                Results Reviewed:  Glucose   Date Value Ref Range Status   10/08/2024 89 70 - 99 mg/dL Final   07/08/2022 117 (H) 65 - 99 mg/dL Final     BUN   Date Value Ref Range Status   10/08/2024 8 6 - 24 mg/dL Final   07/08/2022 14 6 - 20 mg/dL Final     Creatinine   Date Value Ref Range Status   10/08/2024 0.82 0.57 - 1.00 mg/dL Final   07/08/2022 0.80 0.57 - 1.00 mg/dL Final   09/17/2019 0.70 0.60 - 1.30 mg/dL Final     Comment:     Serial Number: [id number]:  [id number]     Sodium   Date Value Ref Range Status   10/08/2024 140 134 - 144 mmol/L Final   07/08/2022 141 136 - 145 mmol/L Final     Potassium   Date Value Ref Range Status   10/08/2024 4.4 3.5 - 5.2 mmol/L Final   07/08/2022 4.1 3.5 - 5.2 mmol/L Final     Chloride   Date Value Ref Range Status   10/08/2024 104 96 - 106 mmol/L Final   07/08/2022 106 98 - 107 mmol/L Final     CO2   Date Value Ref Range Status   07/08/2022 26.0 22.0 - 29.0 mmol/L Final     Total CO2   Date Value Ref Range Status   10/08/2024 24 20 - 29 mmol/L Final     Calcium   Date Value Ref Range Status   10/08/2024 9.4 8.7 - 10.2 mg/dL Final   07/08/2022 9.3 8.6 - 10.5 mg/dL Final     ALT (SGPT)   Date Value Ref Range Status   10/08/2024 21 0 - 32 IU/L Final   06/23/2022 36 (H) 1 - 33 U/L Final     AST (SGOT)   Date Value Ref Range Status   10/08/2024 21 0 - 40 IU/L Final   06/23/2022 27 1 - 32 U/L Final     WBC   Date Value Ref Range Status   10/08/2024 3.5 3.4 - 10.8 x10E3/uL Final     Hematocrit   Date Value Ref Range Status   10/08/2024 42.1 34.0 - 46.6 % Final   07/08/2022 39.2 34.0 - 46.6 % Final     Platelets   Date Value Ref Range Status   10/08/2024 243 150 - 450 x10E3/uL Final   07/08/2022 222 140 - 450 10*3/mm3 Final     Total Cholesterol   Date Value Ref Range Status   02/28/2022 284 (H) 0 - 200 mg/dL Final     Triglycerides   Date Value  Ref Range Status   10/08/2024 214 (H) 0 - 149 mg/dL Final   02/28/2022 313 (H) 0 - 150 mg/dL Final     HDL Cholesterol   Date Value Ref Range Status   10/08/2024 53 >39 mg/dL Final   02/28/2022 67 (H) 40 - 60 mg/dL Final     LDL Chol Calc (NIH)   Date Value Ref Range Status   10/08/2024 120 (H) 0 - 99 mg/dL Final     LDL/HDL Ratio   Date Value Ref Range Status   02/28/2022 2.30  Final     Hemoglobin A1C   Date Value Ref Range Status   10/08/2024 6.0 (H) 4.8 - 5.6 % Final     Comment:              Prediabetes: 5.7 - 6.4           Diabetes: >6.4           Glycemic control for adults with diabetes: <7.0     02/28/2022 5.8 % Final             Procedure   Procedures       Assessment / Plan     Assessment/Plan:   Diagnosis Plan   1. Class 1 obesity due to excess calories with serious comorbidity in adult, unspecified BMI  Tirzepatide-Weight Management (ZEPBOUND) 7.5 MG/0.5ML solution auto-injector      2. Low back pain, unspecified back pain laterality, unspecified chronicity, unspecified whether sciatica present  pregabalin (Lyrica) 100 MG capsule      3. Degeneration of intervertebral disc of lumbosacral region with discogenic back pain  cyclobenzaprine (FLEXERIL) 10 MG tablet      4. Menopausal symptoms  estradiol (Soledad) 0.05 MG/24HR patch    Patient is doing well with her hormones.  Refills are given.  She is instructed to call with any bleeding or spotting.      5. Long-term use of high-risk medication  Pain Management Profile (13 Drugs) Urine - Urine, Clean Catch            No follow-ups on file. unless patient needs to be seen sooner or acute issues arise.      I have discussed the patient results/orders and and plan/recommendation with them at today's visit.      Signed by:    Gibson George MD Date: 04/08/25

## 2025-04-10 LAB
AMPHETAMINES UR QL SCN: NEGATIVE NG/ML
BARBITURATES UR QL SCN: NEGATIVE NG/ML
BENZODIAZ UR QL SCN: NEGATIVE NG/ML
BZE UR QL SCN: NEGATIVE NG/ML
CANNABINOIDS UR QL SCN: NEGATIVE NG/ML
CREAT UR-MCNC: 210.3 MG/DL (ref 20–300)
FENTANYL UR-MCNC: NEGATIVE PG/ML
LABORATORY COMMENT REPORT: NORMAL
MEPERIDINE UR QL: NEGATIVE NG/ML
METHADONE UR QL SCN: NEGATIVE NG/ML
OPIATES UR QL SCN: NEGATIVE NG/ML
OXYCODONE+OXYMORPHONE UR QL SCN: NEGATIVE NG/ML
PCP UR QL: NEGATIVE NG/ML
PH UR: 5.4 [PH] (ref 4.5–8.9)
PROPOXYPH UR QL SCN: NEGATIVE NG/ML
SP GR UR: 1.02
TRAMADOL UR QL SCN: NEGATIVE NG/ML

## 2025-04-17 DIAGNOSIS — M54.50 LOW BACK PAIN, UNSPECIFIED BACK PAIN LATERALITY, UNSPECIFIED CHRONICITY, UNSPECIFIED WHETHER SCIATICA PRESENT: Primary | ICD-10-CM

## 2025-05-14 ENCOUNTER — OFFICE VISIT (OUTPATIENT)
Age: 60
End: 2025-05-14
Payer: COMMERCIAL

## 2025-05-14 ENCOUNTER — HOSPITAL ENCOUNTER (OUTPATIENT)
Dept: ULTRASOUND IMAGING | Facility: HOSPITAL | Age: 60
Discharge: HOME | End: 2025-05-14
Admitting: NURSE PRACTITIONER
Payer: COMMERCIAL

## 2025-05-14 VITALS
BODY MASS INDEX: 30.48 KG/M2 | TEMPERATURE: 98 F | DIASTOLIC BLOOD PRESSURE: 74 MMHG | WEIGHT: 172 LBS | OXYGEN SATURATION: 97 % | HEIGHT: 63 IN | SYSTOLIC BLOOD PRESSURE: 126 MMHG | HEART RATE: 95 BPM

## 2025-05-14 DIAGNOSIS — R22.42 LOCALIZED SWELLING OF LEFT LOWER LEG: Primary | ICD-10-CM

## 2025-05-14 DIAGNOSIS — L53.9 ERYTHEMA: ICD-10-CM

## 2025-05-14 DIAGNOSIS — R22.42 LOCALIZED SWELLING OF LEFT LOWER LEG: ICD-10-CM

## 2025-05-14 PROCEDURE — 93971 EXTREMITY STUDY: CPT

## 2025-05-14 RX ORDER — DOXYCYCLINE 100 MG/1
100 CAPSULE ORAL 2 TIMES DAILY
Qty: 14 CAPSULE | Refills: 0 | Status: SHIPPED | OUTPATIENT
Start: 2025-05-14

## 2025-05-14 NOTE — PROGRESS NOTES
Chief Complaint   Patient presents with    Leg Injury     Swollen and red         History:  Desirae Alberto is a 60 y.o. female who presents today for evaluation of the above problems.      DVT  Patient complains of  swelling, pain, redness  in the left leg. Symptoms have been present for 1 week. She has not had similar problems in the past. The patient is able to ambulate. Risk factors for hypercoagulable state include:  prolonged sitting and standing at work . She had no family or personal history of clotting disorder. No prior history of DVT in the past. She has had recent injury of item scraping down her leg while moving items at home. No fever.     No Known Allergies  Past Medical History:   Diagnosis Date    Arthritis     Cancer     lymphoma of small bowel    Family history of colon cancer     GERD (gastroesophageal reflux disease)     Glaucoma     Hyperlipidemia     Hypothyroidism     Low back pain     TIA (transient ischemic attack)      Past Surgical History:   Procedure Laterality Date    BREAST EXCISIONAL BIOPSY Left 01/2016    Benign    CHOLECYSTECTOMY      COLON RESECTION SMALL BOWEL  2014    COLONOSCOPY  09/05/2019    Dr. Tabares-Internal and external hemorrhoids; Repeat 10 years    COLONOSCOPY N/A 3/7/2025    Procedure: COLONOSCOPY WITH ANESTHESIA;  Surgeon: Clotilde Friedman MD;  Location: [facility];  Service: Gastroenterology;  Laterality: N/A;  pre op:  FH: colon cancer  post op: polyp  pcp: Gibson George MD    ENDOSCOPY  09/05/2019    Dr. Tabares-Possible Hightower's esophagus-biopsied    ENDOSCOPY N/A 06/24/2022    LA Grade C reflux esophagitis with no bleeding; A large amount of food (residue) in the stomach; Normal examined duodenum; No specimens collected; Repeat 6 weeks    ENDOSCOPY N/A 08/05/2022    Normal esophagus; Normal stomach; Normal examined duodenum; No specimens collected    KNEE ARTHROSCOPY Right 07/14/2020    Procedure: RIGHT KNEE PARTIAL MEDIAL  MENISCECTOMY;  Surgeon: Vijay Grewal MD;  Location:  [facility] OR;  Service: Orthopedics;  Laterality: Right;    LAPAROSCOPIC TUBAL LIGATION      TOTAL KNEE ARTHROPLASTY Right 07/28/2021    Procedure: RIGHT TOTAL KNEE REPLACEMENT;  Surgeon: Eliecer Foley MD;  Location:  [facility];  Service: Orthopedics;  Laterality: Right;    TRANSURETHRAL RESECTION OF BLADDER TUMOR N/A 07/12/2022    Procedure: CYSTOSCOPY TRANSURETHRAL RESECTION OF BLADDER TUMOR;  Surgeon: Joel Poe MD;  Location:  [facility];  Service: Urology;  Laterality: N/A;    VAGINAL MESH REVISION      2010/2016    WISDOM TOOTH EXTRACTION       Family History   Problem Relation Age of Onset    Heart defect Mother     Diverticulitis Mother     No Known Problems Father     No Known Problems Sister     Cancer Maternal Aunt     Colon cancer Paternal Aunt 68    Stomach cancer Paternal Aunt 70    Liver cancer Paternal Aunt     Colon cancer Paternal Aunt 80    Breast cancer Neg Hx     Ovarian cancer Neg Hx     Uterine cancer Neg Hx     Melanoma Neg Hx     Prostate cancer Neg Hx     Colon polyps Neg Hx     Esophageal cancer Neg Hx     Liver disease Neg Hx     Rectal cancer Neg Hx       reports that she quit smoking about 24 years ago. Her smoking use included cigarettes. She started smoking about 34 years ago. She has a 5 pack-year smoking history. She has never used smokeless tobacco. She reports that she does not currently use alcohol. She reports that she does not use drugs.      Current Outpatient Medications:     cyclobenzaprine (FLEXERIL) 10 MG tablet, Take 1 tablet by mouth 3 (Three) Times a Day As Needed for Muscle Spasms., Disp: 270 tablet, Rfl: 3    diclofenac (VOLTAREN) 50 MG EC tablet, Take 1 tablet by mouth 2 (Two) Times a Day As Needed for Pain., Disp: 60 tablet, Rfl: 5    estradiol (Soledad) 0.05 MG/24HR patch, Place 1 patch on the skin as directed by provider 2 (Two) Times a Week. Remove old patch before applying new., Disp: 24  "patch, Rfl: 3    lidocaine (XYLOCAINE) 5 % ointment, Apply 1 application  topically to the appropriate area as directed Every 2 (Two) Hours As Needed for Mild Pain., Disp: 35.44 g, Rfl: 11    linaclotide (Linzess) 72 MCG capsule capsule, Take 1 capsule by mouth Every Morning Before Breakfast., Disp: 90 capsule, Rfl: 3    pantoprazole (PROTONIX) 40 MG EC tablet, Take 1 tablet by mouth Daily., Disp: 90 tablet, Rfl: 3    pregabalin (Lyrica) 100 MG capsule, Take 1 capsule by mouth 2 (Two) Times a Day., Disp: 180 capsule, Rfl: 0    Progesterone (PROMETRIUM) 200 MG capsule, Take 1 capsule by mouth Daily., Disp: 90 capsule, Rfl: 3    Tirzepatide-Weight Management (ZEPBOUND) 7.5 MG/0.5ML solution auto-injector, Inject 0.5 mL under the skin into the appropriate area as directed 1 (One) Time Per Week., Disp: 6 mL, Rfl: 2    Vibegron (Gemtesa) 75 MG tablet, Take 1 tablet by mouth Daily., Disp: 90 tablet, Rfl: 3    azelastine (ASTELIN) 0.1 % nasal spray, Instill 2 sprays into each nostril as directed by provider 2 (Two) Times a Day. (Patient not taking: Reported on 5/14/2025), Disp: 30 mL, Rfl: 12    doxycycline (VIBRAMYCIN) 100 MG capsule, Take 1 capsule by mouth 2 (Two) Times a Day., Disp: 14 capsule, Rfl: 0    triamcinolone (KENALOG) 0.5 % ointment, Apply topically to the appropriate area as directed 2 (Two) Times a Day. (Patient not taking: Reported on 5/14/2025), Disp: 15 g, Rfl: 0       OBJECTIVE:  Visit Vitals  /74 (BP Location: Left arm, Patient Position: Sitting, Cuff Size: Adult)   Pulse 95   Temp 98 °F (36.7 °C) (Temporal)   Ht 160 cm (63\")   Wt 78 kg (172 lb)   LMP 06/30/2019   SpO2 97%   BMI 30.47 kg/m²      Estimated body mass index is 30.47 kg/m² as calculated from the following:    Height as of this encounter: 160 cm (63\").    Weight as of this encounter: 78 kg (172 lb).        Physical Exam  Constitutional:       Appearance: Normal appearance. She is obese.   HENT:      Head: Normocephalic and atraumatic. "   Cardiovascular:      Pulses: Normal pulses.   Musculoskeletal:      Right lower leg: Normal.      Left lower leg: Swelling and tenderness present. 1+ Edema present.        Legs:       Comments: Left inner lower extremity has mild redness, mild warmth, mild swelling, and tenderness right above ankle with some outside purple/black bruising around site.     Normal ROM of ankle and no pain with manipulation.     Gait and ambulation normal.    Skin:     General: Skin is warm.      Capillary Refill: Capillary refill takes less than 2 seconds.      Findings: Abrasion, bruising and erythema present.          Neurological:      Mental Status: She is alert and oriented to person, place, and time.      Sensory: Sensation is intact.      Motor: Motor function is intact.      Coordination: Coordination is intact.      Gait: Gait is intact.   Psychiatric:         Attention and Perception: Attention and perception normal.         Mood and Affect: Mood and affect normal.         Speech: Speech normal.         Behavior: Behavior normal. Behavior is cooperative.         Thought Content: Thought content normal.         Cognition and Memory: Cognition and memory normal.         Judgment: Judgment normal.         Result Review :                   Assessment/Plan    Diagnoses and all orders for this visit:    1. Localized swelling of left lower leg (Primary)  -     Cancel: Duplex Venous Lower Extremity - Left CAR; Future  -     US Venous Doppler Lower Extremity Left (duplex); Future    2. Erythema  -     doxycycline (VIBRAMYCIN) 100 MG capsule; Take 1 capsule by mouth 2 (Two) Times a Day.  Dispense: 14 capsule; Refill: 0      Patient presents for injury of left lower leg from abrasion of moving item at home 1-2 weeks ago. The site is not healing well like she would like. IT is red, warm, swollen and painful. She is able to ambulate on the leg like normal. She does have prolonged periods of sitting and standing with her employment. No  history personally or family of clotting disorders. She is not a smoker. At this time with redness still present we are going to perform stat venous doppler US today to rule out DVT to be sure and safe with injury as cause and then doxycycline for cellulitis concern as well. Recommend ice and heat alternating, tylenol as needed, elevate leg when she can for optimal blood flow and keep eye on site. If worsening at all or concerns RTC or if severe go to ER. Will see what US shows today and give further plan of care if needed.     (Walking into [facility] radiology today for US before 3 pm per scheduling)    Education materials and an After Visit Summary were printed and given to the patient at discharge.    Return if symptoms worsen or fail to improve.

## 2025-05-19 ENCOUNTER — TELEPHONE (OUTPATIENT)
Dept: INTERNAL MEDICINE | Facility: CLINIC | Age: 60
End: 2025-05-19
Payer: COMMERCIAL

## 2025-05-19 DIAGNOSIS — R07.81 PAINFUL RIB: Primary | ICD-10-CM

## 2025-05-19 RX ORDER — HYDROCODONE BITARTRATE AND ACETAMINOPHEN 7.5; 325 MG/1; MG/1
1 TABLET ORAL EVERY 6 HOURS PRN
Qty: 12 TABLET | Refills: 0 | Status: SHIPPED | OUTPATIENT
Start: 2025-05-19 | End: 2025-05-22

## 2025-05-19 NOTE — TELEPHONE ENCOUNTER
For Dr. George  For [facility] RX Patient is needing some Narco's her rib is out of place.

## 2025-05-21 ENCOUNTER — OFFICE VISIT (OUTPATIENT)
Dept: NEUROSURGERY | Facility: CLINIC | Age: 60
End: 2025-05-21
Payer: COMMERCIAL

## 2025-05-21 VITALS — WEIGHT: 170 LBS | BODY MASS INDEX: 30.12 KG/M2 | HEIGHT: 63 IN

## 2025-05-21 DIAGNOSIS — M50.30 DDD (DEGENERATIVE DISC DISEASE), CERVICAL: ICD-10-CM

## 2025-05-21 DIAGNOSIS — Z87.891 FORMER CIGARETTE SMOKER: ICD-10-CM

## 2025-05-21 DIAGNOSIS — M79.7 FIBROMYALGIA: Primary | ICD-10-CM

## 2025-05-21 DIAGNOSIS — M51.362 DEGENERATION OF INTERVERTEBRAL DISC OF LUMBAR REGION WITH DISCOGENIC BACK PAIN AND LOWER EXTREMITY PAIN: ICD-10-CM

## 2025-05-21 PROCEDURE — 99214 OFFICE O/P EST MOD 30 MIN: CPT | Performed by: NEUROLOGICAL SURGERY

## 2025-05-21 NOTE — LETTER
May 23, 2025     Magdi Sheffield DO  [address] [place]  [place] [address]    Patient: Desirae Alberto   YOB: 1965   Date of Visit: 5/21/2025     Dear Magdi Sheffield DO:       Thank you for referring Desirae Alberto to me for evaluation. Below are the relevant portions of my assessment and plan of care.    If you have questions, please do not hesitate to call me. I look forward to following Desirae along with you.         Sincerely,        Grant Medeiros MD        CC: DRAKE Beck William Lee, MD  05/23/25 0901  Sign when Signing Visit  Chief complaint:   Chief Complaint   Patient presents with   • Lumbar back pain     Patient is here to follow up due to lumbar back pain,neck pain that radiates down both arms and legs. Patient states her right side is worse and her pain seems worse after drinking a diet coke. Patient did have EMG done on 02/26/2025 and has completed physical therapy.      Subjective    HPI:   History of Present Illness  The patient presents for evaluation of lower back pain.    She has been experiencing lower back pain since the age of 32, which has progressively worsened to the point of immobility. The pain is bilateral, affecting both upper and lower extremities, with occasional numbness and tingling in her arms and feet. She also reports intermittent whole arm and leg pain, medial thigh pain, and constant pelvic pain. Chiropractic care has been sought for several years. No assistive devices are used for mobility, but occasional gait abnormalities are noted. Her chiropractor identified a leg length discrepancy of 0.25 inches, which is currently being addressed, along with a rib out of place. No diagnosis of fibromyalgia has been made, nor has she seen a rheumatologist or consulted another spine surgeon, but she did see Rafy Gamez previously. Physical therapy and nerve testing have been completed, yielding normal results. Pain management  "has not been sought. An appointment scheduled with Dr. Sheffield for an injection 2 weeks ago was rescheduled due to work commitments. Various treatments have been tried, including Flexeril, Voltaren, Norco, and pregabalin. A muscle relaxer was taken last night due to severe pain. Footwear has been changed in an attempt to alleviate symptoms. Gabapentin has been used for the past 6 years, providing some relief, though the medication induces drowsiness and blurriness, particularly when the dosage is increased. Subsequently, she was switched to Lyrica.    Back pain has evolved into nerve pain, predominantly on the right side, which she believes is exacerbated by the consumption of Diet Coke. Severe nerve pain radiates down her arm, necessitating the use of a pillow for support during sleep.    A long-standing issue with bladder leakage is reported, but no fecal incontinence is noted.    Recently, shingles developed, raising concerns about potential interactions between medications.    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      Oswestry Disability Index = 20%   Score   Pain Intensity Fairly severe pain-3   Personal Care Look after myself without pain-0   Lifting Can lift heavy weights with extra pain-1   Walking Walk any distance-0   Sitting Sit in \"favorite\" chair as long as I like-1   Standing Stand as long as I like but with extra pain-1   Sleeping Can only sleep < 6 hrs-2   Sex Life (if applicable) Not applicable-0   Social Life Social life normal, but increases pain-1   Traveling Travel gives me extra pain-1   (German et al, 1980)    SCORE INTERPRETATION OF THE OSWESTRY LBP DISABILITY QUESTIONNAIRE     0-20% Minimal disability.  Can cope with most ADLs. Usually no treatment is needed, apart from advice on lifting, sitting, posture, physical fitness, and diet.  In this group, some patients have particular difficulty with sitting and this may be important if their occupation is " sedentary (, , etc.).    ROS  Review of Systems   Constitutional: Negative.    HENT: Negative.     Eyes: Negative.    Respiratory: Negative.     Cardiovascular: Negative.    Gastrointestinal: Negative.    Endocrine: Negative.    Genitourinary: Negative.    Musculoskeletal:  Positive for back pain, neck pain and neck stiffness.   Skin: Negative.    Allergic/Immunologic: Negative.    Neurological:  Positive for numbness.   Hematological: Negative.    Psychiatric/Behavioral: Negative.     All other systems reviewed and are negative.    PFSH:  Past Medical History:   Diagnosis Date   • Arthritis    • Cancer     lymphoma of small bowel   • Cataract    • Chronic constipation    • Family history of colon cancer    • GERD (gastroesophageal reflux disease)    • Glaucoma    • History of gastroesophageal reflux (GERD) 2019   • History of high cholesterol 8/2019   • History of melanoma 2014    Malt Lymphoma/sm bowel resection   • Hyperlipidemia    • Hypothyroid 2016   • Hypothyroidism    • Low back pain    • Ovarian cyst 1995   • TIA (transient ischemic attack)    • Urinary incontinence      Past Surgical History:   Procedure Laterality Date   • BREAST EXCISIONAL BIOPSY Left 01/2016    Benign   • CHOLECYSTECTOMY     • COLON RESECTION SMALL BOWEL  2014   • COLONOSCOPY  09/05/2019    Dr. Tabares-Internal and external hemorrhoids; Repeat 10 years   • COLONOSCOPY N/A 03/07/2025    Procedure: COLONOSCOPY WITH ANESTHESIA;  Surgeon: Clotilde Friedman MD;  Location: [facility];  Service: Gastroenterology;  Laterality: N/A;  pre op:  FH: colon cancer  post op: polyp  pcp: Gibson George MD   • ENDOSCOPY  09/05/2019    Dr. Tabares-Possible Hightower's esophagus-biopsied   • ENDOSCOPY N/A 06/24/2022    LA Grade C reflux esophagitis with no bleeding; A large amount of food (residue) in the stomach; Normal examined duodenum; No specimens collected; Repeat 6 weeks   • ENDOSCOPY N/A 08/05/2022    Normal esophagus; Normal  stomach; Normal examined duodenum; No specimens collected   • KNEE ARTHROSCOPY Right 07/14/2020    Procedure: RIGHT KNEE PARTIAL MEDIAL MENISCECTOMY;  Surgeon: Vijay Grewal MD;  Location:  [facility] OR;  Service: Orthopedics;  Laterality: Right;   • LAPAROSCOPIC CHOLECYSTECTOMY  2016   • LAPAROSCOPIC TUBAL LIGATION     • TOTAL KNEE ARTHROPLASTY Right 07/28/2021    Procedure: RIGHT TOTAL KNEE REPLACEMENT;  Surgeon: Eliecer Foley MD;  Location:  [facility] OR;  Service: Orthopedics;  Laterality: Right;   • TRANSURETHRAL RESECTION OF BLADDER TUMOR N/A 07/12/2022    Procedure: CYSTOSCOPY TRANSURETHRAL RESECTION OF BLADDER TUMOR;  Surgeon: Joel Poe MD;  Location:  [facility] OR;  Service: Urology;  Laterality: N/A;   • TUBAL ABDOMINAL LIGATION  1988   • VAGINAL MESH REVISION      2010/2016   • WISDOM TOOTH EXTRACTION       Objective     Current Outpatient Medications   Medication Sig Dispense Refill   • cyclobenzaprine (FLEXERIL) 10 MG tablet Take 1 tablet by mouth 3 (Three) Times a Day As Needed for Muscle Spasms. 270 tablet 3   • diclofenac (VOLTAREN) 50 MG EC tablet Take 1 tablet by mouth 2 (Two) Times a Day As Needed for Pain. 60 tablet 5   • doxycycline (VIBRAMYCIN) 100 MG capsule Take 1 capsule by mouth 2 (Two) Times a Day. 14 capsule 0   • estradiol (Soledad) 0.05 MG/24HR patch Place 1 patch on the skin as directed by provider 2 (Two) Times a Week. Remove old patch before applying new. 24 patch 3   • HYDROcodone-acetaminophen (NORCO) 7.5-325 MG per tablet Take 1 tablet by mouth Every 6 (Six) Hours As Needed for Moderate Pain for up to 3 days. 12 tablet 0   • lidocaine (XYLOCAINE) 5 % ointment Apply 1 application  topically to the appropriate area as directed Every 2 (Two) Hours As Needed for Mild Pain. 35.44 g 11   • linaclotide (Linzess) 72 MCG capsule capsule Take 1 capsule by mouth Every Morning Before Breakfast. 90 capsule 3   • pantoprazole (PROTONIX) 40 MG EC tablet Take 1 tablet by mouth  "Daily. 90 tablet 3   • pregabalin (Lyrica) 100 MG capsule Take 1 capsule by mouth 2 (Two) Times a Day. 180 capsule 0   • Progesterone (PROMETRIUM) 200 MG capsule Take 1 capsule by mouth Daily. 90 capsule 3   • Tirzepatide-Weight Management (ZEPBOUND) 7.5 MG/0.5ML solution auto-injector Inject 0.5 mL under the skin into the appropriate area as directed 1 (One) Time Per Week. 6 mL 2   • Vibegron (Gemtesa) 75 MG tablet Take 1 tablet by mouth Daily. 90 tablet 3     No current facility-administered medications for this visit.       Vital Signs  Ht 160 cm (63\")   Wt 77.1 kg (170 lb)   LMP 06/30/2019   BMI 30.11 kg/m²   Physical Exam  Vitals and nursing note reviewed.   Constitutional:       General: She is not in acute distress.     Appearance: Normal appearance. She is well-developed and well-groomed. She is obese. She is not ill-appearing, toxic-appearing or diaphoretic.      Comments: BMI 31.85   HENT:      Head: Normocephalic and atraumatic.      Right Ear: Hearing normal.      Left Ear: Hearing normal.   Eyes:      General: Lids are normal.      Extraocular Movements: Extraocular movements intact.      Conjunctiva/sclera: Conjunctivae normal.      Pupils: Pupils are equal, round, and reactive to light.   Neck:      Trachea: Trachea normal.   Cardiovascular:      Rate and Rhythm: Normal rate and regular rhythm.   Pulmonary:      Effort: Pulmonary effort is normal. No tachypnea, bradypnea, accessory muscle usage or respiratory distress.   Abdominal:      Palpations: Abdomen is soft.   Musculoskeletal:      Cervical back: Full passive range of motion without pain and neck supple.   Skin:     General: Skin is warm and dry.   Neurological:      Mental Status: She is alert.      GCS: GCS eye subscore is 4. GCS verbal subscore is 5. GCS motor subscore is 6.      Coordination: Coordination is intact.      Deep Tendon Reflexes:      Reflex Scores:       Tricep reflexes are 2+ on the right side and 2+ on the left side.      "  Bicep reflexes are 2+ on the right side and 2+ on the left side.       Brachioradialis reflexes are 2+ on the right side and 2+ on the left side.       Patellar reflexes are 2+ on the right side and 2+ on the left side.       Achilles reflexes are 0 on the right side and 0 on the left side.  Psychiatric:         Speech: Speech normal.         Behavior: Behavior normal. Behavior is cooperative.       Neurological Exam  Mental Status  Alert. Speech is normal. Language is fluent with no aphasia. Attention and concentration are normal.    Cranial Nerves  CN II: Visual acuity is normal.  CN III, IV, VI: Extraocular movements intact bilaterally. Normal lids and orbits bilaterally. Pupils equal round and reactive to light bilaterally.  CN V: Facial sensation is normal.  CN VII: Full and symmetric facial movement.  CN IX, X: Palate elevates symmetrically  CN XI: Shoulder shrug strength is normal.    Motor  Normal muscle bulk throughout. Normal muscle tone.                                               Right                     Left  Deltoid                                   5                          5   Biceps                                   5                          5   Triceps                                  5                          5   Wrist extensor                       5                          5   Finger flexor                          5                          5   Iliopsoas                               5                          5   Quadriceps                           5                          5   Gastrocnemius                     5                           5   Anterior tibialis                      5                          5  Extensor hallucis longus      5                           5    Sensory  Light touch is normal in upper and lower extremities.     Reflexes                                            Right                      Left  Brachioradialis                    2+                          2+  Biceps                                 2+                         2+  Triceps                                2+                         2+  Patellar                                2+                         2+  Achilles                                0                         0  Right Plantar: equivocal  Left Plantar: equivocal    Right pathological reflexes: Anselmo's absent. Ankle clonus absent.  Left pathological reflexes: Anselmo's absent. Ankle clonus absent.    Coordination    Finger-to-nose, rapid alternating movements and heel-to-shin normal bilaterally without dysmetria.    Gait  Casual gait is normal including stance, stride, and arm swing.  (12 bullet pts)    Female  strength (pounds)  AGE Right Hand RH Norms Left Hand LH Norms   20-24  70±14.5  61±13.1   25-29  75±13.9  63.5±12   30-34  79±19.2  68±17.7   35-39  74±10.8  66±11.7   40-44  70±13.5  62±13.8   45-49  62±15.1  56±12.7   50-54  66±11.6  57±10.7   55-59 *85 57±12.5 78 47±11.9   60-64  55±10.1  46±10.1   65-69  50±9.7  41±8.2   70-74  50±11.7  42±10.2   75+  43±11.0  38±8.9   (CATIE Ceron et al; Hand Dynometer: Effects of trials and sessions.  Perpetual and Motor Skills 61:195-8, 1985)  * = Dominant hand  > = Intervention    Results Review:   12/9/2024.  X-rays of the cervical spine show evidence of acute fractures, severe spondylosis at C3-4 and C4-5, and anteriolisthesis of C7 over T1.          1/13/2025.  MRI of the cervical spine shows no STIR signal change suggest acute fractures, no cord signal change, multilevel degenerative changes to include spondylosis at C3-4 and C4-5 and anteriolisthesis of C7 over T1 resulting in multiple areas of right foraminal narrowing without significant central canal or foraminal stenosis.           1/13/2025.  MRI of the lumbar spine shows no STIR signal changes to suggest acute fractures, no malalignment, the conus terminates at a normal level, spondylosis with Modic endplate changes at L4-5 resulting  in mild thecal sac compression and bilateral foraminal narrowing and a small lateral disc protrusion at L5-S1 resulting in moderate left foraminal stenosis.          Lumbar Stenosis         [facility]  Neurodiagnostics Department  [address] [place] [address]  Phone: [phone]        Test Date:  2025     Patient: MILAGROS STEWART : 1965 Physician: Mary Galvan M.D.   Sex: Female     Ref Phys: NIKOLAI THOMPSON   ID#: [id number]     Technician: Asuncion Donovan      Patient History:  Neck pain. Paresthesia in both arms, right more than left.     NCV Findings:  Evaluation of the right median/ulnar (palm) comparison nerve showed abnormal peak latency difference (Median Palm-Ulnar Palm, 0.7 ms).  All remaining nerves (as indicated in the following tables) were within normal limits.        EMG Findings:  All examined muscles (as indicated in the following table) showed no evidence of electrical instability.        Impressions: The results of this study are abnormal. There is electrodiagnostic evidence of mild, right median neuropathy at the wrist.      There is no electrodiagnostic evidence of median neuropathy at the wrist on the left, ulnar neuropathy at the elbow on either side, cervical radiculopathy on either side.     [facility]  Neurodiagnostics Department  [address] [place], [place] [address]  Phone: [phone]        Test Date:  2025     Patient: MILAGROS STEWART : 1965 Physician: Mary Galvan M.D.   Sex: Female     Ref Phys: Nikolai Thompson APREILEEN   ID#: [id number]     Technician: Rebeca Moody      Patient History:  Patient reports she has low back pain with bilateral leg pain and paresthesia, worse on the right.      NCV Findings:  Evaluation of the left peroneal motor and the right peroneal motor nerves showed reduced amplitude (Ankle, L1.3, R1.9 mV) and reduced amplitude (Poplt, L1.2, R1.8 mV).  All remaining nerves (as indicated in the following  tables) were within normal limits.  All left vs. right side differences were within normal limits.        EMG Findings:  All examined muscles (as indicated in the following table) showed no evidence of electrical instability.        Impressions: The results of this study are minimally abnormal. The low amplitude bilateral peroneal motor studies are of dubious significance. This would not correlate with her symptoms and is likely  not significant.      There is no electrodiagnostic evidence of lumbosacral radiculopathy or a large fiber polyneuropathy on either side.            _____________________________  Mary Galvan M.D.    Results  Imaging   - MRI of cervical spine: No evidence of cervical stenosis, spondylolisthesis, fracture, tumors, masses, or infection.   - MRI of lumbar spine: Some degenerative disk disease at L4-5 and L5-S1, but no evidence of pinched nerves, shifting of the bones, spondylolisthesis, stenosis, tumors, masses, infection, or fracture bones.  - Cervical and thoracic x-rays with degenerative changes but no evidence of spondylolisthesis or fracture.    Diagnostic Testing   - EMG nerve conduction study: Mild right carpal tunnel disease.        Assessment/Plan:   Desirae Alberto is a 60 y.o. female with significant medical comorbidities to include TIA, GI related cancer, hyperlipidemia, hypothyroidism, GERD, former smoker, and obesity.  She presents with a known problem of lumbar back and bilateral nondermatomal leg pain and dysesthesias, as well as a new complaint of persistent neck pain with bilateral hand numbness and tingling.  Physical exam findings of positive Tinel's over the bilateral cubital fossa and right median nerve, absent bilateral Achilles, and equivocal plantar reflexes.  MRI of the cervical spine and lumbar spine with degenerative changes most noticeable at L4/5 but no evidence of spondylolisthesis, stenosis, tumors or masses or signs of infection.  No evidence of  deformity.     Recommendations:  Axial Lumbar Pain:    Define his back pain without significant radiculopathy or weakness.  This can include referred pain radiating down posterior thighs without decent below the knees.     Differential diagnosis for imaging negative back pain  Acute (<6 weeks) myofascial pain, drug induced myalgias (statins, Neulasta, or phosphodiesterase type V inhibitors such as tadalafil).   Subacute (6 weeks - 3 months) multiple myeloma, injectable bone metabolism agents such as prolia, sacroiliitis.   Chronic (>3 months) facet arthropathy, failed back syndrome after surgery, flat back syndrome. Spondyloarthropathies including ankylosis spondylitis (HLA-B27), Paget's disease.  Fibromyalgia or other rheumatological disease. Malingering, conversion disorder and secondary gain are diagnoses of exclusion.    Desirae's signs and symptoms are most concerning for fibromyalgia given her synptoms of whole-body intermittent sharp shooting electrical pain and signs of intact neurological exam.  Furthermore her imaging shows no evidence of stenosis, spondylolisthesis, deformity, tumors, masses, or infection.    Desirae and I reviewed her history of presenting illness, physical examination, and relevant imaging.  The role for surgical evaluation is to determine if there is a surgical intervention that can partially or totally relieve the patient's current pain complaints.  I explained that the role for surgery is limited to 5 broad categories including ...  Bone fractures  Compression (stenosis) of the spinal cord or nerve roots from degeneration, infection, or neoplasm  Abnormal movements of the bones (I.e. spondylolisthesis)  Spinal deformity  Consideration of spinal cord stimulator or intrathecal pain pump after evaluation by Pain Management     A structural diagnosis is possible and only 50% of patients with chronic back pain.  These patients account for 85% of the cost of lost work and compensation.  (Srini. Back Pain and Sciatica. NEJM.1988; 318:291-300).    Fortunately Desirae has no evidence of any of the above indications.  Therefore, I explained that, following national guidelines, surgical intervention is unlikely to ameliorate her current pain complaints. Several large studies have shown that spinal surgery for isolated axial back pain without radiculopathy has no significant effect on objective outcomes measures. Desirae voiced understanding and was relieved to learn they would not be needing surgery.    TREATMENT RECOMMENDATIONS ...  - Back exercises handout provided to patient  - Chiropractic (spinal manipulation therapy) for 1 month for Axial back pain WO radiculopathy. [facility]. [phone]. [address] [place] [address]  - Cymbalta/Duloxetine 30mg QD x 7D then increase to 60mg QD (MSK, Fibromyalgia, peripheral neuropathy)  - Pain management referral for Epidural/foraminal injections  - Acupuncture.  In a randomized trial of chronic LBP was more effective than sham.  x Oral steroids have no benefit over placebo  - Inflammatory workup: HLA-B27, Rheumatoid Factor, CCP, RYAN, anti-ds DNA, Anti-Valentin, antiphospholipid antibodies, Anti-SSA, anti-SSB.  - Myopathy evaluation: CK, aldolase, SGOT, SGPT, LDH and consider EMG and biopsy.   - No further surgical evaluation should be sought unless new neurological deficit or imaging finding.      Assessment & Plan  1. Cervical and lumbar pain.  The patient's symptoms do not align with the presence of single-level degenerative disc disease. There is no evidence of radiculopathy on EMG/NCS in the upper and lower extremities.  No evidence of stenosis in the lumbar spine or cervical spine. Her intermittent, widespread pain that responds to Lyrica suggests a possible diagnosis of fibromyalgia. Given the absence of surgical indications, pain management is deemed the most appropriate course of action. A comprehensive panel of rheumatological and  myopathy tests will be conducted. If these tests return positive results, a referral to rheumatology will be made. If all tests are negative, she will continue with pain management.    2. Nerve pain.  The patient reports nerve pain that worsens with the consumption of diet coke. She is currently on gabapentin and has been switched to Lyrica due to side effects. The patient will continue with Lyrica for nerve pain management.    3. Mild right carpal tunnel  EMG nerve conduction study confirms mild right carpal tunnel disease.  Patient has numbness and tingling and pain in all 5 fingers outside of the distribution of the median nerve.  Carpal tunnel release is not indicated when symptoms occur outside of the distribution of the median nerve.        There are no diagnoses linked to this encounter.    No follow-ups on file.    Thank you, for allowing me to continue to participate in the care of this patient.    Sincerely,    Grant Medeiros MD    Patient or patient representative verbalized consent for the use of Ambient Listening during the visit with  Grant Medeiros MD for chart documentation. 5/21/2025  10:20 CST     I spent 20 minutes caring for Desirae on this date of service. This time includes time spent by me in the following activities: preparing for the visit, reviewing tests, obtaining and/or reviewing a separately obtained history, performing a medically appropriate examination and/or evaluation, counseling and educating the patient/family/caregiver, ordering medications, tests, or procedures, referring and communicating with other health care professionals, documenting information in the medical record, independently interpreting results and communicating that information with the patient/family/caregiver, and/or care coordination.     Medical Decision Making (2/3)  Problem Points (2,3,4 or more)  Undiagnosed new problem (Mod)  Chronic Stable, 2 or more (Mod)  Data Points (2,3,4 or  more)  CATEGORY 1  INDEPENDENT INTERPRETATION Imaging = 4  REVIEWED other tests (EMG, NCS, LOGAN, echo, ekg, PFT) = 2.  ORDERED Lab ordered = 5  CATEGORY 2  Independent interpretation of test performed by another physician/NPP (Cat 2)  CATEGORY 3  Risk (Low, Mod, High)  LOW    E/M = MDM 2 out of 3   or  Time  Est Level 4 - 30173 = Mod + (Cat1=3pts or Cat2 or Cat3) + Mod Risk   or   45-59 min

## 2025-05-21 NOTE — PROGRESS NOTES
Chief complaint:   Chief Complaint   Patient presents with    Lumbar back pain     Patient is here to follow up due to lumbar back pain,neck pain that radiates down both arms and legs. Patient states her right side is worse and her pain seems worse after drinking a diet coke. Patient did have EMG done on 02/26/2025 and has completed physical therapy.      Subjective     HPI:   History of Present Illness  The patient presents for evaluation of lower back pain.    She has been experiencing lower back pain since the age of 32, which has progressively worsened to the point of immobility. The pain is bilateral, affecting both upper and lower extremities, with occasional numbness and tingling in her arms and feet. She also reports intermittent whole arm and leg pain, medial thigh pain, and constant pelvic pain. Chiropractic care has been sought for several years. No assistive devices are used for mobility, but occasional gait abnormalities are noted. Her chiropractor identified a leg length discrepancy of 0.25 inches, which is currently being addressed, along with a rib out of place. No diagnosis of fibromyalgia has been made, nor has she seen a rheumatologist or consulted another spine surgeon, but she did see Rafy Gamez previously. Physical therapy and nerve testing have been completed, yielding normal results. Pain management has not been sought. An appointment scheduled with Dr. Sheffield for an injection 2 weeks ago was rescheduled due to work commitments. Various treatments have been tried, including Flexeril, Voltaren, Norco, and pregabalin. A muscle relaxer was taken last night due to severe pain. Footwear has been changed in an attempt to alleviate symptoms. Gabapentin has been used for the past 6 years, providing some relief, though the medication induces drowsiness and blurriness, particularly when the dosage is increased. Subsequently, she was switched to Lyrica.    Back pain has evolved into nerve pain,  "predominantly on the right side, which she believes is exacerbated by the consumption of Diet Coke. Severe nerve pain radiates down her arm, necessitating the use of a pillow for support during sleep.    A long-standing issue with bladder leakage is reported, but no fecal incontinence is noted.    Recently, shingles developed, raising concerns about potential interactions between medications.    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      Oswestry Disability Index = 20%   Score   Pain Intensity Fairly severe pain-3   Personal Care Look after myself without pain-0   Lifting Can lift heavy weights with extra pain-1   Walking Walk any distance-0   Sitting Sit in \"favorite\" chair as long as I like-1   Standing Stand as long as I like but with extra pain-1   Sleeping Can only sleep < 6 hrs-2   Sex Life (if applicable) Not applicable-0   Social Life Social life normal, but increases pain-1   Traveling Travel gives me extra pain-1   ([place] et al, 1980)    SCORE INTERPRETATION OF THE OSWESTRY LBP DISABILITY QUESTIONNAIRE     0-20% Minimal disability.  Can cope with most ADLs. Usually no treatment is needed, apart from advice on lifting, sitting, posture, physical fitness, and diet.  In this group, some patients have particular difficulty with sitting and this may be important if their occupation is sedentary (, , etc.).    ROS  Review of Systems   Constitutional: Negative.    HENT: Negative.     Eyes: Negative.    Respiratory: Negative.     Cardiovascular: Negative.    Gastrointestinal: Negative.    Endocrine: Negative.    Genitourinary: Negative.    Musculoskeletal:  Positive for back pain, neck pain and neck stiffness.   Skin: Negative.    Allergic/Immunologic: Negative.    Neurological:  Positive for numbness.   Hematological: Negative.    Psychiatric/Behavioral: Negative.     All other systems reviewed and are negative.    PFSH:  Past Medical History:   Diagnosis " Date    Arthritis     Cancer     lymphoma of small bowel    Cataract     Chronic constipation     Family history of colon cancer     GERD (gastroesophageal reflux disease)     Glaucoma     History of gastroesophageal reflux (GERD) 2019    History of high cholesterol 8/2019    History of melanoma 2014    Malt Lymphoma/sm bowel resection    Hyperlipidemia     Hypothyroid 2016    Hypothyroidism     Low back pain     Ovarian cyst 1995    TIA (transient ischemic attack)     Urinary incontinence      Past Surgical History:   Procedure Laterality Date    BREAST EXCISIONAL BIOPSY Left 01/2016    Benign    CHOLECYSTECTOMY      COLON RESECTION SMALL BOWEL  2014    COLONOSCOPY  09/05/2019    Dr. Tabares-Internal and external hemorrhoids; Repeat 10 years    COLONOSCOPY N/A 03/07/2025    Procedure: COLONOSCOPY WITH ANESTHESIA;  Surgeon: Clotilde Friedman MD;  Location: [facility] ENDOSCOPY;  Service: Gastroenterology;  Laterality: N/A;  pre op:  FH: colon cancer  post op: polyp  pcp: Gibson George MD    ENDOSCOPY  09/05/2019    Dr. Tabares-Possible Hightower's esophagus-biopsied    ENDOSCOPY N/A 06/24/2022    LA Grade C reflux esophagitis with no bleeding; A large amount of food (residue) in the stomach; Normal examined duodenum; No specimens collected; Repeat 6 weeks    ENDOSCOPY N/A 08/05/2022    Normal esophagus; Normal stomach; Normal examined duodenum; No specimens collected    KNEE ARTHROSCOPY Right 07/14/2020    Procedure: RIGHT KNEE PARTIAL MEDIAL MENISCECTOMY;  Surgeon: Vijay Grewal MD;  Location: [facility] OR;  Service: Orthopedics;  Laterality: Right;    LAPAROSCOPIC CHOLECYSTECTOMY  2016    LAPAROSCOPIC TUBAL LIGATION      TOTAL KNEE ARTHROPLASTY Right 07/28/2021    Procedure: RIGHT TOTAL KNEE REPLACEMENT;  Surgeon: Eliecer Foley MD;  Location: [facility] OR;  Service: Orthopedics;  Laterality: Right;    TRANSURETHRAL RESECTION OF BLADDER TUMOR N/A 07/12/2022    Procedure: CYSTOSCOPY TRANSURETHRAL  "RESECTION OF BLADDER TUMOR;  Surgeon: Joel Poe MD;  Location: [facility] OR;  Service: Urology;  Laterality: N/A;    TUBAL ABDOMINAL LIGATION  1988    VAGINAL MESH REVISION      2010/2016    WISDOM TOOTH EXTRACTION       Objective      Current Outpatient Medications   Medication Sig Dispense Refill    cyclobenzaprine (FLEXERIL) 10 MG tablet Take 1 tablet by mouth 3 (Three) Times a Day As Needed for Muscle Spasms. 270 tablet 3    diclofenac (VOLTAREN) 50 MG EC tablet Take 1 tablet by mouth 2 (Two) Times a Day As Needed for Pain. 60 tablet 5    doxycycline (VIBRAMYCIN) 100 MG capsule Take 1 capsule by mouth 2 (Two) Times a Day. 14 capsule 0    estradiol (Soledad) 0.05 MG/24HR patch Place 1 patch on the skin as directed by provider 2 (Two) Times a Week. Remove old patch before applying new. 24 patch 3    HYDROcodone-acetaminophen (NORCO) 7.5-325 MG per tablet Take 1 tablet by mouth Every 6 (Six) Hours As Needed for Moderate Pain for up to 3 days. 12 tablet 0    lidocaine (XYLOCAINE) 5 % ointment Apply 1 application  topically to the appropriate area as directed Every 2 (Two) Hours As Needed for Mild Pain. 35.44 g 11    linaclotide (Linzess) 72 MCG capsule capsule Take 1 capsule by mouth Every Morning Before Breakfast. 90 capsule 3    pantoprazole (PROTONIX) 40 MG EC tablet Take 1 tablet by mouth Daily. 90 tablet 3    pregabalin (Lyrica) 100 MG capsule Take 1 capsule by mouth 2 (Two) Times a Day. 180 capsule 0    Progesterone (PROMETRIUM) 200 MG capsule Take 1 capsule by mouth Daily. 90 capsule 3    Tirzepatide-Weight Management (ZEPBOUND) 7.5 MG/0.5ML solution auto-injector Inject 0.5 mL under the skin into the appropriate area as directed 1 (One) Time Per Week. 6 mL 2    Vibegron (Gemtesa) 75 MG tablet Take 1 tablet by mouth Daily. 90 tablet 3     No current facility-administered medications for this visit.       Vital Signs  Ht 160 cm (63\")   Wt 77.1 kg (170 lb)   LMP 06/30/2019   BMI 30.11 kg/m²   Physical " Exam  Vitals and nursing note reviewed.   Constitutional:       General: She is not in acute distress.     Appearance: Normal appearance. She is well-developed and well-groomed. She is obese. She is not ill-appearing, toxic-appearing or diaphoretic.      Comments: BMI 31.85   HENT:      Head: Normocephalic and atraumatic.      Right Ear: Hearing normal.      Left Ear: Hearing normal.   Eyes:      General: Lids are normal.      Extraocular Movements: Extraocular movements intact.      Conjunctiva/sclera: Conjunctivae normal.      Pupils: Pupils are equal, round, and reactive to light.   Neck:      Trachea: Trachea normal.   Cardiovascular:      Rate and Rhythm: Normal rate and regular rhythm.   Pulmonary:      Effort: Pulmonary effort is normal. No tachypnea, bradypnea, accessory muscle usage or respiratory distress.   Abdominal:      Palpations: Abdomen is soft.   Musculoskeletal:      Cervical back: Full passive range of motion without pain and neck supple.   Skin:     General: Skin is warm and dry.   Neurological:      Mental Status: She is alert.      GCS: GCS eye subscore is 4. GCS verbal subscore is 5. GCS motor subscore is 6.      Coordination: Coordination is intact.      Deep Tendon Reflexes:      Reflex Scores:       Tricep reflexes are 2+ on the right side and 2+ on the left side.       Bicep reflexes are 2+ on the right side and 2+ on the left side.       Brachioradialis reflexes are 2+ on the right side and 2+ on the left side.       Patellar reflexes are 2+ on the right side and 2+ on the left side.       Achilles reflexes are 0 on the right side and 0 on the left side.  Psychiatric:         Speech: Speech normal.         Behavior: Behavior normal. Behavior is cooperative.       Neurological Exam  Mental Status  Alert. Speech is normal. Language is fluent with no aphasia. Attention and concentration are normal.    Cranial Nerves  CN II: Visual acuity is normal.  CN III, IV, VI: Extraocular movements  intact bilaterally. Normal lids and orbits bilaterally. Pupils equal round and reactive to light bilaterally.  CN V: Facial sensation is normal.  CN VII: Full and symmetric facial movement.  CN IX, X: Palate elevates symmetrically  CN XI: Shoulder shrug strength is normal.    Motor  Normal muscle bulk throughout. Normal muscle tone.                                               Right                     Left  Deltoid                                   5                          5   Biceps                                   5                          5   Triceps                                  5                          5   Wrist extensor                       5                          5   Finger flexor                          5                          5   Iliopsoas                               5                          5   Quadriceps                           5                          5   Gastrocnemius                     5                           5   Anterior tibialis                      5                          5  Extensor hallucis longus      5                           5    Sensory  Light touch is normal in upper and lower extremities.     Reflexes                                            Right                      Left  Brachioradialis                    2+                         2+  Biceps                                 2+                         2+  Triceps                                2+                         2+  Patellar                                2+                         2+  Achilles                                0                         0  Right Plantar: equivocal  Left Plantar: equivocal    Right pathological reflexes: Anselmo's absent. Ankle clonus absent.  Left pathological reflexes: Anselmo's absent. Ankle clonus absent.    Coordination    Finger-to-nose, rapid alternating movements and heel-to-shin normal bilaterally without dysmetria.    Gait  Casual gait is normal  including stance, stride, and arm swing.  (12 bullet pts)    Female  strength (pounds)  AGE Right Hand RH Norms Left Hand LH Norms   20-24  70±14.5  61±13.1   25-29  75±13.9  63.5±12   30-34  79±19.2  68±17.7   35-39  74±10.8  66±11.7   40-44  70±13.5  62±13.8   45-49  62±15.1  56±12.7   50-54  66±11.6  57±10.7   55-59 *85 57±12.5 78 47±11.9   60-64  55±10.1  46±10.1   65-69  50±9.7  41±8.2   70-74  50±11.7  42±10.2   75+  43±11.0  38±8.9   (CATIE Ceron et al; Hand Dynometer: Effects of trials and sessions.  Perpetual and Motor Skills 61:195-8, 1985)  * = Dominant hand  > = Intervention    Results Review:   2024.  X-rays of the cervical spine show evidence of acute fractures, severe spondylosis at C3-4 and C4-5, and anteriolisthesis of C7 over T1.          2025.  MRI of the cervical spine shows no STIR signal change suggest acute fractures, no cord signal change, multilevel degenerative changes to include spondylosis at C3-4 and C4-5 and anteriolisthesis of C7 over T1 resulting in multiple areas of right foraminal narrowing without significant central canal or foraminal stenosis.           2025.  MRI of the lumbar spine shows no STIR signal changes to suggest acute fractures, no malalignment, the conus terminates at a normal level, spondylosis with Modic endplate changes at L4-5 resulting in mild thecal sac compression and bilateral foraminal narrowing and a small lateral disc protrusion at L5-S1 resulting in moderate left foraminal stenosis.          Lumbar Stenosis         [facility]  Neurodiagnostics Department  [address] [place] [address]  Phone: [phone]        Test Date:  2025     Patient: MILAGROS STEWART : 1965 Physician: Mary Galvan M.D.   Sex: Female     Ref Phys: TOYA MARTINEZ   ID#: [id number]     Technician: Asuncion Donovan      Patient History:  Neck pain. Paresthesia in both arms, right more than left.     NCV Findings:  Evaluation of the  right median/ulnar (palm) comparison nerve showed abnormal peak latency difference (Median Palm-Ulnar Palm, 0.7 ms).  All remaining nerves (as indicated in the following tables) were within normal limits.        EMG Findings:  All examined muscles (as indicated in the following table) showed no evidence of electrical instability.        Impressions: The results of this study are abnormal. There is electrodiagnostic evidence of mild, right median neuropathy at the wrist.      There is no electrodiagnostic evidence of median neuropathy at the wrist on the left, ulnar neuropathy at the elbow on either side, cervical radiculopathy on either side.     [facility]  Neurodiagnostics Department  [address] [place] [address]  Phone: [phone]        Test Date:  2025     Patient: MILAGROS STEWART : 1965 Physician: Mary Galvan M.D.   Sex: Female     Ref Phys: Nikolai Jay APRN   ID#: [id number]     Technician: Rebeca Moody      Patient History:  Patient reports she has low back pain with bilateral leg pain and paresthesia, worse on the right.      NCV Findings:  Evaluation of the left peroneal motor and the right peroneal motor nerves showed reduced amplitude (Ankle, L1.3, R1.9 mV) and reduced amplitude (Poplt, L1.2, R1.8 mV).  All remaining nerves (as indicated in the following tables) were within normal limits.  All left vs. right side differences were within normal limits.        EMG Findings:  All examined muscles (as indicated in the following table) showed no evidence of electrical instability.        Impressions: The results of this study are minimally abnormal. The low amplitude bilateral peroneal motor studies are of dubious significance. This would not correlate with her symptoms and is likely  not significant.      There is no electrodiagnostic evidence of lumbosacral radiculopathy or a large fiber polyneuropathy on either side.            _____________________________  Mary  ALBERTO Galvan M.D.    Results  Imaging   - MRI of cervical spine: No evidence of cervical stenosis, spondylolisthesis, fracture, tumors, masses, or infection.   - MRI of lumbar spine: Some degenerative disk disease at L4-5 and L5-S1, but no evidence of pinched nerves, shifting of the bones, spondylolisthesis, stenosis, tumors, masses, infection, or fracture bones.  - Cervical and thoracic x-rays with degenerative changes but no evidence of spondylolisthesis or fracture.    Diagnostic Testing   - EMG nerve conduction study: Mild right carpal tunnel disease.        Assessment/Plan:   Desirae Alberto is a 60 y.o. female with significant medical comorbidities to include TIA, GI related cancer, hyperlipidemia, hypothyroidism, GERD, former smoker, and obesity.  She presents with a known problem of lumbar back and bilateral nondermatomal leg pain and dysesthesias, as well as a new complaint of persistent neck pain with bilateral hand numbness and tingling.  Physical exam findings of positive Tinel's over the bilateral cubital fossa and right median nerve, absent bilateral Achilles, and equivocal plantar reflexes.  MRI of the cervical spine and lumbar spine with degenerative changes most noticeable at L4/5 but no evidence of spondylolisthesis, stenosis, tumors or masses or signs of infection.  No evidence of deformity.     Recommendations:  Axial Lumbar Pain:    Define his back pain without significant radiculopathy or weakness.  This can include referred pain radiating down posterior thighs without decent below the knees.     Differential diagnosis for imaging negative back pain  Acute (<6 weeks) myofascial pain, drug induced myalgias (statins, Neulasta, or phosphodiesterase type V inhibitors such as tadalafil).   Subacute (6 weeks - 3 months) multiple myeloma, injectable bone metabolism agents such as prolia, sacroiliitis.   Chronic (>3 months) facet arthropathy, failed back syndrome after surgery, flat back syndrome.  Spondyloarthropathies including ankylosis spondylitis (HLA-B27), Paget's disease.  Fibromyalgia or other rheumatological disease. Malingering, conversion disorder and secondary gain are diagnoses of exclusion.    Desirae's signs and symptoms are most concerning for fibromyalgia given her synptoms of whole-body intermittent sharp shooting electrical pain and signs of intact neurological exam.  Furthermore her imaging shows no evidence of stenosis, spondylolisthesis, deformity, tumors, masses, or infection.    Desirae and I reviewed her history of presenting illness, physical examination, and relevant imaging.  The role for surgical evaluation is to determine if there is a surgical intervention that can partially or totally relieve the patient's current pain complaints.  I explained that the role for surgery is limited to 5 broad categories including ...  Bone fractures  Compression (stenosis) of the spinal cord or nerve roots from degeneration, infection, or neoplasm  Abnormal movements of the bones (I.e. spondylolisthesis)  Spinal deformity  Consideration of spinal cord stimulator or intrathecal pain pump after evaluation by Pain Management     A structural diagnosis is possible and only 50% of patients with chronic back pain.  These patients account for 85% of the cost of lost work and compensation. (Srini. Back Pain and Sciatica. NEJM.1988; 318:291-300).    Fortunately Desirae has no evidence of any of the above indications.  Therefore, I explained that, following national guidelines, surgical intervention is unlikely to ameliorate her current pain complaints. Several large studies have shown that spinal surgery for isolated axial back pain without radiculopathy has no significant effect on objective outcomes measures. Desirae voiced understanding and was relieved to learn they would not be needing surgery.    TREATMENT RECOMMENDATIONS ...  - Back exercises handout provided to patient  - Chiropractic (spinal  manipulation therapy) for 1 month for Axial back pain WO radiculopathy. [facility]. [phone]. [address] [place] [address]  - Cymbalta/Duloxetine 30mg QD x 7D then increase to 60mg QD (MSK, Fibromyalgia, peripheral neuropathy)  - Pain management referral for Epidural/foraminal injections  - Acupuncture.  In a randomized trial of chronic LBP was more effective than sham.  x Oral steroids have no benefit over placebo  - Inflammatory workup: HLA-B27, Rheumatoid Factor, CCP, RYAN, anti-ds DNA, Anti-Valentin, antiphospholipid antibodies, Anti-SSA, anti-SSB.  - Myopathy evaluation: CK, aldolase, SGOT, SGPT, LDH and consider EMG and biopsy.   - No further surgical evaluation should be sought unless new neurological deficit or imaging finding.      Assessment & Plan  1. Cervical and lumbar pain.  The patient's symptoms do not align with the presence of single-level degenerative disc disease. There is no evidence of radiculopathy on EMG/NCS in the upper and lower extremities.  No evidence of stenosis in the lumbar spine or cervical spine. Her intermittent, widespread pain that responds to Lyrica suggests a possible diagnosis of fibromyalgia. Given the absence of surgical indications, pain management is deemed the most appropriate course of action. A comprehensive panel of rheumatological and myopathy tests will be conducted. If these tests return positive results, a referral to rheumatology will be made. If all tests are negative, she will continue with pain management.    2. Nerve pain.  The patient reports nerve pain that worsens with the consumption of diet coke. She is currently on gabapentin and has been switched to Lyrica due to side effects. The patient will continue with Lyrica for nerve pain management.    3. Mild right carpal tunnel  EMG nerve conduction study confirms mild right carpal tunnel disease.  Patient has numbness and tingling and pain in all 5 fingers outside of the distribution of the  median nerve.  Carpal tunnel release is not indicated when symptoms occur outside of the distribution of the median nerve.        There are no diagnoses linked to this encounter.    No follow-ups on file.    Thank you, for allowing me to continue to participate in the care of this patient.    Sincerely,    Grant Medeiros MD    Patient or patient representative verbalized consent for the use of Ambient Listening during the visit with  Grant Medeiros MD for chart documentation. 5/21/2025  10:20 CST     I spent 20 minutes caring for Desirae on this date of service. This time includes time spent by me in the following activities: preparing for the visit, reviewing tests, obtaining and/or reviewing a separately obtained history, performing a medically appropriate examination and/or evaluation, counseling and educating the patient/family/caregiver, ordering medications, tests, or procedures, referring and communicating with other health care professionals, documenting information in the medical record, independently interpreting results and communicating that information with the patient/family/caregiver, and/or care coordination.     Medical Decision Making (2/3)  Problem Points (2,3,4 or more)  Undiagnosed new problem (Mod)  Chronic Stable, 2 or more (Mod)  Data Points (2,3,4 or more)  CATEGORY 1  INDEPENDENT INTERPRETATION Imaging = 4  REVIEWED other tests (EMG, NCS, LOGAN, echo, ekg, PFT) = 2.  ORDERED Lab ordered = 5  CATEGORY 2  Independent interpretation of test performed by another physician/NPP (Cat 2)  CATEGORY 3  Risk (Low, Mod, High)  LOW    E/M = MDM 2 out of 3   or  Time  Est Level 4 - 93853 = Mod + (Cat1=3pts or Cat2 or Cat3) + Mod Risk   or   45-59 min

## 2025-05-27 ENCOUNTER — OFFICE VISIT (OUTPATIENT)
Dept: INTERNAL MEDICINE | Facility: CLINIC | Age: 60
End: 2025-05-27
Payer: COMMERCIAL

## 2025-05-27 VITALS
SYSTOLIC BLOOD PRESSURE: 122 MMHG | WEIGHT: 169.2 LBS | BODY MASS INDEX: 29.98 KG/M2 | TEMPERATURE: 98.4 F | OXYGEN SATURATION: 96 % | HEIGHT: 63 IN | DIASTOLIC BLOOD PRESSURE: 78 MMHG | HEART RATE: 90 BPM

## 2025-05-27 DIAGNOSIS — M51.360 DEGENERATION OF INTERVERTEBRAL DISC OF LUMBAR REGION WITH DISCOGENIC BACK PAIN: Primary | ICD-10-CM

## 2025-05-27 PROCEDURE — 99213 OFFICE O/P EST LOW 20 MIN: CPT | Performed by: FAMILY MEDICINE

## 2025-05-27 RX ORDER — HYDROCODONE BITARTRATE AND ACETAMINOPHEN 10; 325 MG/1; MG/1
1 TABLET ORAL EVERY 8 HOURS PRN
Qty: 90 TABLET | Refills: 0 | Status: SHIPPED | OUTPATIENT
Start: 2025-05-27

## 2025-05-27 NOTE — PROGRESS NOTES
Subjective     Chief Complaint   Patient presents with    Back Pain    Shoulder Pain       History of Present Illness    Patient's PMR from outside medical facility reviewed and noted.    Desirae Alberto is a 60 y.o. female who presents for a routine office visit.  Comes in for chronic lower back pain.  She states that her chronic lower back pain has been going on for some time recently has been getting significantly worse that she has been moving and lifting and pulling and tugging and doing a lot more work than she had previously would like something to help with her pain at this time she is currently taking Lyrica for nerve pain and understands that it is contraindicated to take this with narcotics but has tolerated it thus far and would like to continue on current dosages.  She is still awaiting a referral to pain management for possible epidural steroid injections    Pain described as sharp, shooting, and stabbing. does radiate and occurs continuously. Patient also experiences the following associated symptoms patient denies any problems .    Current pain level is a 5/10 .  Patient states that durring the past week that her pain has interfered with her enjoyment of life 5/10  with 10 bieng complete interference. The patient also states that during the past week that that the pain has interfered with her general activity 5/10  with 10 bieng complete interference. Patient reports that the pain improves with massage and pain medication, and gets worse with increased activity, lifting weight, and weather changes.  This pain limits the patient from minimally interferes with activities of daily living.    The treatment plan and the above PEG score are reviewed with the patient.  Patient feels that she is doing well with the pain and improvement provided to her by the medication.  Patient states she can do more activities of daily living while taking the medication that it significantly impacts the quality of  her life.  The patient reports no side effects of her medication, and have no evidence of oversedation, confusion, slurred speech, or abnormal gait at this time.  Patient reports she is compliant with her regimen and not modifying her own dosage and/or timing.  The patient reports that she is not taking any illicit substances, or alcohol.  Patient continues to do well with the current treatment plan and states that she would like to continue with opioid therapy at this time.  The patient has never had an overdose or needed a rescue medication we are aware of at this office.  A plan is in place discussed when initiating her narcotics for discontinuation should problems arise or pain improve.    Patient has an rx for zepbound, and need to lose weight but has not tolerated wegovy in the past and has been on it.      Final Peg score: 5    Otherwise no other problems, complaints, or concerns.     Current MME total: 30    Objective Radiological Findings: DDD lumbar pine    Trearment goals: reduced pain, Increased activities of daily living    History:   Duration of the patient's pain:  since before 2024   Legal issues? NO   Current controlled medication and doses: norco 10   Medications that have failed: Nsaids, gabapoentin, lyrica   Side effects of current medications: none   Satisfaction with treatment: yes    Functional Status:   Able to feed self: YES   Able to bathe self: YES   Able to use the toilet independently: YES   Able to dress self: YES   Able to get up from bed or chair without assistance: yes    Adverse Events:   Constipation: yes   Lethargy: NO     Aberrant Behavior:   Over dose: NO   Run out of medications early: NO   Last UDS consistent: yes   Taking medications as prescribed: YES  Refills for prescriptions appropriate: yes  Lost rx/pills: no  Taking more medication than prescribed: not applicable  Are you receiving PAIN medications from other doctors: no  Are currently pregnant? No  Recent ER visits: No           Past Medical History:   Past Medical History:   Diagnosis Date    Arthritis     Cancer     lymphoma of small bowel    Cataract     Chronic constipation     Family history of colon cancer     GERD (gastroesophageal reflux disease)     Glaucoma     History of gastroesophageal reflux (GERD) 2019    History of high cholesterol 8/2019    History of melanoma 2014    Malt Lymphoma/sm bowel resection    Hyperlipidemia     Hypothyroid 2016    Hypothyroidism     Low back pain     Ovarian cyst 1995    Shingles     TIA (transient ischemic attack)     Urinary incontinence      Past Surgical History:  Past Surgical History:   Procedure Laterality Date    BREAST EXCISIONAL BIOPSY Left 01/2016    Benign    CHOLECYSTECTOMY      COLON RESECTION SMALL BOWEL  2014    COLONOSCOPY  09/05/2019    Dr. Tabares-Internal and external hemorrhoids; Repeat 10 years    COLONOSCOPY N/A 03/07/2025    Procedure: COLONOSCOPY WITH ANESTHESIA;  Surgeon: Clotilde Friedman MD;  Location: [facility] ENDOSCOPY;  Service: Gastroenterology;  Laterality: N/A;  pre op:  FH: colon cancer  post op: polyp  pcp: Gibson George MD    ENDOSCOPY  09/05/2019    Dr. Tabares-Possible Hightower's esophagus-biopsied    ENDOSCOPY N/A 06/24/2022    LA Grade C reflux esophagitis with no bleeding; A large amount of food (residue) in the stomach; Normal examined duodenum; No specimens collected; Repeat 6 weeks    ENDOSCOPY N/A 08/05/2022    Normal esophagus; Normal stomach; Normal examined duodenum; No specimens collected    KNEE ARTHROSCOPY Right 07/14/2020    Procedure: RIGHT KNEE PARTIAL MEDIAL MENISCECTOMY;  Surgeon: Vijay Grewal MD;  Location: [facility] OR;  Service: Orthopedics;  Laterality: Right;    LAPAROSCOPIC CHOLECYSTECTOMY  2016    LAPAROSCOPIC TUBAL LIGATION      TOTAL KNEE ARTHROPLASTY Right 07/28/2021    Procedure: RIGHT TOTAL KNEE REPLACEMENT;  Surgeon: Eliecer Foley MD;  Location: [facility] OR;  Service: Orthopedics;  Laterality: Right;     TRANSURETHRAL RESECTION OF BLADDER TUMOR N/A 07/12/2022    Procedure: CYSTOSCOPY TRANSURETHRAL RESECTION OF BLADDER TUMOR;  Surgeon: Joel Poe MD;  Location: [facility] OR;  Service: Urology;  Laterality: N/A;    TUBAL ABDOMINAL LIGATION  1988    VAGINAL MESH REVISION      2010/2016    WISDOM TOOTH EXTRACTION       Social History:  reports that she quit smoking about 24 years ago. Her smoking use included cigarettes. She started smoking about 34 years ago. She has a 5 pack-year smoking history. She has never used smokeless tobacco. She reports that she does not currently use alcohol. She reports that she does not use drugs.    Family History: family history includes Cancer in her maternal aunt, paternal aunt, paternal aunt, and paternal aunt; Colon cancer in her paternal aunt, paternal aunt, and paternal aunt; Colon cancer (age of onset: 68) in her paternal aunt; Colon cancer (age of onset: 80) in her paternal aunt; Diverticulitis in her mother; Heart defect in her mother; Liver cancer in her paternal aunt; No Known Problems in her father and sister; Stomach cancer (age of onset: 70) in her paternal aunt.      Allergies:  No Known Allergies  Medications:  Prior to Admission medications    Medication Sig Start Date End Date Taking? Authorizing Provider   azelastine (ASTELIN) 0.1 % nasal spray Instill 2 sprays into each nostril as directed by provider 2 (Two) Times a Day. 9/7/23  Yes Luz Mota APRN   cyclobenzaprine (FLEXERIL) 5 MG tablet Take 1 tablet by mouth 3 (Three) Times a Day As Needed for Muscle Spasms. 10/8/24  Yes Gibson George MD   diclofenac (VOLTAREN) 50 MG EC tablet Take 1 tablet by mouth 2 (Two) Times a Day As Needed for Pain. 2/14/25  Yes Gibson George MD   estradiol (Soledad) 0.05 MG/24HR patch Place 1 patch on the skin as directed by provider 2 (Two) Times a Week. Remove old patch before applying new. 11/28/24  Yes Apple Ivan APRN   estradiol  (MINIVELLE, VIVELLE-DOT) 0.05 MG/24HR patch Apply 1 patch to the appropriate area as directed twice weekly **Remove old patch before applying new patch** 9/30/24  Yes Gibson George MD   estradiol (MINIVELLE, VIVELLE-DOT) 0.05 MG/24HR patch Apply 1 patch to the appropriate area as directed twice weekly **Remove old patch before applying new patch** 10/1/24  Yes Gibson George MD   estradiol (MINIVELLE, VIVELLE-DOT) 0.05 MG/24HR patch Place 1 patch on the skin as directed by provider 2 (Two) Times a Week. remove old patch before applying new patch. 10/3/24  Yes Gibson George MD   gabapentin (NEURONTIN) 400 MG capsule Take 1 capsule by mouth 3 (Three) Times a Day. 2/17/25  Yes Gibson George MD   lidocaine (XYLOCAINE) 5 % ointment Apply 1 application  topically to the appropriate area as directed Every 2 (Two) Hours As Needed for Mild Pain. 11/29/23  Yes Gibson George MD   linaclotide (Linzess) 72 MCG capsule capsule Take 1 capsule by mouth Every Morning Before Breakfast. 1/17/25  Yes Christina Vaughn APRN   pantoprazole (PROTONIX) 40 MG EC tablet Take 1 tablet by mouth Daily. 1/17/25  Yes Christina Vaughn APRN   Progesterone (Prometrium) 200 MG capsule Take 1 capsule by mouth Daily. 3/28/25  Yes Apple Ivan APRN   Progesterone (PROMETRIUM) 200 MG capsule Take 1 capsule by mouth Daily. 3/28/25  Yes Apple Ivan APRN   Tirzepatide-Weight Management (Zepbound) 5 MG/0.5ML solution auto-injector Inject 0.5 mL under the skin into the appropriate area as directed 1 (One) Time Per Week. 4/7/25  Yes Gibson George MD   triamcinolone (KENALOG) 0.5 % ointment Apply topically to the appropriate area as directed 2 (Two) Times a Day. 9/17/24  Yes Gibson George MD   Vibegron (Gemtesa) 75 MG tablet Take 1 tablet by mouth Daily. 11/26/24  Yes Apple Ivan, APRN           Review of systems   negative unless otherwise specified above in  "HPI    Objective     Vital Signs: /78 (BP Location: Right arm)   Pulse 90   Temp 98.4 °F (36.9 °C) (Temporal)   Ht 160 cm (62.99\")   Wt 76.7 kg (169 lb 3.2 oz)   LMP 06/30/2019   SpO2 96%   BMI 29.98 kg/m²     Physical Exam  Vitals and nursing note reviewed.   Constitutional:       General: She is not in acute distress.     Appearance: Normal appearance.   HENT:      Head: Normocephalic.   Eyes:      Extraocular Movements: Extraocular movements intact.      Pupils: Pupils are equal, round, and reactive to light.   Cardiovascular:      Rate and Rhythm: Normal rate and regular rhythm.      Heart sounds: Normal heart sounds. No murmur heard.  Pulmonary:      Effort: Pulmonary effort is normal. No respiratory distress.      Breath sounds: Normal breath sounds. No rhonchi or rales.   Abdominal:      General: Abdomen is flat. Bowel sounds are normal.      Palpations: Abdomen is soft.   Neurological:      General: No focal deficit present.      Mental Status: She is alert.                Results Reviewed:  Glucose   Date Value Ref Range Status   10/08/2024 89 70 - 99 mg/dL Final   07/08/2022 117 (H) 65 - 99 mg/dL Final     BUN   Date Value Ref Range Status   10/08/2024 8 6 - 24 mg/dL Final   07/08/2022 14 6 - 20 mg/dL Final     Creatinine   Date Value Ref Range Status   10/08/2024 0.82 0.57 - 1.00 mg/dL Final   07/08/2022 0.80 0.57 - 1.00 mg/dL Final   09/17/2019 0.70 0.60 - 1.30 mg/dL Final     Comment:     Serial Number: [id number]:  [id number]     Sodium   Date Value Ref Range Status   10/08/2024 140 134 - 144 mmol/L Final   07/08/2022 141 136 - 145 mmol/L Final     Potassium   Date Value Ref Range Status   10/08/2024 4.4 3.5 - 5.2 mmol/L Final   07/08/2022 4.1 3.5 - 5.2 mmol/L Final     Chloride   Date Value Ref Range Status   10/08/2024 104 96 - 106 mmol/L Final   07/08/2022 106 98 - 107 mmol/L Final     CO2   Date Value Ref Range Status   07/08/2022 26.0 22.0 - 29.0 mmol/L Final     Total CO2   Date " Value Ref Range Status   10/08/2024 24 20 - 29 mmol/L Final     Calcium   Date Value Ref Range Status   10/08/2024 9.4 8.7 - 10.2 mg/dL Final   07/08/2022 9.3 8.6 - 10.5 mg/dL Final     ALT (SGPT)   Date Value Ref Range Status   10/08/2024 21 0 - 32 IU/L Final   06/23/2022 36 (H) 1 - 33 U/L Final     AST (SGOT)   Date Value Ref Range Status   10/08/2024 21 0 - 40 IU/L Final   06/23/2022 27 1 - 32 U/L Final     WBC   Date Value Ref Range Status   10/08/2024 3.5 3.4 - 10.8 x10E3/uL Final     Hematocrit   Date Value Ref Range Status   10/08/2024 42.1 34.0 - 46.6 % Final   07/08/2022 39.2 34.0 - 46.6 % Final     Platelets   Date Value Ref Range Status   10/08/2024 243 150 - 450 x10E3/uL Final   07/08/2022 222 140 - 450 10*3/mm3 Final     Total Cholesterol   Date Value Ref Range Status   02/28/2022 284 (H) 0 - 200 mg/dL Final     Triglycerides   Date Value Ref Range Status   10/08/2024 214 (H) 0 - 149 mg/dL Final   02/28/2022 313 (H) 0 - 150 mg/dL Final     HDL Cholesterol   Date Value Ref Range Status   10/08/2024 53 >39 mg/dL Final   02/28/2022 67 (H) 40 - 60 mg/dL Final     LDL Chol Calc (NIH)   Date Value Ref Range Status   10/08/2024 120 (H) 0 - 99 mg/dL Final     LDL/HDL Ratio   Date Value Ref Range Status   02/28/2022 2.30  Final     Hemoglobin A1C   Date Value Ref Range Status   10/08/2024 6.0 (H) 4.8 - 5.6 % Final     Comment:              Prediabetes: 5.7 - 6.4           Diabetes: >6.4           Glycemic control for adults with diabetes: <7.0     02/28/2022 5.8 % Final             Procedure   Procedures       Assessment / Plan     Assessment/Plan:   Diagnosis Plan   1. Degeneration of intervertebral disc of lumbar region with discogenic back pain  HYDROcodone-acetaminophen (NORCO)  MG per tablet              Return if symptoms worsen or fail to improve. unless patient needs to be seen sooner or acute issues arise.    As part of this patient's treatment plan, I am prescribing controlled substances. The  patient has been made aware of appropriate use of such medications, including potential risk of somnolence, limited ability to drive and /or work safely, and potential for dependence or overdose. It has also been made clear that these medications are for use by this patient only, without concomitant use of alcohol or other substances unless prescribed.  Patient has been advised that PRN or as needed pain medication allows the patient to skip doses if not needed, but that the medication is not to be taken more often or at higher doses than prescribed.    Patient has completed prescribing agreement detailing terms of continued prescribing of controlled substances, including monitoring THEODORA reports, urine drug screening, and pill counts if necessary. The patient is aware that inappropriate use will result in cessation of prescribing such medications, and possible termination from this practice.    History and physical exam exhibit continued safe and appropriate use of controlled substances.    1.  Controlled substance abuse agreement discussed and copy in record: YES  2.  Discussed:   Risk of addiction: YES   Specific risk of medications:YES   Side effects of medications: YES   Reasonable expectations of the medication: YES  3.  Treatment Objectives:   Discussed management of constipation: YES   Discussed management of other side effects: YES  4.  eKASPER:     THEODORA report has been reviewed by: Gibson George MD on 05/27/25 in the PDMD in the electronic medical record.   Results/Date of last THEODORA:  appropriate  5.  Results/Date of last drug screen:  appropriate  6.  Follow up plan:    Follow Up in One Months Time              Continue on current medications as directed    EMR Dictation/Transcription disclaimer:   Some of this note may be an electronic transcription/translation of spoken language to printed text. The electronic translation of spoken language may permit erroneous, or at times, nonsensical  words or phrases to be inadvertently transcribed; Although I have reviewed the note for such errors, some may still exist.     I have discussed the patient results/orders and and plan/recommendation with them at today's visit.      Signed by:    Gibson George MD Date: 05/27/25

## 2025-05-29 DIAGNOSIS — L03.116 CELLULITIS OF LEFT LOWER EXTREMITY: Primary | ICD-10-CM

## 2025-05-29 RX ORDER — SULFAMETHOXAZOLE AND TRIMETHOPRIM 800; 160 MG/1; MG/1
1 TABLET ORAL 2 TIMES DAILY
Qty: 20 TABLET | Refills: 0 | Status: SHIPPED | OUTPATIENT
Start: 2025-05-29 | End: 2025-06-08

## 2025-06-04 ENCOUNTER — TELEPHONE (OUTPATIENT)
Dept: INTERNAL MEDICINE | Facility: CLINIC | Age: 60
End: 2025-06-04

## 2025-06-04 ENCOUNTER — CLINICAL SUPPORT (OUTPATIENT)
Dept: INTERNAL MEDICINE | Facility: CLINIC | Age: 60
End: 2025-06-04
Payer: COMMERCIAL

## 2025-06-04 DIAGNOSIS — R07.9 CHEST PAIN, UNSPECIFIED TYPE: Primary | ICD-10-CM

## 2025-06-04 NOTE — TELEPHONE ENCOUNTER
[facility] insurance had me call David @ [phone]  I spoke with Vernell  she said the Zepbound should be pain for,  she rain a claim and it took it.    So she does not need a PA

## 2025-06-11 DIAGNOSIS — L03.116 CELLULITIS OF LEFT LOWER EXTREMITY: Primary | ICD-10-CM

## 2025-06-11 RX ORDER — CLINDAMYCIN HYDROCHLORIDE 300 MG/1
300 CAPSULE ORAL 3 TIMES DAILY
Qty: 42 CAPSULE | Refills: 0 | Status: SHIPPED | OUTPATIENT
Start: 2025-06-11 | End: 2025-06-26

## 2025-06-19 ENCOUNTER — PRIOR AUTHORIZATION (OUTPATIENT)
Dept: INTERNAL MEDICINE | Facility: CLINIC | Age: 60
End: 2025-06-19
Payer: COMMERCIAL

## 2025-06-19 NOTE — TELEPHONE ENCOUNTER
Desirae Alberto (Key: [id number])  Zepbound 7.5MG/0.5ML pen-injectors  Message from Plan  Your PA request has been approved. Additional information will be provided in the approval communication. (Message 1147). Authorization Expiration Date: June 19, 2026.

## 2025-07-02 DIAGNOSIS — M54.50 LOW BACK PAIN, UNSPECIFIED BACK PAIN LATERALITY, UNSPECIFIED CHRONICITY, UNSPECIFIED WHETHER SCIATICA PRESENT: ICD-10-CM

## 2025-07-02 RX ORDER — PREGABALIN 100 MG/1
100 CAPSULE ORAL 2 TIMES DAILY
Qty: 180 CAPSULE | Refills: 0 | Status: SHIPPED | OUTPATIENT
Start: 2025-07-02

## 2025-08-07 ENCOUNTER — PRIOR AUTHORIZATION (OUTPATIENT)
Dept: INTERNAL MEDICINE | Facility: CLINIC | Age: 60
End: 2025-08-07
Payer: COMMERCIAL

## (undated) DEVICE — [TOMCAT CUTTER, ARTHROSCOPIC SHAVER BLADE,  DO NOT RESTERILIZE,  DO NOT USE IF PACKAGE IS DAMAGED,  KEEP DRY,  KEEP AWAY FROM SUNLIGHT]: Brand: FORMULA

## (undated) DEVICE — CUTTING ELECTRODE MONO 24FR 12/30°  FOR RESECTOSCOPES, TELESCOPE Ø 4 MM, FOR SHEATHS, INTERMITTENT/CONTINUOUS IRRIGATION, 24/26 FR, LOOP: ROUND, WIRE Ø 0.25 MM, FORK COLOR YELLOW, STEM COLOR TRANSPARENT, PACK = 10 PCS, FOR SHARK RESECTOSCOPE, STERILE, FOR SINGLE USE: Brand: SHARK

## (undated) DEVICE — CUFF,BP,DISP,1 TUBE,ADULT,HP: Brand: MEDLINE

## (undated) DEVICE — POOLE SUCTION INSTRUMENT WITH REMOVABLE SHEATH: Brand: POOLE

## (undated) DEVICE — BNDG ELAS W/CLIP 6IN 10YD LF STRL

## (undated) DEVICE — PK KN ARTHSCP 30

## (undated) DEVICE — TBG SMPL FLTR LINE NASL 02/C02 A/ BX/100

## (undated) DEVICE — Device: Brand: SINGLE USE ELECTROSURGICAL SNARE SD-400

## (undated) DEVICE — SENSR O2 OXIMAX FNGR A/ 18IN NONSTR

## (undated) DEVICE — ANTIBACTERIAL UNDYED BRAIDED (POLYGLACTIN 910), SYNTHETIC ABSORBABLE SUTURE: Brand: COATED VICRYL

## (undated) DEVICE — SUT MONOCRYL PLS ANTIB UND 3/0  PS1 27IN

## (undated) DEVICE — NDL HYPO PRECISIONGLIDE/REG 18G 11/2 PNK

## (undated) DEVICE — SHORT LENS-STERILE

## (undated) DEVICE — PLUG,CATHETER,DRAINAGE PROTECTOR,TUBE: Brand: MEDLINE

## (undated) DEVICE — GLV SURG DERMASSURE GRN LF PF 8.0

## (undated) DEVICE — 3M™ STERI-DRAPE™ U-DRAPE 1015: Brand: STERI-DRAPE™

## (undated) DEVICE — DISPOSABLE TOURNIQUET CUFF SINGLE BLADDER, SINGLE PORT AND QUICK CONNECT CONNECTOR: Brand: COLOR CUFF

## (undated) DEVICE — SYR LL TP 10ML STRL

## (undated) DEVICE — BHS TURNOVER CYSTO: Brand: MEDLINE INDUSTRIES, INC.

## (undated) DEVICE — DOVER HYDROGEL COATED LATEX FOLEY CATHETER, 5 ML, 3-WAY 22 FR/CH (7.3 MM): Brand: DOVER

## (undated) DEVICE — RECIPROCATING BLADE, DOUBLE SIDED, OFFSET  (70.0 X 1.0 X 12.5MM)

## (undated) DEVICE — SYS CLS SKIN PREMIERPRO EXOFINFUSION 22CM

## (undated) DEVICE — CONMED SCOPE SAVER BITE BLOCK, 20X27 MM: Brand: SCOPE SAVER

## (undated) DEVICE — GLV SURG SENSICARE PI ORTHO SZ8.5 LF STRL

## (undated) DEVICE — PROB ABLAT SERFAS ENERGY 90S 3.5MM

## (undated) DEVICE — REAMR PAT W/PILOT/HL 46MM

## (undated) DEVICE — 4-PORT MANIFOLD: Brand: NEPTUNE 2

## (undated) DEVICE — PK TURNOVER RM ADV

## (undated) DEVICE — ARGYLE YANKAUER BULB TIP WITH VENT: Brand: ARGYLE

## (undated) DEVICE — 3M™ STERI-STRIP™ REINFORCED ADHESIVE SKIN CLOSURES, R1547, 1/2 IN X 4 IN (12 MM X 100 MM), 6 STRIPS/ENVELOPE: Brand: 3M™ STERI-STRIP™

## (undated) DEVICE — BIPOLAR SEALER 23-112-1 AQM 6.0: Brand: AQUAMANTYS™

## (undated) DEVICE — SYR LUERLOK 20CC BX/50

## (undated) DEVICE — CVR BRD ARM 13X30

## (undated) DEVICE — FOAM BUMP ROUND LARGE: Brand: MEDLINE INDUSTRIES, INC.

## (undated) DEVICE — THE CHANNEL CLEANING BRUSH IS A NYLON FLEXI BRUSH ATTACHED TO A FLEXIBLE PLASTIC SHEATH DESIGNED TO SAFELY REMOVE DEBRIS FROM FLEXIBLE ENDOSCOPES.

## (undated) DEVICE — NDL HYPO PRECISIONGLIDE REG 22G 1 1/2

## (undated) DEVICE — TBG ARTHRO FLOWSTEADY/ST DISP

## (undated) DEVICE — DEFENDO AIR WATER SUCTION AND BIOPSY VALVE KIT FOR  OLYMPUS: Brand: DEFENDO AIR/WATER/SUCTION AND BIOPSY VALVE

## (undated) DEVICE — MASK,OXYGEN,MED CONC,ADLT,7' TUB, UC: Brand: PENDING

## (undated) DEVICE — THE SINGLE USE ETRAP – POLYP TRAP IS USED FOR SUCTION RETRIEVAL OF ENDOSCOPICALLY REMOVED POLYPS.: Brand: ETRAP

## (undated) DEVICE — PK CYSTO 30

## (undated) DEVICE — YANKAUER,BULB TIP WITH VENT: Brand: ARGYLE

## (undated) DEVICE — 3M™ IOBAN™ 2 ANTIMICROBIAL INCISE DRAPE 6651EZ: Brand: IOBAN™ 2

## (undated) DEVICE — GLV SURG BIOGEL LTX PF 8

## (undated) DEVICE — PK KN TOTL 30

## (undated) DEVICE — Device: Brand: POWER-FLO®

## (undated) DEVICE — SYRINGE,PISTON,IRRIGATION,60ML,STERILE: Brand: MEDLINE

## (undated) DEVICE — SPNG GZ WOVN 4X4IN 12PLY 10/BX STRL

## (undated) DEVICE — UNDERCAST PADDING: Brand: DEROYAL

## (undated) DEVICE — CMT BONE REFOBACIN R W/GENT 1X40: Type: IMPLANTABLE DEVICE | Site: KNEE | Status: NON-FUNCTIONAL

## (undated) DEVICE — CLTH CLENS READYCLEANSE PERI CARE PK/5

## (undated) DEVICE — GLV SURG TRIUMPH MICRO PF LTX 8.5 STRL

## (undated) DEVICE — DUAL CUT SAGITTAL BLADE

## (undated) DEVICE — GLV SURG TRIUMPH MICRO PF LTX 7.5 STRL

## (undated) DEVICE — CMT BONE R 1X40: Type: IMPLANTABLE DEVICE | Site: KNEE | Status: NON-FUNCTIONAL

## (undated) DEVICE — SUCTION MAT (LOW PROFILE), 50X34: Brand: NEPTUNE

## (undated) DEVICE — Device: Brand: DEFENDO AIR/WATER/SUCTION AND BIOPSY VALVE

## (undated) DEVICE — GLV SURG BIOGEL M LTX PF 7 1/2